# Patient Record
Sex: MALE | Race: BLACK OR AFRICAN AMERICAN | Employment: FULL TIME | URBAN - NONMETROPOLITAN AREA
[De-identification: names, ages, dates, MRNs, and addresses within clinical notes are randomized per-mention and may not be internally consistent; named-entity substitution may affect disease eponyms.]

---

## 2017-10-12 ENCOUNTER — NURSE TRIAGE (OUTPATIENT)
Dept: ADMINISTRATIVE | Age: 37
End: 2017-10-12

## 2017-10-12 NOTE — TELEPHONE ENCOUNTER
Reason for Disposition   Alcohol use, abuse, and dependence: question about  (all triage questions negative)    Answer Assessment - Initial Assessment Questions  1. DO YOU DRINK: \"Do you drink alcohol, including beer, wine or hard liquor? \"      Hard liquir  2. HOW OFTEN: \"How many days per week do you typically drink alcohol? \"     Every day for years   3. HOW MUCH: \"How many drinks do you typically have on days when you drink? \" (one drink = 1.5 oz alcohol, 5 oz wine, 12 oz beer)      Drinks all day every day   4. MOST: \"What is the most that you have had to drink on any one occasion in the last   *No Answer* month? \"     5. LAST 24 HOURS: \"Have you had a drink within the last 24 hours? \"      yes6. DRINKING PROBLEM: \"Do you have or have you ever had a drinking problem? \"      Martha Carmichael been through alcohol anonymous- states that just makes him want to drink to hear other people talking about drinking, has been to [facility]- states that he is addicted and cannot stop. Had heard that there is a shot than they can give you. He is expecting me as the \"hospital\" to have all the answer. Was going to try to direct him to Outpatient addiction services to see if they could help-explaining they I do not have all the answers, but my job is to direct him to another source that might, if I do not have that information. Maribel Lo up at this point before I could even finish my questions or before I could get him the #. Was pleasant enough, but feeling that I am not understanding the additctive nature of alcohol. That is why I was trying to transfer to outpatient addiction services. But again, he hung up before I could do it. 7. DRUG PROBLEM: Valorie Ulloaipes you using any other drugs? \" (e.g., yes/no; cocaine, prescription medications, etc.)     Assessment not finished  8. SYMPTOMS: \"What symptoms are you currently experiencing? \" (e.g., none, tremors or shakiness, abdominal pain, vomiting, blackout spells)      na  9.  DETOX PROGRAM: Lucia Peng you ever gone through a detox program?\"      yes  10. THERAPIST: \"Do you have a counselor or therapist? Name? \"       No  11. SUPPORT: \"Who is with you now? \" \"Who do you live with?\" \"Do you have family or friends nearby who you can talk to?\" \"Are you a member of Alcoholics Anonymous? \"      no12. PREGNANCY: \"Is there any chance you are pregnant? \" \"When was your last menstrual period? \"      na    Protocols used: ALCOHOL ABUSE AND DEPENDENCE-ADULT-

## 2018-06-27 ENCOUNTER — HOSPITAL ENCOUNTER (OUTPATIENT)
Age: 38
Setting detail: OBSERVATION
Discharge: HOME | End: 2018-06-28
Attending: HOSPITALIST | Admitting: HOSPITALIST
Payer: MEDICARE

## 2018-06-27 ENCOUNTER — APPOINTMENT (OUTPATIENT)
Dept: CT IMAGING | Age: 38
End: 2018-06-27
Payer: MEDICARE

## 2018-06-27 DIAGNOSIS — F10.10 ALCOHOL ABUSE: ICD-10-CM

## 2018-06-27 DIAGNOSIS — D69.6 THROMBOCYTOPENIA (HCC): ICD-10-CM

## 2018-06-27 DIAGNOSIS — R56.9 ALCOHOL WITHDRAWAL SEIZURE WITHOUT COMPLICATION (HCC): Primary | ICD-10-CM

## 2018-06-27 DIAGNOSIS — E87.6 HYPOKALEMIA: ICD-10-CM

## 2018-06-27 DIAGNOSIS — F10.930 ALCOHOL WITHDRAWAL SEIZURE WITHOUT COMPLICATION (HCC): Primary | ICD-10-CM

## 2018-06-27 PROBLEM — K70.10 ALCOHOLIC HEPATITIS WITHOUT ASCITES: Status: ACTIVE | Noted: 2018-06-27

## 2018-06-27 LAB
ACETAMINOPHEN LEVEL: < 5 UG/ML (ref 0–20)
ALBUMIN SERPL-MCNC: 3.8 G/DL (ref 3.5–5.1)
ALP BLD-CCNC: 188 U/L (ref 38–126)
ALT SERPL-CCNC: 77 U/L (ref 11–66)
AMMONIA: 39 UMOL/L (ref 11–60)
AMPHETAMINE+METHAMPHETAMINE URINE SCREEN: NEGATIVE
ANION GAP SERPL CALCULATED.3IONS-SCNC: 26 MEQ/L (ref 8–16)
ANISOCYTOSIS: PRESENT
AST SERPL-CCNC: 274 U/L (ref 5–40)
BACTERIA: ABNORMAL /HPF
BARBITURATE QUANTITATIVE URINE: NEGATIVE
BASOPHILIA: ABNORMAL
BASOPHILIC STIPPLING: ABNORMAL
BASOPHILS # BLD: 0.4 %
BASOPHILS ABSOLUTE: 0 THOU/MM3 (ref 0–0.1)
BENZODIAZEPINE QUANTITATIVE URINE: NEGATIVE
BILIRUB SERPL-MCNC: 1.1 MG/DL (ref 0.3–1.2)
BILIRUBIN DIRECT: 0.4 MG/DL (ref 0–0.3)
BILIRUBIN URINE: ABNORMAL
BLOOD, URINE: NEGATIVE
BUN BLDV-MCNC: 3 MG/DL (ref 7–22)
CALCIUM SERPL-MCNC: 9.3 MG/DL (ref 8.5–10.5)
CANNABINOID QUANTITATIVE URINE: NEGATIVE
CASTS 2: ABNORMAL /LPF
CASTS UA: ABNORMAL /LPF
CHARACTER, URINE: CLEAR
CHLORIDE BLD-SCNC: 92 MEQ/L (ref 98–111)
CO2: 22 MEQ/L (ref 23–33)
COCAINE METABOLITE QUANTITATIVE URINE: NEGATIVE
COLOR: YELLOW
CREAT SERPL-MCNC: 0.7 MG/DL (ref 0.4–1.2)
CRYSTALS, UA: ABNORMAL
EOSINOPHIL # BLD: 0.8 %
EOSINOPHILS ABSOLUTE: 0.1 THOU/MM3 (ref 0–0.4)
EPITHELIAL CELLS, UA: ABNORMAL /HPF
ERYTHROCYTE [DISTWIDTH] IN BLOOD BY AUTOMATED COUNT: 14.1 % (ref 11.5–14.5)
ERYTHROCYTE [DISTWIDTH] IN BLOOD BY AUTOMATED COUNT: 47.9 FL (ref 35–45)
ETHYL ALCOHOL, SERUM: < 0.01 %
GFR SERPL CREATININE-BSD FRML MDRD: > 90 ML/MIN/1.73M2
GLUCOSE BLD-MCNC: 119 MG/DL
GLUCOSE BLD-MCNC: 119 MG/DL (ref 70–108)
GLUCOSE BLD-MCNC: 125 MG/DL (ref 70–108)
GLUCOSE URINE: NEGATIVE MG/DL
HCT VFR BLD CALC: 31.4 % (ref 42–52)
HEMOGLOBIN: 11.2 GM/DL (ref 14–18)
ICTOTEST: NEGATIVE
IMMATURE GRANS (ABS): 0.05 THOU/MM3 (ref 0–0.07)
IMMATURE GRANULOCYTES: 0.6 %
KETONES, URINE: 40
LEUKOCYTE ESTERASE, URINE: NEGATIVE
LIPASE: 66.7 U/L (ref 5.6–51.3)
LYMPHOCYTES # BLD: 16.9 %
LYMPHOCYTES ABSOLUTE: 1.3 THOU/MM3 (ref 1–4.8)
MAGNESIUM: 1.1 MG/DL (ref 1.6–2.4)
MCH RBC QN AUTO: 33.7 PG (ref 26–33)
MCHC RBC AUTO-ENTMCNC: 35.7 GM/DL (ref 32.2–35.5)
MCV RBC AUTO: 94.6 FL (ref 80–94)
MISCELLANEOUS 2: ABNORMAL
MONOCYTES # BLD: 5.4 %
MONOCYTES ABSOLUTE: 0.4 THOU/MM3 (ref 0.4–1.3)
NITRITE, URINE: NEGATIVE
NUCLEATED RED BLOOD CELLS: 0 /100 WBC
OPIATES, URINE: NEGATIVE
OSMOLALITY CALCULATION: 277.4 MOSMOL/KG (ref 275–300)
OXYCODONE: NEGATIVE
PH UA: 8
PHENCYCLIDINE QUANTITATIVE URINE: NEGATIVE
PLATELET # BLD: 68 THOU/MM3 (ref 130–400)
PLATELET ESTIMATE: ABNORMAL
PMV BLD AUTO: 10.1 FL (ref 9.4–12.4)
POTASSIUM SERPL-SCNC: 2.8 MEQ/L (ref 3.5–5.2)
PROLACTIN: 28.2 NG/ML
PROTEIN UA: 30
RBC # BLD: 3.32 MILL/MM3 (ref 4.7–6.1)
RBC URINE: ABNORMAL /HPF
RENAL EPITHELIAL, UA: ABNORMAL
SALICYLATE, SERUM: < 0.3 MG/DL (ref 2–10)
SCAN OF BLOOD SMEAR: NORMAL
SEG NEUTROPHILS: 75.9 %
SEGMENTED NEUTROPHILS ABSOLUTE COUNT: 6 THOU/MM3 (ref 1.8–7.7)
SODIUM BLD-SCNC: 140 MEQ/L (ref 135–145)
SPECIFIC GRAVITY, URINE: 1.02 (ref 1–1.03)
TARGET CELLS: ABNORMAL
TOTAL CK: 462 U/L (ref 55–170)
TOTAL PROTEIN: 6.8 G/DL (ref 6.1–8)
TROPONIN T: < 0.01 NG/ML
UROBILINOGEN, URINE: 1 EU/DL
WBC # BLD: 7.9 THOU/MM3 (ref 4.8–10.8)
WBC UA: ABNORMAL /HPF
YEAST: ABNORMAL

## 2018-06-27 PROCEDURE — 36415 COLL VENOUS BLD VENIPUNCTURE: CPT

## 2018-06-27 PROCEDURE — 72125 CT NECK SPINE W/O DYE: CPT

## 2018-06-27 PROCEDURE — 84484 ASSAY OF TROPONIN QUANT: CPT

## 2018-06-27 PROCEDURE — 82948 REAGENT STRIP/BLOOD GLUCOSE: CPT

## 2018-06-27 PROCEDURE — 83735 ASSAY OF MAGNESIUM: CPT

## 2018-06-27 PROCEDURE — 70450 CT HEAD/BRAIN W/O DYE: CPT

## 2018-06-27 PROCEDURE — 84146 ASSAY OF PROLACTIN: CPT

## 2018-06-27 PROCEDURE — 2580000003 HC RX 258: Performed by: PHYSICIAN ASSISTANT

## 2018-06-27 PROCEDURE — 83690 ASSAY OF LIPASE: CPT

## 2018-06-27 PROCEDURE — 82140 ASSAY OF AMMONIA: CPT

## 2018-06-27 PROCEDURE — 85025 COMPLETE CBC W/AUTO DIFF WBC: CPT

## 2018-06-27 PROCEDURE — 93005 ELECTROCARDIOGRAM TRACING: CPT | Performed by: PHYSICIAN ASSISTANT

## 2018-06-27 PROCEDURE — 96375 TX/PRO/DX INJ NEW DRUG ADDON: CPT

## 2018-06-27 PROCEDURE — 82550 ASSAY OF CK (CPK): CPT

## 2018-06-27 PROCEDURE — 80307 DRUG TEST PRSMV CHEM ANLYZR: CPT

## 2018-06-27 PROCEDURE — 2500000003 HC RX 250 WO HCPCS: Performed by: PHYSICIAN ASSISTANT

## 2018-06-27 PROCEDURE — 80053 COMPREHEN METABOLIC PANEL: CPT

## 2018-06-27 PROCEDURE — 6370000000 HC RX 637 (ALT 250 FOR IP): Performed by: PHYSICIAN ASSISTANT

## 2018-06-27 PROCEDURE — 81001 URINALYSIS AUTO W/SCOPE: CPT

## 2018-06-27 PROCEDURE — G0480 DRUG TEST DEF 1-7 CLASSES: HCPCS

## 2018-06-27 PROCEDURE — 96365 THER/PROPH/DIAG IV INF INIT: CPT

## 2018-06-27 PROCEDURE — 99220 PR INITIAL OBSERVATION CARE/DAY 70 MINUTES: CPT | Performed by: HOSPITALIST

## 2018-06-27 PROCEDURE — 82248 BILIRUBIN DIRECT: CPT

## 2018-06-27 PROCEDURE — 99285 EMERGENCY DEPT VISIT HI MDM: CPT

## 2018-06-27 PROCEDURE — 6360000002 HC RX W HCPCS: Performed by: PHYSICIAN ASSISTANT

## 2018-06-27 RX ORDER — POTASSIUM CHLORIDE 7.45 MG/ML
10 INJECTION INTRAVENOUS
Status: COMPLETED | OUTPATIENT
Start: 2018-06-28 | End: 2018-06-28

## 2018-06-27 RX ORDER — THIAMINE HYDROCHLORIDE 100 MG/ML
100 INJECTION, SOLUTION INTRAMUSCULAR; INTRAVENOUS DAILY
Status: DISCONTINUED | OUTPATIENT
Start: 2018-06-28 | End: 2018-06-27

## 2018-06-27 RX ORDER — THIAMINE HYDROCHLORIDE 100 MG/ML
100 INJECTION, SOLUTION INTRAMUSCULAR; INTRAVENOUS ONCE
Status: COMPLETED | OUTPATIENT
Start: 2018-06-27 | End: 2018-06-27

## 2018-06-27 RX ORDER — MAGNESIUM SULFATE IN WATER 40 MG/ML
2 INJECTION, SOLUTION INTRAVENOUS ONCE
Status: COMPLETED | OUTPATIENT
Start: 2018-06-28 | End: 2018-06-28

## 2018-06-27 RX ORDER — 0.9 % SODIUM CHLORIDE 0.9 %
1000 INTRAVENOUS SOLUTION INTRAVENOUS ONCE
Status: COMPLETED | OUTPATIENT
Start: 2018-06-27 | End: 2018-06-28

## 2018-06-27 RX ORDER — POTASSIUM CHLORIDE 20 MEQ/1
40 TABLET, EXTENDED RELEASE ORAL ONCE
Status: COMPLETED | OUTPATIENT
Start: 2018-06-27 | End: 2018-06-27

## 2018-06-27 RX ADMIN — SODIUM CHLORIDE 1000 ML: 9 INJECTION, SOLUTION INTRAVENOUS at 21:37

## 2018-06-27 RX ADMIN — FOLIC ACID 1 MG: 5 INJECTION, SOLUTION INTRAMUSCULAR; INTRAVENOUS; SUBCUTANEOUS at 23:14

## 2018-06-27 RX ADMIN — POTASSIUM CHLORIDE 40 MEQ: 20 TABLET, EXTENDED RELEASE ORAL at 23:00

## 2018-06-27 RX ADMIN — THIAMINE HYDROCHLORIDE 100 MG: 100 INJECTION, SOLUTION INTRAMUSCULAR; INTRAVENOUS at 22:12

## 2018-06-27 ASSESSMENT — ENCOUNTER SYMPTOMS
RHINORRHEA: 0
NAUSEA: 0
ABDOMINAL PAIN: 0
SHORTNESS OF BREATH: 0
BACK PAIN: 0
DIARRHEA: 0
VOMITING: 0
EYE REDNESS: 0
WHEEZING: 0
SORE THROAT: 0
EYE DISCHARGE: 0
COUGH: 0

## 2018-06-28 ENCOUNTER — APPOINTMENT (OUTPATIENT)
Dept: ULTRASOUND IMAGING | Age: 38
End: 2018-06-28
Payer: MEDICARE

## 2018-06-28 VITALS
BODY MASS INDEX: 16.91 KG/M2 | HEART RATE: 94 BPM | RESPIRATION RATE: 16 BRPM | OXYGEN SATURATION: 98 % | SYSTOLIC BLOOD PRESSURE: 141 MMHG | DIASTOLIC BLOOD PRESSURE: 100 MMHG | WEIGHT: 120.8 LBS | HEIGHT: 71 IN | TEMPERATURE: 99.2 F

## 2018-06-28 PROBLEM — F10.10 ALCOHOL ABUSE: Status: ACTIVE | Noted: 2018-06-28

## 2018-06-28 PROBLEM — D69.59 THROMBOCYTOPENIA CONCURRENT WITH AND DUE TO ALCOHOLISM (HCC): Status: ACTIVE | Noted: 2018-06-28

## 2018-06-28 PROBLEM — F10.20 THROMBOCYTOPENIA CONCURRENT WITH AND DUE TO ALCOHOLISM (HCC): Status: ACTIVE | Noted: 2018-06-28

## 2018-06-28 LAB
ALBUMIN SERPL-MCNC: 3.5 G/DL (ref 3.5–5.1)
ALP BLD-CCNC: 188 U/L (ref 38–126)
ALT SERPL-CCNC: 62 U/L (ref 11–66)
ANION GAP SERPL CALCULATED.3IONS-SCNC: 16 MEQ/L (ref 8–16)
AST SERPL-CCNC: 165 U/L (ref 5–40)
BASOPHILS # BLD: 0.3 %
BASOPHILS ABSOLUTE: 0 THOU/MM3 (ref 0–0.1)
BILIRUB SERPL-MCNC: 1.2 MG/DL (ref 0.3–1.2)
BUN BLDV-MCNC: < 2 MG/DL (ref 7–22)
CALCIUM SERPL-MCNC: 8.8 MG/DL (ref 8.5–10.5)
CHLORIDE BLD-SCNC: 96 MEQ/L (ref 98–111)
CO2: 24 MEQ/L (ref 23–33)
CREAT SERPL-MCNC: 0.5 MG/DL (ref 0.4–1.2)
EKG ATRIAL RATE: 92 BPM
EKG ATRIAL RATE: 99 BPM
EKG ATRIAL RATE: 99 BPM
EKG P AXIS: 81 DEGREES
EKG P AXIS: 82 DEGREES
EKG P AXIS: 84 DEGREES
EKG P-R INTERVAL: 122 MS
EKG P-R INTERVAL: 122 MS
EKG P-R INTERVAL: 124 MS
EKG Q-T INTERVAL: 378 MS
EKG Q-T INTERVAL: 386 MS
EKG Q-T INTERVAL: 394 MS
EKG QRS DURATION: 74 MS
EKG QRS DURATION: 76 MS
EKG QRS DURATION: 78 MS
EKG QTC CALCULATION (BAZETT): 485 MS
EKG QTC CALCULATION (BAZETT): 487 MS
EKG QTC CALCULATION (BAZETT): 495 MS
EKG R AXIS: 80 DEGREES
EKG R AXIS: 80 DEGREES
EKG R AXIS: 82 DEGREES
EKG T AXIS: 80 DEGREES
EKG T AXIS: 80 DEGREES
EKG T AXIS: 82 DEGREES
EKG VENTRICULAR RATE: 92 BPM
EKG VENTRICULAR RATE: 99 BPM
EKG VENTRICULAR RATE: 99 BPM
EOSINOPHIL # BLD: 2.1 %
EOSINOPHILS ABSOLUTE: 0.2 THOU/MM3 (ref 0–0.4)
ERYTHROCYTE [DISTWIDTH] IN BLOOD BY AUTOMATED COUNT: 14.4 % (ref 11.5–14.5)
ERYTHROCYTE [DISTWIDTH] IN BLOOD BY AUTOMATED COUNT: 49.6 FL (ref 35–45)
GFR SERPL CREATININE-BSD FRML MDRD: > 90 ML/MIN/1.73M2
GLUCOSE BLD-MCNC: 107 MG/DL (ref 70–108)
HAV IGM SER IA-ACNC: NEGATIVE
HCT VFR BLD CALC: 33.1 % (ref 42–52)
HEMOGLOBIN: 11.5 GM/DL (ref 14–18)
HEPATITIS B CORE IGM ANTIBODY: NEGATIVE
HEPATITIS B SURFACE ANTIGEN: NEGATIVE
HEPATITIS C ANTIBODY: NEGATIVE
IMMATURE GRANS (ABS): 0.05 THOU/MM3 (ref 0–0.07)
IMMATURE GRANULOCYTES: 0.6 %
LYMPHOCYTES # BLD: 19.7 %
LYMPHOCYTES ABSOLUTE: 1.5 THOU/MM3 (ref 1–4.8)
MAGNESIUM: 2.1 MG/DL (ref 1.6–2.4)
MCH RBC QN AUTO: 33.8 PG (ref 26–33)
MCHC RBC AUTO-ENTMCNC: 34.7 GM/DL (ref 32.2–35.5)
MCV RBC AUTO: 97.4 FL (ref 80–94)
MONOCYTES # BLD: 5 %
MONOCYTES ABSOLUTE: 0.4 THOU/MM3 (ref 0.4–1.3)
NUCLEATED RED BLOOD CELLS: 0 /100 WBC
PLATELET # BLD: 64 THOU/MM3 (ref 130–400)
PMV BLD AUTO: 10.2 FL (ref 9.4–12.4)
POTASSIUM REFLEX MAGNESIUM: 3 MEQ/L (ref 3.5–5.2)
POTASSIUM SERPL-SCNC: 3.2 MEQ/L (ref 3.5–5.2)
RBC # BLD: 3.4 MILL/MM3 (ref 4.7–6.1)
SEG NEUTROPHILS: 72.3 %
SEGMENTED NEUTROPHILS ABSOLUTE COUNT: 5.6 THOU/MM3 (ref 1.8–7.7)
SODIUM BLD-SCNC: 136 MEQ/L (ref 135–145)
TOTAL PROTEIN: 6.6 G/DL (ref 6.1–8)
WBC # BLD: 7.8 THOU/MM3 (ref 4.8–10.8)

## 2018-06-28 PROCEDURE — G0378 HOSPITAL OBSERVATION PER HR: HCPCS

## 2018-06-28 PROCEDURE — 76705 ECHO EXAM OF ABDOMEN: CPT

## 2018-06-28 PROCEDURE — 36415 COLL VENOUS BLD VENIPUNCTURE: CPT

## 2018-06-28 PROCEDURE — 85025 COMPLETE CBC W/AUTO DIFF WBC: CPT

## 2018-06-28 PROCEDURE — 96366 THER/PROPH/DIAG IV INF ADDON: CPT

## 2018-06-28 PROCEDURE — 6370000000 HC RX 637 (ALT 250 FOR IP): Performed by: HOSPITALIST

## 2018-06-28 PROCEDURE — 93010 ELECTROCARDIOGRAM REPORT: CPT | Performed by: NUCLEAR MEDICINE

## 2018-06-28 PROCEDURE — 6360000002 HC RX W HCPCS: Performed by: PHYSICIAN ASSISTANT

## 2018-06-28 PROCEDURE — 6360000002 HC RX W HCPCS: Performed by: HOSPITALIST

## 2018-06-28 PROCEDURE — 96367 TX/PROPH/DG ADDL SEQ IV INF: CPT

## 2018-06-28 PROCEDURE — 2500000003 HC RX 250 WO HCPCS: Performed by: HOSPITALIST

## 2018-06-28 PROCEDURE — 99217 PR OBSERVATION CARE DISCHARGE MANAGEMENT: CPT | Performed by: INTERNAL MEDICINE

## 2018-06-28 PROCEDURE — 93005 ELECTROCARDIOGRAM TRACING: CPT | Performed by: HOSPITALIST

## 2018-06-28 PROCEDURE — 84132 ASSAY OF SERUM POTASSIUM: CPT

## 2018-06-28 PROCEDURE — 83735 ASSAY OF MAGNESIUM: CPT

## 2018-06-28 PROCEDURE — 80053 COMPREHEN METABOLIC PANEL: CPT

## 2018-06-28 PROCEDURE — 80074 ACUTE HEPATITIS PANEL: CPT

## 2018-06-28 RX ORDER — MULTIVITAMIN WITH FOLIC ACID 400 MCG
1 TABLET ORAL DAILY
Status: DISCONTINUED | OUTPATIENT
Start: 2018-06-28 | End: 2018-06-28 | Stop reason: HOSPADM

## 2018-06-28 RX ORDER — POTASSIUM CHLORIDE 20 MEQ/1
40 TABLET, EXTENDED RELEASE ORAL ONCE
Status: COMPLETED | OUTPATIENT
Start: 2018-06-28 | End: 2018-06-28

## 2018-06-28 RX ORDER — FOLIC ACID 1 MG/1
1 TABLET ORAL DAILY
Status: DISCONTINUED | OUTPATIENT
Start: 2018-06-28 | End: 2018-06-28 | Stop reason: HOSPADM

## 2018-06-28 RX ORDER — LORAZEPAM 2 MG/ML
3 INJECTION INTRAMUSCULAR
Status: DISCONTINUED | OUTPATIENT
Start: 2018-06-28 | End: 2018-06-28 | Stop reason: HOSPADM

## 2018-06-28 RX ORDER — LORAZEPAM 2 MG/ML
4 INJECTION INTRAMUSCULAR
Status: DISCONTINUED | OUTPATIENT
Start: 2018-06-28 | End: 2018-06-28 | Stop reason: HOSPADM

## 2018-06-28 RX ORDER — LORAZEPAM 2 MG/1
4 TABLET ORAL
Status: DISCONTINUED | OUTPATIENT
Start: 2018-06-28 | End: 2018-06-28 | Stop reason: HOSPADM

## 2018-06-28 RX ORDER — POTASSIUM CHLORIDE 20MEQ/15ML
40 LIQUID (ML) ORAL PRN
Status: DISCONTINUED | OUTPATIENT
Start: 2018-06-28 | End: 2018-06-28 | Stop reason: HOSPADM

## 2018-06-28 RX ORDER — SODIUM CHLORIDE 0.9 % (FLUSH) 0.9 %
10 SYRINGE (ML) INJECTION PRN
Status: DISCONTINUED | OUTPATIENT
Start: 2018-06-28 | End: 2018-06-28 | Stop reason: HOSPADM

## 2018-06-28 RX ORDER — DEXTROSE, SODIUM CHLORIDE, AND POTASSIUM CHLORIDE 5; .45; .15 G/100ML; G/100ML; G/100ML
INJECTION INTRAVENOUS CONTINUOUS
Status: DISCONTINUED | OUTPATIENT
Start: 2018-06-28 | End: 2018-06-28 | Stop reason: HOSPADM

## 2018-06-28 RX ORDER — POTASSIUM CHLORIDE 20 MEQ/1
20 TABLET, EXTENDED RELEASE ORAL DAILY
Qty: 3 TABLET | Refills: 0 | Status: SHIPPED | OUTPATIENT
Start: 2018-06-28 | End: 2019-08-01

## 2018-06-28 RX ORDER — POTASSIUM CHLORIDE 7.45 MG/ML
10 INJECTION INTRAVENOUS PRN
Status: DISCONTINUED | OUTPATIENT
Start: 2018-06-28 | End: 2018-06-28 | Stop reason: HOSPADM

## 2018-06-28 RX ORDER — CHLORDIAZEPOXIDE HYDROCHLORIDE 10 MG/1
10 CAPSULE, GELATIN COATED ORAL 3 TIMES DAILY PRN
Qty: 30 CAPSULE | Refills: 0 | Status: SHIPPED | OUTPATIENT
Start: 2018-06-28 | End: 2018-07-08

## 2018-06-28 RX ORDER — LORAZEPAM 2 MG/ML
2 INJECTION INTRAMUSCULAR
Status: DISCONTINUED | OUTPATIENT
Start: 2018-06-28 | End: 2018-06-28 | Stop reason: HOSPADM

## 2018-06-28 RX ORDER — THIAMINE MONONITRATE (VIT B1) 100 MG
100 TABLET ORAL DAILY
Status: DISCONTINUED | OUTPATIENT
Start: 2018-06-28 | End: 2018-06-28 | Stop reason: HOSPADM

## 2018-06-28 RX ORDER — POTASSIUM CHLORIDE 20 MEQ/1
40 TABLET, EXTENDED RELEASE ORAL PRN
Status: DISCONTINUED | OUTPATIENT
Start: 2018-06-28 | End: 2018-06-28 | Stop reason: HOSPADM

## 2018-06-28 RX ORDER — SODIUM CHLORIDE 0.9 % (FLUSH) 0.9 %
10 SYRINGE (ML) INJECTION EVERY 12 HOURS SCHEDULED
Status: DISCONTINUED | OUTPATIENT
Start: 2018-06-28 | End: 2018-06-28 | Stop reason: HOSPADM

## 2018-06-28 RX ORDER — ONDANSETRON 2 MG/ML
4 INJECTION INTRAMUSCULAR; INTRAVENOUS EVERY 6 HOURS PRN
Status: DISCONTINUED | OUTPATIENT
Start: 2018-06-28 | End: 2018-06-28 | Stop reason: HOSPADM

## 2018-06-28 RX ORDER — LORAZEPAM 2 MG/ML
1 INJECTION INTRAMUSCULAR
Status: DISCONTINUED | OUTPATIENT
Start: 2018-06-28 | End: 2018-06-28 | Stop reason: HOSPADM

## 2018-06-28 RX ORDER — LORAZEPAM 1 MG/1
1 TABLET ORAL
Status: DISCONTINUED | OUTPATIENT
Start: 2018-06-28 | End: 2018-06-28 | Stop reason: HOSPADM

## 2018-06-28 RX ORDER — LORAZEPAM 2 MG/1
2 TABLET ORAL
Status: DISCONTINUED | OUTPATIENT
Start: 2018-06-28 | End: 2018-06-28 | Stop reason: HOSPADM

## 2018-06-28 RX ADMIN — POTASSIUM CHLORIDE 10 MEQ: 7.46 INJECTION, SOLUTION INTRAVENOUS at 06:13

## 2018-06-28 RX ADMIN — MAGNESIUM SULFATE HEPTAHYDRATE 2 G: 40 INJECTION, SOLUTION INTRAVENOUS at 01:26

## 2018-06-28 RX ADMIN — Medication 100 MG: at 07:47

## 2018-06-28 RX ADMIN — THERA TABS 1 TABLET: TAB at 07:47

## 2018-06-28 RX ADMIN — FOLIC ACID 1 MG: 1 TABLET ORAL at 07:47

## 2018-06-28 RX ADMIN — POTASSIUM CHLORIDE 10 MEQ: 7.46 INJECTION, SOLUTION INTRAVENOUS at 03:26

## 2018-06-28 RX ADMIN — POTASSIUM CHLORIDE 10 MEQ: 7.46 INJECTION, SOLUTION INTRAVENOUS at 02:19

## 2018-06-28 RX ADMIN — POTASSIUM CHLORIDE, DEXTROSE MONOHYDRATE AND SODIUM CHLORIDE: 150; 5; 450 INJECTION, SOLUTION INTRAVENOUS at 01:21

## 2018-06-28 RX ADMIN — POTASSIUM CHLORIDE 40 MEQ: 20 TABLET, EXTENDED RELEASE ORAL at 12:08

## 2018-06-28 RX ADMIN — POTASSIUM CHLORIDE 10 MEQ: 7.46 INJECTION, SOLUTION INTRAVENOUS at 01:22

## 2018-06-28 ASSESSMENT — PAIN DESCRIPTION - PROGRESSION: CLINICAL_PROGRESSION: NOT CHANGED

## 2018-06-28 ASSESSMENT — PAIN SCALES - GENERAL
PAINLEVEL_OUTOF10: 6
PAINLEVEL_OUTOF10: 0

## 2018-06-28 ASSESSMENT — PAIN DESCRIPTION - FREQUENCY: FREQUENCY: INTERMITTENT

## 2018-06-28 ASSESSMENT — PAIN DESCRIPTION - ORIENTATION: ORIENTATION: MID;ANTERIOR

## 2018-06-28 ASSESSMENT — PAIN DESCRIPTION - ONSET: ONSET: ON-GOING

## 2018-06-28 ASSESSMENT — PAIN DESCRIPTION - DESCRIPTORS: DESCRIPTORS: HEADACHE

## 2018-06-28 ASSESSMENT — PAIN DESCRIPTION - PAIN TYPE: TYPE: ACUTE PAIN

## 2018-06-28 ASSESSMENT — PAIN DESCRIPTION - LOCATION: LOCATION: HEAD

## 2018-07-24 ENCOUNTER — TELEPHONE (OUTPATIENT)
Dept: FAMILY MEDICINE CLINIC | Age: 38
End: 2018-07-24

## 2018-07-24 NOTE — TELEPHONE ENCOUNTER
Due to office policy, patient has been dismissed from [address] due to no show of new patient appointment. Standard and certified letters have been mailed to patient.

## 2019-08-01 ENCOUNTER — APPOINTMENT (OUTPATIENT)
Dept: GENERAL RADIOLOGY | Age: 39
End: 2019-08-01
Payer: MEDICARE

## 2019-08-01 ENCOUNTER — APPOINTMENT (OUTPATIENT)
Dept: CT IMAGING | Age: 39
End: 2019-08-01
Payer: MEDICARE

## 2019-08-01 ENCOUNTER — HOSPITAL ENCOUNTER (EMERGENCY)
Age: 39
Discharge: HOME | End: 2019-08-01
Attending: FAMILY MEDICINE
Payer: MEDICARE

## 2019-08-01 VITALS
HEIGHT: 71 IN | SYSTOLIC BLOOD PRESSURE: 128 MMHG | BODY MASS INDEX: 18.2 KG/M2 | TEMPERATURE: 97.9 F | HEART RATE: 87 BPM | WEIGHT: 130 LBS | RESPIRATION RATE: 18 BRPM | DIASTOLIC BLOOD PRESSURE: 96 MMHG | OXYGEN SATURATION: 99 %

## 2019-08-01 DIAGNOSIS — R74.8 ELEVATED LIPASE: ICD-10-CM

## 2019-08-01 DIAGNOSIS — R11.2 NON-INTRACTABLE VOMITING WITH NAUSEA, UNSPECIFIED VOMITING TYPE: Primary | ICD-10-CM

## 2019-08-01 DIAGNOSIS — K86.2 PANCREATIC CYST: ICD-10-CM

## 2019-08-01 DIAGNOSIS — Z78.9 ALCOHOL USE: ICD-10-CM

## 2019-08-01 DIAGNOSIS — E87.6 HYPOKALEMIA: ICD-10-CM

## 2019-08-01 LAB
ALBUMIN SERPL-MCNC: 4.9 G/DL (ref 3.5–5.1)
ALP BLD-CCNC: 127 U/L (ref 38–126)
ALT SERPL-CCNC: 32 U/L (ref 11–66)
AMPHETAMINE+METHAMPHETAMINE URINE SCREEN: NEGATIVE
ANION GAP SERPL CALCULATED.3IONS-SCNC: 17 MEQ/L (ref 8–16)
ANION GAP SERPL CALCULATED.3IONS-SCNC: 23 MEQ/L (ref 8–16)
AST SERPL-CCNC: 82 U/L (ref 5–40)
BACTERIA: ABNORMAL /HPF
BARBITURATE QUANTITATIVE URINE: NEGATIVE
BASOPHILS # BLD: 0.7 %
BASOPHILS ABSOLUTE: 0 THOU/MM3 (ref 0–0.1)
BENZODIAZEPINE QUANTITATIVE URINE: NEGATIVE
BILIRUB SERPL-MCNC: 0.7 MG/DL (ref 0.3–1.2)
BILIRUBIN DIRECT: < 0.2 MG/DL (ref 0–0.3)
BILIRUBIN URINE: ABNORMAL
BLOOD, URINE: NEGATIVE
BUN BLDV-MCNC: 21 MG/DL (ref 7–22)
BUN BLDV-MCNC: 24 MG/DL (ref 7–22)
CALCIUM SERPL-MCNC: 10 MG/DL (ref 8.5–10.5)
CALCIUM SERPL-MCNC: 10.9 MG/DL (ref 8.5–10.5)
CANNABINOID QUANTITATIVE URINE: NEGATIVE
CASTS 2: ABNORMAL /LPF
CASTS UA: ABNORMAL /LPF
CHARACTER, URINE: CLEAR
CHLORIDE BLD-SCNC: 85 MEQ/L (ref 98–111)
CHLORIDE BLD-SCNC: 89 MEQ/L (ref 98–111)
CO2: 25 MEQ/L (ref 23–33)
CO2: 25 MEQ/L (ref 23–33)
COCAINE METABOLITE QUANTITATIVE URINE: NEGATIVE
COLOR: YELLOW
CREAT SERPL-MCNC: 0.9 MG/DL (ref 0.4–1.2)
CREAT SERPL-MCNC: 0.9 MG/DL (ref 0.4–1.2)
CRYSTALS, UA: ABNORMAL
EKG ATRIAL RATE: 100 BPM
EKG P AXIS: 84 DEGREES
EKG P-R INTERVAL: 124 MS
EKG Q-T INTERVAL: 358 MS
EKG QRS DURATION: 84 MS
EKG QTC CALCULATION (BAZETT): 461 MS
EKG R AXIS: 85 DEGREES
EKG T AXIS: 85 DEGREES
EKG VENTRICULAR RATE: 100 BPM
EOSINOPHIL # BLD: 0.7 %
EOSINOPHILS ABSOLUTE: 0 THOU/MM3 (ref 0–0.4)
EPITHELIAL CELLS, UA: ABNORMAL /HPF
ERYTHROCYTE [DISTWIDTH] IN BLOOD BY AUTOMATED COUNT: 14.1 % (ref 11.5–14.5)
ERYTHROCYTE [DISTWIDTH] IN BLOOD BY AUTOMATED COUNT: 48.8 FL (ref 35–45)
ETHYL ALCOHOL, SERUM: < 0.01 %
GFR SERPL CREATININE-BSD FRML MDRD: > 90 ML/MIN/1.73M2
GFR SERPL CREATININE-BSD FRML MDRD: > 90 ML/MIN/1.73M2
GLUCOSE BLD-MCNC: 109 MG/DL (ref 70–108)
GLUCOSE BLD-MCNC: 115 MG/DL (ref 70–108)
GLUCOSE URINE: NEGATIVE MG/DL
HCT VFR BLD CALC: 43.2 % (ref 42–52)
HEMOGLOBIN: 15.4 GM/DL (ref 14–18)
ICTOTEST: NEGATIVE
IMMATURE GRANS (ABS): 0.02 THOU/MM3 (ref 0–0.07)
IMMATURE GRANULOCYTES: 0.4 %
KETONES, URINE: 15
LEUKOCYTE ESTERASE, URINE: NEGATIVE
LIPASE: 134.4 U/L (ref 5.6–51.3)
LYMPHOCYTES # BLD: 31.7 %
LYMPHOCYTES ABSOLUTE: 1.8 THOU/MM3 (ref 1–4.8)
MAGNESIUM: 1.8 MG/DL (ref 1.6–2.4)
MCH RBC QN AUTO: 33.6 PG (ref 26–33)
MCHC RBC AUTO-ENTMCNC: 35.6 GM/DL (ref 32.2–35.5)
MCV RBC AUTO: 94.3 FL (ref 80–94)
MISCELLANEOUS 2: ABNORMAL
MONOCYTES # BLD: 16.5 %
MONOCYTES ABSOLUTE: 0.9 THOU/MM3 (ref 0.4–1.3)
NITRITE, URINE: NEGATIVE
NUCLEATED RED BLOOD CELLS: 0 /100 WBC
OPIATES, URINE: NEGATIVE
OSMOLALITY CALCULATION: 266.2 MOSMOL/KG (ref 275–300)
OSMOLALITY CALCULATION: 271.3 MOSMOL/KG (ref 275–300)
OXYCODONE: NEGATIVE
PH UA: 7 (ref 5–9)
PHENCYCLIDINE QUANTITATIVE URINE: NEGATIVE
PLATELET # BLD: 209 THOU/MM3 (ref 130–400)
PMV BLD AUTO: 10.5 FL (ref 9.4–12.4)
POTASSIUM SERPL-SCNC: 3.2 MEQ/L (ref 3.5–5.2)
POTASSIUM SERPL-SCNC: 3.3 MEQ/L (ref 3.5–5.2)
PRO-BNP: 18.6 PG/ML (ref 0–450)
PROTEIN UA: ABNORMAL
RBC # BLD: 4.58 MILL/MM3 (ref 4.7–6.1)
RBC URINE: ABNORMAL /HPF
RENAL EPITHELIAL, UA: ABNORMAL
SEG NEUTROPHILS: 50 %
SEGMENTED NEUTROPHILS ABSOLUTE COUNT: 2.8 THOU/MM3 (ref 1.8–7.7)
SODIUM BLD-SCNC: 131 MEQ/L (ref 135–145)
SODIUM BLD-SCNC: 133 MEQ/L (ref 135–145)
SPECIFIC GRAVITY, URINE: 1.03 (ref 1–1.03)
TOTAL PROTEIN: 8.4 G/DL (ref 6.1–8)
TROPONIN T: < 0.01 NG/ML
UROBILINOGEN, URINE: 1 EU/DL (ref 0–1)
WBC # BLD: 5.6 THOU/MM3 (ref 4.8–10.8)
WBC UA: ABNORMAL /HPF
YEAST: ABNORMAL

## 2019-08-01 PROCEDURE — 6360000002 HC RX W HCPCS: Performed by: FAMILY MEDICINE

## 2019-08-01 PROCEDURE — 2580000003 HC RX 258: Performed by: FAMILY MEDICINE

## 2019-08-01 PROCEDURE — 93010 ELECTROCARDIOGRAM REPORT: CPT | Performed by: INTERNAL MEDICINE

## 2019-08-01 PROCEDURE — 83690 ASSAY OF LIPASE: CPT

## 2019-08-01 PROCEDURE — 36415 COLL VENOUS BLD VENIPUNCTURE: CPT

## 2019-08-01 PROCEDURE — 80048 BASIC METABOLIC PNL TOTAL CA: CPT

## 2019-08-01 PROCEDURE — 6360000004 HC RX CONTRAST MEDICATION: Performed by: FAMILY MEDICINE

## 2019-08-01 PROCEDURE — 80307 DRUG TEST PRSMV CHEM ANLYZR: CPT

## 2019-08-01 PROCEDURE — 93005 ELECTROCARDIOGRAM TRACING: CPT | Performed by: FAMILY MEDICINE

## 2019-08-01 PROCEDURE — 84484 ASSAY OF TROPONIN QUANT: CPT

## 2019-08-01 PROCEDURE — 81001 URINALYSIS AUTO W/SCOPE: CPT

## 2019-08-01 PROCEDURE — 2500000003 HC RX 250 WO HCPCS: Performed by: FAMILY MEDICINE

## 2019-08-01 PROCEDURE — 71046 X-RAY EXAM CHEST 2 VIEWS: CPT

## 2019-08-01 PROCEDURE — 83735 ASSAY OF MAGNESIUM: CPT

## 2019-08-01 PROCEDURE — 99285 EMERGENCY DEPT VISIT HI MDM: CPT

## 2019-08-01 PROCEDURE — 96375 TX/PRO/DX INJ NEW DRUG ADDON: CPT

## 2019-08-01 PROCEDURE — 80053 COMPREHEN METABOLIC PANEL: CPT

## 2019-08-01 PROCEDURE — 74177 CT ABD & PELVIS W/CONTRAST: CPT

## 2019-08-01 PROCEDURE — 83880 ASSAY OF NATRIURETIC PEPTIDE: CPT

## 2019-08-01 PROCEDURE — 85025 COMPLETE CBC W/AUTO DIFF WBC: CPT

## 2019-08-01 PROCEDURE — 6370000000 HC RX 637 (ALT 250 FOR IP): Performed by: FAMILY MEDICINE

## 2019-08-01 PROCEDURE — G0480 DRUG TEST DEF 1-7 CLASSES: HCPCS

## 2019-08-01 PROCEDURE — 82248 BILIRUBIN DIRECT: CPT

## 2019-08-01 PROCEDURE — 96361 HYDRATE IV INFUSION ADD-ON: CPT

## 2019-08-01 PROCEDURE — 96374 THER/PROPH/DIAG INJ IV PUSH: CPT

## 2019-08-01 RX ORDER — POTASSIUM CHLORIDE 750 MG/1
40 TABLET, FILM COATED, EXTENDED RELEASE ORAL ONCE
Status: COMPLETED | OUTPATIENT
Start: 2019-08-01 | End: 2019-08-01

## 2019-08-01 RX ORDER — ONDANSETRON 2 MG/ML
4 INJECTION INTRAMUSCULAR; INTRAVENOUS ONCE
Status: COMPLETED | OUTPATIENT
Start: 2019-08-01 | End: 2019-08-01

## 2019-08-01 RX ORDER — ONDANSETRON 4 MG/1
4 TABLET, ORALLY DISINTEGRATING ORAL EVERY 8 HOURS PRN
Qty: 6 TABLET | Refills: 0 | Status: SHIPPED | OUTPATIENT
Start: 2019-08-01

## 2019-08-01 RX ORDER — CHLORDIAZEPOXIDE HYDROCHLORIDE 25 MG/1
25 CAPSULE, GELATIN COATED ORAL 3 TIMES DAILY PRN
Qty: 9 CAPSULE | Refills: 0 | Status: SHIPPED | OUTPATIENT
Start: 2019-08-01 | End: 2019-08-04

## 2019-08-01 RX ORDER — POTASSIUM CHLORIDE 7.45 MG/ML
10 INJECTION INTRAVENOUS ONCE
Status: COMPLETED | OUTPATIENT
Start: 2019-08-01 | End: 2019-08-01

## 2019-08-01 RX ORDER — FAMOTIDINE 20 MG/1
20 TABLET, FILM COATED ORAL 2 TIMES DAILY
Qty: 60 TABLET | Refills: 3 | Status: ON HOLD | OUTPATIENT
Start: 2019-08-01 | End: 2019-12-17

## 2019-08-01 RX ORDER — 0.9 % SODIUM CHLORIDE 0.9 %
1000 INTRAVENOUS SOLUTION INTRAVENOUS ONCE
Status: DISCONTINUED | OUTPATIENT
Start: 2019-08-01 | End: 2019-08-01 | Stop reason: HOSPADM

## 2019-08-01 RX ORDER — 0.9 % SODIUM CHLORIDE 0.9 %
1000 INTRAVENOUS SOLUTION INTRAVENOUS ONCE
Status: COMPLETED | OUTPATIENT
Start: 2019-08-01 | End: 2019-08-01

## 2019-08-01 RX ORDER — POTASSIUM CHLORIDE 20 MEQ/1
20 TABLET, EXTENDED RELEASE ORAL DAILY
Qty: 3 TABLET | Refills: 0 | Status: ON HOLD | OUTPATIENT
Start: 2019-08-01 | End: 2019-12-17

## 2019-08-01 RX ADMIN — ONDANSETRON 4 MG: 2 INJECTION INTRAMUSCULAR; INTRAVENOUS at 12:59

## 2019-08-01 RX ADMIN — POTASSIUM CHLORIDE 10 MEQ: 7.46 INJECTION, SOLUTION INTRAVENOUS at 16:59

## 2019-08-01 RX ADMIN — IOPAMIDOL 80 ML: 755 INJECTION, SOLUTION INTRAVENOUS at 14:01

## 2019-08-01 RX ADMIN — SODIUM CHLORIDE 1000 ML: 9 INJECTION, SOLUTION INTRAVENOUS at 13:03

## 2019-08-01 RX ADMIN — FAMOTIDINE 20 MG: 10 INJECTION, SOLUTION INTRAVENOUS at 12:59

## 2019-08-01 RX ADMIN — POTASSIUM CHLORIDE 40 MEQ: 750 TABLET, FILM COATED, EXTENDED RELEASE ORAL at 14:32

## 2019-08-01 ASSESSMENT — ENCOUNTER SYMPTOMS
SORE THROAT: 0
DIARRHEA: 0
ABDOMINAL PAIN: 0
NAUSEA: 1
EYE REDNESS: 0
SHORTNESS OF BREATH: 1
COUGH: 0
RHINORRHEA: 0
BLOOD IN STOOL: 0
EYE DISCHARGE: 0
WHEEZING: 0
VOMITING: 1
BACK PAIN: 0
CONSTIPATION: 0

## 2019-08-01 ASSESSMENT — PAIN SCALES - GENERAL: PAINLEVEL_OUTOF10: 1

## 2019-08-01 ASSESSMENT — PAIN DESCRIPTION - PAIN TYPE: TYPE: ACUTE PAIN

## 2019-08-01 ASSESSMENT — PAIN DESCRIPTION - LOCATION: LOCATION: ABDOMEN

## 2019-08-01 NOTE — ED PROVIDER NOTES
[facility]  eMERGENCY dEPARTMENT eNCOUnter          CHIEF COMPLAINT       Chief Complaint   Patient presents with    Emesis       Nurses Notes reviewed and I agree except as noted in the HPI. HISTORY OF PRESENT ILLNESS    Raymon Nieto is a 37 y.o. male who presents to the Emergency Department for the evaluation of nausea and vomiting onset 4 days ago. He reports weight loss and exertional shortness of breath. He states he can only keep food down when he \"nibbles on food\". He denies fever, chills, diarrhea, constipation, blood in stool or abdominal pain. patient states he has had these episodes in the past stating it occurrs every other month. Patient smokes cigarettes and drinks alcohol daily. He states he drinks vodka daily and his last drink was yesterday. He states he drank almost a whole bottle yesterday. He denies illicit drug use. The patient has a past medical history of liver disease or seizures. No further complaints at the time of the initial encounter. The HPI was provided by the patient. REVIEW OF SYSTEMS     Review of Systems   Constitutional: Positive for unexpected weight change (loss). Negative for appetite change, chills, fatigue and fever. HENT: Negative for congestion, ear pain, rhinorrhea and sore throat. Eyes: Negative for discharge, redness and visual disturbance. Respiratory: Positive for shortness of breath (exertional ). Negative for cough and wheezing. Cardiovascular: Negative for chest pain, palpitations and leg swelling. Gastrointestinal: Positive for nausea and vomiting. Negative for abdominal pain, blood in stool, constipation and diarrhea. Genitourinary: Negative for decreased urine volume, difficulty urinating, dysuria and hematuria. Musculoskeletal: Negative for arthralgias, back pain, joint swelling and neck pain. Skin: Negative for pallor and rash. Allergic/Immunologic: Negative for environmental allergies.    Neurological: Negative GLOMERULAR FILTRATION RATE, ESTIMATED   BILE ACIDS, TOTAL   GLOMERULAR FILTRATION RATE, ESTIMATED       EMERGENCY DEPARTMENT COURSE:   Vitals:    Vitals:    08/01/19 1225 08/01/19 1316 08/01/19 1419 08/01/19 1621   BP: (!) 139/111 (!) 116/100 (!) 127/94 (!) 128/96   Pulse: 109 104 88 87   Resp: 18 17 18 18   Temp:  97.9 °F (36.6 °C)     TempSrc:  Oral     SpO2: 98% 99% 100% 99%   Weight: 130 lb (59 kg)      Height: 5' 11\" (1.803 m)          12:25 PM: The patient was seen and evaluated. MDM:  The patient was seen and evaluated within the ED today following nausea and vomiting. Within the department, I observed the patient's vital signs to be within acceptable range, with the exception of tachycardia. On exam, I appreciated tachycardia. Radiological studies revealed Thickening of the ascending, descending and sigmoid colon concerning for colitis. Correlation with symptoms is advised. 1.2 cm pancreatic head cyst. Follow-up advised. Laboratory work was completed and discussed with the patient. Potassium was low at 3.2. Within the department, the patient was treated with IV fluids, Pepcid, Zofran and potassium chloride. I observed the patient's condition to improve during the duration of their stay. I explained my proposed course of treatment to the patient, and they were amenable to my decision. They were discharged home, and will return to the ED if their symptoms become more severe in nature, or otherwise change. The patient is to follow up with Josiah Butt MD and [address] [place] [address]. Patient felt better. Patient was able to tolerate oral intake. Patient was counseled regarding alcohol. He was also advised to follow-up and establish care with primary care physician and have his lipase and kidney function rechecked. Patient was also provided with gastroenterology information to follow-up as an outpatient. Return precautions discussed with the patient.  Patient was

## 2019-10-04 ENCOUNTER — TELEPHONE (OUTPATIENT)
Dept: INTERNAL MEDICINE CLINIC | Age: 39
End: 2019-10-04

## 2019-10-04 NOTE — TELEPHONE ENCOUNTER
[facility]- called requesting lab orders for patient to be fax to [phone]. No open ordrers.  Patient is not our patient

## 2019-12-17 ENCOUNTER — HOSPITAL ENCOUNTER (OUTPATIENT)
Age: 39
Setting detail: OUTPATIENT SURGERY
Discharge: HOME | End: 2019-12-17
Attending: INTERNAL MEDICINE | Admitting: INTERNAL MEDICINE
Payer: MEDICARE

## 2019-12-17 ENCOUNTER — ANESTHESIA EVENT (OUTPATIENT)
Dept: ENDOSCOPY | Age: 39
End: 2019-12-17
Payer: MEDICARE

## 2019-12-17 ENCOUNTER — ANESTHESIA (OUTPATIENT)
Dept: ENDOSCOPY | Age: 39
End: 2019-12-17
Payer: MEDICARE

## 2019-12-17 VITALS
TEMPERATURE: 97.1 F | BODY MASS INDEX: 17.39 KG/M2 | WEIGHT: 124.2 LBS | HEART RATE: 81 BPM | OXYGEN SATURATION: 99 % | SYSTOLIC BLOOD PRESSURE: 106 MMHG | RESPIRATION RATE: 18 BRPM | DIASTOLIC BLOOD PRESSURE: 73 MMHG | HEIGHT: 71 IN

## 2019-12-17 VITALS
DIASTOLIC BLOOD PRESSURE: 66 MMHG | OXYGEN SATURATION: 100 % | SYSTOLIC BLOOD PRESSURE: 105 MMHG | RESPIRATION RATE: 5 BRPM

## 2019-12-17 PROCEDURE — 6360000002 HC RX W HCPCS: Performed by: NURSE ANESTHETIST, CERTIFIED REGISTERED

## 2019-12-17 PROCEDURE — 7100000000 HC PACU RECOVERY - FIRST 15 MIN: Performed by: INTERNAL MEDICINE

## 2019-12-17 PROCEDURE — 3700000000 HC ANESTHESIA ATTENDED CARE: Performed by: INTERNAL MEDICINE

## 2019-12-17 PROCEDURE — 7100000001 HC PACU RECOVERY - ADDTL 15 MIN: Performed by: INTERNAL MEDICINE

## 2019-12-17 PROCEDURE — 88305 TISSUE EXAM BY PATHOLOGIST: CPT

## 2019-12-17 PROCEDURE — 2709999900 HC NON-CHARGEABLE SUPPLY: Performed by: INTERNAL MEDICINE

## 2019-12-17 PROCEDURE — 3700000001 HC ADD 15 MINUTES (ANESTHESIA): Performed by: INTERNAL MEDICINE

## 2019-12-17 PROCEDURE — 2580000003 HC RX 258: Performed by: INTERNAL MEDICINE

## 2019-12-17 PROCEDURE — 3609010400 HC COLONOSCOPY POLYPECTOMY HOT BIOPSY: Performed by: INTERNAL MEDICINE

## 2019-12-17 RX ORDER — FAMOTIDINE 20 MG/1
20 TABLET, FILM COATED ORAL 2 TIMES DAILY
COMMUNITY

## 2019-12-17 RX ORDER — PROPOFOL 10 MG/ML
INJECTION, EMULSION INTRAVENOUS PRN
Status: DISCONTINUED | OUTPATIENT
Start: 2019-12-17 | End: 2019-12-17 | Stop reason: SDUPTHER

## 2019-12-17 RX ORDER — SODIUM CHLORIDE 450 MG/100ML
INJECTION, SOLUTION INTRAVENOUS CONTINUOUS
Status: DISCONTINUED | OUTPATIENT
Start: 2019-12-17 | End: 2019-12-17 | Stop reason: HOSPADM

## 2019-12-17 RX ADMIN — PROPOFOL 250 MG: 10 INJECTION, EMULSION INTRAVENOUS at 11:38

## 2019-12-17 RX ADMIN — SODIUM CHLORIDE: 4.5 INJECTION, SOLUTION INTRAVENOUS at 09:41

## 2019-12-17 ASSESSMENT — PAIN SCALES - GENERAL
PAINLEVEL_OUTOF10: 0
PAINLEVEL_OUTOF10: 0

## 2019-12-17 ASSESSMENT — PAIN - FUNCTIONAL ASSESSMENT: PAIN_FUNCTIONAL_ASSESSMENT: 0-10

## 2020-02-21 ENCOUNTER — HOSPITAL ENCOUNTER (OUTPATIENT)
Age: 40
Discharge: HOME | End: 2020-02-21
Payer: MEDICARE

## 2020-02-21 ENCOUNTER — HOSPITAL ENCOUNTER (OUTPATIENT)
Dept: GENERAL RADIOLOGY | Age: 40
Discharge: HOME | End: 2020-02-21
Payer: MEDICARE

## 2020-02-21 LAB
ALBUMIN SERPL-MCNC: 4.6 G/DL (ref 3.5–5.1)
ALP BLD-CCNC: 210 U/L (ref 38–126)
ALT SERPL-CCNC: 91 U/L (ref 11–66)
ANION GAP SERPL CALCULATED.3IONS-SCNC: 19 MEQ/L (ref 8–16)
AST SERPL-CCNC: 231 U/L (ref 5–40)
BASOPHILS # BLD: 0.3 %
BASOPHILS ABSOLUTE: 0 THOU/MM3 (ref 0–0.1)
BILIRUB SERPL-MCNC: 0.9 MG/DL (ref 0.3–1.2)
BILIRUBIN DIRECT: 0.3 MG/DL (ref 0–0.3)
BUN BLDV-MCNC: 4 MG/DL (ref 7–22)
CALCIUM SERPL-MCNC: 10.3 MG/DL (ref 8.5–10.5)
CHLORIDE BLD-SCNC: 99 MEQ/L (ref 98–111)
CHOLESTEROL, TOTAL: 273 MG/DL (ref 100–199)
CO2: 24 MEQ/L (ref 23–33)
CREAT SERPL-MCNC: 0.4 MG/DL (ref 0.4–1.2)
EKG ATRIAL RATE: 87 BPM
EKG P AXIS: 87 DEGREES
EKG P-R INTERVAL: 128 MS
EKG Q-T INTERVAL: 388 MS
EKG QRS DURATION: 82 MS
EKG QTC CALCULATION (BAZETT): 466 MS
EKG R AXIS: 85 DEGREES
EKG T AXIS: 86 DEGREES
EKG VENTRICULAR RATE: 87 BPM
EOSINOPHIL # BLD: 0.9 %
EOSINOPHILS ABSOLUTE: 0 THOU/MM3 (ref 0–0.4)
ERYTHROCYTE [DISTWIDTH] IN BLOOD BY AUTOMATED COUNT: 16.4 % (ref 11.5–14.5)
ERYTHROCYTE [DISTWIDTH] IN BLOOD BY AUTOMATED COUNT: 61.1 FL (ref 35–45)
FERRITIN: 537 NG/ML (ref 22–322)
FOLATE: 12.7 NG/ML (ref 4.8–24.2)
GFR SERPL CREATININE-BSD FRML MDRD: > 90 ML/MIN/1.73M2
GLUCOSE BLD-MCNC: 96 MG/DL (ref 70–108)
HCT VFR BLD CALC: 42.7 % (ref 42–52)
HDLC SERPL-MCNC: 138 MG/DL
HEMOGLOBIN: 14 GM/DL (ref 14–18)
HEPATITIS C ANTIBODY: NEGATIVE
IMMATURE GRANS (ABS): 0.01 THOU/MM3 (ref 0–0.07)
IMMATURE GRANULOCYTES: 0.3 %
IRON SATURATION: 33 % (ref 20–50)
IRON: 97 UG/DL (ref 65–195)
LDL CHOLESTEROL CALCULATED: 119 MG/DL
LYMPHOCYTES # BLD: 32.8 %
LYMPHOCYTES ABSOLUTE: 1.1 THOU/MM3 (ref 1–4.8)
MAGNESIUM: 1.8 MG/DL (ref 1.6–2.4)
MCH RBC QN AUTO: 33.3 PG (ref 26–33)
MCHC RBC AUTO-ENTMCNC: 32.8 GM/DL (ref 32.2–35.5)
MCV RBC AUTO: 101.4 FL (ref 80–94)
MONOCYTES # BLD: 11.4 %
MONOCYTES ABSOLUTE: 0.4 THOU/MM3 (ref 0.4–1.3)
NUCLEATED RED BLOOD CELLS: 1 /100 WBC
PLATELET # BLD: 170 THOU/MM3 (ref 130–400)
PMV BLD AUTO: 9.6 FL (ref 9.4–12.4)
POTASSIUM SERPL-SCNC: 3.1 MEQ/L (ref 3.5–5.2)
PROSTATE SPECIFIC ANTIGEN: 0.69 NG/ML (ref 0–1)
RBC # BLD: 4.21 MILL/MM3 (ref 4.7–6.1)
SEG NEUTROPHILS: 54.3 %
SEGMENTED NEUTROPHILS ABSOLUTE COUNT: 1.8 THOU/MM3 (ref 1.8–7.7)
SODIUM BLD-SCNC: 142 MEQ/L (ref 135–145)
TOTAL IRON BINDING CAPACITY: 293 UG/DL (ref 171–450)
TOTAL PROTEIN: 7.9 G/DL (ref 6.1–8)
TRIGL SERPL-MCNC: 81 MG/DL (ref 0–199)
TSH SERPL DL<=0.05 MIU/L-ACNC: 2.95 UIU/ML (ref 0.4–4.2)
VITAMIN B-12: 679 PG/ML (ref 211–911)
WBC # BLD: 3.4 THOU/MM3 (ref 4.8–10.8)

## 2020-02-21 PROCEDURE — 82607 VITAMIN B-12: CPT

## 2020-02-21 PROCEDURE — 83735 ASSAY OF MAGNESIUM: CPT

## 2020-02-21 PROCEDURE — 83540 ASSAY OF IRON: CPT

## 2020-02-21 PROCEDURE — 93005 ELECTROCARDIOGRAM TRACING: CPT | Performed by: NURSE PRACTITIONER

## 2020-02-21 PROCEDURE — 84591 ASSAY OF NOS VITAMIN: CPT

## 2020-02-21 PROCEDURE — 84153 ASSAY OF PSA TOTAL: CPT

## 2020-02-21 PROCEDURE — 71046 X-RAY EXAM CHEST 2 VIEWS: CPT

## 2020-02-21 PROCEDURE — 84207 ASSAY OF VITAMIN B-6: CPT

## 2020-02-21 PROCEDURE — 84590 ASSAY OF VITAMIN A: CPT

## 2020-02-21 PROCEDURE — 86803 HEPATITIS C AB TEST: CPT

## 2020-02-21 PROCEDURE — 84443 ASSAY THYROID STIM HORMONE: CPT

## 2020-02-21 PROCEDURE — 36415 COLL VENOUS BLD VENIPUNCTURE: CPT

## 2020-02-21 PROCEDURE — 82248 BILIRUBIN DIRECT: CPT

## 2020-02-21 PROCEDURE — 82306 VITAMIN D 25 HYDROXY: CPT

## 2020-02-21 PROCEDURE — 83550 IRON BINDING TEST: CPT

## 2020-02-21 PROCEDURE — 80061 LIPID PANEL: CPT

## 2020-02-21 PROCEDURE — 82728 ASSAY OF FERRITIN: CPT

## 2020-02-21 PROCEDURE — 87389 HIV-1 AG W/HIV-1&-2 AB AG IA: CPT

## 2020-02-21 PROCEDURE — 82746 ASSAY OF FOLIC ACID SERUM: CPT

## 2020-02-21 PROCEDURE — 85025 COMPLETE CBC W/AUTO DIFF WBC: CPT

## 2020-02-21 PROCEDURE — 80053 COMPREHEN METABOLIC PANEL: CPT

## 2020-02-21 PROCEDURE — 84252 ASSAY OF VITAMIN B-2: CPT

## 2020-02-23 LAB — HIV-2 AB: NEGATIVE

## 2020-02-26 LAB — VITAMIN D2 AND D3, TOTAL: NORMAL

## 2020-02-27 LAB
RETINOL (VITAMIN A): NORMAL
VITAMIN B6: 25.7 NMOL/L (ref 20–125)

## 2020-02-28 LAB — VITAMIN B2: 5 NMOL/L (ref 5–50)

## 2021-05-04 ENCOUNTER — APPOINTMENT (OUTPATIENT)
Dept: GENERAL RADIOLOGY | Age: 41
End: 2021-05-04
Payer: MEDICARE

## 2021-05-04 ENCOUNTER — HOSPITAL ENCOUNTER (EMERGENCY)
Age: 41
Discharge: HOME | End: 2021-05-04
Attending: EMERGENCY MEDICINE
Payer: MEDICARE

## 2021-05-04 VITALS
SYSTOLIC BLOOD PRESSURE: 149 MMHG | RESPIRATION RATE: 16 BRPM | WEIGHT: 145 LBS | HEART RATE: 96 BPM | TEMPERATURE: 98.4 F | OXYGEN SATURATION: 98 % | BODY MASS INDEX: 20.3 KG/M2 | DIASTOLIC BLOOD PRESSURE: 99 MMHG | HEIGHT: 71 IN

## 2021-05-04 DIAGNOSIS — R11.2 NON-INTRACTABLE VOMITING WITH NAUSEA, UNSPECIFIED VOMITING TYPE: Primary | ICD-10-CM

## 2021-05-04 PROCEDURE — 6370000000 HC RX 637 (ALT 250 FOR IP): Performed by: EMERGENCY MEDICINE

## 2021-05-04 PROCEDURE — 99283 EMERGENCY DEPT VISIT LOW MDM: CPT

## 2021-05-04 PROCEDURE — 71046 X-RAY EXAM CHEST 2 VIEWS: CPT

## 2021-05-04 RX ORDER — ONDANSETRON 4 MG/1
4 TABLET, ORALLY DISINTEGRATING ORAL EVERY 8 HOURS PRN
Qty: 12 TABLET | Refills: 0 | Status: SHIPPED | OUTPATIENT
Start: 2021-05-04

## 2021-05-04 RX ORDER — ONDANSETRON 4 MG/1
8 TABLET, ORALLY DISINTEGRATING ORAL ONCE
Status: COMPLETED | OUTPATIENT
Start: 2021-05-04 | End: 2021-05-04

## 2021-05-04 RX ADMIN — ONDANSETRON 8 MG: 4 TABLET, ORALLY DISINTEGRATING ORAL at 12:06

## 2021-05-04 ASSESSMENT — ENCOUNTER SYMPTOMS
BACK PAIN: 0
NAUSEA: 1
DIARRHEA: 0
EYE REDNESS: 0
VOMITING: 1
CONSTIPATION: 0
COUGH: 1
ABDOMINAL PAIN: 0
SHORTNESS OF BREATH: 0
TROUBLE SWALLOWING: 0

## 2021-05-04 NOTE — ED PROVIDER NOTES
 Number of children: 4    Years of education: None    Highest education level: None   Occupational History    None   Social Needs    Financial resource strain: None    Food insecurity     Worry: None     Inability: None    Transportation needs     Medical: None     Non-medical: None   Tobacco Use    Smoking status: Current Every Day Smoker     Packs/day: 1.00     Types: Cigarettes, Cigars    Smokeless tobacco: Never Used    Tobacco comment: daily   Substance and Sexual Activity    Alcohol use: Yes     Alcohol/week: 70.0 standard drinks     Types: 70 Shots of liquor per week     Comment: approximately 10 shots of vodka a day    Drug use: No    Sexual activity: Yes   Lifestyle    Physical activity     Days per week: None     Minutes per session: None    Stress: None   Relationships    Social connections     Talks on phone: None     Gets together: None     Attends Holiness service: None     Active member of club or organization: None     Attends meetings of clubs or organizations: None     Relationship status: None    Intimate partner violence     Fear of current or ex partner: None     Emotionally abused: None     Physically abused: None     Forced sexual activity: None   Other Topics Concern    None   Social History Narrative    None       REVIEW OF SYSTEMS     Review of Systems   Constitutional: Negative for fatigue and fever. HENT: Negative for congestion and trouble swallowing. Eyes: Negative for redness. Respiratory: Positive for cough. Negative for shortness of breath. Cardiovascular: Negative for chest pain. Gastrointestinal: Positive for nausea and vomiting. Negative for abdominal pain, constipation and diarrhea. Genitourinary: Negative for difficulty urinating. Musculoskeletal: Negative for back pain. Skin: Negative for rash. Allergic/Immunologic: Negative for immunocompromised state. Neurological: Negative for light-headedness and headaches.    Hematological: Does not bruise/bleed easily. Except as noted above the remainder of the review of systems was reviewed and is. PHYSICAL EXAM    (up to 7 for level 4, 8 or more for level 5)     ED Triage Vitals [05/04/21 1149]   BP Temp Temp Source Pulse Resp SpO2 Height Weight   (!) 156/99 98.4 °F (36.9 °C) Oral 110 18 98 % 5' 11\" (1.803 m) 145 lb (65.8 kg)       Physical Exam  Vitals signs and nursing note reviewed. Constitutional:       General: He is not in acute distress. Appearance: He is normal weight. He is not ill-appearing. HENT:      Head: Normocephalic and atraumatic. Nose: Nose normal.      Mouth/Throat:      Mouth: Mucous membranes are moist.      Pharynx: Oropharynx is clear. No oropharyngeal exudate or posterior oropharyngeal erythema. Eyes:      General: No scleral icterus. Extraocular Movements: Extraocular movements intact. Conjunctiva/sclera: Conjunctivae normal.      Pupils: Pupils are equal, round, and reactive to light. Neck:      Musculoskeletal: Neck supple. Cardiovascular:      Rate and Rhythm: Regular rhythm. Tachycardia present. Pulses: Normal pulses. Heart sounds: Normal heart sounds. No murmur. Pulmonary:      Effort: Pulmonary effort is normal. No respiratory distress. Breath sounds: Normal breath sounds. No decreased breath sounds, wheezing or rales. Abdominal:      General: Bowel sounds are normal.      Palpations: Abdomen is soft. Tenderness: There is no abdominal tenderness. There is no guarding or rebound. Musculoskeletal:      Right lower leg: No edema. Left lower leg: No edema. Skin:     General: Skin is warm and dry. Capillary Refill: Capillary refill takes less than 2 seconds. Neurological:      General: No focal deficit present. Mental Status: He is alert.          DIAGNOSTIC RESULTS     EKG:(none if blank)  All E[facility]'s are [facility] by [facility] who either signs or Co-signs this chart in the absence of a cardiologist.        RADIOLOGY: (none if blank)   Interpretation per the Radiologistbelow, if available at the time of this note:    XR CHEST (2 VW)   Final Result   Normal chest. No acute findings. **This report has been created using voice recognition software. It may contain minor errors which are inherent in voice recognition technology. **      Final report electronically signed by Dr. Stewart Mercado on 5/4/2021 12:37 PM          LABS:  Labs Reviewed - No data to display    All other labs were within normal range or not returned as of this dictation. Please note, any cultures that may have been sent were not resulted at the time of this patient visit. EMERGENCY DEPARTMENT COURSE andMedical Decision Making:     MDM  Number of Diagnoses or Management Options  Diagnosis management comments: 39-year-old well-appearing male presents emergency room for vomiting and cough. Patient does have a history of alcohol abuse however he denies any abdominal pain associated with the vomiting. Will give p.o. Zofran and to a chest x-ray to rule out any pneumonia. He had some mild tachycardia but otherwise normal vitals. Will defer on doing labs at this time. We will ensure that he can tolerate p.o. liquids prior to discharge. /  ED Course as of May 05 1556   Tue May 04, 2021   1300 Patient feeling better after Zofran. Spoke with him about how it was a little early for us to discern the exact cause of his vomiting since he is only had 3 episodes today and no other symptoms. Chest x-ray did not reveal any pneumonia pneumothorax or free air under the diaphragm. We will give him a small prescription of Zofran for symptomatic relief at home.    [DD]      ED Course User Index  [DD] Day Deleon MD         The patient was evaluated during the global COVID-19 pandemic, and that diagnosis was considered upon their initial presentation.  Their evaluation, treatment and testing was consistent with current guidelines for patients who present with complaints or symptoms that may be related to COVID-19. Strict returnprecautions and follow up instructions were discussed with the patient with which the patient agrees        ED Medications administered this visit:    Medications   ondansetron (ZOFRAN-ODT) disintegrating tablet 8 mg (8 mg Oral Given 5/4/21 1206)         Procedures: (None if blank)       CLINICAL       1. Non-intractable vomiting with nausea, unspecified vomiting type          DISPOSITION/PLAN   DISPOSITION Decision To Discharge 05/04/2021 01:01:37 PM      PATIENT REFERRED TO:  David Agudelo APRN - CNP  [address] [facility]  [facility] [address]  [address] [facility]  [phone]    Schedule an appointment as soon as possible for a visit   If symptoms worsen      DISCHARGE MEDICATIONS:  Discharge Medication List as of 5/4/2021  1:04 PM      START taking these medications    Details   !! ondansetron (ZOFRAN ODT) 4 MG disintegrating tablet Take 1 tablet by mouth every 8 hours as needed for Nausea or Vomiting, Disp-12 tablet, R-0Normal       !! - Potential duplicate medications found. Please discuss with provider.                  (Please note that portions of this note were completed with a voice recognition program.  Efforts were made to edit the dictations but occasionallywords are mis-transcribed.)      Electronically signed by Clare Sunshine MD on 5/4/21 at 12:03 PM EDT    Attending Physician, Emergency Department       Jorge Ortiz MD  05/05/21 2138

## 2021-05-04 NOTE — ED NOTES
Pt presents to ED via triage for c/o \"random vomiting. \" Pt states he was at work and [employer] of nowhere, I started vomiting. \" Upon initial assessment, pt is A&ox4, resps easy and unlabored. Pt denies chest pain, shortness of breath, abdominal pain, diarrhea, cough or fever. Pt denies being sick recently. VSS. Will monitor.      Malorie Amado RN  05/04/21 5343

## 2021-12-03 ENCOUNTER — HOSPITAL ENCOUNTER (EMERGENCY)
Age: 41
Discharge: HOME | End: 2021-12-03
Payer: COMMERCIAL

## 2021-12-03 VITALS
TEMPERATURE: 97.9 F | OXYGEN SATURATION: 99 % | RESPIRATION RATE: 18 BRPM | SYSTOLIC BLOOD PRESSURE: 127 MMHG | HEART RATE: 78 BPM | DIASTOLIC BLOOD PRESSURE: 72 MMHG

## 2021-12-03 DIAGNOSIS — S61.213A LACERATION OF LEFT MIDDLE FINGER WITHOUT FOREIGN BODY WITHOUT DAMAGE TO NAIL, INITIAL ENCOUNTER: Primary | ICD-10-CM

## 2021-12-03 PROCEDURE — 90715 TDAP VACCINE 7 YRS/> IM: CPT | Performed by: NURSE PRACTITIONER

## 2021-12-03 PROCEDURE — 12001 RPR S/N/AX/GEN/TRNK 2.5CM/<: CPT

## 2021-12-03 PROCEDURE — 6360000002 HC RX W HCPCS: Performed by: NURSE PRACTITIONER

## 2021-12-03 PROCEDURE — 99282 EMERGENCY DEPT VISIT SF MDM: CPT

## 2021-12-03 PROCEDURE — 90471 IMMUNIZATION ADMIN: CPT | Performed by: NURSE PRACTITIONER

## 2021-12-03 PROCEDURE — 2500000003 HC RX 250 WO HCPCS

## 2021-12-03 RX ORDER — LIDOCAINE HYDROCHLORIDE 10 MG/ML
INJECTION, SOLUTION INFILTRATION; PERINEURAL
Status: COMPLETED
Start: 2021-12-03 | End: 2021-12-03

## 2021-12-03 RX ORDER — LIDOCAINE HYDROCHLORIDE 20 MG/ML
INJECTION, SOLUTION INFILTRATION; PERINEURAL
Status: DISCONTINUED
Start: 2021-12-03 | End: 2021-12-03 | Stop reason: WASHOUT

## 2021-12-03 RX ORDER — CEPHALEXIN 500 MG/1
500 CAPSULE ORAL 3 TIMES DAILY
Qty: 21 CAPSULE | Refills: 0 | Status: SHIPPED | OUTPATIENT
Start: 2021-12-03 | End: 2021-12-10

## 2021-12-03 RX ADMIN — LIDOCAINE HYDROCHLORIDE: 10 INJECTION, SOLUTION INFILTRATION; PERINEURAL at 19:54

## 2021-12-03 RX ADMIN — TETANUS TOXOID, REDUCED DIPHTHERIA TOXOID AND ACELLULAR PERTUSSIS VACCINE, ADSORBED 0.5 ML: 5; 2.5; 8; 8; 2.5 SUSPENSION INTRAMUSCULAR at 19:53

## 2021-12-03 ASSESSMENT — PAIN SCALES - GENERAL: PAINLEVEL_OUTOF10: 0

## 2021-12-03 NOTE — ED TRIAGE NOTES
Pt presents to the ED for a laceration to left middle finger. Pt states while at work he cut his finger on a piece of machinery. Pt states he works at [employer] in [facility] [employer].

## 2021-12-04 NOTE — ED NOTES
Aluminum finger splint applied to LT index finger. Coban applied.       Lazarus Grizzle, RN  12/03/21 2035

## 2021-12-08 ENCOUNTER — HOSPITAL ENCOUNTER (EMERGENCY)
Age: 41
Discharge: HOME | End: 2021-12-08
Payer: COMMERCIAL

## 2021-12-08 VITALS
SYSTOLIC BLOOD PRESSURE: 140 MMHG | DIASTOLIC BLOOD PRESSURE: 85 MMHG | OXYGEN SATURATION: 100 % | HEART RATE: 82 BPM | RESPIRATION RATE: 16 BRPM | TEMPERATURE: 98.2 F

## 2021-12-08 DIAGNOSIS — Z51.89 ENCOUNTER FOR WOUND RE-CHECK: Primary | ICD-10-CM

## 2021-12-08 PROCEDURE — 99282 EMERGENCY DEPT VISIT SF MDM: CPT

## 2021-12-08 ASSESSMENT — ENCOUNTER SYMPTOMS
VOMITING: 0
SHORTNESS OF BREATH: 0
NAUSEA: 0

## 2021-12-08 NOTE — ED PROVIDER NOTES
[facility] [address] [address] EMERGENCY DEPT      CHIEF COMPLAINT       Chief Complaint   Patient presents with    Suture / Staple Removal       Nurses Notes reviewed and I agree except as noted in the HPI. HISTORY OF PRESENT ILLNESS    Rose Dexter is a 35 y.o. male who presents for suture removal of a laceration of the left middle finger. Patient had the sutures placed on 12/03 and thought he was supposed to return after 12/06 for removal. Patient did not go to [address] [place] [address] on 12/06. REVIEW OF SYSTEMS     Review of Systems   Constitutional: Negative for diaphoresis and fever. Respiratory: Negative for shortness of breath. Cardiovascular: Negative for chest pain. Gastrointestinal: Negative for nausea and vomiting. Musculoskeletal: Negative for gait problem and myalgias. Skin: Positive for wound. Negative for rash. Neurological: Negative for weakness and numbness. PAST MEDICAL HISTORY    has a past medical history of Liver disease and Seizures ([facility] [address] SURGICAL HISTORY      has a past surgical history that includes Colonoscopy (N/A, 12/17/2019). CURRENT MEDICATIONS       Discharge Medication List as of 12/8/2021 12:32 PM      CONTINUE these medications which have NOT CHANGED    Details   cephALEXin (KEFLEX) 500 MG capsule Take 1 capsule by mouth 3 times daily for 7 days, Disp-21 capsule, R-0Normal      !! ondansetron (ZOFRAN ODT) 4 MG disintegrating tablet Take 1 tablet by mouth every 8 hours as needed for Nausea or Vomiting, Disp-12 tablet, R-0Normal      famotidine (PEPCID) 20 MG tablet Take 20 mg by mouth 2 times dailyHistorical Med      !! ondansetron (ZOFRAN ODT) 4 MG disintegrating tablet Take 1 tablet by mouth every 8 hours as needed for Nausea, Disp-6 tablet, R-0Print       !! - Potential duplicate medications found. Please discuss with provider. ALLERGIES     has No Known Allergies. FAMILY HISTORY     He indicated that his mother is alive.  He indicated that his GCS verbal subscore is 5. GCS motor subscore is 6. Sensory: No sensory deficit. Gait: Gait normal.   Psychiatric:         Speech: Speech normal.         Behavior: Behavior normal. Behavior is cooperative. Thought Content: Thought content normal.         DIFFERENTIAL DIAGNOSIS:   Including but not limited to: Suture Removal, wound check    DIAGNOSTIC RESULTS     EKG: All EKG's are [facility] by [facility] who either signs or Co-signs this chart in the absence of a cardiologist.  None    RADIOLOGY: non-plain film images(s) such as CT,Ultrasound and MRI are read by the radiologist.  Plain radiographic images are visualized and preliminarily interpreted by the emergency physician unless otherwise stated below. No orders to display       LABS:   Labs Reviewed - No data to display    EMERGENCY DEPARTMENT COURSE:   Vitals:    Vitals:    12/08/21 1114   BP: (!) 140/85   Pulse: 82   Resp: 16   Temp: 98.2 °F (36.8 °C)   TempSrc: Oral   SpO2: 100%       Patient was seen in the emergency department during the global pandemic, when there was surge capacity and regional healthcare crisis. MDM:  The patient was seen and evaluated within the ED today for possible Suture Removal. On exam, I appreciated no acute findings. Old records were reviewed. Within the department, I observed the patient's vital signs to be within acceptable range. The patient was comfortable with the plan of discharge home and to follow up with this facility for Suture Removal after the 12/12 because he lives within walking distance. Anticipatory guidance was given. The patient was instructed to return to the emergency department for any worsening of their symptoms. Patient was discharged from the emergency department in good condition with all questions answered. See disposition below.  I have given the patient strict written and verbal instructions about care at home, follow-up, and signs and symptoms of worsening of condition and they did verbalize understanding. CRITICAL CARE:   None    CONSULTS:  None    PROCEDURES:  None    FINAL IMPRESSION      1. Encounter for wound re-check          DISPOSITION/PLAN     1.  Encounter for wound re-check        PATIENT REFERRED TO:  [facility] EMERGENCY DEPT  [address]    For suture removal on the 12th or 13th      DISCHARGE MEDICATIONS:  Discharge Medication List as of 12/8/2021 12:32 PM          (Please note that portions of this note were completed with a voice recognition program.  Efforts were made to edit the dictations but occasionally words are mis-transcribed.)    Jayson Mckeon PA-C 12/08/21 1:54 PM    SUJATA Potter PA-C  12/08/21 7961

## 2021-12-12 ENCOUNTER — HOSPITAL ENCOUNTER (EMERGENCY)
Age: 41
Discharge: HOME | End: 2021-12-12
Payer: COMMERCIAL

## 2021-12-12 VITALS
TEMPERATURE: 98.3 F | OXYGEN SATURATION: 99 % | DIASTOLIC BLOOD PRESSURE: 100 MMHG | HEART RATE: 78 BPM | RESPIRATION RATE: 16 BRPM | SYSTOLIC BLOOD PRESSURE: 146 MMHG

## 2021-12-12 DIAGNOSIS — Z48.02 VISIT FOR SUTURE REMOVAL: Primary | ICD-10-CM

## 2021-12-12 PROCEDURE — 99282 EMERGENCY DEPT VISIT SF MDM: CPT

## 2021-12-12 PROCEDURE — 99281 EMR DPT VST MAYX REQ PHY/QHP: CPT

## 2021-12-12 NOTE — ED PROVIDER NOTES
oral temperature is 98.3 °F (36.8 °C). His blood pressure is 146/100 (abnormal) and his pulse is 78. His respiration is 16 and oxygen saturation is 99%. Physical Exam  Vitals and nursing note reviewed. Constitutional:       Appearance: Normal appearance. HENT:      Head: Normocephalic and atraumatic. Eyes:      Conjunctiva/sclera: Conjunctivae normal.   Cardiovascular:      Rate and Rhythm: Normal rate. Pulmonary:      Effort: Pulmonary effort is normal. No respiratory distress. Musculoskeletal:         General: No swelling, tenderness or deformity. Left hand: Normal range of motion. Cervical back: Normal range of motion. Skin:     Comments: Laceration to the palmar surface overlying the left distal phalanx that has 4 intact sutures and is well-healing. No dehiscence. No erythema, swelling, crusting or evidence of secondary infection. Neurological:      General: No focal deficit present. Mental Status: He is alert. Psychiatric:         Mood and Affect: Mood normal.           DIFFERENTIAL DIAGNOSIS:   Differential diagnoses are discussed    DIAGNOSTIC RESULTS     EKG: All EKG's are interpreted by the Emergency Department Physician who either signs or Co-signsthis chart in the absence of a cardiologist.        RADIOLOGY: non-plain film images(s) such as CT, Ultrasound and MRI are read by the radiologist.    No orders to display       LABS:    Labs Reviewed - No data to display    EMERGENCY DEPARTMENT COURSE:   Vitals:    Vitals:    12/12/21 1039   BP: (!) 146/100   Pulse: 78   Resp: 16   Temp: 98.3 °F (36.8 °C)   TempSrc: Oral   SpO2: 99%      12:07 PM EST: The patient was seen and evaluated. Patient presents for request of suture removal for sutures placed 10-11 days ago. Area appears to be well-healing without secondary infection. Sutures removed and patient tolerated this well. Return precautions discussed and he denied further needs upon discharge.      CRITICAL CARE:   None CONSULTS:  None    PROCEDURES:  Suture Removal    Date/Time: 12/12/2021 12:10 PM  Performed by: Gonzalo Suazo PA-C  Authorized by: Gonzalo Suazo PA-C     Consent:     Consent obtained:  Verbal    Consent given by:  Patient    Risks discussed:  Pain and wound separation    Alternatives discussed:  No treatment and delayed treatment  Location:     Location:  Upper extremity    Upper extremity location:  Hand    Hand location:  L long finger  Procedure details:     Wound appearance:  No signs of infection, good wound healing and clean    Number of sutures removed:  4  Post-procedure details:     Post-removal:  No dressing applied    Patient tolerance of procedure: Tolerated well, no immediate complications        FINAL IMPRESSION      1.  Visit for suture removal          DISPOSITION/PLAN   Discharge    PATIENT REFERRED TO:  MARIA ALEJANDRA Calhoun CNP  [address] [address]  [phone]      As needed      DISCHARGEMEDICATIONS:  New Prescriptions    No medications on file       (Please note that portions of this note were completedwith a voice recognition program.  Efforts were made to edit the dictations but occasionally words are mis-transcribed.)        Gonzalo Suazo PA-C  12/12/21 1219

## 2021-12-13 ASSESSMENT — ENCOUNTER SYMPTOMS
VOMITING: 0
RHINORRHEA: 0
EYE REDNESS: 0
BACK PAIN: 0
ABDOMINAL PAIN: 0
COUGH: 0
CHEST TIGHTNESS: 0
NAUSEA: 0

## 2021-12-13 NOTE — ED PROVIDER NOTES
[facility] Emergency [address] [place] [address]       Chief Complaint   Patient presents with    Laceration     left middle finger       Nurses Notes reviewed and I agree except as noted in the HPI. HISTORY OF PRESENT ILLNESS    Brad Valdivia alvaro 35 y.o. male who presents to the ED for evaluation of a laceration to the left middle finger while at work. Unsure when last tetanus was. FROM. Sensation intact. HPI was provided by the patient    REVIEW OF SYSTEMS     Review of Systems   Constitutional: Negative for chills, fatigue and fever. HENT: Negative for congestion, ear discharge, ear pain, postnasal drip and rhinorrhea. Eyes: Negative for redness. Respiratory: Negative for cough and chest tightness. Cardiovascular: Negative for chest pain and leg swelling. Gastrointestinal: Negative for abdominal pain, nausea and vomiting. Genitourinary: Negative for difficulty urinating, dysuria, enuresis, flank pain and hematuria. Musculoskeletal: Negative for back pain and joint swelling. Skin: Positive for wound. Negative for rash. Neurological: Negative for dizziness, light-headedness, numbness and headaches. Psychiatric/Behavioral: Negative for agitation, behavioral problems and confusion. All other systems negative except as noted. PAST MEDICAL HISTORY     Past Medical History:   Diagnosis Date    Liver disease     Seizures ([facility] [address]        SURGICALHISTORY      has a past surgical history that includes Colonoscopy (N/A, 12/17/2019).     CURRENT MEDICATIONS       Discharge Medication List as of 12/3/2021  9:09 PM      CONTINUE these medications which have NOT CHANGED    Details   !! ondansetron (ZOFRAN ODT) 4 MG disintegrating tablet Take 1 tablet by mouth every 8 hours as needed for Nausea or Vomiting, Disp-12 tablet, R-0Normal      famotidine (PEPCID) 20 MG tablet Take 20 mg by mouth 2 times dailyHistorical Med      !! ondansetron (ZOFRAN ODT) 4 MG disintegrating tablet Take 1 tablet by mouth every 8 hours as needed for Nausea, Disp-6 tablet, R-0Print       !! - Potential duplicate medications found. Please discuss with provider. ALLERGIES     has No Known Allergies. FAMILY HISTORY     He indicated that his mother is alive. He indicated that his father is alive. family history is not on file. SOCIAL HISTORY       Social History     Socioeconomic History    Marital status: Single     Spouse name: Not on file    Number of children: 3    Years of education: Not on file    Highest education level: Not on file   Occupational History    Not on file   Tobacco Use    Smoking status: Current Every Day Smoker     Packs/day: 1.00     Types: Cigarettes, Cigars    Smokeless tobacco: Never Used    Tobacco comment: daily   Vaping Use    Vaping Use: Never used   Substance and Sexual Activity    Alcohol use: Yes     Alcohol/week: 70.0 standard drinks     Types: 70 Shots of liquor per week     Comment: approximately 10 shots of vodka a day    Drug use: No    Sexual activity: Yes   Other Topics Concern    Not on file   Social History Narrative    Not on file     Social Determinants of Health     Financial Resource Strain:     Difficulty of Paying Living Expenses: Not on file   Food Insecurity:     Worried About Running Out of Food in the Last Year: Not on file    Nuris of Food in the Last Year: Not on file   Transportation Needs:     Lack of Transportation (Medical): Not on file    Lack of Transportation (Non-Medical):  Not on file   Physical Activity:     Days of Exercise per Week: Not on file    Minutes of Exercise per Session: Not on file   Stress:     Feeling of Stress : Not on file   Social Connections:     Frequency of Communication with Friends and Family: Not on file    Frequency of Social Gatherings with Friends and Family: Not on file    Attends Yazidi Services: Not on file    Active Member of Clubs or Organizations: Not on file    Attends cardiologist.        RADIOLOGY: non-plainfilm images(s) such as CT, Ultrasound and MRI are read by the radiologist.  Plain radiographic images are visualized and preliminarily interpreted by the emergency physician unless otherwise stated below. No orders to display         LABS:   Labs Reviewed - No data to display    EMERGENCY DEPARTMENT COURSE:   Vitals:    Vitals:    12/03/21 1822 12/03/21 1823   BP:  127/72   Pulse: 78    Resp: 18    Temp: 97.9 °F (36.6 °C)    TempSrc: Oral    SpO2: 99%                                     MDM    Patient was seen and evaluated in the emergency department for a laceration to the left middle finger. Wound is closed per the procedure note. Patient tolerated well. He is placed in a finger splint and given close follow up with [facility]. Patient is agreeable. Medications   lidocaine 1 % injection (  Given 12/3/21 1954)   Tetanus-Diphth-Acell Pertussis (BOOSTRIX) injection 0.5 mL (0.5 mLs IntraMUSCular Given 12/3/21 1953)       Please note that the patient was evaluated during a pandemic. All efforts were made for HIPPA compliance as well as provision of appropriate care. Patient was seen independently by myself. The patient's final impression and disposition and plan was determined by myself. Strict return precautions and follow up instructions were discussed with the patient prior to discharge, with which the patient agrees. Physical assessment findings, diagnostic testing(s) if applicable, and vital signs reviewed with patient/patient representative. Questions answered. Medications asdirected, including OTC medications for supportive care. Education provided on medications. Differential diagnosis(s) discussed with patient/patient representative. Home care/self care instructions reviewed withpatient/patient representative. Patient is to follow-up with family care provider in 2-3 days if no improvement.   Patient is to go to the emergency department if symptoms worsen. Patient/patient representative isaware of care plan, questions answered, verbalizes understanding and is in agreement. CRITICAL CARE:   None    CONSULTS:  None    PROCEDURES:  Lac Repair    Date/Time: 12/13/2021 6:03 AM  Performed by: MARIA ALEJANDRA Orta CNP  Authorized by: MARIA ALEJANDRA Orta CNP     Consent:     Consent obtained:  Verbal    Consent given by:  Patient    Risks discussed:  Infection, pain, tendon damage, vascular damage, retained foreign body, poor cosmetic result, poor wound healing, nerve damage and need for additional repair    Alternatives discussed:  No treatment, delayed treatment and observation  Anesthesia (see MAR for exact dosages): Anesthesia method:  Local infiltration    Local anesthetic:  Lidocaine 1% w/o epi  Laceration details:     Location:  Finger    Finger location:  L long finger    Length (cm):  1.5  Pre-procedure details:     Preparation:  Patient was prepped and draped in usual sterile fashion  Exploration:     Hemostasis achieved with:  Direct pressure    Wound exploration: wound explored through full range of motion and entire depth of wound probed and visualized      Wound extent: no fascia violation noted, no foreign bodies/material noted, no muscle damage noted, no nerve damage noted, no tendon damage noted, no underlying fracture noted and no vascular damage noted      Contaminated: no    Treatment:     Area cleansed with:  Shfabian-Clens    Amount of cleaning:  Standard  Skin repair:     Repair method:  Sutures    Suture size:  4-0    Suture material:  Nylon    Suture technique:  Simple interrupted    Number of sutures:  4  Approximation:     Approximation:  Close  Post-procedure details:     Dressing:  Antibiotic ointment and splint for protection    Patient tolerance of procedure: Tolerated well, no immediate complications        FINAL IMPRESSION     1.  Laceration of left middle finger without foreign body without damage to nail, initial encounter          DISPOSITION/PLAN   DISPOSITION Decision To Discharge 12/03/2021 07:52:09 PM      PATIENT REFERREDTO:  [facility]  [address] [address]  [phone]  Go on 12/6/2021  @ 0830 am, For follow up, For wound re-check      DISCHARGE MEDICATIONS:  Discharge Medication List as of 12/3/2021  9:09 PM      START taking these medications    Details   cephALEXin (KEFLEX) 500 MG capsule Take 1 capsule by mouth 3 times daily for 7 days, Disp-21 capsule, R-0Normal             (Please note that portions of this note were completed with a voice recognition program.  Efforts were made to edit the dictations but occasionally words are mis-transcribed.)         MARIA ALEJANDRA Pollock CNP, APRN - CNP  12/13/21 [id number]

## 2022-06-16 ENCOUNTER — HOSPITAL ENCOUNTER (EMERGENCY)
Age: 42
Discharge: HOME | End: 2022-06-16
Payer: MEDICARE

## 2022-06-16 VITALS
DIASTOLIC BLOOD PRESSURE: 68 MMHG | TEMPERATURE: 98.5 F | BODY MASS INDEX: 19.18 KG/M2 | OXYGEN SATURATION: 98 % | SYSTOLIC BLOOD PRESSURE: 120 MMHG | WEIGHT: 137 LBS | HEART RATE: 87 BPM | RESPIRATION RATE: 16 BRPM | HEIGHT: 71 IN

## 2022-06-16 DIAGNOSIS — G56.21 CUBITAL TUNNEL SYNDROME ON RIGHT: Primary | ICD-10-CM

## 2022-06-16 PROCEDURE — 99283 EMERGENCY DEPT VISIT LOW MDM: CPT

## 2022-06-16 RX ORDER — METHYLPREDNISOLONE 4 MG/1
TABLET ORAL
Qty: 1 KIT | Refills: 0 | Status: SHIPPED | OUTPATIENT
Start: 2022-06-16

## 2022-06-16 RX ORDER — LIDOCAINE 40 MG/G
CREAM TOPICAL
Qty: 30 G | Refills: 0 | Status: SHIPPED | OUTPATIENT
Start: 2022-06-16

## 2022-06-16 NOTE — ED NOTES
Pt presents to the ER for R arm numbness. Pt states that it is numb below his elbow. Pt had a laceration to his R arm and since his arm from the elbow is goes numb every once in a while.      Brown Burt  06/16/22 [id number]

## 2022-06-16 NOTE — Clinical Note
Jazmin Brian was seen and treated in our emergency department on 6/16/2022. He may return to work on 06/20/2022. If you have any questions or concerns, please don't hesitate to call.       Magda Paniagua PA-C

## 2022-06-16 NOTE — Clinical Note
Sheryl Hale was seen and treated in our emergency department on 6/16/2022. He may return to work on 06/20/2022. If you have any questions or concerns, please don't hesitate to call.       Andrew Nuñez PA-C

## 2022-06-16 NOTE — ED PROVIDER NOTES
[facility]  eMERGENCY dEPARTMENT eNCOUnter          [address] [facility]       Chief Complaint   Patient presents with    Numbness     R below elbow       Nurses Notes reviewed and I agree except as noted in the HPI. HISTORY OF PRESENT ILLNESS    Grant Encarnacion is a 38 y.o. male who presents to the Emergency Department for the evaluation of right hand numbness. Patient states that approximately 5 years ago after he punched a window and sustained elbow laceration which resulted in surgical intervention. He had altered sensation and range of motion initially after the surgery which has recovered and for the past 2 days, he has noticed numbness and tingling in the right hand, primarily the fourth and fifth digits associated with some weakness. He is right-hand dominant. States that today he dropped some milk as a result of the weakness. He complains of pain that radiates from the elbow distally, none proximal.  He tried ibuprofen without relief. Denies recent injury but he does work as a  and has been working increased hours recently. The HPI was provided by the patient. REVIEW OF SYSTEMS     Review of Systems   Musculoskeletal: Positive for arthralgias. Negative for neck pain. Neurological: Positive for weakness and numbness. All other systems reviewed and are negative. PAST MEDICAL HISTORY    has a past medical history of Liver disease and Seizures ([facility] [address] SURGICAL HISTORY      has a past surgical history that includes Colonoscopy (N/A, 12/17/2019).     CURRENT MEDICATIONS       Discharge Medication List as of 6/22/2022  1:36 PM      CONTINUE these medications which have NOT CHANGED    Details   !! ondansetron (ZOFRAN ODT) 4 MG disintegrating tablet Take 1 tablet by mouth every 8 hours as needed for Nausea or Vomiting, Disp-12 tablet, R-0Normal      famotidine (PEPCID) 20 MG tablet Take 20 mg by mouth 2 times dailyHistorical Med      !! ondansetron (ZOFRAN ODT) 4 MG disintegrating tablet Take 1 tablet by mouth every 8 hours as needed for Nausea, Disp-6 tablet, R-0Print       !! - Potential duplicate medications found. Please discuss with provider. ALLERGIES     has No Known Allergies. FAMILY HISTORY     He indicated that his mother is alive. He indicated that his father is alive. family history is not on file. SOCIAL HISTORY      reports that he has been smoking cigarettes and cigars. He has been smoking about 1.00 pack per day. He has never used smokeless tobacco. He reports current alcohol use of about 70.0 standard drinks of alcohol per week. He reports that he does not use drugs. PHYSICAL EXAM     INITIAL VITALS:  height is 5' 11\" (1.803 m) and weight is 137 lb (62.1 kg). His oral temperature is 98.5 °F (36.9 °C). His blood pressure is 120/68 and his pulse is 87. His respiration is 16 and oxygen saturation is 98%. Physical Exam  Vitals and nursing note reviewed. Constitutional:       Appearance: Normal appearance. HENT:      Head: Normocephalic and atraumatic. Eyes:      Conjunctiva/sclera: Conjunctivae normal.   Cardiovascular:      Rate and Rhythm: Normal rate. Pulses: Normal pulses. Pulmonary:      Effort: Pulmonary effort is normal. No respiratory distress. Musculoskeletal:      Right shoulder: Normal.      Right upper arm: Normal.      Right elbow: No swelling, deformity or effusion. Normal range of motion. Tenderness present. Right forearm: Normal.      Right hand: Normal strength. Normal sensation. Normal capillary refill. Normal pulse. Cervical back: Normal range of motion. Comments: Positive Tinel and Phalen signs on the right as well as positive cubital Tinel sign.  strength intact bilaterally, though decreased in the fourth and fifth digits on the right in comparison to the left. Skin:     General: Skin is warm and dry. Neurological:      General: No focal deficit present.       Mental Status: He is alert.   Psychiatric:         Mood and Affect: Mood normal.         DIFFERENTIAL DIAGNOSIS:   Differential diagnoses are discussed    DIAGNOSTIC RESULTS     EKG: All EKG's are interpreted by the Emergency Department Physician who either signs or Co-signsthis chart in the absence of a cardiologist.        RADIOLOGY: non-plain film images(s) such as CT, Ultrasound and MRI are read by the radiologist.    No orders to display       LABS:    Labs Reviewed - No data to display    EMERGENCY DEPARTMENT COURSE:   Vitals:    Vitals:    06/16/22 1429   BP: 120/68   Pulse: 87   Resp: 16   Temp: 98.5 °F (36.9 °C)   TempSrc: Oral   SpO2: 98%   Weight: 137 lb (62.1 kg)   Height: 5' 11\" (1.803 m)      2:45 PM EDT: The patient was seen and evaluated. Patient presents today with reassuring vital signs for complaints of atraumatic paresthesias and weakness associated with radiating pain from the right elbow to the ulnar surface of the hand/wrist.  He does report working increased hours recently. He does have some associated decreased  strength in the affected digits, otherwise neurovascularly intact. Symptoms consistent with cubital tunnel syndrome. Work note provided and he is advised of RICE and provided with wrist brace. We will trial Medrol Dosepak as ibuprofen has not provided relief and provide lidocaine cream as well. Close orthopedic follow-up advised if not improving given the dominant hand involvement and decreased strength in the fourth and fifth digits. Return precautions were discussed as well. Patient was agreeable with the above plan and denied further needs upon discharge. CRITICAL CARE:   None    CONSULTS:  None    PROCEDURES:  None    FINAL IMPRESSION      1.  Cubital tunnel syndrome on right          DISPOSITION/PLAN   Discharge    PATIENT REFERRED TO:  MARIA ALEJANDRA Amaro - CNP  [address]  [address]      As needed    [address]  [address] [address]  [facility], hand provider should be available tuesday/thursday mornings    [facility] EMERGENCY DEPT  [address]    As needed      DISCHARGEMEDICATIONS:  Discharge Medication List as of 6/22/2022  1:36 PM      START taking these medications    Details   methylPREDNISolone (MEDROL, ADAN,) 4 MG tablet Take by mouth., Disp-1 kit, R-0Normal      lidocaine (LMX) 4 % cream Apply topically as needed for pain., Disp-30 g, R-0, Normal             (Please note that portions of this note were completedwith a voice recognition program.  Efforts were made to edit the dictations but occasionally words are mis-transcribed.)     Joshua Alvarez PA-C  06/22/22 [id number]

## 2022-06-28 ENCOUNTER — APPOINTMENT (OUTPATIENT)
Dept: GENERAL RADIOLOGY | Age: 42
End: 2022-06-28
Payer: MEDICARE

## 2022-06-28 ENCOUNTER — APPOINTMENT (OUTPATIENT)
Dept: CT IMAGING | Age: 42
End: 2022-06-28
Payer: MEDICARE

## 2022-06-28 ENCOUNTER — HOSPITAL ENCOUNTER (EMERGENCY)
Age: 42
Discharge: HOME | End: 2022-06-28
Attending: STUDENT IN AN ORGANIZED HEALTH CARE EDUCATION/TRAINING PROGRAM
Payer: MEDICARE

## 2022-06-28 VITALS
OXYGEN SATURATION: 99 % | DIASTOLIC BLOOD PRESSURE: 100 MMHG | SYSTOLIC BLOOD PRESSURE: 168 MMHG | RESPIRATION RATE: 18 BRPM | HEIGHT: 71 IN | HEART RATE: 78 BPM | WEIGHT: 137 LBS | BODY MASS INDEX: 19.18 KG/M2 | TEMPERATURE: 98.5 F

## 2022-06-28 DIAGNOSIS — R55 POSTURAL DIZZINESS WITH PRESYNCOPE: ICD-10-CM

## 2022-06-28 DIAGNOSIS — J18.9 COMMUNITY ACQUIRED PNEUMONIA, UNSPECIFIED LATERALITY: Primary | ICD-10-CM

## 2022-06-28 DIAGNOSIS — R42 POSTURAL DIZZINESS WITH PRESYNCOPE: ICD-10-CM

## 2022-06-28 LAB
ALBUMIN SERPL-MCNC: 4.7 G/DL (ref 3.5–5.1)
ALP BLD-CCNC: 73 U/L (ref 38–126)
ALT SERPL-CCNC: 22 U/L (ref 11–66)
ANION GAP SERPL CALCULATED.3IONS-SCNC: 23 MEQ/L (ref 8–16)
AST SERPL-CCNC: 41 U/L (ref 5–40)
BASOPHILS # BLD: 1.1 %
BASOPHILS ABSOLUTE: 0 THOU/MM3 (ref 0–0.1)
BILIRUB SERPL-MCNC: 0.5 MG/DL (ref 0.3–1.2)
BILIRUBIN DIRECT: < 0.2 MG/DL (ref 0–0.3)
BUN BLDV-MCNC: 10 MG/DL (ref 7–22)
CALCIUM SERPL-MCNC: 9.4 MG/DL (ref 8.5–10.5)
CHLORIDE BLD-SCNC: 93 MEQ/L (ref 98–111)
CO2: 20 MEQ/L (ref 23–33)
CREAT SERPL-MCNC: 0.6 MG/DL (ref 0.4–1.2)
EKG ATRIAL RATE: 88 BPM
EKG P AXIS: 83 DEGREES
EKG P-R INTERVAL: 130 MS
EKG Q-T INTERVAL: 380 MS
EKG QRS DURATION: 84 MS
EKG QTC CALCULATION (BAZETT): 459 MS
EKG R AXIS: 85 DEGREES
EKG T AXIS: 77 DEGREES
EKG VENTRICULAR RATE: 88 BPM
EOSINOPHIL # BLD: 0.5 %
EOSINOPHILS ABSOLUTE: 0 THOU/MM3 (ref 0–0.4)
ERYTHROCYTE [DISTWIDTH] IN BLOOD BY AUTOMATED COUNT: 15 % (ref 11.5–14.5)
ERYTHROCYTE [DISTWIDTH] IN BLOOD BY AUTOMATED COUNT: 48.8 FL (ref 35–45)
ETHYL ALCOHOL, SERUM: 0.03 %
GFR SERPL CREATININE-BSD FRML MDRD: > 90 ML/MIN/1.73M2
GLUCOSE BLD-MCNC: 102 MG/DL (ref 70–108)
HCT VFR BLD CALC: 43.5 % (ref 42–52)
HEMOGLOBIN: 14.6 GM/DL (ref 14–18)
IMMATURE GRANS (ABS): 0.01 THOU/MM3 (ref 0–0.07)
IMMATURE GRANULOCYTES: 0.3 %
LIPASE: 10.4 U/L (ref 5.6–51.3)
LYMPHOCYTES # BLD: 24.4 %
LYMPHOCYTES ABSOLUTE: 0.9 THOU/MM3 (ref 1–4.8)
MAGNESIUM: 1.6 MG/DL (ref 1.6–2.4)
MCH RBC QN AUTO: 30.2 PG (ref 26–33)
MCHC RBC AUTO-ENTMCNC: 33.6 GM/DL (ref 32.2–35.5)
MCV RBC AUTO: 90.1 FL (ref 80–94)
MONOCYTES # BLD: 3.3 %
MONOCYTES ABSOLUTE: 0.1 THOU/MM3 (ref 0.4–1.3)
NUCLEATED RED BLOOD CELLS: 0 /100 WBC
OSMOLALITY CALCULATION: 271.2 MOSMOL/KG (ref 275–300)
PLATELET # BLD: 191 THOU/MM3 (ref 130–400)
PMV BLD AUTO: 9.5 FL (ref 9.4–12.4)
POTASSIUM SERPL-SCNC: 3.7 MEQ/L (ref 3.5–5.2)
PRO-BNP: 201.4 PG/ML (ref 0–450)
RBC # BLD: 4.83 MILL/MM3 (ref 4.7–6.1)
SEG NEUTROPHILS: 70.4 %
SEGMENTED NEUTROPHILS ABSOLUTE COUNT: 2.6 THOU/MM3 (ref 1.8–7.7)
SODIUM BLD-SCNC: 136 MEQ/L (ref 135–145)
TOTAL PROTEIN: 7.3 G/DL (ref 6.1–8)
TROPONIN T: < 0.01 NG/ML
WBC # BLD: 3.7 THOU/MM3 (ref 4.8–10.8)

## 2022-06-28 PROCEDURE — 83690 ASSAY OF LIPASE: CPT

## 2022-06-28 PROCEDURE — 84484 ASSAY OF TROPONIN QUANT: CPT

## 2022-06-28 PROCEDURE — 71045 X-RAY EXAM CHEST 1 VIEW: CPT

## 2022-06-28 PROCEDURE — 2580000003 HC RX 258: Performed by: STUDENT IN AN ORGANIZED HEALTH CARE EDUCATION/TRAINING PROGRAM

## 2022-06-28 PROCEDURE — 36415 COLL VENOUS BLD VENIPUNCTURE: CPT

## 2022-06-28 PROCEDURE — 82077 ASSAY SPEC XCP UR&BREATH IA: CPT

## 2022-06-28 PROCEDURE — 99285 EMERGENCY DEPT VISIT HI MDM: CPT

## 2022-06-28 PROCEDURE — 70450 CT HEAD/BRAIN W/O DYE: CPT

## 2022-06-28 PROCEDURE — 80053 COMPREHEN METABOLIC PANEL: CPT

## 2022-06-28 PROCEDURE — 83735 ASSAY OF MAGNESIUM: CPT

## 2022-06-28 PROCEDURE — 85025 COMPLETE CBC W/AUTO DIFF WBC: CPT

## 2022-06-28 PROCEDURE — 93010 ELECTROCARDIOGRAM REPORT: CPT | Performed by: INTERNAL MEDICINE

## 2022-06-28 PROCEDURE — 93005 ELECTROCARDIOGRAM TRACING: CPT | Performed by: STUDENT IN AN ORGANIZED HEALTH CARE EDUCATION/TRAINING PROGRAM

## 2022-06-28 PROCEDURE — 6370000000 HC RX 637 (ALT 250 FOR IP): Performed by: STUDENT IN AN ORGANIZED HEALTH CARE EDUCATION/TRAINING PROGRAM

## 2022-06-28 PROCEDURE — 82248 BILIRUBIN DIRECT: CPT

## 2022-06-28 PROCEDURE — 83880 ASSAY OF NATRIURETIC PEPTIDE: CPT

## 2022-06-28 RX ORDER — AZITHROMYCIN 250 MG/1
250 TABLET, FILM COATED ORAL SEE ADMIN INSTRUCTIONS
Qty: 6 TABLET | Refills: 0 | Status: SHIPPED | OUTPATIENT
Start: 2022-06-28 | End: 2022-07-03

## 2022-06-28 RX ORDER — AMOXICILLIN AND CLAVULANATE POTASSIUM 875; 125 MG/1; MG/1
1 TABLET, FILM COATED ORAL 2 TIMES DAILY
Qty: 10 TABLET | Refills: 0 | Status: SHIPPED | OUTPATIENT
Start: 2022-06-28 | End: 2022-07-03

## 2022-06-28 RX ORDER — ACETAMINOPHEN 325 MG/1
650 TABLET ORAL ONCE
Status: COMPLETED | OUTPATIENT
Start: 2022-06-28 | End: 2022-06-28

## 2022-06-28 RX ORDER — 0.9 % SODIUM CHLORIDE 0.9 %
1000 INTRAVENOUS SOLUTION INTRAVENOUS ONCE
Status: COMPLETED | OUTPATIENT
Start: 2022-06-28 | End: 2022-06-28

## 2022-06-28 RX ADMIN — SODIUM CHLORIDE 1000 ML: 9 INJECTION, SOLUTION INTRAVENOUS at 14:42

## 2022-06-28 RX ADMIN — ACETAMINOPHEN 650 MG: 325 TABLET ORAL at 15:36

## 2022-06-28 ASSESSMENT — ENCOUNTER SYMPTOMS
SORE THROAT: 0
COUGH: 0
VOMITING: 0
EYE REDNESS: 0
ABDOMINAL PAIN: 0
SHORTNESS OF BREATH: 1
CONSTIPATION: 0
RHINORRHEA: 0
NAUSEA: 0
DIARRHEA: 0

## 2022-06-28 ASSESSMENT — PAIN - FUNCTIONAL ASSESSMENT
PAIN_FUNCTIONAL_ASSESSMENT: NONE - DENIES PAIN
PAIN_FUNCTIONAL_ASSESSMENT: NONE - DENIES PAIN

## 2022-06-28 NOTE — ED TRIAGE NOTES
Patient presents via EMS to ER with reports of syncope, generalized body tingling, and body tremors that started today. 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 Patient reports he thinks he is having a panic attack. EKG obtained. Patient reports drinking alcohol daily. Blood glucose obtained by EMS with result 116. Patient A/O x4.

## 2022-06-28 NOTE — ED PROVIDER NOTES
[facility] ENCOUN[facility]          Pt Name: Kirti Navarro  MRN: [id number]  [place] 1980  Date of evaluation: 6/28/2022  Physician: Bipin Wilson MD    CHIEF COMPLAINT       Chief Complaint   Patient presents with    Loss of Consciousness    Tingling    Shaking     History obtained from the patient. HISTORY OF PRESENT ILLNESS    HPI  Kirti Navarro is a 38 y.o. male with a history of alcohol abuse who presents to the emergency department for evaluation of lightheadedness. Patient states that he was going to an employment agency when he suddenly started feeling lightheaded and felt like he was going to pass out. Patient laid down on the ground. He did not lose consciousness or fall or hit his head. Patient states that he drank water today but did not drink alcohol or eat anything. He states this has not happened before. Patient states that after this lightheaded episode he has tingling over his entire body. He states that he had 1 episode of emesis earlier today. He reports left-sided headache that started earlier today with gradual onset but got worse. He states headache is currently 4 out of 10 in intensity. He denies history of headaches or migraines. Headache is associated with photophobia. The patient has no other acute complaints at this time. REVIEW OF SYSTEMS   Review of Systems   Constitutional: Negative for chills and fever. HENT: Negative for rhinorrhea and sore throat. Eyes: Negative for redness and visual disturbance. Respiratory: Positive for shortness of breath. Negative for cough. Cardiovascular: Negative for chest pain. Gastrointestinal: Negative for abdominal pain, constipation, diarrhea, nausea and vomiting. Endocrine: Negative for polyuria. Genitourinary: Negative for dysuria. Musculoskeletal: Negative for arthralgias and myalgias. Skin: Negative for rash.    Neurological: Positive for light-headedness, numbness and headaches. Negative for syncope.        +tingling   Psychiatric/Behavioral: Negative for confusion. PAST MEDICAL AND SURGICAL HISTORY     Past Medical History:   Diagnosis Date    Liver disease     Seizures ([address]      Past Surgical History:   Procedure Laterality Date    COLONOSCOPY N/A 12/17/2019    COLONOSCOPY POLYPECTOMY HOT BIOPSY performed by Justus Mcduffie MD at [address] [place] [address]   No current facility-administered medications for this encounter. Current Outpatient Medications:     amoxicillin-clavulanate (AUGMENTIN) 875-125 MG per tablet, Take 1 tablet by mouth 2 times daily for 5 days, Disp: 10 tablet, Rfl: 0    azithromycin (ZITHROMAX) 250 MG tablet, Take 1 tablet by mouth See Admin Instructions for 5 days 500mg on day 1 followed by 250mg on days 2 - 5, Disp: 6 tablet, Rfl: 0    methylPREDNISolone (MEDROL, ADAN,) 4 MG tablet, Take by mouth., Disp: 1 kit, Rfl: 0    lidocaine (LMX) 4 % cream, Apply topically as needed for pain., Disp: 30 g, Rfl: 0    ondansetron (ZOFRAN ODT) 4 MG disintegrating tablet, Take 1 tablet by mouth every 8 hours as needed for Nausea or Vomiting, Disp: 12 tablet, Rfl: 0    famotidine (PEPCID) 20 MG tablet, Take 20 mg by mouth 2 times daily, Disp: , Rfl:     ondansetron (ZOFRAN ODT) 4 MG disintegrating tablet, Take 1 tablet by mouth every 8 hours as needed for Nausea, Disp: 6 tablet, Rfl: 0      SOCIAL HISTORY     Social History     Social History Narrative    Not on file     Social History     Tobacco Use    Smoking status: Current Every Day Smoker     Packs/day: 1.00     Types: Cigarettes, Cigars    Smokeless tobacco: Never Used    Tobacco comment: daily   Vaping Use    Vaping Use: Never used   Substance Use Topics    Alcohol use:  Yes     Alcohol/week: 70.0 standard drinks     Types: 70 Shots of liquor per week     Comment: approximately 10 shots of vodka a day    Drug use: No         ALLERGIES   No Known Allergies      FAMILY HISTORY   History reviewed. No pertinent family history. PREVIOUS RECORDS   Previous records reviewed:   ED visit on 6/16 for right arm numbness and weakness. PHYSICAL EXAM     ED Triage Vitals [06/28/22 1411]   BP Temp Temp Source Heart Rate Resp SpO2 Height Weight   (!) 155/100 98.5 °F (36.9 °C) Oral 86 18 100 % 5' 11\" (1.803 m) 137 lb (62.1 kg)     Initial vital signs and nursing assessment reviewed and normal. Body mass index is 19.11 kg/m². Pulsoximetry is normal per my interpretation. Additional Vital Signs:  Vitals:    06/28/22 1639   BP: (!) 168/100   Pulse: 78   Resp: 18   Temp:    SpO2: 99%       Physical Exam  Vitals and nursing note reviewed. Constitutional:       General: He is not in acute distress. Appearance: Normal appearance. HENT:      Head: Normocephalic and atraumatic. Mouth/Throat:      Mouth: Mucous membranes are moist.   Eyes:      Extraocular Movements: Extraocular movements intact. Conjunctiva/sclera: Conjunctivae normal.      Pupils: Pupils are equal, round, and reactive to light. Cardiovascular:      Rate and Rhythm: Normal rate and regular rhythm. Pulses: Normal pulses. Heart sounds: Normal heart sounds. Pulmonary:      Effort: Pulmonary effort is normal.      Breath sounds: Normal breath sounds. Abdominal:      General: Abdomen is flat. Palpations: Abdomen is soft. Tenderness: There is no abdominal tenderness. Musculoskeletal:         General: Normal range of motion. Cervical back: Normal range of motion and neck supple. Skin:     General: Skin is warm and dry. Capillary Refill: Capillary refill takes less than 2 seconds. Neurological:      Mental Status: He is alert and oriented to person, place, and time. Cranial Nerves: No cranial nerve deficit. Sensory: Sensory deficit present. Motor: Weakness present.       Coordination: Coordination normal.      Comments: Mild sensory deficit to right upper extremity, weakness in  strength/right upper extremity strength 4/5 versus 5/5 in all other extremities though it appears to be effort related as he appears to have full strength/equal movements when he is distracted. Patient tremulous. Psychiatric:         Mood and Affect: Mood normal.         Behavior: Behavior normal.             MEDICAL DECISION MAKING   Initial Assessment:   Jorge Brunson is a 38 y.o. male with a history of alcohol abuse who presents to the emergency department for evaluation of lightheadedness. Patient is hemodynamically stable. Differential but is not limited to dehydration, electrolyte abnormality, anemia, hyponatremia, intracranial hemorrhage, ACS. Doubt PE as patient is PERC negative.     Plan:    CBC, BMP, LFTs, lipase, troponin, BNP, ethanol, magnesium   CT head to evaluate for intracranial hemorrhage, chest x-ray        ED RESULTS   Laboratory results:  Labs Reviewed   CBC WITH AUTO DIFFERENTIAL - Abnormal; Notable for the following components:       Result Value    WBC 3.7 (*)     RDW-CV 15.0 (*)     RDW-SD 48.8 (*)     Lymphocytes Absolute 0.9 (*)     Monocytes Absolute 0.1 (*)     All other components within normal limits   BASIC METABOLIC PANEL - Abnormal; Notable for the following components:    Chloride 93 (*)     CO2 20 (*)     All other components within normal limits   HEPATIC FUNCTION PANEL - Abnormal; Notable for the following components:    AST 41 (*)     All other components within normal limits   ANION GAP - Abnormal; Notable for the following components:    Anion Gap 23.0 (*)     All other components within normal limits   OSMOLALITY - Abnormal; Notable for the following components:    Osmolality Calc 271.2 (*)     All other components within normal limits   LIPASE   TROPONIN   BRAIN NATRIURETIC PEPTIDE   ETHANOL   MAGNESIUM   GLOMERULAR FILTRATION RATE, ESTIMATED       Radiologic studies results:  CT Head WO Contrast   Final Result   No acute intracranial hemorrhage, mass effect or midline shift. **This report has been created using voice recognition software. It may contain minor errors which are inherent in voice recognition technology. **      Final report electronically signed by Dr Johanna Andrews on 6/28/2022 3:31 PM      XR CHEST PORTABLE   Final Result   Right hilar opacities which could represent atelectasis or a developing infiltrate. **This report has been created using voice recognition software. It may contain minor errors which are inherent in voice recognition technology. **      Final report electronically signed by Dr Johanna Andrews on 6/28/2022 2:56 PM                ED COURSE   ED Medications administered this visit:   Medications   0.9 % sodium chloride bolus (0 mLs IntraVENous Stopped 6/28/22 1542)   acetaminophen (TYLENOL) tablet 650 mg (650 mg Oral Given 6/28/22 1536)     Laboratory work-up significant for ethanol level 0.03, normal electrolytes and renal function, negative troponin and BNP, no leukocytosis, normal hemoglobin. Chest x-ray concerning for possible infiltrates. CT head negative for acute process. EKG shows normal sinus rhythm with a rate of 88, , QTc 459, QRS 84, no ischemic changes. On reevaluation, patient reports significant improvement in his symptoms and states that he feels ready to go. Upon review of previous ED visit, patient appeared to have some right-sided upper extremity weakness and numbness at an ED visit a few days ago similar to today and patient states overall he is feeling improved. We will treat patient for community-acquired pneumonia. Antibiotics prescribed. Return precautions were discussed with the patient and he was instructed to follow-up with his primary doctor. Patient verbalized agreement and understanding with this plan and was discharged in stable condition.          MEDICATION CHANGES     Discharge Medication List as of 6/28/2022  5:51 PM      START taking these medications    Details   amoxicillin-clavulanate (AUGMENTIN) 875-125 MG per tablet Take 1 tablet by mouth 2 times daily for 5 days, Disp-10 tablet, R-0Normal      azithromycin (ZITHROMAX) 250 MG tablet Take 1 tablet by mouth See Admin Instructions for 5 days 500mg on day 1 followed by 250mg on days 2 - 5, Disp-6 tablet, R-0Normal               FINAL DISPOSITION     Final diagnoses:   Community acquired pneumonia, unspecified laterality   Postural dizziness with presyncope     Condition: condition: good  Dispo: Discharge to home      This transcription was electronically signed. It was dictated by use of voice recognition software and electronically transcribed. The transcription may contain errors not detected in proofreading.         Pantera Cahu MD  06/29/22 2973

## 2023-03-07 ENCOUNTER — HOSPITAL ENCOUNTER (EMERGENCY)
Age: 43
Discharge: HOME | End: 2023-03-07
Payer: MEDICAID

## 2023-03-07 ENCOUNTER — APPOINTMENT (OUTPATIENT)
Dept: GENERAL RADIOLOGY | Age: 43
End: 2023-03-07
Payer: MEDICAID

## 2023-03-07 ENCOUNTER — APPOINTMENT (OUTPATIENT)
Dept: CT IMAGING | Age: 43
End: 2023-03-07
Payer: MEDICAID

## 2023-03-07 VITALS
TEMPERATURE: 98.8 F | RESPIRATION RATE: 16 BRPM | DIASTOLIC BLOOD PRESSURE: 99 MMHG | SYSTOLIC BLOOD PRESSURE: 135 MMHG | HEART RATE: 102 BPM | OXYGEN SATURATION: 100 %

## 2023-03-07 DIAGNOSIS — R11.14 BILIOUS VOMITING WITH NAUSEA: Primary | ICD-10-CM

## 2023-03-07 DIAGNOSIS — R06.02 SHORTNESS OF BREATH: ICD-10-CM

## 2023-03-07 LAB
ALBUMIN SERPL BCG-MCNC: 4.5 G/DL (ref 3.5–5.1)
ALP SERPL-CCNC: 94 U/L (ref 38–126)
ALT SERPL W/O P-5'-P-CCNC: 110 U/L (ref 11–66)
AMPHETAMINES UR QL SCN: NEGATIVE
ANION GAP SERPL CALC-SCNC: 14 MEQ/L (ref 8–16)
AST SERPL-CCNC: 290 U/L (ref 5–40)
BACTERIA URNS QL MICRO: ABNORMAL /HPF
BARBITURATES UR QL SCN: NEGATIVE
BASOPHILS ABSOLUTE: 0 THOU/MM3 (ref 0–0.1)
BASOPHILS NFR BLD AUTO: 0.3 %
BENZODIAZ UR QL SCN: NEGATIVE
BILIRUB SERPL-MCNC: 1 MG/DL (ref 0.3–1.2)
BILIRUB UR QL STRIP.AUTO: NEGATIVE
BUN SERPL-MCNC: 10 MG/DL (ref 7–22)
BZE UR QL SCN: NEGATIVE
CALCIUM SERPL-MCNC: 10.2 MG/DL (ref 8.5–10.5)
CANNABINOIDS UR QL SCN: NEGATIVE
CASTS #/AREA URNS LPF: ABNORMAL /LPF
CASTS 2: ABNORMAL /LPF
CHARACTER UR: CLEAR
CHLORIDE SERPL-SCNC: 91 MEQ/L (ref 98–111)
CO2 SERPL-SCNC: 25 MEQ/L (ref 23–33)
COLOR: YELLOW
CREAT SERPL-MCNC: 0.7 MG/DL (ref 0.4–1.2)
CRYSTALS URNS MICRO: ABNORMAL
D DIMER PPP IA.FEU-MCNC: 556 NG/ML FEU (ref 0–500)
DEPRECATED RDW RBC AUTO: 47.4 FL (ref 35–45)
EOSINOPHIL NFR BLD AUTO: 0.9 %
EOSINOPHILS ABSOLUTE: 0.1 THOU/MM3 (ref 0–0.4)
EPITHELIAL CELLS, UA: ABNORMAL /HPF
ERYTHROCYTE [DISTWIDTH] IN BLOOD BY AUTOMATED COUNT: 14.6 % (ref 11.5–14.5)
FENTANYL: NEGATIVE
GFR SERPL CREATININE-BSD FRML MDRD: > 60 ML/MIN/1.73M2
GLUCOSE SERPL-MCNC: 163 MG/DL (ref 70–108)
GLUCOSE UR QL STRIP.AUTO: NEGATIVE MG/DL
HCT VFR BLD AUTO: 46.2 % (ref 42–52)
HGB BLD-MCNC: 16 GM/DL (ref 14–18)
HGB UR QL STRIP.AUTO: NEGATIVE
IMM GRANULOCYTES # BLD AUTO: 0.03 THOU/MM3 (ref 0–0.07)
IMM GRANULOCYTES NFR BLD AUTO: 0.4 %
INR PPP: 0.89 (ref 0.85–1.13)
KETONES UR QL STRIP.AUTO: 15
LIPASE SERPL-CCNC: 45.4 U/L (ref 5.6–51.3)
LYMPHOCYTES ABSOLUTE: 1 THOU/MM3 (ref 1–4.8)
LYMPHOCYTES NFR BLD AUTO: 14.5 %
MCH RBC QN AUTO: 30.7 PG (ref 26–33)
MCHC RBC AUTO-ENTMCNC: 34.6 GM/DL (ref 32.2–35.5)
MCV RBC AUTO: 88.7 FL (ref 80–94)
MISCELLANEOUS 2: ABNORMAL
MONOCYTES ABSOLUTE: 0.4 THOU/MM3 (ref 0.4–1.3)
MONOCYTES NFR BLD AUTO: 5.7 %
NEUTROPHILS NFR BLD AUTO: 78.2 %
NITRITE UR QL STRIP: NEGATIVE
NRBC BLD AUTO-RTO: 0 /100 WBC
NT-PROBNP SERPL IA-MCNC: < 36 PG/ML (ref 0–124)
OPIATES UR QL SCN: NEGATIVE
OXYCODONE: NEGATIVE
PCP UR QL SCN: NEGATIVE
PH UR STRIP.AUTO: 6.5 [PH] (ref 5–9)
PLATELET # BLD AUTO: 101 THOU/MM3 (ref 130–400)
PMV BLD AUTO: 10.3 FL (ref 9.4–12.4)
POTASSIUM SERPL-SCNC: 3.9 MEQ/L (ref 3.5–5.2)
PROT SERPL-MCNC: 7.8 G/DL (ref 6.1–8)
PROT UR STRIP.AUTO-MCNC: ABNORMAL MG/DL
RBC # BLD AUTO: 5.21 MILL/MM3 (ref 4.7–6.1)
RBC URINE: ABNORMAL /HPF
RENAL EPI CELLS #/AREA URNS HPF: ABNORMAL /[HPF]
SEGMENTED NEUTROPHILS ABSOLUTE COUNT: 5.2 THOU/MM3 (ref 1.8–7.7)
SODIUM SERPL-SCNC: 130 MEQ/L (ref 135–145)
SP GR UR REFRACT.AUTO: > 1.03 (ref 1–1.03)
TROPONIN T: < 0.01 NG/ML
UROBILINOGEN, URINE: 1 EU/DL (ref 0–1)
WBC # BLD AUTO: 6.7 THOU/MM3 (ref 4.8–10.8)
WBC #/AREA URNS HPF: ABNORMAL /HPF
WBC #/AREA URNS HPF: NEGATIVE /[HPF]
YEAST LIKE FUNGI URNS QL MICRO: ABNORMAL

## 2023-03-07 PROCEDURE — 96374 THER/PROPH/DIAG INJ IV PUSH: CPT

## 2023-03-07 PROCEDURE — 83690 ASSAY OF LIPASE: CPT

## 2023-03-07 PROCEDURE — 80307 DRUG TEST PRSMV CHEM ANLYZR: CPT

## 2023-03-07 PROCEDURE — 6360000002 HC RX W HCPCS: Performed by: NURSE PRACTITIONER

## 2023-03-07 PROCEDURE — 93005 ELECTROCARDIOGRAM TRACING: CPT | Performed by: NURSE PRACTITIONER

## 2023-03-07 PROCEDURE — 74177 CT ABD & PELVIS W/CONTRAST: CPT

## 2023-03-07 PROCEDURE — 80053 COMPREHEN METABOLIC PANEL: CPT

## 2023-03-07 PROCEDURE — 96361 HYDRATE IV INFUSION ADD-ON: CPT

## 2023-03-07 PROCEDURE — 71046 X-RAY EXAM CHEST 2 VIEWS: CPT

## 2023-03-07 PROCEDURE — 85610 PROTHROMBIN TIME: CPT

## 2023-03-07 PROCEDURE — 36415 COLL VENOUS BLD VENIPUNCTURE: CPT

## 2023-03-07 PROCEDURE — 2580000003 HC RX 258: Performed by: NURSE PRACTITIONER

## 2023-03-07 PROCEDURE — 85379 FIBRIN DEGRADATION QUANT: CPT

## 2023-03-07 PROCEDURE — 71275 CT ANGIOGRAPHY CHEST: CPT

## 2023-03-07 PROCEDURE — 83880 ASSAY OF NATRIURETIC PEPTIDE: CPT

## 2023-03-07 PROCEDURE — 81001 URINALYSIS AUTO W/SCOPE: CPT

## 2023-03-07 PROCEDURE — 85025 COMPLETE CBC W/AUTO DIFF WBC: CPT

## 2023-03-07 PROCEDURE — 6360000004 HC RX CONTRAST MEDICATION: Performed by: NURSE PRACTITIONER

## 2023-03-07 PROCEDURE — 84484 ASSAY OF TROPONIN QUANT: CPT

## 2023-03-07 PROCEDURE — 99285 EMERGENCY DEPT VISIT HI MDM: CPT

## 2023-03-07 RX ORDER — ONDANSETRON 2 MG/ML
4 INJECTION INTRAMUSCULAR; INTRAVENOUS ONCE
Status: COMPLETED | OUTPATIENT
Start: 2023-03-07 | End: 2023-03-07

## 2023-03-07 RX ORDER — ONDANSETRON 4 MG/1
4 TABLET, ORALLY DISINTEGRATING ORAL EVERY 8 HOURS PRN
Qty: 20 TABLET | Refills: 0 | Status: SHIPPED | OUTPATIENT
Start: 2023-03-07 | End: 2023-03-14

## 2023-03-07 RX ORDER — SODIUM CHLORIDE 9 MG/ML
INJECTION, SOLUTION INTRAVENOUS ONCE
Status: DISCONTINUED | OUTPATIENT
Start: 2023-03-07 | End: 2023-03-07 | Stop reason: HOSPADM

## 2023-03-07 RX ORDER — OMEPRAZOLE 20 MG/1
40 CAPSULE, DELAYED RELEASE ORAL DAILY
Qty: 60 CAPSULE | Refills: 0 | Status: SHIPPED | OUTPATIENT
Start: 2023-03-07 | End: 2023-04-06

## 2023-03-07 RX ORDER — SODIUM CHLORIDE 9 MG/ML
INJECTION, SOLUTION INTRAVENOUS CONTINUOUS
Status: DISCONTINUED | OUTPATIENT
Start: 2023-03-07 | End: 2023-03-07 | Stop reason: HOSPADM

## 2023-03-07 RX ADMIN — ONDANSETRON 4 MG: 2 INJECTION INTRAMUSCULAR; INTRAVENOUS at 14:12

## 2023-03-07 RX ADMIN — SODIUM CHLORIDE: 9 INJECTION, SOLUTION INTRAVENOUS at 14:13

## 2023-03-07 RX ADMIN — IOPAMIDOL 80 ML: 755 INJECTION, SOLUTION INTRAVENOUS at 15:12

## 2023-03-07 NOTE — ED NOTES
Pt in hallway requesting update. Pt made aware blood testing not back yet.  Will monitor      Mya Washington RN  03/07/23 0532

## 2023-03-07 NOTE — ED NOTES
Resting in bed. No concerns voiced. Call light nimeshin reach.  Will monitor      Mitul Tomas RN  03/07/23 3006

## 2023-03-07 NOTE — ED PROVIDER NOTES
[facility] EMERGENCY DEPT      EMERGENCY MEDICINE     Pt Name: Dashawn Rivera  MRN: [id number]  Birthdate 1980  Date of evaluation: 3/7/2023  Provider: MARIA ALEJANDRA Pan CNP    CHIEF COMPLAINT       Chief Complaint   Patient presents with    Emesis    Abdominal Pain    Shortness of Breath     HISTORY OF PRESENT ILLNESS   Dashawn Rivera is a pleasant 43 y.o. male who presents to the emergency department from from home, by private vehicle for evaluation of SOB, emesis, and abdominal pain x 2 days. Pt states that for the past 2-3 days he has been unable to keep any food down but has been able to keep fluid down. He endorses dizziness, weakness, and SOB, and a productive cough. Pt states that he \"just can't catch his breath.\" Denies fevers, diarrhea, blood in the stool, LOC, CP, palpitations, and hemoptysis. Pt drinks a small bottle of vodka and smokes 5 cigars per day.    PASTMEDICAL HISTORY     Past Medical History:   Diagnosis Date    Liver disease     Seizures (HCC)        Patient Active Problem List   Diagnosis Code    Hypokalemia E87.6    Alcohol withdrawal seizure without complication (HCC) F10.930, R56.9    Alcoholic hepatitis K70.10    Alcohol abuse F10.10    Thrombocytopenia concurrent with and due to alcoholism (HCC) D69.59, F10.20     SURGICAL HISTORY       Past Surgical History:   Procedure Laterality Date    COLONOSCOPY N/A 12/17/2019    COLONOSCOPY POLYPECTOMY HOT BIOPSY performed by Jovanni Perry MD at [facility]       CURRENT MEDICATIONS       Discharge Medication List as of 3/7/2023  4:35 PM          ALLERGIES     has No Known Allergies.    FAMILY HISTORY     He indicated that his mother is alive. He indicated that his father is alive.       SOCIAL HISTORY       Social History     Tobacco Use    Smoking status: Every Day     Packs/day: 1.00     Types: Cigarettes, Cigars    Smokeless tobacco: Never    Tobacco comments:     daily   Vaping Use    Vaping Use: Never used   Substance Use Topics  The patient is a 25y Male complaining of medical evaluation. Alcohol use: Yes     Alcohol/week: 70.0 standard drinks     Types: 70 Shots of liquor per week     Comment: approximately 10 shots of vodka a day    Drug use: No       PHYSICAL EXAM       ED Triage Vitals [03/07/23 1259]   BP Temp Temp Source Heart Rate Resp SpO2 Height Weight   (!) 144/99 98.8 °F (37.1 °C) Oral (!) 130 20 99 % -- --       Additional Vital Signs:  Vitals:    03/07/23 1628   BP: (!) 135/99   Pulse: (!) 102   Resp: 16   Temp:    SpO2: 100%     Physical Exam  Vitals and nursing note reviewed. Constitutional:       General: He is not in acute distress. Appearance: He is underweight. He is not ill-appearing or diaphoretic. HENT:      Head: Normocephalic and atraumatic. Mouth/Throat:      Pharynx: Oropharynx is clear. No oropharyngeal exudate or posterior oropharyngeal erythema. Eyes:      Pupils: Pupils are equal, round, and reactive to light. Cardiovascular:      Rate and Rhythm: Regular rhythm. Tachycardia present. Pulses: Normal pulses. Heart sounds: No murmur heard. No friction rub. No gallop. Pulmonary:      Effort: Pulmonary effort is normal. No accessory muscle usage or respiratory distress. Breath sounds: No wheezing, rhonchi or rales. Comments: Rhonchi cleared with coughing. Abdominal:      General: Abdomen is flat. Bowel sounds are normal. There is no distension or abdominal bruit. There are no signs of injury. Palpations: Abdomen is soft. Tenderness: There is no abdominal tenderness. There is no guarding or rebound. Negative signs include Ferris's sign and McBurney's sign. Skin:     General: Skin is warm and dry. Capillary Refill: Capillary refill takes less than 2 seconds. Coloration: Skin is not cyanotic, jaundiced, mottled or pale. Neurological:      Mental Status: He is alert and oriented to person, place, and time.    Psychiatric:         Mood and Affect: Mood normal.       FORMAL DIAGNOSTIC RESULTS     RADIOLOGY: Interpretation per the Radiologist below, if available at the time of this note (none if blank):    CTA CHEST W WO CONTRAST   Final Result    There is no PE. There is right hilar adenopathy which may be reactive. There is soft tissue density in the retrosternal space which may represent residual thymic tissue versus prevascular space lymphadenopathy. **This report has been created using voice recognition software. It may contain minor errors which are inherent in voice recognition technology. **      Final report electronically signed by Dr. Marcella Chahal on 3/7/2023 3:35 PM      CT ABDOMEN PELVIS W IV CONTRAST Additional Contrast? None   Final Result   1. Hepatomegaly. Distended gallbladder likely from prolonged fasting. No acute abdominal or pelvic abnormality identified. **This report has been created using voice recognition software. It may contain minor errors which are inherent in voice recognition technology. **      Final report electronically signed by Dr. Marcella Chahal on 3/7/2023 3:44 PM      XR CHEST (2 VW)   Final Result   Stable radiographic appearance of the chest. No evidence of an acute process. **This report has been created using voice recognition software. It may contain minor errors which are inherent in voice recognition technology. **      Final report electronically signed by Dr. Marcella Chahal on 3/7/2023 2:06 PM          LABS: (none if blank)  Labs Reviewed   CBC WITH AUTO DIFFERENTIAL - Abnormal; Notable for the following components:       Result Value    RDW-CV 14.6 (*)     RDW-SD 47.4 (*)     Platelets 797 (*)     All other components within normal limits   COMPREHENSIVE METABOLIC PANEL - Abnormal; Notable for the following components:    Glucose 163 (*)     Sodium 130 (*)     Chloride 91 (*)      (*)      (*)     All other components within normal limits   D-DIMER, QUANTITATIVE - Abnormal; Notable for the following components:    D-Dimer, Quant 556.00 (*)     All other components within normal limits   URINE WITH REFLEXED MICRO - Abnormal; Notable for the following components:    Ketones, Urine 15 (*)     Specific Gravity, Urine > 1.030 (*)     Protein, UA TRACE (*)     All other components within normal limits   LIPASE   URINE DRUG SCREEN   PROTIME-INR   ANION GAP   GLOMERULAR FILTRATION RATE, ESTIMATED   BRAIN NATRIURETIC PEPTIDE   TROPONIN       (Any cultures that may have been sent were not resulted at the time of this patient visit)    [address] / ED COURSE:     1) Number and Complexity of Problems            Problem List This Visit:         Chief Complaint   Patient presents with    Emesis    Abdominal Pain    Shortness of Breath            Differential Diagnosis includes (but not limited to):  Gastroenteritis, Dehydration, ANGELITO        Diagnoses Considered but I have low suspicion of:   PE, MI, Pancreatitis              Pertinent Comorbid Conditions:    ETOH abuse    2)  Data Reviewed (none if left blank)          My Independent interpretations:     EKG:        Sinus tachycardia at a rate of 120 as read by physician    Imaging:   Chest x-ray shows stable radiographic appearance of the chest.  No evidence of acute process as read by radiologist.    Labs:      See ED course          External Documentation Reviewed:         Previous patient encounter documents & history available on EMR was reviewed              See Formal Diagnostic Results above for the lab and radiology tests and orders.     3)  Treatment and Disposition         ED Reassessment:  stable         Case discussed with consulting clinician:  Dr. Genene Cushing         Shared Decision-Making was performed and disposition discussed with the Patient/Family and questions answered       Summary of Patient Presentation:      MDM  Number of Diagnoses or Management Options  Bilious vomiting with nausea  Shortness of breath  Diagnosis management comments: Patient is a 39-year-old male that presents to the ER today for shortness of breath, nausea, vomiting and abdominal pain x2 days. Patient reports dizziness and weakness. Patient has a history of seizures, liver disease and alcohol abuse. Appropriate testing ordered. EKG shows sinus tachycardia as read by physician. Chest x-ray as read by radiologist shows the heart size is normal.  Mediastinum is not widened. There is no pulmonary infiltrates or effusions. The pulmonary vasculature is normal.  No suspicious osseous lesions are present. CTA of the chest with contrast there is adequate ossification of the pulmonary artery system. No pulmonary emboli are present. The aorta is within acceptable limits. The heart is normal size. There is no pericardial or pleural effusions. There are right hilar adenopathy which is 1 cm right hilar lymph node. No other mediastinal adenopathy identified. No axillary lymphadenopathy. Superior mediastinal prevascular space lymph nodes versus residual thymic tissue measuring 1.4 x 1.5 cm. The lungs are clear. There are no infiltrates. No suspicious osseous lesions are present. See the same day dedicated exam.  CT of abdomen pelvis with IV contrast as read by radiologist shows hepatomegaly. Distended gallbladder likely from prolonged fasting. No acute abdominal or pelvic abnormalities identified. CBC unremarkable. CMP glucose 163, sodium 130, chloride 91, AST of 290, . D-dimer 556. BNP less than 36.0. Troponin less than 0.010. Lipase 45.4. UDS negative for all substances. UA ketones 15, specific gravity greater than 1.030. Protein trace. Discussed patient with attending Dr. Mary Henriquez. Patient requesting to leave. Patient reports improvement of symptoms after IV fluids and Zofran. Patient will be discharged home to care of self. Plan of care discussed with patient who is in agreements. Patient to follow-up with primary care provider within the next week for reevaluation.   If any worsening or concerns return to the ER immediately. /  ED Course as of 03/07/23 2224   Tue Mar 07, 2023   2221 CO2: 25 [SC]      ED Course User Index  [SC] MARIA ALEJANDRA Ibarra CNP     Vitals Reviewed:    Vitals:    03/07/23 1259 03/07/23 1413 03/07/23 1527 03/07/23 1628   BP: (!) 144/99 118/86 105/66 (!) 135/99   Pulse: (!) 130 (!) 110 (!) 104 (!) 102   Resp: 20 14 16 16   Temp: 98.8 °F (37.1 °C)      TempSrc: Oral      SpO2: 99% 96% 97% 100%       The patient was seen and examined. Appropriate diagnostic testing was performed and results reviewed with the patient. The results of pertinent diagnostic studies and exam findings were discussed. The patients provisional diagnosis and plan of care were discussed with the patient and present family who expressed understanding. Any medications were reviewed and indications and risks of medications were discussed with the patient /family present. Strict verbal and written return precautions, instructions and appropriate follow-up provided to  the patient. ED Medications administered this visit:  (None if blank)  Medications   ondansetron (ZOFRAN) injection 4 mg (4 mg IntraVENous Given 3/7/23 1412)   iopamidol (ISOVUE-370) 76 % injection 80 mL (80 mLs IntraVENous Given 3/7/23 1512)         PROCEDURES: (None if blank)  Procedures:     CRITICAL CARE: (None if blank)      DISCHARGE PRESCRIPTIONS: (None if blank)  Discharge Medication List as of 3/7/2023  4:35 PM        START taking these medications    Details   omeprazole (PRILOSEC) 20 MG delayed release capsule Take 2 capsules by mouth Daily, Disp-60 capsule, R-0Normal             FINAL IMPRESSION      1. Bilious vomiting with nausea    2. Shortness of breath          DISPOSITION/PLAN   DISPOSITION Decision To Discharge 03/07/2023 06:16:54 PM      OUTPATIENT FOLLOW UP THE PATIENT:  [address] [address],[address]  [address]  Schedule an appointment as soon as possible for a visit in 1 week  For reevaluation    MARIA ALEJANDRA Yang CNP, APRN - CNP  03/07/23 7776

## 2023-03-07 NOTE — ED NOTES
Pt refusing more fluids. Pt expressed frustration with length of stay. Refused second bag of fluids. Pt requesting discharge.  Will monitor      Kevin Pardo RN  03/07/23 9682

## 2023-03-07 NOTE — ED TRIAGE NOTES
Presents to ER with concern of SOB, abd pain, and emesis. He reports emesis x2 days. Reports he can be a daily drinker. Reports last drink Saturday. Pt reports he has been unable to eat the last couple days. He reports SOB started yesterday that's worse with exertion. Pt tachycardic. He reports coughing up  \"a lot of phlegm\".   Will monitor

## 2023-03-07 NOTE — DISCHARGE INSTRUCTIONS
Rest, stay well-hydrated. Over-the-counter Tylenol and/or Motrin as needed for pain. Zofran as needed for nausea and vomiting. Omeprazole daily. Follow-up with [facility] medicine within the next week for reevaluation. If any worsening or concerns seek medical attention promptly.

## 2023-03-08 LAB
EKG ATRIAL RATE: 120 BPM
EKG P AXIS: 87 DEGREES
EKG P-R INTERVAL: 122 MS
EKG Q-T INTERVAL: 300 MS
EKG QRS DURATION: 76 MS
EKG QTC CALCULATION (BAZETT): 424 MS
EKG R AXIS: 90 DEGREES
EKG T AXIS: 82 DEGREES
EKG VENTRICULAR RATE: 120 BPM

## 2023-03-08 PROCEDURE — 93010 ELECTROCARDIOGRAM REPORT: CPT | Performed by: INTERNAL MEDICINE

## 2023-04-14 ENCOUNTER — APPOINTMENT (OUTPATIENT)
Dept: CT IMAGING | Age: 43
End: 2023-04-14
Payer: MEDICAID

## 2023-04-14 ENCOUNTER — HOSPITAL ENCOUNTER (EMERGENCY)
Age: 43
Discharge: HOME | End: 2023-04-14
Attending: EMERGENCY MEDICINE
Payer: MEDICAID

## 2023-04-14 VITALS
OXYGEN SATURATION: 97 % | RESPIRATION RATE: 19 BRPM | SYSTOLIC BLOOD PRESSURE: 134 MMHG | DIASTOLIC BLOOD PRESSURE: 83 MMHG | TEMPERATURE: 98.3 F | HEART RATE: 88 BPM

## 2023-04-14 DIAGNOSIS — F10.20 ALCOHOLISM (HCC): Primary | ICD-10-CM

## 2023-04-14 DIAGNOSIS — G40.909 SEIZURE DISORDER (HCC): ICD-10-CM

## 2023-04-14 LAB
ALBUMIN SERPL BCG-MCNC: 4.3 G/DL (ref 3.5–5.1)
ALP SERPL-CCNC: 108 U/L (ref 38–126)
ALT SERPL W/O P-5'-P-CCNC: 43 U/L (ref 11–66)
ANION GAP SERPL CALC-SCNC: 19 MEQ/L (ref 8–16)
AST SERPL-CCNC: 63 U/L (ref 5–40)
BASOPHILS ABSOLUTE: 0 THOU/MM3 (ref 0–0.1)
BASOPHILS NFR BLD AUTO: 0.3 %
BILIRUB CONJ SERPL-MCNC: < 0.2 MG/DL (ref 0–0.3)
BILIRUB SERPL-MCNC: 0.5 MG/DL (ref 0.3–1.2)
BUN SERPL-MCNC: 13 MG/DL (ref 7–22)
CALCIUM SERPL-MCNC: 9.3 MG/DL (ref 8.5–10.5)
CHLORIDE SERPL-SCNC: 98 MEQ/L (ref 98–111)
CO2 SERPL-SCNC: 21 MEQ/L (ref 23–33)
CREAT SERPL-MCNC: 0.9 MG/DL (ref 0.4–1.2)
D DIMER PPP IA.FEU-MCNC: 2737 NG/ML FEU (ref 0–500)
DEPRECATED RDW RBC AUTO: 61.6 FL (ref 35–45)
EOSINOPHIL NFR BLD AUTO: 0.1 %
EOSINOPHILS ABSOLUTE: 0 THOU/MM3 (ref 0–0.4)
ERYTHROCYTE [DISTWIDTH] IN BLOOD BY AUTOMATED COUNT: 17.7 % (ref 11.5–14.5)
ETHANOL SERPL-MCNC: < 0.01 %
GFR SERPL CREATININE-BSD FRML MDRD: > 60 ML/MIN/1.73M2
GGT SERPL-CCNC: 706 U/L (ref 8–69)
GLUCOSE SERPL-MCNC: 92 MG/DL (ref 70–108)
HCT VFR BLD AUTO: 38.5 % (ref 42–52)
HGB BLD-MCNC: 12.3 GM/DL (ref 14–18)
IMM GRANULOCYTES # BLD AUTO: 0.04 THOU/MM3 (ref 0–0.07)
IMM GRANULOCYTES NFR BLD AUTO: 0.6 %
LACTATE SERPL-SCNC: 0.7 MMOL/L (ref 0.5–2)
LACTATE SERPL-SCNC: 7 MMOL/L (ref 0.5–2)
LIPASE SERPL-CCNC: 31.2 U/L (ref 5.6–51.3)
LYMPHOCYTES ABSOLUTE: 1.8 THOU/MM3 (ref 1–4.8)
LYMPHOCYTES NFR BLD AUTO: 26.2 %
MAGNESIUM SERPL-MCNC: 1.3 MG/DL (ref 1.6–2.4)
MCH RBC QN AUTO: 30.6 PG (ref 26–33)
MCHC RBC AUTO-ENTMCNC: 31.9 GM/DL (ref 32.2–35.5)
MCV RBC AUTO: 95.8 FL (ref 80–94)
MONOCYTES ABSOLUTE: 0.8 THOU/MM3 (ref 0.4–1.3)
MONOCYTES NFR BLD AUTO: 11.4 %
NEUTROPHILS NFR BLD AUTO: 61.4 %
NRBC BLD AUTO-RTO: 0 /100 WBC
OSMOLALITY SERPL CALC.SUM OF ELEC: 275.4 MOSMOL/KG (ref 275–300)
PLATELET # BLD AUTO: 117 THOU/MM3 (ref 130–400)
PMV BLD AUTO: 10.2 FL (ref 9.4–12.4)
POTASSIUM SERPL-SCNC: 3.5 MEQ/L (ref 3.5–5.2)
PROT SERPL-MCNC: 6.9 G/DL (ref 6.1–8)
RBC # BLD AUTO: 4.02 MILL/MM3 (ref 4.7–6.1)
SEGMENTED NEUTROPHILS ABSOLUTE COUNT: 4.2 THOU/MM3 (ref 1.8–7.7)
SODIUM SERPL-SCNC: 138 MEQ/L (ref 135–145)
TROPONIN T: < 0.01 NG/ML
TSH SERPL DL<=0.005 MIU/L-ACNC: 3.67 UIU/ML (ref 0.4–4.2)
WBC # BLD AUTO: 6.9 THOU/MM3 (ref 4.8–10.8)

## 2023-04-14 PROCEDURE — 4500000002 HC ER NO CHARGE

## 2023-04-14 PROCEDURE — 80053 COMPREHEN METABOLIC PANEL: CPT

## 2023-04-14 PROCEDURE — 85025 COMPLETE CBC W/AUTO DIFF WBC: CPT

## 2023-04-14 PROCEDURE — 99285 EMERGENCY DEPT VISIT HI MDM: CPT

## 2023-04-14 PROCEDURE — 70450 CT HEAD/BRAIN W/O DYE: CPT

## 2023-04-14 PROCEDURE — 83690 ASSAY OF LIPASE: CPT

## 2023-04-14 PROCEDURE — 83605 ASSAY OF LACTIC ACID: CPT

## 2023-04-14 PROCEDURE — 71275 CT ANGIOGRAPHY CHEST: CPT

## 2023-04-14 PROCEDURE — 82977 ASSAY OF GGT: CPT

## 2023-04-14 PROCEDURE — 2580000003 HC RX 258: Performed by: EMERGENCY MEDICINE

## 2023-04-14 PROCEDURE — 87040 BLOOD CULTURE FOR BACTERIA: CPT

## 2023-04-14 PROCEDURE — 6360000004 HC RX CONTRAST MEDICATION: Performed by: EMERGENCY MEDICINE

## 2023-04-14 PROCEDURE — 36415 COLL VENOUS BLD VENIPUNCTURE: CPT

## 2023-04-14 PROCEDURE — 84484 ASSAY OF TROPONIN QUANT: CPT

## 2023-04-14 PROCEDURE — 83735 ASSAY OF MAGNESIUM: CPT

## 2023-04-14 PROCEDURE — 84443 ASSAY THYROID STIM HORMONE: CPT

## 2023-04-14 PROCEDURE — 82077 ASSAY SPEC XCP UR&BREATH IA: CPT

## 2023-04-14 PROCEDURE — 82248 BILIRUBIN DIRECT: CPT

## 2023-04-14 PROCEDURE — 85379 FIBRIN DEGRADATION QUANT: CPT

## 2023-04-14 PROCEDURE — 93005 ELECTROCARDIOGRAM TRACING: CPT | Performed by: EMERGENCY MEDICINE

## 2023-04-14 RX ORDER — FOLIC ACID 1 MG/1
1 TABLET ORAL DAILY
Qty: 30 TABLET | Refills: 0 | Status: SHIPPED | OUTPATIENT
Start: 2023-04-14

## 2023-04-14 RX ORDER — THIAMINE MONONITRATE (VIT B1) 100 MG
100 TABLET ORAL DAILY
Qty: 30 TABLET | Refills: 0 | Status: SHIPPED | OUTPATIENT
Start: 2023-04-14

## 2023-04-14 RX ORDER — 0.9 % SODIUM CHLORIDE 0.9 %
500 INTRAVENOUS SOLUTION INTRAVENOUS ONCE
Status: DISCONTINUED | OUTPATIENT
Start: 2023-04-14 | End: 2023-04-14 | Stop reason: HOSPADM

## 2023-04-14 RX ORDER — SODIUM CHLORIDE 9 MG/ML
INJECTION, SOLUTION INTRAVENOUS CONTINUOUS
Status: DISCONTINUED | OUTPATIENT
Start: 2023-04-14 | End: 2023-04-14 | Stop reason: HOSPADM

## 2023-04-14 RX ORDER — LEVETIRACETAM 500 MG/1
500 TABLET ORAL 2 TIMES DAILY
Qty: 60 TABLET | Refills: 0 | Status: SHIPPED | OUTPATIENT
Start: 2023-04-14

## 2023-04-14 RX ADMIN — SODIUM CHLORIDE: 9 INJECTION, SOLUTION INTRAVENOUS at 16:58

## 2023-04-14 RX ADMIN — IOPAMIDOL 80 ML: 755 INJECTION, SOLUTION INTRAVENOUS at 18:04

## 2023-04-14 ASSESSMENT — PAIN - FUNCTIONAL ASSESSMENT
PAIN_FUNCTIONAL_ASSESSMENT: NONE - DENIES PAIN
PAIN_FUNCTIONAL_ASSESSMENT: NONE - DENIES PAIN

## 2023-04-14 NOTE — ED PROVIDER NOTES
was reviewed ***             See Formal Diagnostic Results above for the lab and radiology tests and orders. 3)  Treatment and Disposition:         ED Medications administered this visit:  (None if blank)         Medications   0.9 % sodium chloride infusion (0 mL/hr IntraVENous Stopped 4/14/23 1714)   0.9 % sodium chloride bolus ( IntraVENous Rate/Dose Change 4/14/23 1714)   iopamidol (ISOVUE-370) 76 % injection 80 mL (80 mLs IntraVENous Given 4/14/23 1804)            ED Reassessment:  ***         Case discussed with consulting clinician:  ***         Shared Decision-Making was performed and disposition discussed with the        Patient/Family and questions answered          Social determinants of health impacting treatment or disposition:  NA         Code Status:  Full Code      Summary of Patient Presentation:      RAINA  /   Chasidy Mckeon Reviewed:    Vitals:    04/14/23 1730 04/14/23 1745 04/14/23 1912 04/14/23 2013   BP: (!) 137/93 (!) 147/94 123/84 134/83   Pulse: 97 93 87 88   Resp: 17 18 17 19   Temp:  98.3 °F (36.8 °C)     TempSrc:  Oral     SpO2: 98% 98% 98% 97%       The patient was seen and examined. Appropriate diagnostic testing was performed and results reviewed with the patient***      The results of pertinent diagnostic studies and exam findings were discussed. The patients provisional diagnosis and plan of care were discussed with the patient and present family who expressed understanding. Any medications were reviewed and indications and risks of medications were discussed with the patient /family present. Strict verbal and written return precautions, instructions and appropriate follow-up provided to  the patient ***. PROCEDURES: (None if blank)  Procedures:     CRITICAL CARE:  None      FINAL IMPRESSION      1. Alcoholism ([facility] [address]    2.  Possible Seizure disorder VS widrawal syndrome(HCC)          DISPOSITION/PLAN   DISPOSITION [place] 04/14/2023 08:54:23 PM      PATIENT REFERRED TO:  Skylar Mujica

## 2023-04-14 NOTE — ED NOTES
Pt to ED w/family and rprts of possible seizure activity lasting approximately 2 minutes. Pt's daughter rprts finding him on the driveway, convulsing, foaming at the mouth and slow to answer questions. Daughter rprts by the time she got him in the car, he was answering questions appropriately. Daughter rpts this is his third seizure, but not currently on medications. Rprts a hx of alcohol abuse. Pt rprts no alcohol today. Daughter rprs pt has been drinking today. Pt is lethargic, slow to answer questions. Is poor historian, but is A&O x4. Breathing easy and unlabored on RA. Placed on cardiac monitor. Seizure pads in place.       Demetria Watkins RN  04/14/23 2578

## 2023-04-14 NOTE — ED NOTES
ED nurse-to-nurse bedside report    Chief Complaint   Patient presents with    Seizures      LOC: alert and orientated to name, place, date  Vital signs   Vitals:    04/14/23 1715 04/14/23 1730 04/14/23 1745 04/14/23 1912   BP: (!) 130/91 (!) 137/93 (!) 147/94 123/84   Pulse: 98 97 93 87   Resp: 16 17 18 17   Temp:   98.3 °F (36.8 °C)    TempSrc:   Oral    SpO2: 97% 98% 98% 98%      Pain:    Pain Interventions: none  Pain Goal: none  Oxygen: No    Current needs required none   Telemetry: Yes  LDAs:   Peripheral IV 04/14/23 Right Antecubital (Active)   Site Assessment Clean, dry & intact 04/14/23 1654   Line Status Blood return noted; Flushed; Infusing 04/14/23 1654   Phlebitis Assessment No symptoms 04/14/23 1654   Infiltration Assessment 0 04/14/23 1654   Dressing Status New dressing applied;Clean, dry & intact 04/14/23 1654   Dressing Type Transparent 04/14/23 1654   Dressing Intervention New 04/14/23 1654     Continuous Infusions:    sodium chloride Stopped (04/14/23 1714)     Mobility: Requires assistance * 1  Neal Fall Risk Score: No flowsheet data found.   Fall Interventions: call light, rails x2  Report given to: Tiffany Cassidy RN  04/14/23 1914

## 2023-04-15 PROCEDURE — 93010 ELECTROCARDIOGRAM REPORT: CPT | Performed by: INTERNAL MEDICINE

## 2023-04-19 LAB
BACTERIA BLD AEROBE CULT: NORMAL
BACTERIA BLD AEROBE CULT: NORMAL

## 2023-04-23 LAB
EKG ATRIAL RATE: 95 BPM
EKG P AXIS: 83 DEGREES
EKG P-R INTERVAL: 134 MS
EKG Q-T INTERVAL: 344 MS
EKG QRS DURATION: 82 MS
EKG QTC CALCULATION (BAZETT): 432 MS
EKG R AXIS: 84 DEGREES
EKG T AXIS: 75 DEGREES
EKG VENTRICULAR RATE: 95 BPM

## 2023-04-30 ENCOUNTER — APPOINTMENT (OUTPATIENT)
Dept: CT IMAGING | Age: 43
End: 2023-04-30
Payer: MEDICAID

## 2023-04-30 ENCOUNTER — HOSPITAL ENCOUNTER (EMERGENCY)
Age: 43
Discharge: HOME | End: 2023-04-30
Payer: MEDICAID

## 2023-04-30 VITALS
RESPIRATION RATE: 18 BRPM | OXYGEN SATURATION: 95 % | SYSTOLIC BLOOD PRESSURE: 148 MMHG | TEMPERATURE: 99.1 F | BODY MASS INDEX: 19.18 KG/M2 | HEART RATE: 115 BPM | DIASTOLIC BLOOD PRESSURE: 98 MMHG | WEIGHT: 137 LBS | HEIGHT: 71 IN

## 2023-04-30 DIAGNOSIS — S01.81XA FACIAL LACERATION, INITIAL ENCOUNTER: Primary | ICD-10-CM

## 2023-04-30 DIAGNOSIS — R00.0 TACHYCARDIA: ICD-10-CM

## 2023-04-30 DIAGNOSIS — R94.31 ABNORMAL EKG: ICD-10-CM

## 2023-04-30 LAB
ANION GAP SERPL CALC-SCNC: 27 MEQ/L (ref 8–16)
BASOPHILS ABSOLUTE: 0 THOU/MM3 (ref 0–0.1)
BASOPHILS NFR BLD AUTO: 0.4 %
BUN SERPL-MCNC: 7 MG/DL (ref 7–22)
CALCIUM SERPL-MCNC: 8.7 MG/DL (ref 8.5–10.5)
CHLORIDE SERPL-SCNC: 97 MEQ/L (ref 98–111)
CO2 SERPL-SCNC: 18 MEQ/L (ref 23–33)
CREAT SERPL-MCNC: 0.6 MG/DL (ref 0.4–1.2)
DEPRECATED RDW RBC AUTO: 59.9 FL (ref 35–45)
EOSINOPHIL NFR BLD AUTO: 0.2 %
EOSINOPHILS ABSOLUTE: 0 THOU/MM3 (ref 0–0.4)
ERYTHROCYTE [DISTWIDTH] IN BLOOD BY AUTOMATED COUNT: 17.4 % (ref 11.5–14.5)
GFR SERPL CREATININE-BSD FRML MDRD: > 60 ML/MIN/1.73M2
GLUCOSE SERPL-MCNC: 110 MG/DL (ref 70–108)
HCT VFR BLD AUTO: 40.8 % (ref 42–52)
HGB BLD-MCNC: 13.8 GM/DL (ref 14–18)
IMM GRANULOCYTES # BLD AUTO: 0.02 THOU/MM3 (ref 0–0.07)
IMM GRANULOCYTES NFR BLD AUTO: 0.2 %
LYMPHOCYTES ABSOLUTE: 1.5 THOU/MM3 (ref 1–4.8)
LYMPHOCYTES NFR BLD AUTO: 17 %
MAGNESIUM SERPL-MCNC: 1.7 MG/DL (ref 1.6–2.4)
MCH RBC QN AUTO: 31.7 PG (ref 26–33)
MCHC RBC AUTO-ENTMCNC: 33.8 GM/DL (ref 32.2–35.5)
MCV RBC AUTO: 93.8 FL (ref 80–94)
MONOCYTES ABSOLUTE: 0.7 THOU/MM3 (ref 0.4–1.3)
MONOCYTES NFR BLD AUTO: 7.3 %
NEUTROPHILS NFR BLD AUTO: 74.9 %
NRBC BLD AUTO-RTO: 0 /100 WBC
OSMOLALITY SERPL CALC.SUM OF ELEC: 281.7 MOSMOL/KG (ref 275–300)
PLATELET # BLD AUTO: 242 THOU/MM3 (ref 130–400)
PMV BLD AUTO: 9.6 FL (ref 9.4–12.4)
POTASSIUM SERPL-SCNC: 3.2 MEQ/L (ref 3.5–5.2)
RBC # BLD AUTO: 4.35 MILL/MM3 (ref 4.7–6.1)
SEGMENTED NEUTROPHILS ABSOLUTE COUNT: 6.7 THOU/MM3 (ref 1.8–7.7)
SODIUM SERPL-SCNC: 142 MEQ/L (ref 135–145)
TROPONIN T: < 0.01 NG/ML
WBC # BLD AUTO: 9 THOU/MM3 (ref 4.8–10.8)

## 2023-04-30 PROCEDURE — 80048 BASIC METABOLIC PNL TOTAL CA: CPT

## 2023-04-30 PROCEDURE — 36415 COLL VENOUS BLD VENIPUNCTURE: CPT

## 2023-04-30 PROCEDURE — 84484 ASSAY OF TROPONIN QUANT: CPT

## 2023-04-30 PROCEDURE — 99284 EMERGENCY DEPT VISIT MOD MDM: CPT

## 2023-04-30 PROCEDURE — 85025 COMPLETE CBC W/AUTO DIFF WBC: CPT

## 2023-04-30 PROCEDURE — 12013 RPR F/E/E/N/L/M 2.6-5.0 CM: CPT

## 2023-04-30 PROCEDURE — 2500000003 HC RX 250 WO HCPCS

## 2023-04-30 PROCEDURE — 93005 ELECTROCARDIOGRAM TRACING: CPT | Performed by: PHYSICIAN ASSISTANT

## 2023-04-30 PROCEDURE — 6370000000 HC RX 637 (ALT 250 FOR IP): Performed by: PHYSICIAN ASSISTANT

## 2023-04-30 PROCEDURE — 83735 ASSAY OF MAGNESIUM: CPT

## 2023-04-30 PROCEDURE — 70450 CT HEAD/BRAIN W/O DYE: CPT

## 2023-04-30 RX ORDER — LIDOCAINE HYDROCHLORIDE 10 MG/ML
INJECTION, SOLUTION EPIDURAL; INFILTRATION; INTRACAUDAL; PERINEURAL
Status: COMPLETED
Start: 2023-04-30 | End: 2023-04-30

## 2023-04-30 RX ORDER — 0.9 % SODIUM CHLORIDE 0.9 %
1000 INTRAVENOUS SOLUTION INTRAVENOUS ONCE
Status: DISCONTINUED | OUTPATIENT
Start: 2023-04-30 | End: 2023-04-30 | Stop reason: HOSPADM

## 2023-04-30 RX ORDER — LORAZEPAM 1 MG/1
1 TABLET ORAL ONCE
Status: COMPLETED | OUTPATIENT
Start: 2023-04-30 | End: 2023-04-30

## 2023-04-30 RX ORDER — LIDOCAINE HYDROCHLORIDE 10 MG/ML
5 INJECTION, SOLUTION INFILTRATION; PERINEURAL ONCE
Status: COMPLETED | OUTPATIENT
Start: 2023-04-30 | End: 2023-04-30

## 2023-04-30 RX ADMIN — LIDOCAINE HYDROCHLORIDE 5 ML: 10 INJECTION, SOLUTION INFILTRATION; PERINEURAL at 14:03

## 2023-04-30 RX ADMIN — LIDOCAINE HYDROCHLORIDE 5 ML: 10 INJECTION, SOLUTION EPIDURAL; INFILTRATION; INTRACAUDAL; PERINEURAL at 14:03

## 2023-04-30 RX ADMIN — LORAZEPAM 1 MG: 1 TABLET ORAL at 14:02

## 2023-04-30 ASSESSMENT — PAIN - FUNCTIONAL ASSESSMENT: PAIN_FUNCTIONAL_ASSESSMENT: 0-10

## 2023-04-30 ASSESSMENT — PAIN SCALES - GENERAL: PAINLEVEL_OUTOF10: 7

## 2023-04-30 ASSESSMENT — ENCOUNTER SYMPTOMS
SORE THROAT: 0
NAUSEA: 0
VOMITING: 0
DIARRHEA: 0
COUGH: 0
ROS SKIN COMMENTS: LACERATION

## 2023-04-30 ASSESSMENT — PAIN DESCRIPTION - ORIENTATION: ORIENTATION: LEFT

## 2023-04-30 ASSESSMENT — PAIN DESCRIPTION - LOCATION: LOCATION: HEAD

## 2023-05-01 LAB
EKG ATRIAL RATE: 114 BPM
EKG P AXIS: 80 DEGREES
EKG P-R INTERVAL: 130 MS
EKG Q-T INTERVAL: 314 MS
EKG QRS DURATION: 88 MS
EKG QTC CALCULATION (BAZETT): 432 MS
EKG R AXIS: 96 DEGREES
EKG T AXIS: 21 DEGREES
EKG VENTRICULAR RATE: 114 BPM

## 2023-05-16 ENCOUNTER — HOSPITAL ENCOUNTER (EMERGENCY)
Age: 43
Discharge: HOME | End: 2023-05-16
Attending: EMERGENCY MEDICINE
Payer: MEDICAID

## 2023-05-16 VITALS
HEART RATE: 92 BPM | OXYGEN SATURATION: 98 % | WEIGHT: 137 LBS | TEMPERATURE: 98.8 F | RESPIRATION RATE: 15 BRPM | BODY MASS INDEX: 19.11 KG/M2 | DIASTOLIC BLOOD PRESSURE: 98 MMHG | SYSTOLIC BLOOD PRESSURE: 144 MMHG

## 2023-05-16 DIAGNOSIS — Z48.02 VISIT FOR SUTURE REMOVAL: Primary | ICD-10-CM

## 2023-05-16 PROCEDURE — 99282 EMERGENCY DEPT VISIT SF MDM: CPT

## 2023-05-16 ASSESSMENT — PAIN - FUNCTIONAL ASSESSMENT: PAIN_FUNCTIONAL_ASSESSMENT: NONE - DENIES PAIN

## 2023-05-16 NOTE — ED PROVIDER NOTES
[facility] [address] [address] EMERGENCY DEPT    Pt Name: Clemente Amado  MRN: [id number]  [place] 1980  Date of evaluation: 5/16/2023  Resident Physician: Zachery Finch MD  Supervising Physician: Dr. Whitney Jimenez MD    [address] [place] [address]       Chief Complaint   Patient presents with    Suture / Staple Removal     HISTORY OF PRESENT ILLNESS   Clemente Amado is a 37 y.o. male  fall with sutures on 4/30/2023  who presents to the emergency department for evaluation of suture removal.     Pt was last seen in the ED on 4/30/23 where he tripped and landed on some rocks. Had two small lacs to his left face. It was repaired in the ED and 7 sutures were placed. Pt is here for suture removal. States that he waited 16 days bc he wanted to make sure it was healed all the way. No concerns of acute infection, no fever no chills. The patient has no other acute complaints at this time. Review of Systems    Negative Except as Documented Above. PASTMEDICAL HISTORY     Past Medical History:   Diagnosis Date    Liver disease     Seizures ([facility] [address]        Patient Active Problem List   Diagnosis Code    Hypokalemia E87.6    Alcohol withdrawal seizure without complication (HCC) T25.879, A56.5    Alcoholic hepatitis O30.85    Alcohol abuse F10.10    Thrombocytopenia concurrent with and due to alcoholism ([facility] [address] D69.59, F10.20     SURGICAL HISTORY       Past Surgical History:   Procedure Laterality Date    COLONOSCOPY N/A 12/17/2019    COLONOSCOPY POLYPECTOMY HOT BIOPSY performed by Anoop Dunbar MD at [facility]       Previous Medications    FOLIC ACID (FOLVITE) 1 MG TABLET    Take 1 tablet by mouth daily    LEVETIRACETAM (KEPPRA) 500 MG TABLET    Take 1 tablet by mouth 2 times daily    OMEPRAZOLE (PRILOSEC) 20 MG DELAYED RELEASE CAPSULE    Take 2 capsules by mouth Daily    VITAMIN B-1 (THIAMINE) 100 MG TABLET    Take 1 tablet by mouth daily       ALLERGIES     has No Known Allergies.     FAMILY HISTORY     He

## 2023-05-16 NOTE — DISCHARGE INSTRUCTIONS
You were seen today in the ED to get your sutures removed    You can put [facility] on the wound for better healing   Stay out of the sun or put sunscreen on the area for better healing

## 2023-08-09 ENCOUNTER — HOSPITAL ENCOUNTER (EMERGENCY)
Age: 43
Discharge: HOME | End: 2023-08-09
Payer: MEDICAID

## 2023-08-09 VITALS
DIASTOLIC BLOOD PRESSURE: 80 MMHG | WEIGHT: 140 LBS | TEMPERATURE: 97.9 F | RESPIRATION RATE: 18 BRPM | HEIGHT: 71 IN | HEART RATE: 97 BPM | SYSTOLIC BLOOD PRESSURE: 127 MMHG | BODY MASS INDEX: 19.6 KG/M2 | OXYGEN SATURATION: 97 %

## 2023-08-09 DIAGNOSIS — G56.21 CUBITAL TUNNEL SYNDROME ON RIGHT: Primary | ICD-10-CM

## 2023-08-09 LAB
ALBUMIN SERPL BCG-MCNC: 4.3 G/DL (ref 3.5–5.1)
ALP SERPL-CCNC: 61 U/L (ref 38–126)
ALT SERPL W/O P-5'-P-CCNC: 24 U/L (ref 11–66)
ANION GAP SERPL CALC-SCNC: 14 MEQ/L (ref 8–16)
AST SERPL-CCNC: 50 U/L (ref 5–40)
BASOPHILS ABSOLUTE: 0 THOU/MM3 (ref 0–0.1)
BASOPHILS NFR BLD AUTO: 0.5 %
BILIRUB SERPL-MCNC: 0.8 MG/DL (ref 0.3–1.2)
BUN SERPL-MCNC: 17 MG/DL (ref 7–22)
BURR CELLS BLD QL SMEAR: ABNORMAL
CALCIUM SERPL-MCNC: 9.1 MG/DL (ref 8.5–10.5)
CHLORIDE SERPL-SCNC: 100 MEQ/L (ref 98–111)
CO2 SERPL-SCNC: 23 MEQ/L (ref 23–33)
CREAT SERPL-MCNC: 1 MG/DL (ref 0.4–1.2)
DACROCYTES: ABNORMAL
DEPRECATED RDW RBC AUTO: 47.3 FL (ref 35–45)
EOSINOPHIL NFR BLD AUTO: 0.8 %
EOSINOPHILS ABSOLUTE: 0.1 THOU/MM3 (ref 0–0.4)
ERYTHROCYTE [DISTWIDTH] IN BLOOD BY AUTOMATED COUNT: 14.2 % (ref 11.5–14.5)
ETHANOL SERPL-MCNC: < 0.01 %
GFR SERPL CREATININE-BSD FRML MDRD: > 60 ML/MIN/1.73M2
GLUCOSE SERPL-MCNC: 82 MG/DL (ref 70–108)
HCT VFR BLD AUTO: 44.5 % (ref 42–52)
HGB BLD-MCNC: 14.6 GM/DL (ref 14–18)
IMM GRANULOCYTES # BLD AUTO: 0.01 THOU/MM3 (ref 0–0.07)
IMM GRANULOCYTES NFR BLD AUTO: 0.1 %
LYMPHOCYTES ABSOLUTE: 1.7 THOU/MM3 (ref 1–4.8)
LYMPHOCYTES NFR BLD AUTO: 23.1 %
MCH RBC QN AUTO: 30 PG (ref 26–33)
MCHC RBC AUTO-ENTMCNC: 32.8 GM/DL (ref 32.2–35.5)
MCV RBC AUTO: 91.6 FL (ref 80–94)
MONOCYTES ABSOLUTE: 0.6 THOU/MM3 (ref 0.4–1.3)
MONOCYTES NFR BLD AUTO: 8.3 %
NEUTROPHILS NFR BLD AUTO: 67.2 %
NRBC BLD AUTO-RTO: 0 /100 WBC
PATHOLOGIST REVIEW: ABNORMAL
PLATELET # BLD AUTO: 259 THOU/MM3 (ref 130–400)
PMV BLD AUTO: 9.3 FL (ref 9.4–12.4)
POTASSIUM SERPL-SCNC: 4.8 MEQ/L (ref 3.5–5.2)
PROT SERPL-MCNC: 6.8 G/DL (ref 6.1–8)
RBC # BLD AUTO: 4.86 MILL/MM3 (ref 4.7–6.1)
SCAN OF BLOOD SMEAR: NORMAL
SEGMENTED NEUTROPHILS ABSOLUTE COUNT: 5 THOU/MM3 (ref 1.8–7.7)
SODIUM SERPL-SCNC: 137 MEQ/L (ref 135–145)
TARGETS BLD QL SMEAR: ABNORMAL
VARIANT LYMPHS BLD QL SMEAR: ABNORMAL %
WBC # BLD AUTO: 7.4 THOU/MM3 (ref 4.8–10.8)

## 2023-08-09 PROCEDURE — 36415 COLL VENOUS BLD VENIPUNCTURE: CPT

## 2023-08-09 PROCEDURE — 82077 ASSAY SPEC XCP UR&BREATH IA: CPT

## 2023-08-09 PROCEDURE — 80053 COMPREHEN METABOLIC PANEL: CPT

## 2023-08-09 PROCEDURE — 85025 COMPLETE CBC W/AUTO DIFF WBC: CPT

## 2023-08-09 PROCEDURE — 6370000000 HC RX 637 (ALT 250 FOR IP): Performed by: NURSE PRACTITIONER

## 2023-08-09 PROCEDURE — 99283 EMERGENCY DEPT VISIT LOW MDM: CPT

## 2023-08-09 RX ORDER — METHYLPREDNISOLONE 4 MG/1
TABLET ORAL
Qty: 21 TABLET | Refills: 0 | Status: SHIPPED | OUTPATIENT
Start: 2023-08-09 | End: 2023-08-15

## 2023-08-09 RX ORDER — LIDOCAINE 4 G/G
1 PATCH TOPICAL DAILY
Status: DISCONTINUED | OUTPATIENT
Start: 2023-08-09 | End: 2023-08-09 | Stop reason: HOSPADM

## 2023-08-09 RX ORDER — KETOROLAC TROMETHAMINE 30 MG/ML
30 INJECTION, SOLUTION INTRAMUSCULAR; INTRAVENOUS ONCE
Status: DISCONTINUED | OUTPATIENT
Start: 2023-08-09 | End: 2023-08-09 | Stop reason: HOSPADM

## 2023-08-09 ASSESSMENT — PAIN - FUNCTIONAL ASSESSMENT: PAIN_FUNCTIONAL_ASSESSMENT: NONE - DENIES PAIN

## 2023-08-09 NOTE — DISCHARGE INSTRUCTIONS
Rest, stay well-hydrated. Continue home medications as previously prescribed. Over-the-counter Tylenol and/or Motrin as directed for the next 5 to 7 days. Medrol Dosepak as prescribed. Please call [facility] for further evaluation and care, referral has been provided. If any worsening or concerns return to the ER immediately.

## 2023-08-09 NOTE — ED NOTES
Pt presents to ED with c/o right arm numbness that began this morning. Pt states he went to grab a gallon of milk and couldn't pick it up. Pt states having same symptoms when cutting arm previously and damaging nerves. Pt denies pain at this time.       Shantanu Morales  08/09/23 1054

## 2023-09-11 ENCOUNTER — HOSPITAL ENCOUNTER (EMERGENCY)
Age: 43
Discharge: HOME | End: 2023-09-11

## 2023-12-11 ENCOUNTER — HOSPITAL ENCOUNTER (EMERGENCY)
Age: 43
Discharge: HOME | End: 2023-12-11
Attending: EMERGENCY MEDICINE
Payer: MEDICAID

## 2023-12-11 ENCOUNTER — APPOINTMENT (OUTPATIENT)
Dept: CT IMAGING | Age: 43
End: 2023-12-11
Payer: MEDICAID

## 2023-12-11 VITALS
OXYGEN SATURATION: 99 % | DIASTOLIC BLOOD PRESSURE: 87 MMHG | TEMPERATURE: 98.3 F | RESPIRATION RATE: 20 BRPM | HEART RATE: 90 BPM | SYSTOLIC BLOOD PRESSURE: 144 MMHG

## 2023-12-11 DIAGNOSIS — K02.9 DENTAL CARIES: Primary | ICD-10-CM

## 2023-12-11 LAB
ANION GAP SERPL CALC-SCNC: 23 MEQ/L (ref 8–16)
BASOPHILS ABSOLUTE: 0 THOU/MM3 (ref 0–0.1)
BASOPHILS NFR BLD AUTO: 0.4 %
BUN SERPL-MCNC: 8 MG/DL (ref 7–22)
CALCIUM SERPL-MCNC: 9.3 MG/DL (ref 8.5–10.5)
CHLORIDE SERPL-SCNC: 89 MEQ/L (ref 98–111)
CO2 SERPL-SCNC: 23 MEQ/L (ref 23–33)
CREAT SERPL-MCNC: 0.7 MG/DL (ref 0.4–1.2)
DEPRECATED RDW RBC AUTO: 49.9 FL (ref 35–45)
EOSINOPHIL NFR BLD AUTO: 0.2 %
EOSINOPHILS ABSOLUTE: 0 THOU/MM3 (ref 0–0.4)
ERYTHROCYTE [DISTWIDTH] IN BLOOD BY AUTOMATED COUNT: 15.5 % (ref 11.5–14.5)
GFR SERPL CREATININE-BSD FRML MDRD: > 60 ML/MIN/1.73M2
GLUCOSE SERPL-MCNC: 74 MG/DL (ref 70–108)
HCT VFR BLD AUTO: 45.2 % (ref 42–52)
HGB BLD-MCNC: 15.2 GM/DL (ref 14–18)
IMM GRANULOCYTES # BLD AUTO: 0.03 THOU/MM3 (ref 0–0.07)
IMM GRANULOCYTES NFR BLD AUTO: 0.3 %
LYMPHOCYTES ABSOLUTE: 1.5 THOU/MM3 (ref 1–4.8)
LYMPHOCYTES NFR BLD AUTO: 14 %
MCH RBC QN AUTO: 29.7 PG (ref 26–33)
MCHC RBC AUTO-ENTMCNC: 33.6 GM/DL (ref 32.2–35.5)
MCV RBC AUTO: 88.5 FL (ref 80–94)
MONOCYTES ABSOLUTE: 1 THOU/MM3 (ref 0.4–1.3)
MONOCYTES NFR BLD AUTO: 9.6 %
NEUTROPHILS NFR BLD AUTO: 75.5 %
NRBC BLD AUTO-RTO: 0 /100 WBC
OSMOLALITY SERPL CALC.SUM OF ELEC: 267.1 MOSMOL/KG (ref 275–300)
PLATELET # BLD AUTO: 236 THOU/MM3 (ref 130–400)
PMV BLD AUTO: 9.2 FL (ref 9.4–12.4)
POTASSIUM SERPL-SCNC: 4.9 MEQ/L (ref 3.5–5.2)
RBC # BLD AUTO: 5.11 MILL/MM3 (ref 4.7–6.1)
SEGMENTED NEUTROPHILS ABSOLUTE COUNT: 7.9 THOU/MM3 (ref 1.8–7.7)
SODIUM SERPL-SCNC: 135 MEQ/L (ref 135–145)
WBC # BLD AUTO: 10.5 THOU/MM3 (ref 4.8–10.8)

## 2023-12-11 PROCEDURE — 6360000002 HC RX W HCPCS: Performed by: EMERGENCY MEDICINE

## 2023-12-11 PROCEDURE — 85025 COMPLETE CBC W/AUTO DIFF WBC: CPT

## 2023-12-11 PROCEDURE — 96374 THER/PROPH/DIAG INJ IV PUSH: CPT

## 2023-12-11 PROCEDURE — 36415 COLL VENOUS BLD VENIPUNCTURE: CPT

## 2023-12-11 PROCEDURE — 80048 BASIC METABOLIC PNL TOTAL CA: CPT

## 2023-12-11 PROCEDURE — 99285 EMERGENCY DEPT VISIT HI MDM: CPT

## 2023-12-11 PROCEDURE — 70487 CT MAXILLOFACIAL W/DYE: CPT

## 2023-12-11 PROCEDURE — 6360000004 HC RX CONTRAST MEDICATION: Performed by: EMERGENCY MEDICINE

## 2023-12-11 RX ORDER — AMOXICILLIN AND CLAVULANATE POTASSIUM 875; 125 MG/1; MG/1
1 TABLET, FILM COATED ORAL 2 TIMES DAILY
Qty: 20 TABLET | Refills: 0 | Status: SHIPPED | OUTPATIENT
Start: 2023-12-11 | End: 2023-12-21

## 2023-12-11 RX ORDER — DEXAMETHASONE SODIUM PHOSPHATE 4 MG/ML
10 INJECTION, SOLUTION INTRA-ARTICULAR; INTRALESIONAL; INTRAMUSCULAR; INTRAVENOUS; SOFT TISSUE ONCE
Status: COMPLETED | OUTPATIENT
Start: 2023-12-11 | End: 2023-12-11

## 2023-12-11 RX ADMIN — IOPAMIDOL 80 ML: 755 INJECTION, SOLUTION INTRAVENOUS at 13:39

## 2023-12-11 RX ADMIN — DEXAMETHASONE SODIUM PHOSPHATE 10 MG: 4 INJECTION, SOLUTION INTRAMUSCULAR; INTRAVENOUS at 14:33

## 2023-12-11 NOTE — ED PROVIDER NOTES
[facility] EMERGENCY DEPT      CHIEF COMPLAINT       Chief Complaint   Patient presents with    Dental Pain    Facial Swelling       Nurses Notes reviewed and I agree except as noted in the HPI. HISTORY OF PRESENT ILLNESS    Ely Cutler is a 37 y.o. male who presents with complaint of facial swelling status post dental extraction/root canal some time middle of last week. Patient stated that he feels like his face is still numb from the anesthetic. He also has swelling involving right face. He has no trouble swallowing or breathing. Onset: Acute  Duration: Approximately 5 days  Timing: Persistent  Location of Pain: Right facial pain  Intesity/severity: Mild edema  Modifying Factors: Recent dental procedure  Relieved by;  Previous Episodes; Tx Before arrival: None    PAST MEDICAL HISTORY    has a past medical history of Liver disease and Seizures ([address] SURGICAL HISTORY      has a past surgical history that includes Colonoscopy (N/A, 12/17/2019). CURRENT MEDICATIONS       Discharge Medication List as of 12/11/2023  2:33 PM        CONTINUE these medications which have NOT CHANGED    Details   vitamin B-1 (THIAMINE) 100 MG tablet Take 1 tablet by mouth daily, Disp-30 tablet, [id number]      folic acid (FOLVITE) 1 MG tablet Take 1 tablet by mouth daily, Disp-30 tablet, R-0Normal      levETIRAcetam (KEPPRA) 500 MG tablet Take 1 tablet by mouth 2 times daily, Disp-60 tablet, R-0Normal      omeprazole (PRILOSEC) 20 MG delayed release capsule Take 2 capsules by mouth Daily, Disp-60 capsule, R-0Normal             ALLERGIES     has No Known Allergies. FAMILY HISTORY     He indicated that his mother is alive. He indicated that his father is alive. family history is not on file. SOCIAL HISTORY      reports that he has been smoking cigarettes and cigars. He has been smoking an average of 1 pack per day.  He has never used smokeless tobacco. He reports current alcohol use of about 70.0 standard drinks of

## 2023-12-11 NOTE — ED NOTES
Patient to ED for right sided facial swelling that started after having a root canal Wednesday along with fillings.      Sari Macdonald RN  12/11/23 4860

## 2024-01-08 ENCOUNTER — HOSPITAL ENCOUNTER (EMERGENCY)
Age: 44
Discharge: HOME | End: 2024-01-08
Payer: MEDICAID

## 2024-01-08 VITALS
DIASTOLIC BLOOD PRESSURE: 65 MMHG | RESPIRATION RATE: 18 BRPM | TEMPERATURE: 98.6 F | OXYGEN SATURATION: 99 % | SYSTOLIC BLOOD PRESSURE: 116 MMHG | HEART RATE: 82 BPM

## 2024-01-08 DIAGNOSIS — R20.0 LIP NUMBNESS: Primary | ICD-10-CM

## 2024-01-08 DIAGNOSIS — Z92.89 HISTORY OF DENTAL SURGERY: ICD-10-CM

## 2024-01-08 PROCEDURE — 99213 OFFICE O/P EST LOW 20 MIN: CPT

## 2024-01-08 RX ORDER — PREDNISONE 20 MG/1
TABLET ORAL
Qty: 11 TABLET | Refills: 0 | Status: SHIPPED | OUTPATIENT
Start: 2024-01-08 | End: 2024-01-16

## 2024-01-08 ASSESSMENT — PAIN - FUNCTIONAL ASSESSMENT: PAIN_FUNCTIONAL_ASSESSMENT: NONE - DENIES PAIN

## 2024-01-08 NOTE — ED PROVIDER NOTES
[facility] URGENT CARE  Urgent Care Encounter       CHIEF COMPLAINT       Chief Complaint   Patient presents with    Facial Swelling       Nurses Notes reviewed and I agree except as noted in the HPI.  HISTORY OF PRESENT ILLNESS   Dashawn Rivera is a 43 y.o. male who presents with dental pain and lip swelling. Reports post-op dental surgery at a facility in [place] over one month ago. Reports since, facial swelling and lip numbness. Reports was seen at ER for those concerns and was treated with a steroid 30 day ago which reduced swelling significantly. Remains concerned with upper lip numbness.     The history is provided by the patient.       REVIEW OF SYSTEMS     Review of Systems   HENT:  Positive for dental problem.    Neurological:  Positive for numbness (upper lip).   All other systems reviewed and are negative.      PAST MEDICAL HISTORY         Diagnosis Date    Liver disease     Seizures (HCC)        SURGICALHISTORY     Patient  has a past surgical history that includes Colonoscopy (N/A, 12/17/2019).    CURRENT MEDICATIONS       Previous Medications    FOLIC ACID (FOLVITE) 1 MG TABLET    Take 1 tablet by mouth daily    LEVETIRACETAM (KEPPRA) 500 MG TABLET    Take 1 tablet by mouth 2 times daily    OMEPRAZOLE (PRILOSEC) 20 MG DELAYED RELEASE CAPSULE    Take 2 capsules by mouth Daily    VITAMIN B-1 (THIAMINE) 100 MG TABLET    Take 1 tablet by mouth daily       ALLERGIES     Patient is has No Known Allergies.    Patients   Immunization History   Administered Date(s) Administered    TDaP, ADACEL (age 10y-64y), BOOSTRIX (age 10y+), IM, 0.5mL 07/26/2015, 12/03/2021       FAMILY HISTORY     Patient's family history is not on file.    SOCIAL HISTORY     Patient  reports that he has been smoking cigarettes and cigars. He has never used smokeless tobacco. He reports current alcohol use of about 70.0 standard drinks of alcohol per week. He reports that he does not use drugs.    PHYSICAL EXAM     ED TRIAGE

## 2024-03-16 ENCOUNTER — HOSPITAL ENCOUNTER (EMERGENCY)
Age: 44
Discharge: HOME | End: 2024-03-16
Payer: MEDICAID

## 2024-03-16 VITALS
SYSTOLIC BLOOD PRESSURE: 147 MMHG | HEART RATE: 94 BPM | OXYGEN SATURATION: 98 % | TEMPERATURE: 98.6 F | RESPIRATION RATE: 16 BRPM | DIASTOLIC BLOOD PRESSURE: 89 MMHG

## 2024-03-16 DIAGNOSIS — R42 DIZZINESS: Primary | ICD-10-CM

## 2024-03-16 DIAGNOSIS — R55 NEAR SYNCOPE: ICD-10-CM

## 2024-03-16 LAB
EKG ATRIAL RATE: 82 BPM
EKG P AXIS: 87 DEGREES
EKG P-R INTERVAL: 134 MS
EKG Q-T INTERVAL: 380 MS
EKG QRS DURATION: 80 MS
EKG QTC CALCULATION (BAZETT): 443 MS
EKG R AXIS: 86 DEGREES
EKG T AXIS: 78 DEGREES
EKG VENTRICULAR RATE: 82 BPM
FLUAV AG SPEC QL: NEGATIVE
FLUBV AG SPEC QL: NEGATIVE
GLUCOSE BLD STRIP.AUTO-MCNC: 91 MG/DL (ref 70–108)
SARS-COV-2 RDRP RESP QL NAA+PROBE: NOT  DETECTED

## 2024-03-16 PROCEDURE — 93005 ELECTROCARDIOGRAM TRACING: CPT

## 2024-03-16 PROCEDURE — 99214 OFFICE O/P EST MOD 30 MIN: CPT

## 2024-03-16 PROCEDURE — 87635 SARS-COV-2 COVID-19 AMP PRB: CPT

## 2024-03-16 PROCEDURE — 99213 OFFICE O/P EST LOW 20 MIN: CPT

## 2024-03-16 PROCEDURE — 87804 INFLUENZA ASSAY W/OPTIC: CPT

## 2024-03-16 PROCEDURE — 82948 REAGENT STRIP/BLOOD GLUCOSE: CPT

## 2024-03-16 ASSESSMENT — ENCOUNTER SYMPTOMS
COUGH: 1
SHORTNESS OF BREATH: 0
DIARRHEA: 0
TROUBLE SWALLOWING: 0
EYE DISCHARGE: 0
EYE REDNESS: 0
NAUSEA: 1
RHINORRHEA: 0
VOMITING: 0
SORE THROAT: 0

## 2024-03-16 ASSESSMENT — PAIN - FUNCTIONAL ASSESSMENT: PAIN_FUNCTIONAL_ASSESSMENT: NONE - DENIES PAIN

## 2024-03-16 NOTE — ED NOTES
Patient had agreed for transport to [facility] e[facility] by EMS, refused to stay in room 4 and insisted on waiting outside, when outside walked off of property prior to EMS arrival, refused to further evaluated     Gerardo Ho, OSIEL  03/16/24 5106

## 2024-03-16 NOTE — ED PROVIDER NOTES
[facility] URGENT CARE  Urgent Care Encounter      CHIEF COMPLAINT       Chief Complaint   Patient presents with    Dizziness       Nurses Notes reviewed and I agree except as noted in the HPI.  HISTORY OF PRESENT ILLNESS   Dashawn Rivera is a 44 y.o. male who presents urgent care for evaluation of dizziness.  Patient reports onset of symptoms about 20 minutes prior to arrival.  Patient states he has a history of seizures and his dizziness is making him feel like he just wants to pass out.  He does report he is taking his Keppra as prescribed.  He describes the dizziness as his head is full air.  Patient is nauseated due to dizziness.  Patient denies any recent contact with anybody ill, fever.  Denies any alcohol consumption.    REVIEW OF SYSTEMS     Review of Systems   Constitutional:  Positive for fatigue. Negative for chills, diaphoresis and fever.   HENT:  Negative for congestion, ear pain, rhinorrhea, sore throat and trouble swallowing.    Eyes:  Negative for discharge and redness.   Respiratory:  Positive for cough. Negative for shortness of breath.    Cardiovascular:  Negative for chest pain.   Gastrointestinal:  Positive for nausea. Negative for diarrhea and vomiting.   Genitourinary:  Negative for decreased urine volume.   Musculoskeletal:  Negative for neck pain and neck stiffness.   Skin:  Negative for rash.   Neurological:  Positive for dizziness and light-headedness. Negative for headaches.   Hematological:  Negative for adenopathy.   Psychiatric/Behavioral:  Negative for sleep disturbance.        PAST MEDICAL HISTORY         Diagnosis Date    Liver disease     Seizures (HCC)        SURGICAL HISTORY     Patient  has a past surgical history that includes Colonoscopy (N/A, 12/17/2019).    CURRENT MEDICATIONS       Discharge Medication List as of 3/16/2024  2:15 PM        CONTINUE these medications which have NOT CHANGED    Details   vitamin B-1 (THIAMINE) 100 MG tablet Take 1 tablet by mouth  70 - 108 mg/dl   EKG 12 Lead   Result Value Ref Range    Ventricular Rate 82 BPM    Atrial Rate 82 BPM    P-R Interval 134 ms    QRS Duration 80 ms    Q-T Interval 380 ms    QTc Calculation (Bazett) 443 ms    P Axis 87 degrees    R Axis 86 degrees    T Axis 78 degrees       IMAGING:  No orders to display      URGENT CARE COURSE:     Vitals:    03/16/24 1338   BP: (!) 147/89   Pulse: 94   Resp: 16   Temp: 98.6 °F (37 °C)   TempSrc: Oral   SpO2: 98%       Medications - No data to display  PROCEDURES:  None  FINAL IMPRESSION      1. Dizziness    2. Near syncope        DISPOSITION/PLAN   DISPOSITION [place] - Left Before Treatment Complete 03/16/2024 02:15:11 PM    Patient was brought back to a room immediately.  EKG obtained.  40 care blood sugar obtained.  Blood sugar 91.  EKG interpreted by me was normal sinus rhythm.  Discussed with patient with recent symptoms cough noted.  Obtain COVID and influenza swab.  On assessment patient reeks of alcohol.  Discussed with patient patient be further evaluated in the emergency department.  Patient agreed to go by ambulance as he had no other way.  Report was called to [facility] emergency department and given to Summer ramsey RN.  EMS called. Patient refused to wait in room for EMS, he stated that he was going outside.  Patient urged to wait in room for visualization and safety.  Patient refused.  Patient walked out of the facility prior to EMS arrival.  Upon EMS arrival patient unable to locate.    PATIENT REFERRED TO:  No follow-up provider specified.  DISCHARGE MEDICATIONS:  Discharge Medication List as of 3/16/2024  2:15 PM        Discharge Medication List as of 3/16/2024  2:15 PM          MARIA ALEJANDRA Mir CNP, Olivia, APRN - CNP  03/16/24 3518

## 2024-03-16 NOTE — ED TRIAGE NOTES
Presents to ER with concern of feeling dizzy and lightheaded. Reports started 20 min ago. Upon arrival pt at window reporting he feels like he is going to have a seizure. Pt denies any pain or recent illness. EKG complete.

## 2024-03-18 LAB
EKG ATRIAL RATE: 82 BPM
EKG P AXIS: 87 DEGREES
EKG P-R INTERVAL: 134 MS
EKG Q-T INTERVAL: 380 MS
EKG QRS DURATION: 80 MS
EKG QTC CALCULATION (BAZETT): 443 MS
EKG R AXIS: 86 DEGREES
EKG T AXIS: 78 DEGREES
EKG VENTRICULAR RATE: 82 BPM

## 2024-03-18 PROCEDURE — 93010 ELECTROCARDIOGRAM REPORT: CPT | Performed by: INTERNAL MEDICINE

## 2024-09-24 ENCOUNTER — HOSPITAL ENCOUNTER (EMERGENCY)
Age: 44
Discharge: HOME | End: 2024-09-24
Payer: MEDICAID

## 2024-09-24 ENCOUNTER — APPOINTMENT (OUTPATIENT)
Dept: CT IMAGING | Age: 44
End: 2024-09-24
Payer: MEDICAID

## 2024-09-24 ENCOUNTER — APPOINTMENT (OUTPATIENT)
Dept: INTERVENTIONAL RADIOLOGY/VASCULAR | Age: 44
End: 2024-09-24
Payer: MEDICAID

## 2024-09-24 VITALS
RESPIRATION RATE: 16 BRPM | HEIGHT: 70 IN | OXYGEN SATURATION: 98 % | WEIGHT: 130 LBS | DIASTOLIC BLOOD PRESSURE: 94 MMHG | HEART RATE: 78 BPM | BODY MASS INDEX: 18.61 KG/M2 | TEMPERATURE: 98.2 F | SYSTOLIC BLOOD PRESSURE: 143 MMHG

## 2024-09-24 DIAGNOSIS — E83.51 HYPOCALCEMIA: ICD-10-CM

## 2024-09-24 DIAGNOSIS — E83.42 HYPOMAGNESEMIA: ICD-10-CM

## 2024-09-24 DIAGNOSIS — I82.401 ACUTE DEEP VEIN THROMBOSIS (DVT) OF RIGHT LOWER EXTREMITY, UNSPECIFIED VEIN (HCC): Primary | ICD-10-CM

## 2024-09-24 DIAGNOSIS — Z78.9 ALCOHOL USE: ICD-10-CM

## 2024-09-24 DIAGNOSIS — R74.8 ELEVATED LIVER ENZYMES: ICD-10-CM

## 2024-09-24 LAB
ALBUMIN SERPL BCG-MCNC: 2.6 G/DL (ref 3.5–5.1)
ALP SERPL-CCNC: 491 U/L (ref 38–126)
ALT SERPL W/O P-5'-P-CCNC: 80 U/L (ref 11–66)
ANION GAP SERPL CALC-SCNC: 11 MEQ/L (ref 8–16)
ANISOCYTOSIS BLD QL SMEAR: PRESENT
AST SERPL-CCNC: 242 U/L (ref 5–40)
BASO STIPL BLD QL SMEAR: ABNORMAL
BASOPHILS ABSOLUTE: 0 THOU/MM3 (ref 0–0.1)
BASOPHILS NFR BLD AUTO: 0.5 %
BILIRUB SERPL-MCNC: 1.3 MG/DL (ref 0.3–1.2)
BUN SERPL-MCNC: 8 MG/DL (ref 7–22)
CALCIUM SERPL-MCNC: 7.6 MG/DL (ref 8.5–10.5)
CHLORIDE SERPL-SCNC: 97 MEQ/L (ref 98–111)
CO2 SERPL-SCNC: 25 MEQ/L (ref 23–33)
CREAT SERPL-MCNC: 0.5 MG/DL (ref 0.4–1.2)
D DIMER PPP IA.FEU-MCNC: 7530 NG/ML FEU (ref 0–500)
D DIMER PPP IA.FEU-MCNC: 7601 NG/ML FEU (ref 0–500)
DEPRECATED RDW RBC AUTO: 65.7 FL (ref 35–45)
ECHO BSA: 1.71 M2
EOSINOPHIL NFR BLD AUTO: 0.2 %
EOSINOPHILS ABSOLUTE: 0 THOU/MM3 (ref 0–0.4)
ERYTHROCYTE [DISTWIDTH] IN BLOOD BY AUTOMATED COUNT: 18.8 % (ref 11.5–14.5)
GFR SERPL CREATININE-BSD FRML MDRD: > 90 ML/MIN/1.73M2
GLUCOSE SERPL-MCNC: 113 MG/DL (ref 70–108)
HCT VFR BLD AUTO: 30.8 % (ref 42–52)
HGB BLD-MCNC: 10.8 GM/DL (ref 14–18)
IMM GRANULOCYTES # BLD AUTO: 0.05 THOU/MM3 (ref 0–0.07)
IMM GRANULOCYTES NFR BLD AUTO: 0.8 %
LYMPHOCYTES ABSOLUTE: 1.8 THOU/MM3 (ref 1–4.8)
LYMPHOCYTES NFR BLD AUTO: 27.8 %
MAGNESIUM SERPL-MCNC: 1.4 MG/DL (ref 1.6–2.4)
MCH RBC QN AUTO: 34.2 PG (ref 26–33)
MCHC RBC AUTO-ENTMCNC: 35.1 GM/DL (ref 32.2–35.5)
MCV RBC AUTO: 97.5 FL (ref 80–94)
MONOCYTES ABSOLUTE: 0.6 THOU/MM3 (ref 0.4–1.3)
MONOCYTES NFR BLD AUTO: 8.8 %
NEUTROPHILS ABSOLUTE: 4 THOU/MM3 (ref 1.8–7.7)
NEUTROPHILS NFR BLD AUTO: 61.9 %
NRBC BLD AUTO-RTO: 5 /100 WBC
PLATELET # BLD AUTO: 118 THOU/MM3 (ref 130–400)
PLATELET BLD QL SMEAR: ABNORMAL
PMV BLD AUTO: 9.6 FL (ref 9.4–12.4)
POIKILOCYTES: ABNORMAL
POLYCHROMASIA BLD QL SMEAR: ABNORMAL
POTASSIUM SERPL-SCNC: 3.8 MEQ/L (ref 3.5–5.2)
PROT SERPL-MCNC: < 0.2 G/DL (ref 6.1–8)
RBC # BLD AUTO: 3.16 MILL/MM3 (ref 4.7–6.1)
REASON FOR REJECTION: NORMAL
REASON FOR REJECTION: NORMAL
REJECTED TEST: NORMAL
REJECTED TEST: NORMAL
SCAN OF BLOOD SMEAR: NORMAL
SODIUM SERPL-SCNC: 133 MEQ/L (ref 135–145)
STOMATOCYTES: ABNORMAL
TARGETS BLD QL SMEAR: ABNORMAL
WBC # BLD AUTO: 6.5 THOU/MM3 (ref 4.8–10.8)

## 2024-09-24 PROCEDURE — 71275 CT ANGIOGRAPHY CHEST: CPT

## 2024-09-24 PROCEDURE — 6370000000 HC RX 637 (ALT 250 FOR IP): Performed by: PHYSICIAN ASSISTANT

## 2024-09-24 PROCEDURE — 85025 COMPLETE CBC W/AUTO DIFF WBC: CPT

## 2024-09-24 PROCEDURE — 99285 EMERGENCY DEPT VISIT HI MDM: CPT

## 2024-09-24 PROCEDURE — 85379 FIBRIN DEGRADATION QUANT: CPT

## 2024-09-24 PROCEDURE — 93971 EXTREMITY STUDY: CPT

## 2024-09-24 PROCEDURE — 96366 THER/PROPH/DIAG IV INF ADDON: CPT

## 2024-09-24 PROCEDURE — 2580000003 HC RX 258: Performed by: PHYSICIAN ASSISTANT

## 2024-09-24 PROCEDURE — 6360000002 HC RX W HCPCS: Performed by: PHYSICIAN ASSISTANT

## 2024-09-24 PROCEDURE — 6360000004 HC RX CONTRAST MEDICATION: Performed by: PHYSICIAN ASSISTANT

## 2024-09-24 PROCEDURE — 96365 THER/PROPH/DIAG IV INF INIT: CPT

## 2024-09-24 PROCEDURE — 36415 COLL VENOUS BLD VENIPUNCTURE: CPT

## 2024-09-24 PROCEDURE — 80053 COMPREHEN METABOLIC PANEL: CPT

## 2024-09-24 PROCEDURE — 83735 ASSAY OF MAGNESIUM: CPT

## 2024-09-24 RX ORDER — PHENOL 1.4 %
1 AEROSOL, SPRAY (ML) MUCOUS MEMBRANE DAILY
Qty: 30 TABLET | Refills: 0 | Status: SHIPPED | OUTPATIENT
Start: 2024-09-24

## 2024-09-24 RX ORDER — 0.9 % SODIUM CHLORIDE 0.9 %
1000 INTRAVENOUS SOLUTION INTRAVENOUS ONCE
Status: COMPLETED | OUTPATIENT
Start: 2024-09-24 | End: 2024-09-24

## 2024-09-24 RX ORDER — MAGNESIUM SULFATE IN WATER 40 MG/ML
2000 INJECTION, SOLUTION INTRAVENOUS ONCE
Status: COMPLETED | OUTPATIENT
Start: 2024-09-24 | End: 2024-09-24

## 2024-09-24 RX ORDER — IOPAMIDOL 755 MG/ML
80 INJECTION, SOLUTION INTRAVASCULAR
Status: COMPLETED | OUTPATIENT
Start: 2024-09-24 | End: 2024-09-24

## 2024-09-24 RX ADMIN — MAGNESIUM SULFATE HEPTAHYDRATE 2000 MG: 40 INJECTION, SOLUTION INTRAVENOUS at 16:44

## 2024-09-24 RX ADMIN — IOPAMIDOL 80 ML: 755 INJECTION, SOLUTION INTRAVENOUS at 16:24

## 2024-09-24 RX ADMIN — SODIUM CHLORIDE 1000 ML: 9 INJECTION, SOLUTION INTRAVENOUS at 16:42

## 2024-09-24 RX ADMIN — RIVAROXABAN 15 MG: 15 TABLET, FILM COATED ORAL at 17:50

## 2024-09-24 ASSESSMENT — PAIN DESCRIPTION - LOCATION: LOCATION: LEG

## 2024-09-24 ASSESSMENT — PAIN SCALES - GENERAL: PAINLEVEL_OUTOF10: 6

## 2024-09-24 ASSESSMENT — PAIN - FUNCTIONAL ASSESSMENT: PAIN_FUNCTIONAL_ASSESSMENT: 0-10

## 2024-09-30 PROCEDURE — 99285 EMERGENCY DEPT VISIT HI MDM: CPT

## 2024-10-01 ENCOUNTER — APPOINTMENT (OUTPATIENT)
Dept: INTERVENTIONAL RADIOLOGY/VASCULAR | Age: 44
DRG: 182 | End: 2024-10-01
Payer: MEDICAID

## 2024-10-01 ENCOUNTER — APPOINTMENT (OUTPATIENT)
Dept: ULTRASOUND IMAGING | Age: 44
DRG: 182 | End: 2024-10-01
Payer: MEDICAID

## 2024-10-01 ENCOUNTER — HOSPITAL ENCOUNTER (INPATIENT)
Age: 44
LOS: 2 days | Discharge: HOME | DRG: 182 | End: 2024-10-03
Attending: EMERGENCY MEDICINE | Admitting: RADIOLOGY
Payer: MEDICAID

## 2024-10-01 ENCOUNTER — APPOINTMENT (OUTPATIENT)
Dept: CT IMAGING | Age: 44
DRG: 182 | End: 2024-10-01
Payer: MEDICAID

## 2024-10-01 ENCOUNTER — APPOINTMENT (OUTPATIENT)
Dept: MRI IMAGING | Age: 44
DRG: 182 | End: 2024-10-01
Payer: MEDICAID

## 2024-10-01 DIAGNOSIS — I82.401 ACUTE DEEP VEIN THROMBOSIS (DVT) OF RIGHT LOWER EXTREMITY, UNSPECIFIED VEIN (HCC): ICD-10-CM

## 2024-10-01 DIAGNOSIS — F10.20 THROMBOCYTOPENIA CONCURRENT WITH AND DUE TO ALCOHOLISM (HCC): ICD-10-CM

## 2024-10-01 DIAGNOSIS — R74.8 ELEVATED LIVER ENZYMES: Primary | ICD-10-CM

## 2024-10-01 DIAGNOSIS — D69.59 THROMBOCYTOPENIA CONCURRENT WITH AND DUE TO ALCOHOLISM (HCC): ICD-10-CM

## 2024-10-01 DIAGNOSIS — R60.0 BILATERAL LOWER EXTREMITY EDEMA: ICD-10-CM

## 2024-10-01 PROBLEM — R74.01 ELEVATED LIVER TRANSAMINASE LEVEL: Status: ACTIVE | Noted: 2024-10-01

## 2024-10-01 LAB
ALBUMIN SERPL BCG-MCNC: 2.4 G/DL (ref 3.5–5.1)
ALP SERPL-CCNC: 442 U/L (ref 38–126)
ALT SERPL W/O P-5'-P-CCNC: 95 U/L (ref 11–66)
ANION GAP SERPL CALC-SCNC: 17 MEQ/L (ref 8–16)
ANISOCYTOSIS BLD QL SMEAR: PRESENT
APAP SERPL-MCNC: < 5 UG/ML (ref 0–20)
APTT PPP: 56.4 SECONDS (ref 22–38)
AST SERPL-CCNC: 322 U/L (ref 5–40)
B-OH-BUTYR SERPL-MSCNC: 2.89 MG/DL (ref 0.2–2.81)
BACTERIA URNS QL MICRO: ABNORMAL /HPF
BASOPHILS ABSOLUTE: 0 THOU/MM3 (ref 0–0.1)
BASOPHILS NFR BLD AUTO: 0.5 %
BILIRUB CONJ SERPL-MCNC: 1.3 MG/DL (ref 0.1–13.8)
BILIRUB SERPL-MCNC: 1.9 MG/DL (ref 0.3–1.2)
BILIRUB UR QL STRIP.AUTO: ABNORMAL
BUN SERPL-MCNC: 11 MG/DL (ref 7–22)
CALCIUM SERPL-MCNC: 8.2 MG/DL (ref 8.5–10.5)
CASTS #/AREA URNS LPF: ABNORMAL /LPF
CASTS 2: ABNORMAL /LPF
CHARACTER UR: CLEAR
CHLORIDE SERPL-SCNC: 95 MEQ/L (ref 98–111)
CO2 SERPL-SCNC: 21 MEQ/L (ref 23–33)
COLOR, UA: ABNORMAL
CREAT SERPL-MCNC: 0.5 MG/DL (ref 0.4–1.2)
CRYSTALS URNS MICRO: ABNORMAL
DEPRECATED RDW RBC AUTO: 69 FL (ref 35–45)
ECHO BSA: 1.71 M2
ECHO BSA: 1.71 M2
EKG ATRIAL RATE: 109 BPM
EKG P AXIS: 83 DEGREES
EKG P-R INTERVAL: 126 MS
EKG Q-T INTERVAL: 326 MS
EKG QRS DURATION: 74 MS
EKG QTC CALCULATION (BAZETT): 439 MS
EKG R AXIS: 84 DEGREES
EKG T AXIS: 80 DEGREES
EKG VENTRICULAR RATE: 109 BPM
EOSINOPHIL NFR BLD AUTO: 0.2 %
EOSINOPHILS ABSOLUTE: 0 THOU/MM3 (ref 0–0.4)
EPITHELIAL CELLS, UA: ABNORMAL /HPF
ERYTHROCYTE [DISTWIDTH] IN BLOOD BY AUTOMATED COUNT: 18.7 % (ref 11.5–14.5)
ETHANOL SERPL-MCNC: < 0.01 % (ref 0–0.08)
FERRITIN SERPL IA-MCNC: 1399 NG/ML (ref 22–322)
FIBRINOGEN PPP-MCNC: 125 MG/100ML (ref 155–475)
FOLATE SERPL-MCNC: 18 NG/ML (ref 4.8–24.2)
GFR SERPL CREATININE-BSD FRML MDRD: > 90 ML/MIN/1.73M2
GGT SERPL-CCNC: 4143 U/L (ref 8–69)
GLUCOSE SERPL-MCNC: 97 MG/DL (ref 70–108)
GLUCOSE UR QL STRIP.AUTO: NEGATIVE MG/DL
HAV IGM SER QL: NEGATIVE
HBV CORE IGM SERPL QL IA: NEGATIVE
HBV SURFACE AG SERPL QL IA: NEGATIVE
HCT VFR BLD AUTO: 33.5 % (ref 42–52)
HCV IGG SERPL QL IA: NEGATIVE
HGB BLD-MCNC: 11.4 GM/DL (ref 14–18)
HGB RETIC QN AUTO: 33.7 PG (ref 28.2–35.7)
HGB UR QL STRIP.AUTO: NEGATIVE
HOWELL-JOLLY BOD BLD QL SMEAR: PRESENT
HYPOCHROMIA BLD QL SMEAR: PRESENT
IMM GRANULOCYTES # BLD AUTO: 0.07 THOU/MM3 (ref 0–0.07)
IMM GRANULOCYTES NFR BLD AUTO: 1.1 %
IMM RETICS NFR: 23.5 % (ref 2.3–13.4)
INR PPP: 4.68 (ref 0.85–1.13)
INR PPP: 5.59 (ref 0.85–1.13)
IRON SATN MFR SERPL: 89 % (ref 20–50)
IRON SERPL-MCNC: 141 UG/DL (ref 65–195)
KETONES UR QL STRIP.AUTO: 15
LACTIC ACID, SEPSIS: 1.1 MMOL/L (ref 0.5–1.9)
LDH SERPL L TO P-CCNC: 396 U/L (ref 100–190)
LYMPHOCYTES ABSOLUTE: 2.2 THOU/MM3 (ref 1–4.8)
LYMPHOCYTES NFR BLD AUTO: 33 %
MCH RBC QN AUTO: 34.7 PG (ref 26–33)
MCHC RBC AUTO-ENTMCNC: 34 GM/DL (ref 32.2–35.5)
MCV RBC AUTO: 101.8 FL (ref 80–94)
MISCELLANEOUS 2: ABNORMAL
MONOCYTES ABSOLUTE: 0.4 THOU/MM3 (ref 0.4–1.3)
MONOCYTES NFR BLD AUTO: 6.7 %
NEUTROPHILS ABSOLUTE: 3.9 THOU/MM3 (ref 1.8–7.7)
NEUTROPHILS NFR BLD AUTO: 58.5 %
NITRITE UR QL STRIP: NEGATIVE
NRBC BLD AUTO-RTO: 2 /100 WBC
NT-PROBNP SERPL IA-MCNC: < 36 PG/ML (ref 0–124)
OSMOLALITY SERPL CALC.SUM OF ELEC: 265.7 MOSMOL/KG (ref 275–300)
PH UR STRIP.AUTO: 6 [PH] (ref 5–9)
PLATELET # BLD AUTO: 168 THOU/MM3 (ref 130–400)
PLATELET BLD QL SMEAR: ADEQUATE
PMV BLD AUTO: 10.2 FL (ref 9.4–12.4)
POLYCHROMASIA BLD QL SMEAR: ABNORMAL
POTASSIUM SERPL-SCNC: 4.1 MEQ/L (ref 3.5–5.2)
PROT SERPL-MCNC: 4.7 G/DL (ref 6.1–8)
PROT UR STRIP.AUTO-MCNC: ABNORMAL MG/DL
RBC # BLD AUTO: 3.29 MILL/MM3 (ref 4.7–6.1)
RBC URINE: ABNORMAL /HPF
RENAL EPI CELLS #/AREA URNS HPF: ABNORMAL /[HPF]
RETICS # AUTO: 685 THOU/MM3 (ref 20–115)
RETICS/RBC NFR AUTO: 2.3 % (ref 0.5–2)
SALICYLATES SERPL-MCNC: < 0.3 MG/DL (ref 2–10)
SCAN OF BLOOD SMEAR: NORMAL
SODIUM SERPL-SCNC: 133 MEQ/L (ref 135–145)
SP GR UR REFRACT.AUTO: > 1.03 (ref 1–1.03)
TIBC SERPL-MCNC: < 158 UG/DL (ref 171–450)
TROPONIN, HIGH SENSITIVITY: < 6 NG/L (ref 0–12)
UROBILINOGEN, URINE: 1 EU/DL (ref 0–1)
VIT B12 SERPL-MCNC: 1063 PG/ML (ref 211–911)
WBC # BLD AUTO: 6.6 THOU/MM3 (ref 4.8–10.8)
WBC #/AREA URNS HPF: ABNORMAL /HPF
WBC #/AREA URNS HPF: ABNORMAL /[HPF]
YEAST LIKE FUNGI URNS QL MICRO: ABNORMAL

## 2024-10-01 PROCEDURE — 93005 ELECTROCARDIOGRAM TRACING: CPT

## 2024-10-01 PROCEDURE — 83540 ASSAY OF IRON: CPT

## 2024-10-01 PROCEDURE — 83880 ASSAY OF NATRIURETIC PEPTIDE: CPT

## 2024-10-01 PROCEDURE — 6360000004 HC RX CONTRAST MEDICATION: Performed by: NURSE PRACTITIONER

## 2024-10-01 PROCEDURE — 74183 MRI ABD W/O CNTR FLWD CNTR: CPT

## 2024-10-01 PROCEDURE — 83605 ASSAY OF LACTIC ACID: CPT

## 2024-10-01 PROCEDURE — 80074 ACUTE HEPATITIS PANEL: CPT

## 2024-10-01 PROCEDURE — 82077 ASSAY SPEC XCP UR&BREATH IA: CPT

## 2024-10-01 PROCEDURE — 93010 ELECTROCARDIOGRAM REPORT: CPT | Performed by: INTERNAL MEDICINE

## 2024-10-01 PROCEDURE — 36415 COLL VENOUS BLD VENIPUNCTURE: CPT

## 2024-10-01 PROCEDURE — 6360000002 HC RX W HCPCS: Performed by: RADIOLOGY

## 2024-10-01 PROCEDURE — 82390 ASSAY OF CERULOPLASMIN: CPT

## 2024-10-01 PROCEDURE — B51B1ZZ FLUOROSCOPY OF RIGHT LOWER EXTREMITY VEINS USING LOW OSMOLAR CONTRAST: ICD-10-PCS | Performed by: RADIOLOGY

## 2024-10-01 PROCEDURE — 6360000004 HC RX CONTRAST MEDICATION: Performed by: RADIOLOGY

## 2024-10-01 PROCEDURE — 85046 RETICYTE/HGB CONCENTRATE: CPT

## 2024-10-01 PROCEDURE — C1769 GUIDE WIRE: HCPCS

## 2024-10-01 PROCEDURE — 6370000000 HC RX 637 (ALT 250 FOR IP)

## 2024-10-01 PROCEDURE — 82103 ALPHA-1-ANTITRYPSIN TOTAL: CPT

## 2024-10-01 PROCEDURE — 93971 EXTREMITY STUDY: CPT

## 2024-10-01 PROCEDURE — 82977 ASSAY OF GGT: CPT

## 2024-10-01 PROCEDURE — 85610 PROTHROMBIN TIME: CPT

## 2024-10-01 PROCEDURE — 82728 ASSAY OF FERRITIN: CPT

## 2024-10-01 PROCEDURE — 86663 EPSTEIN-BARR ANTIBODY: CPT

## 2024-10-01 PROCEDURE — 99223 1ST HOSP IP/OBS HIGH 75: CPT

## 2024-10-01 PROCEDURE — 2580000003 HC RX 258

## 2024-10-01 PROCEDURE — 85025 COMPLETE CBC W/AUTO DIFF WBC: CPT

## 2024-10-01 PROCEDURE — 80179 DRUG ASSAY SALICYLATE: CPT

## 2024-10-01 PROCEDURE — 80076 HEPATIC FUNCTION PANEL: CPT

## 2024-10-01 PROCEDURE — 86664 EPSTEIN-BARR NUCLEAR ANTIGEN: CPT

## 2024-10-01 PROCEDURE — 85384 FIBRINOGEN ACTIVITY: CPT

## 2024-10-01 PROCEDURE — 80143 DRUG ASSAY ACETAMINOPHEN: CPT

## 2024-10-01 PROCEDURE — 81256 HFE GENE: CPT

## 2024-10-01 PROCEDURE — 37248 TRLUML BALO ANGIOP 1ST VEIN: CPT

## 2024-10-01 PROCEDURE — 86038 ANTINUCLEAR ANTIBODIES: CPT

## 2024-10-01 PROCEDURE — 6360000002 HC RX W HCPCS: Performed by: EMERGENCY MEDICINE

## 2024-10-01 PROCEDURE — 1200000003 HC TELEMETRY R&B

## 2024-10-01 PROCEDURE — 96374 THER/PROPH/DIAG INJ IV PUSH: CPT

## 2024-10-01 PROCEDURE — 85730 THROMBOPLASTIN TIME PARTIAL: CPT

## 2024-10-01 PROCEDURE — A9579 GAD-BASE MR CONTRAST NOS,1ML: HCPCS | Performed by: NURSE PRACTITIONER

## 2024-10-01 PROCEDURE — 76705 ECHO EXAM OF ABDOMEN: CPT

## 2024-10-01 PROCEDURE — 86665 EPSTEIN-BARR CAPSID VCA: CPT

## 2024-10-01 PROCEDURE — 74177 CT ABD & PELVIS W/CONTRAST: CPT

## 2024-10-01 PROCEDURE — 84484 ASSAY OF TROPONIN QUANT: CPT

## 2024-10-01 PROCEDURE — 83516 IMMUNOASSAY NONANTIBODY: CPT

## 2024-10-01 PROCEDURE — 93975 VASCULAR STUDY: CPT

## 2024-10-01 PROCEDURE — 80048 BASIC METABOLIC PNL TOTAL CA: CPT

## 2024-10-01 PROCEDURE — 82010 KETONE BODYS QUAN: CPT

## 2024-10-01 PROCEDURE — 82525 ASSAY OF COPPER: CPT

## 2024-10-01 PROCEDURE — 37187 VENOUS MECH THROMBECTOMY: CPT

## 2024-10-01 PROCEDURE — 83615 LACTATE (LD) (LDH) ENZYME: CPT

## 2024-10-01 PROCEDURE — 6360000004 HC RX CONTRAST MEDICATION: Performed by: EMERGENCY MEDICINE

## 2024-10-01 PROCEDURE — 36010 PLACE CATHETER IN VEIN: CPT

## 2024-10-01 PROCEDURE — 83550 IRON BINDING TEST: CPT

## 2024-10-01 PROCEDURE — 83010 ASSAY OF HAPTOGLOBIN QUANT: CPT

## 2024-10-01 PROCEDURE — 82746 ASSAY OF FOLIC ACID SERUM: CPT

## 2024-10-01 PROCEDURE — 82607 VITAMIN B-12: CPT

## 2024-10-01 PROCEDURE — 2500000003 HC RX 250 WO HCPCS: Performed by: RADIOLOGY

## 2024-10-01 PROCEDURE — 04CK3ZZ EXTIRPATION OF MATTER FROM RIGHT FEMORAL ARTERY, PERCUTANEOUS APPROACH: ICD-10-PCS | Performed by: RADIOLOGY

## 2024-10-01 PROCEDURE — 81001 URINALYSIS AUTO W/SCOPE: CPT

## 2024-10-01 RX ORDER — FENTANYL CITRATE 50 UG/ML
50 INJECTION, SOLUTION INTRAMUSCULAR; INTRAVENOUS ONCE
Status: COMPLETED | OUTPATIENT
Start: 2024-10-01 | End: 2024-10-01

## 2024-10-01 RX ORDER — KETOROLAC TROMETHAMINE 30 MG/ML
15 INJECTION, SOLUTION INTRAMUSCULAR; INTRAVENOUS ONCE
Status: COMPLETED | OUTPATIENT
Start: 2024-10-01 | End: 2024-10-01

## 2024-10-01 RX ORDER — IOPAMIDOL 755 MG/ML
80 INJECTION, SOLUTION INTRAVASCULAR
Status: COMPLETED | OUTPATIENT
Start: 2024-10-01 | End: 2024-10-01

## 2024-10-01 RX ORDER — POLYETHYLENE GLYCOL 3350 17 G/17G
17 POWDER, FOR SOLUTION ORAL DAILY PRN
Status: DISCONTINUED | OUTPATIENT
Start: 2024-10-01 | End: 2024-10-03 | Stop reason: HOSPADM

## 2024-10-01 RX ORDER — POTASSIUM CHLORIDE 1500 MG/1
40 TABLET, EXTENDED RELEASE ORAL PRN
Status: DISCONTINUED | OUTPATIENT
Start: 2024-10-01 | End: 2024-10-03 | Stop reason: HOSPADM

## 2024-10-01 RX ORDER — MIDAZOLAM HYDROCHLORIDE 1 MG/ML
1 INJECTION INTRAMUSCULAR; INTRAVENOUS ONCE
Status: COMPLETED | OUTPATIENT
Start: 2024-10-01 | End: 2024-10-01

## 2024-10-01 RX ORDER — LIDOCAINE HYDROCHLORIDE 20 MG/ML
5 INJECTION, SOLUTION INFILTRATION; PERINEURAL ONCE
Status: COMPLETED | OUTPATIENT
Start: 2024-10-01 | End: 2024-10-01

## 2024-10-01 RX ORDER — POTASSIUM CHLORIDE 7.45 MG/ML
10 INJECTION INTRAVENOUS PRN
Status: DISCONTINUED | OUTPATIENT
Start: 2024-10-01 | End: 2024-10-03 | Stop reason: HOSPADM

## 2024-10-01 RX ORDER — ACETAMINOPHEN 650 MG/1
650 SUPPOSITORY RECTAL EVERY 6 HOURS PRN
Status: DISCONTINUED | OUTPATIENT
Start: 2024-10-01 | End: 2024-10-03 | Stop reason: HOSPADM

## 2024-10-01 RX ORDER — SODIUM CHLORIDE 9 MG/ML
INJECTION, SOLUTION INTRAVENOUS PRN
Status: DISCONTINUED | OUTPATIENT
Start: 2024-10-01 | End: 2024-10-03 | Stop reason: HOSPADM

## 2024-10-01 RX ORDER — FOLIC ACID 1 MG/1
1 TABLET ORAL DAILY
Status: DISCONTINUED | OUTPATIENT
Start: 2024-10-01 | End: 2024-10-03 | Stop reason: HOSPADM

## 2024-10-01 RX ORDER — LANOLIN ALCOHOL/MO/W.PET/CERES
100 CREAM (GRAM) TOPICAL DAILY
Status: DISCONTINUED | OUTPATIENT
Start: 2024-10-01 | End: 2024-10-03 | Stop reason: HOSPADM

## 2024-10-01 RX ORDER — LANOLIN ALCOHOL/MO/W.PET/CERES
400 CREAM (GRAM) TOPICAL DAILY
Status: DISCONTINUED | OUTPATIENT
Start: 2024-10-01 | End: 2024-10-03 | Stop reason: HOSPADM

## 2024-10-01 RX ORDER — IOPAMIDOL 612 MG/ML
100 INJECTION, SOLUTION INTRAVASCULAR
Status: COMPLETED | OUTPATIENT
Start: 2024-10-01 | End: 2024-10-01

## 2024-10-01 RX ORDER — LEVETIRACETAM 500 MG/1
500 TABLET ORAL 2 TIMES DAILY
Status: DISCONTINUED | OUTPATIENT
Start: 2024-10-01 | End: 2024-10-03 | Stop reason: HOSPADM

## 2024-10-01 RX ORDER — MAGNESIUM SULFATE IN WATER 40 MG/ML
2000 INJECTION, SOLUTION INTRAVENOUS PRN
Status: DISCONTINUED | OUTPATIENT
Start: 2024-10-01 | End: 2024-10-03 | Stop reason: HOSPADM

## 2024-10-01 RX ORDER — MULTIVITAMIN WITH IRON
1 TABLET ORAL DAILY
Status: DISCONTINUED | OUTPATIENT
Start: 2024-10-01 | End: 2024-10-03 | Stop reason: HOSPADM

## 2024-10-01 RX ORDER — ACETAMINOPHEN 325 MG/1
650 TABLET ORAL EVERY 6 HOURS PRN
Status: DISCONTINUED | OUTPATIENT
Start: 2024-10-01 | End: 2024-10-03 | Stop reason: HOSPADM

## 2024-10-01 RX ORDER — SODIUM CHLORIDE 0.9 % (FLUSH) 0.9 %
5-40 SYRINGE (ML) INJECTION PRN
Status: DISCONTINUED | OUTPATIENT
Start: 2024-10-01 | End: 2024-10-03 | Stop reason: HOSPADM

## 2024-10-01 RX ORDER — ONDANSETRON 2 MG/ML
4 INJECTION INTRAMUSCULAR; INTRAVENOUS EVERY 6 HOURS PRN
Status: DISCONTINUED | OUTPATIENT
Start: 2024-10-01 | End: 2024-10-03 | Stop reason: HOSPADM

## 2024-10-01 RX ORDER — PANTOPRAZOLE SODIUM 40 MG/1
40 TABLET, DELAYED RELEASE ORAL
Status: DISCONTINUED | OUTPATIENT
Start: 2024-10-01 | End: 2024-10-03 | Stop reason: HOSPADM

## 2024-10-01 RX ORDER — ONDANSETRON 4 MG/1
4 TABLET, ORALLY DISINTEGRATING ORAL EVERY 8 HOURS PRN
Status: DISCONTINUED | OUTPATIENT
Start: 2024-10-01 | End: 2024-10-03 | Stop reason: HOSPADM

## 2024-10-01 RX ORDER — SODIUM CHLORIDE 0.9 % (FLUSH) 0.9 %
5-40 SYRINGE (ML) INJECTION EVERY 12 HOURS SCHEDULED
Status: DISCONTINUED | OUTPATIENT
Start: 2024-10-01 | End: 2024-10-03 | Stop reason: HOSPADM

## 2024-10-01 RX ADMIN — IOPAMIDOL 80 ML: 755 INJECTION, SOLUTION INTRAVENOUS at 03:26

## 2024-10-01 RX ADMIN — GADOTERIDOL 10 ML: 279.3 INJECTION, SOLUTION INTRAVENOUS at 18:48

## 2024-10-01 RX ADMIN — FENTANYL CITRATE 50 MCG: 50 INJECTION INTRAMUSCULAR; INTRAVENOUS at 14:14

## 2024-10-01 RX ADMIN — Medication 400 MG: at 11:55

## 2024-10-01 RX ADMIN — LEVETIRACETAM 500 MG: 500 TABLET, FILM COATED ORAL at 21:46

## 2024-10-01 RX ADMIN — LIDOCAINE HYDROCHLORIDE 5 ML: 20 INJECTION, SOLUTION INFILTRATION; PERINEURAL at 14:37

## 2024-10-01 RX ADMIN — MIDAZOLAM 1 MG: 1 INJECTION INTRAMUSCULAR; INTRAVENOUS at 13:48

## 2024-10-01 RX ADMIN — IOPAMIDOL 25 ML: 612 INJECTION, SOLUTION INTRAVENOUS at 14:37

## 2024-10-01 RX ADMIN — FENTANYL CITRATE 50 MCG: 50 INJECTION INTRAMUSCULAR; INTRAVENOUS at 13:48

## 2024-10-01 RX ADMIN — Medication 1 TABLET: at 11:54

## 2024-10-01 RX ADMIN — MIDAZOLAM 1 MG: 1 INJECTION INTRAMUSCULAR; INTRAVENOUS at 14:14

## 2024-10-01 RX ADMIN — KETOROLAC TROMETHAMINE 15 MG: 30 INJECTION, SOLUTION INTRAMUSCULAR at 01:51

## 2024-10-01 RX ADMIN — SODIUM CHLORIDE, PRESERVATIVE FREE 10 ML: 5 INJECTION INTRAVENOUS at 21:47

## 2024-10-01 RX ADMIN — ACETAMINOPHEN 650 MG: 325 TABLET ORAL at 21:47

## 2024-10-01 RX ADMIN — LEVETIRACETAM 500 MG: 500 TABLET, FILM COATED ORAL at 11:54

## 2024-10-01 ASSESSMENT — PAIN DESCRIPTION - LOCATION
LOCATION: LEG
LOCATION: FOOT

## 2024-10-01 ASSESSMENT — PAIN SCALES - GENERAL
PAINLEVEL_OUTOF10: 6
PAINLEVEL_OUTOF10: 6
PAINLEVEL_OUTOF10: 4
PAINLEVEL_OUTOF10: 4
PAINLEVEL_OUTOF10: 5
PAINLEVEL_OUTOF10: 6
PAINLEVEL_OUTOF10: 0
PAINLEVEL_OUTOF10: 6

## 2024-10-01 ASSESSMENT — PAIN - FUNCTIONAL ASSESSMENT
PAIN_FUNCTIONAL_ASSESSMENT: 0-10
PAIN_FUNCTIONAL_ASSESSMENT: NONE - DENIES PAIN
PAIN_FUNCTIONAL_ASSESSMENT: PREVENTS OR INTERFERES SOME ACTIVE ACTIVITIES AND ADLS
PAIN_FUNCTIONAL_ASSESSMENT: 0-10

## 2024-10-01 ASSESSMENT — PAIN DESCRIPTION - DESCRIPTORS
DESCRIPTORS: PRESSURE
DESCRIPTORS: CRAMPING
DESCRIPTORS: TIGHTNESS

## 2024-10-01 ASSESSMENT — PAIN DESCRIPTION - PAIN TYPE: TYPE: ACUTE PAIN

## 2024-10-01 ASSESSMENT — PAIN DESCRIPTION - ORIENTATION
ORIENTATION: RIGHT
ORIENTATION: LEFT;RIGHT
ORIENTATION: RIGHT;LEFT
ORIENTATION: RIGHT

## 2024-10-01 ASSESSMENT — PAIN DESCRIPTION - DIRECTION: RADIATING_TOWARDS: RIGHT FOOT

## 2024-10-01 ASSESSMENT — PAIN DESCRIPTION - FREQUENCY: FREQUENCY: CONTINUOUS

## 2024-10-01 NOTE — ED NOTES
Pt. Resting in bed with even and unlabored respirations. Pt. Denies any pain. Pt. Updated about plan for liver ultrasound and lower extremity doppler. Lab at bedside. Pt. Has no further concerns, questions or needs at this time. Call light within reach.

## 2024-10-01 NOTE — PROGRESS NOTES
1326 Pt arrived to special procedure room 2 for thrombectomy under conscious sedation. No family present per patient. Patient placed call to mother and writer spoke with mother per patient request.  1327 Procedure explained using teach back method. Pt states understanding.  1333 Dr Brady into assess patient and explain procedure. This procedure has been fully reviewed with the patient and written informed consent has been obtained.   1343 Patient assisted to table in prone position. Monitor attached to patient. Comfort ensured.  1346 Pre-procedure images obtained.  1353 Procedure begins. Access to right popliteal vein.  1424 Angioplasty performed per Dr Brady in the right mid femoral vein using a [id number] [place].  1427 Angioplasty performed per Dr Brady in the right mid femoral vein using a [id number] [place].  1436 Procedure complete. Post-procedure images obtained.  1443 Call placed to ED for report and was notified that patient would be going to [facility]. Call placed to [facility] and was notified that room had not been cleaned yet for patient.  1446 Slipknot placed per Dr Brady to right popliteal vein.  1449 Monitor removed. Patient assisted to stretcher in semi fowlers position. Comfort ensured.  1500 Notified by ED that patient will be going to [facility] room 3.  1508 Patient transported to [facility] room 3 in stable condition. Writer at bedside.

## 2024-10-01 NOTE — ED TRIAGE NOTES
Pt to ED c/o left leg swelling. Pt states he was here 3 days ago and they diagnosed him with a blood clot in his right leg. Pt sautes now his left leg is swelling and rating his pain a 6/10. Pt states he is taking his medication as prescribed. On arrival pt foot cool to the touch and has pitting edema. Pt A&O.

## 2024-10-01 NOTE — ED NOTES
Patient resting in bed playing on phone. Updated on plan of care. Denies any needs. Call light in reach.

## 2024-10-01 NOTE — CONSULTS
CONSULTATION NOTE :GI       Patient - Dashawn Rivera,  Age - 44 y.o.    - 1980      Room Number - 6K-27/027-A   MRN -  [id number]   formerly [facility] # - [id number]  Date of Admission -  10/1/2024 12:12 AM  Patient's PCP: No primary care provider on file.     Requesting Physician: Behl, Ashita, MD    REASON FOR CONSULTATION     Elevated liver enzymes, elevated INR  HISTORY OF PRESENT ILLNESS       This is a very pleasant 44 y.o. male who was admitted to the hospital with a chief complaints of  leg swelling. Patient reports being seen recently () in ED for concerns of right lower extremity swelling. States he was sent home on a blood thinner. Reports that he noted that his left foot was becoming swollen and was fearful he may have a blood clot in that leg as well. States that he has pain only with standing. Otherwise denies symptoms. Denies headache, dizziness, or lightheadedness. Denies fever or chills. Denies chest pain or SOB. Denies abdominal pain or nausea. Denies dysuria or hematuria.  Patient reports a history of alcohol use but when he was diagnosed with the blood clot he quit drinking.     At time of examination pt status post of successful thromboaspiration right leg veins.   Status post successful venous angioplasty mid thigh segment femoral vein.     Chart review patient drinks 70 standard liquor drinks per week, he states it depends on the occasion of how much he drinks. Denies any drug use. States he works part time and was sitting in house watching movie for last 3 days. Weight is stable denies any decrease appetite. Denies any SOB or chest pain, some tenderness RUQ. Denies nausea or vomiting, bloody or black stools.      US-   DOPPLER EVALUATION: The splenic artery and splenic vein are patent. The main  portal vein shows hepatopetal flow. Hepatofugal flow is seen in the hepatic  veins. Loss of phasicity in the hepatic veins can reflect underlying liver  disease.     Impression:

## 2024-10-01 NOTE — OP NOTE
Department of Radiology  Post Procedure Progress Note      Pre-Procedure Diagnosis:  Deep venous thrombosis     Procedure Performed:  thromboaspiration  right leg veins , and venous angioplasty     Anesthesia: local / versed and fentanyl    Findings: successful    Immediate Complications:  None    Estimated Blood Loss: minimal    SEE DICTATED PROCEDURE NOTE FOR COMPLETE DETAILS.    Scar Brady MD   10/1/2024 2:40 PM

## 2024-10-01 NOTE — PROGRESS NOTES
Attempted to see pt for full consult, off the floor for testing.  Will attempt to see patient again for consult.    Electronically signed by MARIA ALEJANDRA Miguel CNP on 10/1/2024 at 1:24 PM

## 2024-10-01 NOTE — H&P
Hospitalist History & Physical    Patient:  Dashawn Rivera    Unit/Bed:10/010A  YOB: 1980  MRN: [id number]   Acct: [id number]   PCP: No primary care provider on file.  Code Status: Prior    Date of Service: Pt seen/examined on 10/01/24 and admitted to Inpatient with expected LOS greater than two midnights due to medical therapy.     Chief Complaint: leg swelling    Assessment/Plan:    Acutely elevated transaminases: Albumin 2.4, Alk Phos 442, ALT 95, , Total Bili 1.9, Direct Bili 1.3, previously unremarkable in Aug 2023. Unclear etiology. Spoke with Dr. Perry with GI and he suspects acute hepatitis on chronic fibrosis/cirrhosis and believes malnutrition may be contributing to elevated INR. There is also concern for extensive DVT in the portal or hepatic vein.   GI consult  Liver US pending  Hepatitis panel pending  Acetaminophen level <5.0, Salicyclic acid level <0.3  Neuro checks Q4H    Prolonged prothrombin time: INR 5.59. Likely multifactorial due to malnutrition and underlying liver disease.   Repeat INR to assess for accuracy: 4.68  Treatment as noted above and below    Acute DVT to right lower extremity (POA): Was evaluated in ED on 9/24 with concerns of right lower leg swelling. Doppler to RLE on 9/24/24 extensive acute DVT throughout the right lower extremity. Patient with concerns of left lower leg swelling and pain.   Continue Xarelto  Venous Doppler LLE pending  NPO  IR request for possible thrombectomy    Protein calorie malnutrition: Total protein 4.7. Likely from alcoholism.   Dietician consult    Alcohol use disorder: Reports that he has not had a drink since 9/25/24. Denies any withdrawal symptoms. EtOH level negative on arrival.   Continue multivitamin, folic acid, thiamine    Seizure disorder:   Seizure precautions  Continue Keppra      DVT prophylaxis: Already on anticoagulation  Diet: No diet orders on file  PT/OT: No  Tele: Yes  Code Status: Prior      History

## 2024-10-01 NOTE — H&P
Formulation and discussion of sedation / procedure plans, risks, benefits, side effects and alternatives with patient and/or responsible adult completed.    History and Physical reviewed and unchanged.    Electronically signed by Scar Brady MD on 10/1/24 at 1:41 PM EDT

## 2024-10-01 NOTE — PROGRESS NOTES
Patient was in US at time of consult. Will see patient later today. Please call for any GI immediate concerns.

## 2024-10-01 NOTE — H&P
[facility]  Sedation/Analgesia History & Physical    Pt Name: Dashawn Rivera  MRN: [id number]  YOB: 1980  Provider Performing Procedure: Scar Brady MD, MD  Primary Care Physician: No primary care provider on file.    Formulation and discussion of sedation / procedure plans, risks, benefits, side effects and alternatives with patient and/or responsible adult completed.    PRE-PROCEDURE   DNR-CCA/DNR-CC []Yes [x]No  Brief History/Pre-Procedure Diagnosis: Deep venous thrombosis right leg           MEDICAL HISTORY  []CAD/Valve  []Liver Disease  []Lung Disease []Diabetes  []Hypertension []Renal Disease  []Additional information:       has a past medical history of Liver disease and Seizures (HCC).    SURGICAL HISTORY   has a past surgical history that includes Colonoscopy (N/A, 12/17/2019).  Additional information:       ALLERGIES   Allergies as of 09/30/2024    (No Known Allergies)     Additional information:       MEDICATIONS   Coumadin Use Last 5 Days [x]No []Yes  Antiplatelet drug therapy use last 5 days  [x]No []Yes  Other anticoagulant use last 5 days  []No [x]Yes    Current Facility-Administered Medications:     sodium chloride flush 0.9 % injection 5-40 mL, 5-40 mL, IntraVENous, 2 times per day, Milena Aguilera APRN - CNP    sodium chloride flush 0.9 % injection 5-40 mL, 5-40 mL, IntraVENous, PRN, Milena Aguilera S, APRN - CNP    0.9 % sodium chloride infusion, , IntraVENous, PRN, Milena Aguilera, APRN - CNP    potassium chloride (KLOR-CON M) extended release tablet 40 mEq, 40 mEq, Oral, PRN **OR** potassium bicarb-citric acid (EFFER-K) effervescent tablet 40 mEq, 40 mEq, Oral, PRN **OR** potassium chloride 10 mEq/100 mL IVPB (Peripheral Line), 10 mEq, IntraVENous, PRN, Milena Aguilera, APRN - CNP    magnesium sulfate 2000 mg in 50 mL IVPB premix, 2,000 mg, IntraVENous, PRN, Milena Aguilera, APRN - CNP    ondansetron (ZOFRAN-ODT) disintegrating tablet 4 mg, 4 mg,

## 2024-10-01 NOTE — ED PROVIDER NOTES
Patient is agreeable with this plan of care. [DD]      ED Course User Index  [DD] Delmis Arguello MD     Vitals Reviewed:    Vitals:    10/01/24 0310 10/01/24 0338 10/01/24 0438 10/01/24 0553   BP: 137/87 131/85 111/80 117/80   Pulse: 98 99 96 97   Resp: 15 15 17 14   Temp:       SpO2: 98% 99% 98% 97%   Weight:       Height:           The patient was seen and examined. Appropriate diagnostic testing was performed and results reviewed with the patient.      The results of pertinent diagnostic studies and exam findings were discussed. The patient’s provisional diagnosis and plan of care were discussed with the patient and present family who expressed understanding. Any medications were reviewed and indications and risks of medications were discussed with the patient /family present. Strict verbal and written return precautions, instructions and appropriate follow-up provided to  the patient .     ED Medications administered this visit:  (None if blank)  Medications   sodium chloride flush 0.9 % injection 5-40 mL (has no administration in time range)   sodium chloride flush 0.9 % injection 5-40 mL (has no administration in time range)   0.9 % sodium chloride infusion (has no administration in time range)   potassium chloride (KLOR-CON M) extended release tablet 40 mEq (has no administration in time range)     Or   potassium bicarb-citric acid (EFFER-K) effervescent tablet 40 mEq (has no administration in time range)     Or   potassium chloride 10 mEq/100 mL IVPB (Peripheral Line) (has no administration in time range)   magnesium sulfate 2000 mg in 50 mL IVPB premix (has no administration in time range)   ondansetron (ZOFRAN-ODT) disintegrating tablet 4 mg (has no administration in time range)     Or   ondansetron (ZOFRAN) injection 4 mg (has no administration in time range)   polyethylene glycol (GLYCOLAX) packet 17 g (has no administration in time range)   acetaminophen (TYLENOL) tablet 650 mg (has no

## 2024-10-01 NOTE — ED NOTES
Pt. Resting in bed with even and unlabored respirations. Pt. Denies any pain. Ultrasound at bedside. Pt. Updated about plan of care and treatment.. Pt. Has no further concerns, questions or needs at this time. Call light within reach.

## 2024-10-01 NOTE — PLAN OF CARE
Problem: Discharge Planning  Goal: Discharge to home or other facility with appropriate resources  Outcome: Progressing  Flowsheets (Taken 10/1/2024 1645)  Discharge to home or other facility with appropriate resources: Identify barriers to discharge with patient and caregiver     Problem: Pain  Goal: Verbalizes/displays adequate comfort level or baseline comfort level  Outcome: Progressing     Problem: ABCDS Injury Assessment  Goal: Absence of physical injury  Outcome: Progressing     Problem: Safety - Adult  Goal: Free from fall injury  Outcome: Progressing     Problem: Neurosensory - Adult  Goal: Absence of seizures  Outcome: Progressing  Flowsheets (Taken 10/1/2024 1645)  Absence of seizures: Monitor for seizure activity.  If seizure occurs, document type and location of movements and any associated apnea     Problem: Respiratory - Adult  Goal: Achieves optimal ventilation and oxygenation  Outcome: Progressing  Flowsheets (Taken 10/1/2024 1645)  Achieves optimal ventilation and oxygenation:   Assess for changes in respiratory status   Assess for changes in mentation and behavior     Problem: Cardiovascular - Adult  Goal: Absence of cardiac dysrhythmias or at baseline  Outcome: Progressing  Flowsheets (Taken 10/1/2024 1645)  Absence of cardiac dysrhythmias or at baseline:   Monitor cardiac rate and rhythm   Assess for signs of decreased cardiac output     Problem: Skin/Tissue Integrity - Adult  Goal: Skin integrity remains intact  Outcome: Progressing  Flowsheets (Taken 10/1/2024 1645)  Skin Integrity Remains Intact: Monitor for areas of redness and/or skin breakdown  Goal: Incisions, wounds, or drain sites healing without S/S of infection  Outcome: Progressing  Flowsheets (Taken 10/1/2024 1645)  Incisions, Wounds, or Drain Sites Healing Without Sign and Symptoms of Infection: ADMISSION and DAILY: Assess and document risk factors for pressure ulcer development  Goal: Oral mucous membranes remain

## 2024-10-01 NOTE — ED NOTES
Pt. Resting in bed with even and unlabored respirations. Pt. Denies any pain. Pt. Updated about plan of care and treatment. Pt. Has no further concerns, questions or needs at this time. Call light within reach.      Waiting on a bed @ [facility].       Rosie Goes  09/21/21 5155

## 2024-10-01 NOTE — PROCEDURES
PROCEDURE NOTE  Date: 10/1/2024   Name: Dashawn Rivera  YOB: 1980    Procedures    EKG completed, given to Debra CARTAGENA

## 2024-10-01 NOTE — PROGRESS NOTES
2nd attempt to see pt but off floor for procedure.  Will attempt to see again tomorrow for full consult.      Per chart review patient presented to ED on 9/24/2024 for evaluation of RLE edema with pain.  Doppler (+) extensive acute DVT throughout the RLE.  He was discharged on Xarelto.  History of alcohol abuse.  He presents again today with LLE edema and pain.  Doppler (+) acute DVT present in one of the left posterior tibial veins.  INR elevated at 5.59, APTT elevated at 56.4.  Repeat INR 4.68.  Patient reports he has not had any alcohol since starting Xarelto.  High risk to continue anticoagulation with elevated INR.  He is currently at IR for thrombectomy.  H/H 11.4/33.5.  Platelets 168.  Retic count elevated slightly at 2.3.  Absolute reticulocyte elevated at 685.  Bili elevated at 1.9.  Rule out hemolysis.  Ferritin elevated at 1399, iron sat elevated at 89%.  Check HFE gene d/t elevated iron levels.      Current recommendations:       HOLD anticoagulation   Recommend IVC filter.   Check fibrinogen    Check HFE gene with elevated iron, elevated ferritin.     Obtain LDH, haptoglobin.  Bili elevated at 1.9.   Obtain MRI Abdomen   Will need hypercoagulable workup as outpatient.   Await GI recommendations.    Discussed case with Dr. Garcia.      Please call with specific questions/concerns.    Electronically signed by MARIA ALEJANDRA Miguel CNP on 10/1/2024 at 2:19 PM

## 2024-10-01 NOTE — ED NOTES
Report provided by OSIEL rosado. Pt. Resting in bed with even and unlabored respirations. Pt. States pain is a 6/10 at this time. Pt. Updated about plan of care and treatment.  Pt. Has no further concerns, questions or needs at this time. Call light within reach.

## 2024-10-02 ENCOUNTER — APPOINTMENT (OUTPATIENT)
Dept: INTERVENTIONAL RADIOLOGY/VASCULAR | Age: 44
DRG: 182 | End: 2024-10-02
Payer: MEDICAID

## 2024-10-02 PROBLEM — E44.0 MODERATE MALNUTRITION (HCC): Status: ACTIVE | Noted: 2024-10-02

## 2024-10-02 LAB
A1AT SERPL-MCNC: 142 MG/DL (ref 90–200)
ALBUMIN SERPL BCG-MCNC: 2.2 G/DL (ref 3.5–5.1)
ALP SERPL-CCNC: 412 U/L (ref 38–126)
ALT SERPL W/O P-5'-P-CCNC: 67 U/L (ref 11–66)
ANION GAP SERPL CALC-SCNC: 10 MEQ/L (ref 8–16)
ANISOCYTOSIS BLD QL SMEAR: PRESENT
AST SERPL-CCNC: 188 U/L (ref 5–40)
BASOPHILS ABSOLUTE: 0 THOU/MM3 (ref 0–0.1)
BASOPHILS NFR BLD AUTO: 0.2 %
BILIRUB CONJ SERPL-MCNC: 1.2 MG/DL (ref 0.1–13.8)
BILIRUB SERPL-MCNC: 1.6 MG/DL (ref 0.3–1.2)
BUN SERPL-MCNC: 6 MG/DL (ref 7–22)
CALCIUM SERPL-MCNC: 7.4 MG/DL (ref 8.5–10.5)
CHLORIDE SERPL-SCNC: 103 MEQ/L (ref 98–111)
CO2 SERPL-SCNC: 24 MEQ/L (ref 23–33)
CREAT SERPL-MCNC: 0.5 MG/DL (ref 0.4–1.2)
DEPRECATED RDW RBC AUTO: 74.9 FL (ref 35–45)
EOSINOPHIL NFR BLD AUTO: 1 %
EOSINOPHILS ABSOLUTE: 0.1 THOU/MM3 (ref 0–0.4)
ERYTHROCYTE [DISTWIDTH] IN BLOOD BY AUTOMATED COUNT: 19.7 % (ref 11.5–14.5)
GFR SERPL CREATININE-BSD FRML MDRD: > 90 ML/MIN/1.73M2
GLUCOSE SERPL-MCNC: 97 MG/DL (ref 70–108)
HCT VFR BLD AUTO: 24.9 % (ref 42–52)
HGB BLD-MCNC: 8.2 GM/DL (ref 14–18)
IMM GRANULOCYTES # BLD AUTO: 0.05 THOU/MM3 (ref 0–0.07)
IMM GRANULOCYTES NFR BLD AUTO: 0.8 %
LYMPHOCYTES ABSOLUTE: 2.1 THOU/MM3 (ref 1–4.8)
LYMPHOCYTES NFR BLD AUTO: 35.8 %
MCH RBC QN AUTO: 35 PG (ref 26–33)
MCHC RBC AUTO-ENTMCNC: 32.9 GM/DL (ref 32.2–35.5)
MCV RBC AUTO: 106.4 FL (ref 80–94)
MONOCYTES ABSOLUTE: 0.4 THOU/MM3 (ref 0.4–1.3)
MONOCYTES NFR BLD AUTO: 7.4 %
NEUTROPHILS ABSOLUTE: 3.2 THOU/MM3 (ref 1.8–7.7)
NEUTROPHILS NFR BLD AUTO: 54.8 %
NRBC BLD AUTO-RTO: 2 /100 WBC
PLATELET # BLD AUTO: 117 THOU/MM3 (ref 130–400)
PMV BLD AUTO: 10.3 FL (ref 9.4–12.4)
POTASSIUM SERPL-SCNC: 3.7 MEQ/L (ref 3.5–5.2)
PROT SERPL-MCNC: 4.6 G/DL (ref 6.1–8)
RBC # BLD AUTO: 2.34 MILL/MM3 (ref 4.7–6.1)
SODIUM SERPL-SCNC: 137 MEQ/L (ref 135–145)
SPECIMEN SOURCE: NORMAL
WBC # BLD AUTO: 5.9 THOU/MM3 (ref 4.8–10.8)

## 2024-10-02 PROCEDURE — 6370000000 HC RX 637 (ALT 250 FOR IP): Performed by: PHYSICIAN ASSISTANT

## 2024-10-02 PROCEDURE — 2580000003 HC RX 258

## 2024-10-02 PROCEDURE — C1880 VENA CAVA FILTER: HCPCS | Performed by: RADIOLOGY

## 2024-10-02 PROCEDURE — 1200000003 HC TELEMETRY R&B

## 2024-10-02 PROCEDURE — 85025 COMPLETE CBC W/AUTO DIFF WBC: CPT

## 2024-10-02 PROCEDURE — 06H03DZ INSERTION OF INTRALUMINAL DEVICE INTO INFERIOR VENA CAVA, PERCUTANEOUS APPROACH: ICD-10-PCS | Performed by: RADIOLOGY

## 2024-10-02 PROCEDURE — C1880 VENA CAVA FILTER: HCPCS

## 2024-10-02 PROCEDURE — 80076 HEPATIC FUNCTION PANEL: CPT

## 2024-10-02 PROCEDURE — 6360000002 HC RX W HCPCS: Performed by: PHYSICIAN ASSISTANT

## 2024-10-02 PROCEDURE — 37191 INS ENDOVAS VENA CAVA FILTR: CPT

## 2024-10-02 PROCEDURE — 6370000000 HC RX 637 (ALT 250 FOR IP)

## 2024-10-02 PROCEDURE — 2500000003 HC RX 250 WO HCPCS: Performed by: RADIOLOGY

## 2024-10-02 PROCEDURE — 99254 IP/OBS CNSLTJ NEW/EST MOD 60: CPT | Performed by: NURSE PRACTITIONER

## 2024-10-02 PROCEDURE — 80048 BASIC METABOLIC PNL TOTAL CA: CPT

## 2024-10-02 PROCEDURE — 6360000004 HC RX CONTRAST MEDICATION: Performed by: RADIOLOGY

## 2024-10-02 PROCEDURE — 36415 COLL VENOUS BLD VENIPUNCTURE: CPT

## 2024-10-02 RX ORDER — LIDOCAINE HYDROCHLORIDE 20 MG/ML
INJECTION, SOLUTION EPIDURAL; INFILTRATION; INTRACAUDAL; PERINEURAL PRN
Status: COMPLETED | OUTPATIENT
Start: 2024-10-02 | End: 2024-10-02

## 2024-10-02 RX ORDER — MAGNESIUM SULFATE IN WATER 40 MG/ML
2000 INJECTION, SOLUTION INTRAVENOUS ONCE
Status: COMPLETED | OUTPATIENT
Start: 2024-10-02 | End: 2024-10-02

## 2024-10-02 RX ORDER — IOPAMIDOL 612 MG/ML
INJECTION, SOLUTION INTRAVASCULAR PRN
Status: COMPLETED | OUTPATIENT
Start: 2024-10-02 | End: 2024-10-02

## 2024-10-02 RX ORDER — PREGABALIN 75 MG/1
75 CAPSULE ORAL ONCE
Status: COMPLETED | OUTPATIENT
Start: 2024-10-02 | End: 2024-10-02

## 2024-10-02 RX ORDER — DIPHENHYDRAMINE HCL 25 MG
25 TABLET ORAL EVERY 6 HOURS PRN
Status: DISCONTINUED | OUTPATIENT
Start: 2024-10-02 | End: 2024-10-03 | Stop reason: HOSPADM

## 2024-10-02 RX ADMIN — LEVETIRACETAM 500 MG: 500 TABLET, FILM COATED ORAL at 09:09

## 2024-10-02 RX ADMIN — LEVETIRACETAM 500 MG: 500 TABLET, FILM COATED ORAL at 19:32

## 2024-10-02 RX ADMIN — SODIUM CHLORIDE, PRESERVATIVE FREE 10 ML: 5 INJECTION INTRAVENOUS at 19:32

## 2024-10-02 RX ADMIN — FOLIC ACID 1 MG: 1 TABLET ORAL at 09:10

## 2024-10-02 RX ADMIN — Medication 1 TABLET: at 09:10

## 2024-10-02 RX ADMIN — IOPAMIDOL 20 ML: 612 INJECTION, SOLUTION INTRAVENOUS at 10:31

## 2024-10-02 RX ADMIN — Medication 400 MG: at 09:09

## 2024-10-02 RX ADMIN — Medication 100 MG: at 09:10

## 2024-10-02 RX ADMIN — PREGABALIN 75 MG: 75 CAPSULE ORAL at 01:09

## 2024-10-02 RX ADMIN — MAGNESIUM SULFATE HEPTAHYDRATE 2000 MG: 40 INJECTION, SOLUTION INTRAVENOUS at 01:18

## 2024-10-02 RX ADMIN — LIDOCAINE HYDROCHLORIDE 10 ML: 20 INJECTION, SOLUTION EPIDURAL; INFILTRATION; INTRACAUDAL; PERINEURAL at 10:31

## 2024-10-02 ASSESSMENT — PAIN SCALES - GENERAL
PAINLEVEL_OUTOF10: 4
PAINLEVEL_OUTOF10: 2

## 2024-10-02 ASSESSMENT — PAIN DESCRIPTION - LOCATION: LOCATION: LEG

## 2024-10-02 ASSESSMENT — PAIN DESCRIPTION - ORIENTATION: ORIENTATION: RIGHT

## 2024-10-02 NOTE — PROGRESS NOTES
Hospitalist Progress Note      Patient:  Dashawn Rivera 44 y.o. male       : 1980  Unit/Bed:[facility]27/027-A    Date of Admission: 10/1/2024      ASSESSMENT AND PLAN    Active Problems  Acute liver dysfunction  Acute transaminitis with mild hyperbilirubinemia, elevated INR.  MRCP reported hepatomegaly with patchy geographic areas of steatosis  GI consulted -questionable cirrhosis / alcoholic hepatitis.   Unprovoked DVT, bilateral (POA)  Doppler 24 reported extensive acute DVT through RLE.  Patient was discharged on Xarelto.  Presented again 10/1 with LLE pain and edema. B/l venous dopplers 10/1 showed persistent extensive acute appearing DVT throughout RLE, acute DVT in 1 of left posterior tibial veins.  Hematology consulted  Given patient's liver dysfunction causing elevated INR and APTT, recommends against any anticoagulation.  S/p right lower extremity thrombectomy 10/1  IVC filter placed 10/2.  Hematology will plan hypercoag workup as outpatient  Elevated INR, prolonged APTT  Likely secondary to underlying liver disease.  Hematology and GI following as above.  Protein calorie malnutrition  Dietitian consulted  Alcohol use disorder  Thiamine, folic acid, multivitamin.  Reports last drink being , monitor for signs of withdrawal.  Addiction services consult  Hypocalcemia  Check iCal and replace per protocol   Acute on chronic microcytic anemia  Anemia workup overall unimpressive. Elevated iron sat. Hematology following. No signs of acute bleeding.     Chronic Conditions (reviewed, stable, and home medications resumed, unless otherwise stated)  Seizure disorder        LDA: []CVC / []PICC / []Midline / []Raygoza / []Drains / []Mediport / [x]None  Antibiotics: no  Steroids: no  Labs (still needed?): [x]Yes / []No  IVF (still needed?): []Yes / [x]No    Level of care: []Step Down / [x]Med-Surg  Bed Status: [x]Inpatient / []Observation  Telemetry: [x]Yes / []No  PT/OT: []Yes / [x]No    DVT Prophylaxis: []

## 2024-10-02 NOTE — CARE COORDINATION
Case Management Assessment Initial Evaluation    Date/Time of Evaluation: 10/2/2024 9:16 AM  Assessment Completed by: Griselda Zavala RN    If patient is discharged prior to next notation, then this note serves as note for discharge by case management.    Patient Name: Dashawn Rivera                   YOB: 1980  Diagnosis: Elevated liver enzymes [R74.8]  Bilateral lower extremity edema [R60.0]  Acute deep vein thrombosis (DVT) of right lower extremity, unspecified vein (HCC) [I82.401]  Elevated liver transaminase level [R74.01]                   Date / Time: 10/1/2024 12:12 AM  Location: [address] [place] [address]     Patient Admission Status: Inpatient   Readmission Risk Low 0-14, Mod 15-19), High > 20: Readmission Risk Score: 14.2    Current PCP: No primary care provider on file.  Health Care Decision Makers:     Additional Case Management Notes: Presented to ER with reports of leg swelling, recent DVT diagnosis being treated with Xarelto. Trending liver panel. GI consulted. Hematology consulted, recommending IVC filter. INR 5.59 on admission.     Procedures: 10/1 thromboaspiration right leg veins , and venous angioplasty   IVC filter planned    Imaging: 10/1 CT Abd/Plv W: 1. Distended gallbladder.   2. Severe hepatic steatosis with mild-to-moderate hepatomegaly.   3. Probably benign cystic changes in the right kidney and pancreas, outpatient ultrasound follow-up recommended to document benignity. Six-month follow up CT may be needed if ultrasound is equivocal.   4. Mild chronic fatty infiltrate in the left colon wall of uncertain   significance.   10/1 US Abd: 1. Marked hepatomegaly. Fatty infiltration of the liver.   2. Subcentimeter pancreatic cystic lesions are seen. MRI of the abdomen without and with contrast on an outpatient basis can be obtained.   3. The gallbladder is distended. No gallstones   10/1 MRI Abd W WO:   1. Hepatomegaly with patchy geographic areas of steatosis. No suspicious liver lesions.  2.  Benign right renal cyst.    Patient Goals/Plan/Treatment Preferences: Planning to return to his step-mother's home. Denies any needs at this time States his family can transport him home and to follow up appointments.   He would like scheduled with a new PCP, after reviewing offices in the area he would like scheduled with [facility]. Updated  for scheduling.          10/02/24 1870   Service Assessment   Patient Orientation Alert and Oriented   Cognition Alert   History Provided By Patient   Primary Caregiver Self   Accompanied By/Relationship alone   Support Systems Spouse/Significant Other;Parent;Children   Patient's Healthcare Decision Maker is: Legal Next of Kin  (parents)   PCP Verified by CM No  (would like set up with)   Prior Functional Level Independent in ADLs/IADLs   Current Functional Level Independent in ADLs/IADLs   Can patient return to prior living arrangement Yes   Ability to make needs known: Good   Family able to assist with home care needs: Yes   Would you like for me to discuss the discharge plan with any other family members/significant others, and if so, who? Yes   Financial Resources Medicaid   Community Resources None   Discharge Planning   Type of Residence House   Living Arrangements Other (Comment)  (step-mom)   Current Services Prior To Admission None   Potential Assistance Needed N/A   DME Ordered? No   Potential Assistance Purchasing Medications No   Type of Home Care Services None   Patient expects to be discharged to: House   Services At/After Discharge   Transition of Care Consult (CM Consult) Discharge Planning   Services At/After Discharge None   Mode of Transport at Discharge Other (see comment)  (family)   Confirm Follow Up Transport Family   Condition of Participation: Discharge Planning   The Plan for Transition of Care is related to the following treatment goals: return home hopefully tomorrow

## 2024-10-02 NOTE — PLAN OF CARE
Problem: Discharge Planning  Goal: Discharge to home or other facility with appropriate resources  10/2/2024 0533 by Christ Reid RN  Outcome: Progressing  10/1/2024 1732 by Debra Panchal RN  Outcome: Progressing  Flowsheets (Taken 10/1/2024 1645)  Discharge to home or other facility with appropriate resources: Identify barriers to discharge with patient and caregiver     Problem: Pain  Goal: Verbalizes/displays adequate comfort level or baseline comfort level  10/2/2024 0533 by Christ Reid RN  Outcome: Progressing  10/1/2024 1732 by Debra Panchal RN  Outcome: Progressing     Problem: ABCDS Injury Assessment  Goal: Absence of physical injury  10/2/2024 0533 by Christ Reid RN  Outcome: Progressing  10/1/2024 1732 by Debra Panchal RN  Outcome: Progressing     Problem: Safety - Adult  Goal: Free from fall injury  10/2/2024 0533 by Christ Reid RN  Outcome: Progressing  10/1/2024 1732 by Debra Panchal RN  Outcome: Progressing     Problem: Neurosensory - Adult  Goal: Absence of seizures  10/2/2024 0533 by Christ Reid RN  Outcome: Progressing  10/1/2024 1732 by Debra Panchal RN  Outcome: Progressing  Flowsheets (Taken 10/1/2024 1645)  Absence of seizures: Monitor for seizure activity.  If seizure occurs, document type and location of movements and any associated apnea     Problem: Respiratory - Adult  Goal: Achieves optimal ventilation and oxygenation  10/2/2024 0533 by Christ Reid RN  Outcome: Progressing  Flowsheets (Taken 10/1/2024 1645 by Debra Panchal RN)  Achieves optimal ventilation and oxygenation:   Assess for changes in respiratory status   Assess for changes in mentation and behavior  10/1/2024 1732 by Debra Panchal RN  Outcome: Progressing  Flowsheets (Taken 10/1/2024 1645)  Achieves optimal ventilation and oxygenation:   Assess for changes in respiratory status   Assess for changes in mentation and behavior     Problem: Cardiovascular - Adult  Goal:  without assistive devices   Assist with transfers and ambulation using safe patient handling equipment as needed  10/1/2024 1732 by Debra Panchal RN  Outcome: Progressing  Flowsheets (Taken 10/1/2024 1645)  Return Mobility to Safest Level of Function:   Assess patient stability and activity tolerance for standing, transferring and ambulating with or without assistive devices   Assist with transfers and ambulation using safe patient handling equipment as needed  Goal: Maintain proper alignment of affected body part  10/2/2024 0533 by Christ Reid RN  Outcome: Progressing  Flowsheets (Taken 10/1/2024 1645 by Debra Panchal RN)  Maintain proper alignment of affected body part: Support and protect limb and body alignment per provider's orders  10/1/2024 1732 by Debra Panchal RN  Outcome: Progressing  Flowsheets (Taken 10/1/2024 1645)  Maintain proper alignment of affected body part: Support and protect limb and body alignment per provider's orders     Problem: Gastrointestinal - Adult  Goal: Maintains or returns to baseline bowel function  10/2/2024 0533 by Christ Reid RN  Outcome: Progressing  Flowsheets (Taken 10/1/2024 1645 by Debra Panchal RN)  Maintains or returns to baseline bowel function:   Assess bowel function   Encourage oral fluids to ensure adequate hydration  10/1/2024 1732 by Debra Panchal RN  Outcome: Progressing  Flowsheets (Taken 10/1/2024 1645)  Maintains or returns to baseline bowel function:   Assess bowel function   Encourage oral fluids to ensure adequate hydration  Goal: Maintains adequate nutritional intake  10/2/2024 0533 by Christ Reid RN  Outcome: Progressing  Flowsheets (Taken 10/1/2024 1645 by Debra Panchal RN)  Maintains adequate nutritional intake: Monitor percentage of each meal consumed  10/1/2024 1732 by Debra Panchal RN  Outcome: Progressing  Flowsheets (Taken 10/1/2024 1645)  Maintains adequate nutritional intake: Monitor percentage of each meal

## 2024-10-02 NOTE — H&P
Formulation and discussion of sedation / procedure plans, risks, benefits, side effects and alternatives with patient and/or responsible adult completed.    History and Physical reviewed and unchanged.    Electronically signed by Scar Brady MD on 10/2/24 at 10:28 AM EDT

## 2024-10-02 NOTE — OP NOTE
Department of Radiology  Post Procedure Progress Note      Pre-Procedure Diagnosis:  Deep venous thrombosis     Procedure Performed:  inferior vena cava filter insertion     Anesthesia: local    Findings: successful    Immediate Complications:  None    Estimated Blood Loss: minimal    SEE DICTATED PROCEDURE NOTE FOR COMPLETE DETAILS.    Scar Brady MD   10/2/2024 10:28 AM

## 2024-10-02 NOTE — PROGRESS NOTES
TRANSFER - OUT REPORT:    Verbal report given to Diana RN(name) on Dashawn Rivera being transferred to (unit) for routine progression of patient care       Report consisted of patient's Situation, Background, Assessment and   Recommendations(SBAR).     Information from the following report(s) Nurse Handoff Report, Surgery Report, and MAR was reviewed with the receiving nurse.    Opportunity for questions and clarification was provided.      Patient transported with:   Monitor

## 2024-10-02 NOTE — PROGRESS NOTES
Comprehensive Nutrition Assessment    Type and Reason for Visit:  Initial, Consult (ONS start)    Nutrition Recommendations/Plan:   Recommend continue MVI, thiamin, and folic acid supplementation.  ONS initiated: Ensure Plus TID (prefers chocolate or vanilla).  Continue current diet. Encouraged oral intake.      Malnutrition Assessment:  Malnutrition Status:  Moderate malnutrition (10/02/24 1522)    Context:  Social/Environmental Circumstances (alcohol abuse/food availability)     Findings of the 6 clinical characteristics of malnutrition:  Energy Intake:  Less than 75% estimated energy requirements for 3 months or longer  Weight Loss:  Unable to assess (limited EMR weights, reported 7.1% loss in the last 3-4 months per patient)     Body Fat Loss:   (Moderate Body Fat Loss) Orbital, Triceps, Fat Overlying Ribs   Muscle Mass Loss:   (Moderate Muscle Mass Loss) Temples (temporalis), Clavicles (pectoralis & deltoids), Thigh (quadraceps), Calf (gastrocnemius)  Fluid Accumulation:  No significant fluid accumulation     Strength:  Not Performed    Nutrition Assessment:     Pt. moderately malnourished AEB criteria as listed above.  At risk for further nutrition compromise r/t admit with liver enzymes, DVT s/p IVC filter placement 10/2/24, alcohol abuse history,  elevated liver enzymes, and underlying medical condition (hx: liver disease, seizures, smoking, ETOH abuse-10 shots of vodka daily).        Nutrition Related Findings:    Pt. Report/Treatments/Miscellaneous: Patient seen, reports he has always been thin but has lost 10# or so in the last 3-4 months.  Denied any nausea.  States he no longer has food stamps, working on getting them back.  Alcohol abuse hx.  Eating maybe 2 meals/day.  Agreeable to ONS.    GI Status: no BM  Pertinent Labs: 10/2/24: Albumin 2.2, Alk Phos 412, ALT 67, , total bilirubin 1.6  Pertinent Meds: folvite, keppra, mag-ox, MVI, protonix, thiamin     Wound Type: None       Current  Nutrition Intake & Therapies:    Average Meal Intake:  (not  recorded, was NPO earlier today)     ADULT DIET; Regular  ADULT ORAL NUTRITION SUPPLEMENT; Breakfast, Lunch, Dinner; Standard High Calorie/High Protein Oral Supplement    Anthropometric Measures:  Height: 177.8 cm (5' 10\")  Ideal Body Weight (IBW): 166 lbs (75 kg)    Admission Body Weight: 59 kg (130 lb) (10/1/24, no edema)  Current Body Weight: 58.6 kg (129 lb 3.7 oz) (10/2/24 bedscale, no edema),    Current BMI (kg/m2): 18.5  Usual Body Weight:  (~135-140# per RN.  Limited EMR weights: 12/17/19: 124#3oz)                       BMI Categories: Normal Weight (BMI 18.5-24.9)    Estimated Daily Nutrient Needs:  Energy Requirements Based On: Kcal/kg  Weight Used for Energy Requirements:  (59 kg)  Energy (kcal/day): ~ 9899-6296 kcals (30-35 kcals/kg/day)  Weight Used for Protein Requirements:  (59 kg)  Protein (g/day): ~71-83 grams (1.2-1.4 grams/kg/day)         Nutrition Diagnosis:   Moderate malnutrition, In context of social or environmental circumstances related to inadequate protein-energy intake as evidenced by Criteria as identified in malnutrition assessment    Nutrition Interventions:   Food and/or Nutrient Delivery: Continue Current Diet, Start Oral Nutrition Supplement  Nutrition Education/Counseling: Education initiated  Coordination of Nutrition Care: Continue to monitor while inpatient       Goals:     Goals: PO intake 75% or greater, by next RD assessment       Nutrition Monitoring and Evaluation:      Food/Nutrient Intake Outcomes: Food and Nutrient Intake, Supplement Intake  Physical Signs/Symptoms Outcomes: Biochemical Data, GI Status, Fluid Status or Edema, Nutrition Focused Physical Findings, Weight    Discharge Planning:    Too soon to determine     Allison C Schwab, KYMBERLY, LD  Contact: [phone]

## 2024-10-02 NOTE — CONSULTS
non-focal.  Psychiatric: Alert and oriented x 3, thought content appropriate, normal insight  Capillary Refill: < 3 seconds   Peripheral Pulses: +2 palpable     Labs   CBC  Recent Labs     10/01/24  0058 10/02/24  0554   WBC 6.6 5.9   RBC 3.29* 2.34*   HGB 11.4* 8.2*   HCT 33.5* 24.9*   .8* 106.4*   MCH 34.7* 35.0*   MCHC 34.0 32.9    117*   MPV 10.2 10.3      BMP  Recent Labs     10/01/24  0058 10/02/24  0554   * 137   K 4.1 3.7   CL 95* 103   CO2 21* 24   BUN 11 6*   CREATININE 0.5 0.5   GLUCOSE 97 97   CALCIUM 8.2* 7.4*     LFT  Recent Labs     10/01/24  0212 10/02/24  0554   * 188*   ALT 95* 67*   BILITOT 1.9* 1.6*   ALKPHOS 442* 412*     INR  Recent Labs     10/01/24  0058 10/01/24  0651   INR 5.59* 4.68*     PTT  Recent Labs     10/01/24  0058   APTT 56.4*       Radiology        MRI ABDOMEN W WO CONTRAST    Result Date: 10/1/2024  MR abdomen with and without gadolinium Comparison: CT/SR - CT ABDOMEN PELVIS W IV CONTRAST - 10/01/2024 03:27 AM EDT Findings: Liver is enlarged with patchy geographic areas of signal dropout on opposed phase imaging suggesting steatosis. No arterial enhancing lesions. Gallbladder is unremarkable. No intrahepatic biliary dilatation. Normal caliber common bile duct and pancreatic duct. Spleen, pancreas, and adrenal glands are unremarkable. Both kidneys enhance symmetrically without hydronephrosis. Benign 1 x 0.8 cm T2 hyperintense cyst in the lower pole of the right kidney. Visualized bowel loops are unremarkable. No ascites. No enlarged abdominal lymph nodes. No abnormal bone marrow edema.     1. Hepatomegaly with patchy geographic areas of steatosis. No suspicious liver lesions. 2. Benign right renal cyst. This document has been electronically signed by: Wade Platt MD on 10/01/2024 08:11 PM    IR GUIDED THROMB MECH VEIN    Result Date: 10/1/2024  PROCEDURE: IR GUIDED THROMB MECH VEIN/venous angioplasty CLINICAL INFORMATION: extensive DVT RLE PERFORMED  Leg swelling COMPARISON: CT 4/14/2023 TECHNIQUE: 3 mm axial images were obtained through the chest after the administration of IV contrast.  A non-contrast localizer was obtained.  3D reconstructions were performed on the scanner to include MIP images through the right and left pulmonary arteries and sagittal images through the chest. Isovue was the intravenous contrast utilized. All CT scans at this facility use dose modulation, iterative reconstruction, and/or weight-based dosing when appropriate to reduce radiation dose to as low as reasonably achievable. FINDINGS: The thyroid gland is normal. The central airways are patent. The heart is normal in size. There is no pericardial effusion. The pulmonary arteries are adequately opacified. There are no pulmonary emboli. No pathologically enlarged lymph nodes in the mediastinum, hilar or axillary regions. The lungs are free of infiltrates. No pleural effusion or pneumothorax. The liver is diffusely hypoattenuated which is likely related to fatty infiltration. The bones are intact.      No pulmonary emboli or pulmonary infiltrates. **This report has been created using voice recognition software. It may contain minor errors which are inherent in voice recognition technology.** Electronically signed by Dr Andrew Vick    Vascular duplex lower extremity venous right    Result Date: 9/24/2024  PROCEDURE: VAS DUP LOWER EXTREMITY VENOUS RIGHT CLINICAL INFORMATION: 44-year-old male with right calf edema. COMPARISON: No prior study. TECHNIQUE: Venous doppler ultrasound was performed of the right lower extremity using gray scale, color flow and spectral doppler imaging. FINDINGS: There is lack of compressibility and absence of flow in the right superficial femoral vein, distal common femoral vein, popliteal vein, gastrocnemius, posterior tibial vein, peroneal vein and anterior tibial veins. There is normal color flow, spectral analysis and compressibility in the

## 2024-10-02 NOTE — PROGRESS NOTES
0955 Patient received in IR for IVC filter insertion.   1000 Slip knot removed from right calf, no bleeding noted, gauze and opsite applied to area.  1005 This procedure has been fully reviewed with the patient and written informed consent has been obtained.  1014 Procedure started with Dr. Brady.  1022 Access to right femoral vein with use of ultrasound.   1026 [place] IVC filter inserted. LOT: [id number].  1027 Procedure completed; patient tolerated well.   1028 Sheath removed and manual pressure held.   1042 Manual pressure discontinued. Dressing to right groin, quick clot, gauze and opsite; no bleeding noted.  1044 Patient on bed; comfort ensured.  1045 Patient taken to 6K via bed/nurse. Groin dressing remains dry and intact; no bleeding or hematoma noted. Area soft. Pt alert and oriented x3; follows commands. Skin pink, warm, and dry. Respirations easy, regular, and nonlabored.

## 2024-10-03 VITALS
SYSTOLIC BLOOD PRESSURE: 96 MMHG | WEIGHT: 116.4 LBS | HEIGHT: 70 IN | BODY MASS INDEX: 16.66 KG/M2 | HEART RATE: 91 BPM | OXYGEN SATURATION: 99 % | TEMPERATURE: 98.3 F | RESPIRATION RATE: 16 BRPM | DIASTOLIC BLOOD PRESSURE: 63 MMHG

## 2024-10-03 LAB
ALBUMIN SERPL BCG-MCNC: 2.5 G/DL (ref 3.5–5.1)
ALP SERPL-CCNC: 385 U/L (ref 38–126)
ALT SERPL W/O P-5'-P-CCNC: 57 U/L (ref 11–66)
ANION GAP SERPL CALC-SCNC: 8 MEQ/L (ref 8–16)
ANISOCYTOSIS BLD QL SMEAR: PRESENT
AST SERPL-CCNC: 118 U/L (ref 5–40)
BASOPHILS ABSOLUTE: 0 THOU/MM3 (ref 0–0.1)
BASOPHILS NFR BLD AUTO: 0.3 %
BILIRUB CONJ SERPL-MCNC: 0.9 MG/DL (ref 0.1–13.8)
BILIRUB SERPL-MCNC: 1.3 MG/DL (ref 0.3–1.2)
BUN SERPL-MCNC: 6 MG/DL (ref 7–22)
CALCIUM SERPL-MCNC: 7.7 MG/DL (ref 8.5–10.5)
CERULOPLASMIN SERPL-MCNC: 17 MG/DL (ref 15–30)
CHLORIDE SERPL-SCNC: 101 MEQ/L (ref 98–111)
CO2 SERPL-SCNC: 25 MEQ/L (ref 23–33)
CREAT SERPL-MCNC: 0.5 MG/DL (ref 0.4–1.2)
DEPRECATED RDW RBC AUTO: 77 FL (ref 35–45)
EOSINOPHIL NFR BLD AUTO: 0.5 %
EOSINOPHILS ABSOLUTE: 0 THOU/MM3 (ref 0–0.4)
ERYTHROCYTE [DISTWIDTH] IN BLOOD BY AUTOMATED COUNT: 19.7 % (ref 11.5–14.5)
GFR SERPL CREATININE-BSD FRML MDRD: > 90 ML/MIN/1.73M2
GLUCOSE SERPL-MCNC: 94 MG/DL (ref 70–108)
HAPTOGLOB SERPL-MCNC: < 10 MG/DL (ref 30–200)
HCT VFR BLD AUTO: 25.8 % (ref 42–52)
HGB BLD-MCNC: 8.1 GM/DL (ref 14–18)
IMM GRANULOCYTES # BLD AUTO: 0.14 THOU/MM3 (ref 0–0.07)
IMM GRANULOCYTES NFR BLD AUTO: 1.8 %
INR PPP: 0.96 (ref 0.85–1.13)
LYMPHOCYTES ABSOLUTE: 2.4 THOU/MM3 (ref 1–4.8)
LYMPHOCYTES NFR BLD AUTO: 31.4 %
MCH RBC QN AUTO: 34.3 PG (ref 26–33)
MCHC RBC AUTO-ENTMCNC: 31.4 GM/DL (ref 32.2–35.5)
MCV RBC AUTO: 109.3 FL (ref 80–94)
MITOCHONDRIAL M2 AB, IGG: 1.2 U/ML (ref 0–4)
MONOCYTES ABSOLUTE: 0.6 THOU/MM3 (ref 0.4–1.3)
MONOCYTES NFR BLD AUTO: 7.4 %
NEUTROPHILS ABSOLUTE: 4.5 THOU/MM3 (ref 1.8–7.7)
NEUTROPHILS NFR BLD AUTO: 58.6 %
NRBC BLD AUTO-RTO: 2 /100 WBC
PLATELET # BLD AUTO: 115 THOU/MM3 (ref 130–400)
PLATELET BLD QL SMEAR: ABNORMAL
PMV BLD AUTO: 10.5 FL (ref 9.4–12.4)
POIKILOCYTES: ABNORMAL
POLYCHROMASIA BLD QL SMEAR: ABNORMAL
POTASSIUM SERPL-SCNC: 4.3 MEQ/L (ref 3.5–5.2)
PROT SERPL-MCNC: 5 G/DL (ref 6.1–8)
RBC # BLD AUTO: 2.36 MILL/MM3 (ref 4.7–6.1)
SCAN OF BLOOD SMEAR: NORMAL
SODIUM SERPL-SCNC: 134 MEQ/L (ref 135–145)
STOMATOCYTES: ABNORMAL
TARGETS BLD QL SMEAR: ABNORMAL
WBC # BLD AUTO: 7.6 THOU/MM3 (ref 4.8–10.8)

## 2024-10-03 PROCEDURE — 99239 HOSP IP/OBS DSCHRG MGMT >30: CPT | Performed by: PHYSICIAN ASSISTANT

## 2024-10-03 PROCEDURE — 85610 PROTHROMBIN TIME: CPT

## 2024-10-03 PROCEDURE — 85025 COMPLETE CBC W/AUTO DIFF WBC: CPT

## 2024-10-03 PROCEDURE — 80048 BASIC METABOLIC PNL TOTAL CA: CPT

## 2024-10-03 PROCEDURE — 80076 HEPATIC FUNCTION PANEL: CPT

## 2024-10-03 PROCEDURE — 36415 COLL VENOUS BLD VENIPUNCTURE: CPT

## 2024-10-03 PROCEDURE — 2580000003 HC RX 258

## 2024-10-03 PROCEDURE — 6370000000 HC RX 637 (ALT 250 FOR IP)

## 2024-10-03 RX ORDER — MULTIVITAMIN WITH IRON
1 TABLET ORAL DAILY
COMMUNITY
Start: 2024-10-04

## 2024-10-03 RX ORDER — DIPHENHYDRAMINE HCL 25 MG
25 TABLET ORAL EVERY 6 HOURS PRN
COMMUNITY
Start: 2024-10-03 | End: 2024-11-02

## 2024-10-03 RX ADMIN — Medication 400 MG: at 09:24

## 2024-10-03 RX ADMIN — Medication 1 TABLET: at 09:24

## 2024-10-03 RX ADMIN — LEVETIRACETAM 500 MG: 500 TABLET, FILM COATED ORAL at 09:24

## 2024-10-03 RX ADMIN — SODIUM CHLORIDE, PRESERVATIVE FREE 10 ML: 5 INJECTION INTRAVENOUS at 09:24

## 2024-10-03 RX ADMIN — FOLIC ACID 1 MG: 1 TABLET ORAL at 09:24

## 2024-10-03 RX ADMIN — Medication 100 MG: at 09:24

## 2024-10-03 RX ADMIN — PANTOPRAZOLE SODIUM 40 MG: 40 TABLET, DELAYED RELEASE ORAL at 06:28

## 2024-10-03 NOTE — PROGRESS NOTES
Progress note: GI    Patient - Dashawn Rivera,  Age - 44 y.o.    - 1980      Room Number - 6K-27/027-A   MRN -  [id number]   [facility] # - [id number]  Date of Admission -  10/1/2024 12:12 AM    SUBJECTIVE:   Pt sitting up eating breakfast. Denies any abd pain, nausea or vomiting. Slight tenderness at filter placement and mild feet pain. States had bowel movement that was \"normal\" when asked color he stated brown.  Discussed cessation of alcohol safely, states \"this threw him for a loop and will stop drinking\" when asked about rehab stated he did it before does not feel he needs it now.   OBJECTIVE   VITALS    height is 1.778 m (5' 10\") and weight is 52.8 kg (116 lb 6.5 oz). His oral temperature is 98.2 °F (36.8 °C). His blood pressure is 102/61 and his pulse is 88. His respiration is 18 and oxygen saturation is 98%.       Wt Readings from Last 3 Encounters:   10/03/24 52.8 kg (116 lb 6.5 oz)   24 59 kg (130 lb)   23 63.5 kg (140 lb)       I/O (24 Hours)    Intake/Output Summary (Last 24 hours) at 10/3/2024 0816  Last data filed at 10/3/2024 0418  Gross per 24 hour   Intake 1000 ml   Output 1075 ml   Net -75 ml       General Appearance  Awake, alert, oriented,  not  In acute distress  HEENT - normocephalic, atraumatic, pink conjunctiva,  icteric sclera  Lungs -  Bilateral good air entry, no rhonchi, no wheeze  Cardiovascular - Heart sounds are normal.  Regular rate and rhythm without murmur, gallop or rub.  Abdomen - soft, not distended, nontender  Neurologic - Awake, alert, oriented to name, place and time.no deficit  Skin - No bruising or bleeding  Extremities - Full ROM    MEDICATIONS:      sodium chloride flush  5-40 mL IntraVENous 2 times per day    folic acid  1 mg Oral Daily    levETIRAcetam  500 mg Oral BID    pantoprazole  40 mg Oral QAM AC    [Held by provider] rivaroxaban  15 mg Oral Daily    thiamine  100 mg Oral Daily    magnesium oxide  400 mg Oral Daily    multivitamin  1 tablet Oral     BC  04/14/2023 05:20 PM     No growth 24 hours. No growth 48 hours. No growth at 5 days         IMAGING:         Problem list of patient:     Patient Active Problem List   Diagnosis    Hypokalemia    Alcohol withdrawal seizure without complication (HCC)    Alcoholic hepatitis    Alcohol abuse    Thrombocytopenia concurrent with and due to alcoholism (HCC)    Elevated liver transaminase level    Moderate malnutrition (HCC)         ASSESSMENT/PLAN     -Chart reviewed over last 24 hours.   -LFTs continue to decrease. Continue to wait for full results from liver work up. Can discuss at follow up appt if discharged.  -INR normal, acute anemia, no signs of bleeding at this time. Continue to monitor HH and transfuse per protocol.   -Hematology following.  -No stool color documented. Nursing to monitor and document all stools.   Monitor daily labs.  -Addiction services following.         MARIA ALEJANDRA Almeida - CNP, MD, FACP 10/3/2024 8:16 AM

## 2024-10-03 NOTE — CARE COORDINATION
10/3/24, 11:57 AM EDT    Patient goals/plan/ treatment preferences discussed by  and .  Patient goals/plan/ treatment preferences reviewed with patient/ family.  Patient/ family verbalize understanding of discharge plan and are in agreement with goal/plan/treatment preferences.  Understanding was demonstrated using the teach back method.  AVS provided by RN at time of discharge, which includes all necessary medical information pertaining to the patients current course of illness, treatment, post-discharge goals of care, and treatment preferences.     Services At/After Discharge: None

## 2024-10-03 NOTE — PLAN OF CARE
Problem: Discharge Planning  Goal: Discharge to home or other facility with appropriate resources  Outcome: Progressing  Flowsheets (Taken 10/1/2024 1645 by Debra Panchal, RN)  Discharge to home or other facility with appropriate resources: Identify barriers to discharge with patient and caregiver     Problem: Pain  Goal: Verbalizes/displays adequate comfort level or baseline comfort level  Outcome: Progressing  Flowsheets (Taken 10/2/2024 2148)  Verbalizes/displays adequate comfort level or baseline comfort level: Encourage patient to monitor pain and request assistance     Problem: ABCDS Injury Assessment  Goal: Absence of physical injury  Outcome: Progressing  Flowsheets (Taken 10/2/2024 2148)  Absence of Physical Injury: Implement safety measures based on patient assessment     Problem: Safety - Adult  Goal: Free from fall injury  Outcome: Progressing  Flowsheets (Taken 10/2/2024 2148)  Free From Fall Injury: Instruct family/caregiver on patient safety     Problem: Neurosensory - Adult  Goal: Absence of seizures  Outcome: Progressing  Flowsheets (Taken 10/1/2024 1645 by Debra Panchal, RN)  Absence of seizures: Monitor for seizure activity.  If seizure occurs, document type and location of movements and any associated apnea     Problem: Respiratory - Adult  Goal: Achieves optimal ventilation and oxygenation  Outcome: Progressing  Flowsheets (Taken 10/1/2024 1645 by Debra Panchal, RN)  Achieves optimal ventilation and oxygenation:   Assess for changes in respiratory status   Assess for changes in mentation and behavior     Problem: Cardiovascular - Adult  Goal: Absence of cardiac dysrhythmias or at baseline  Outcome: Progressing  Flowsheets (Taken 10/1/2024 1645 by Debra Panchal, RN)  Absence of cardiac dysrhythmias or at baseline:   Monitor cardiac rate and rhythm   Assess for signs of decreased cardiac output     Problem: Skin/Tissue Integrity - Adult  Goal: Skin integrity remains intact  Outcome:  Progressing  Flowsheets (Taken 10/1/2024 1645 by Debra Panchal, RN)  Absence of urinary retention:   Assess patient’s ability to void and empty bladder   Monitor intake/output and perform bladder scan as needed     Problem: Infection - Adult  Goal: Absence of infection at discharge  Outcome: Progressing  Flowsheets (Taken 10/1/2024 1645 by Debra Panchal, RN)  Absence of infection at discharge:   Assess and monitor for signs and symptoms of infection   Monitor lab/diagnostic results  Goal: Absence of infection during hospitalization  Outcome: Progressing  Flowsheets (Taken 10/1/2024 1645 by Debra Panchal, RN)  Absence of infection during hospitalization:   Assess and monitor for signs and symptoms of infection   Monitor lab/diagnostic results     Problem: Metabolic/Fluid and Electrolytes - Adult  Goal: Electrolytes maintained within normal limits  Outcome: Progressing  Flowsheets (Taken 10/1/2024 1645 by Debra Panchal, RN)  Electrolytes maintained within normal limits: Monitor labs and assess patient for signs and symptoms of electrolyte imbalances  Goal: Hemodynamic stability and optimal renal function maintained  Outcome: Progressing  Flowsheets (Taken 10/1/2024 1645 by Debra Panchal, RN)  Hemodynamic stability and optimal renal function maintained: Monitor labs and assess for signs and symptoms of volume excess or deficit     Problem: Hematologic - Adult  Goal: Maintains hematologic stability  Outcome: Progressing  Flowsheets (Taken 10/1/2024 1645 by Debra Panchal, RN)  Maintains hematologic stability:   Assess for signs and symptoms of bleeding or hemorrhage   Monitor labs for bleeding or clotting disorders     Problem: Nutrition Deficit:  Goal: Optimize nutritional status  Outcome: Progressing  Flowsheets (Taken 10/2/2024 2148)  Nutrient intake appropriate for improving, restoring, or maintaining nutritional needs: Assess nutritional status and recommend course of action

## 2024-10-03 NOTE — PLAN OF CARE
Problem: Discharge Planning  Goal: Discharge to home or other facility with appropriate resources  10/3/2024 1040 by Floyd Drummond RN  Outcome: Progressing  Flowsheets (Taken 10/1/2024 1645 by Debra Panchal, RN)  Discharge to home or other facility with appropriate resources: Identify barriers to discharge with patient and caregiver  10/2/2024 2148 by Douglas Maharaj RN  Outcome: Progressing  Flowsheets (Taken 10/1/2024 1645 by Debra Panchal, RN)  Discharge to home or other facility with appropriate resources: Identify barriers to discharge with patient and caregiver     Problem: Pain  Goal: Verbalizes/displays adequate comfort level or baseline comfort level  10/3/2024 1040 by Floyd Drummond RN  Outcome: Progressing  Flowsheets (Taken 10/2/2024 2148 by Douglas Maharaj RN)  Verbalizes/displays adequate comfort level or baseline comfort level: Encourage patient to monitor pain and request assistance  10/2/2024 2148 by Douglas Maharaj RN  Outcome: Progressing  Flowsheets (Taken 10/2/2024 2148)  Verbalizes/displays adequate comfort level or baseline comfort level: Encourage patient to monitor pain and request assistance     Problem: ABCDS Injury Assessment  Goal: Absence of physical injury  10/3/2024 1040 by Floyd Drummond RN  Outcome: Progressing  Flowsheets (Taken 10/2/2024 2148 by Douglas Maharaj RN)  Absence of Physical Injury: Implement safety measures based on patient assessment  10/2/2024 2148 by Douglas Maharaj RN  Outcome: Progressing  Flowsheets (Taken 10/2/2024 2148)  Absence of Physical Injury: Implement safety measures based on patient assessment     Problem: Safety - Adult  Goal: Free from fall injury  10/3/2024 1040 by Floyd Drummond RN  Outcome: Progressing  Flowsheets (Taken 10/2/2024 2148 by Douglas Maharaj, RN)  Free From Fall Injury: Instruct family/caregiver on patient safety  10/2/2024 2148 by Douglas Maharaj RN  Outcome: Progressing  Flowsheets (Taken

## 2024-10-04 LAB
COPPER SERPL-MCNC: 72.4 UG/DL (ref 70–140)
EPSTEIN-BARR VIRUS ANTIBODIES: NORMAL
NUCLEAR IGG SER QL IA: NORMAL
SMA IGG SER-ACNC: 14 UNITS (ref 0–19)

## 2024-10-05 NOTE — DISCHARGE SUMMARY
Hospitalist Discharge Summary    Patient: Dashawn Rivera  YOB: 1980  MRN: [id number]   Acct: [id number]    Primary Care Physician: Lyn Damico, MARIA ALEJANDRA - CNP    Admit date  10/1/2024    Discharge date: 10/3/2024     Chief Complaint on presentation: leg swelling     Initial H&P and Hospital course:  \"Dashawn Rivera is a 44 y.o. male with PMHx of Alcohol use, Seizure disorder who presented to [facility] with chief complaint of leg swelling. Patient reports being seen recently (9/24) in ED for concerns of right lower extremity swelling. States he was sent home on a blood thinner. Reports that he noted that his left foot was becoming swollen and was fearful he may have a blood clot in that leg as well. States that he has pain only with standing. Otherwise denies symptoms. Denies headache, dizziness, or lightheadedness. Denies fever or chills. Denies chest pain or SOB. Denies abdominal pain or nausea. Denies dysuria or hematuria.  Patient reports a history of alcohol use but when he was diagnosed with the blood clot he quit drinking.\"    Patient was admitted to hospitalist service for acute liver dysfunction as well as unprovoked bilateral DVTs.  GI and hematology consulted.  Patient went for right lower extremity thrombectomy on 10/1, IVC filter placement on 10/2.  Discussed with GI and hematology, okay for discharge from their standpoint.  Patient advised on alcohol cessation.  GI will follow-up in the office she will remainder of liver workup.  Follow-up with PCP and hematology. Repeat CMP  and CBC in 1 week    Patient responded well to medical management. Consultants had signed off or were contacted and agree with discharge plan. The patient was discharged in stable condition with appropriate outpatient follow up arranged.      Discharge Diagnoses / Assessment and Plan:    Acute liver dysfunction  Acute transaminitis with mild hyperbilirubinemia, elevated INR.  MRCP reported hepatomegaly with patchy  medications  diphenhydrAMINE 25 MG tablet  multivitamin Tabs tablet          Patient Instructions:    Discharge lab work: CMP, CBC, INR  Activity: activity as tolerated and no driving for today  Diet: No diet orders on file      Follow-up visits:   Tonie Stanford APRN - CNP  [address]  [address]  [place] [address]  [phone]    Go on 10/30/2024  Hospital follow up appointment, Appt time: 10:00am    Roxana Schmidt APRN - CNP  [address] [address]  [phone]    Follow up on 10/17/2024  10/17/24 @ 11:00         Disposition: home  Condition at Discharge: Stable    Time Spent: 45 minutes    Signed:    Thank you Lyn Damico APRN - CNP for the opportunity to be involved in this patient's care.    Electronically signed by Edna Mchugh PA-C on 10/5/2024 at 6:26 PM  Discharging Hospitalist

## 2024-10-09 LAB
HFE GENE MUT ANL BLD/T: NORMAL
HFE P.C282Y BLD/T QL: NEGATIVE
HFE P.H63D BLD/T QL: NEGATIVE
HFE P.S65C BLD/T QL: NEGATIVE
SPECIMEN SOURCE: NORMAL

## 2024-10-11 SDOH — HEALTH STABILITY: PHYSICAL HEALTH: ON AVERAGE, HOW MANY DAYS PER WEEK DO YOU ENGAGE IN MODERATE TO STRENUOUS EXERCISE (LIKE A BRISK WALK)?: 2 DAYS

## 2024-10-14 ENCOUNTER — HOSPITAL ENCOUNTER (OUTPATIENT)
Age: 44
Discharge: HOME | End: 2024-10-14
Payer: MEDICAID

## 2024-10-14 ENCOUNTER — HOSPITAL ENCOUNTER (INPATIENT)
Age: 44
LOS: 14 days | Discharge: HOME | DRG: 134 | End: 2024-10-28
Attending: STUDENT IN AN ORGANIZED HEALTH CARE EDUCATION/TRAINING PROGRAM
Payer: MEDICAID

## 2024-10-14 ENCOUNTER — HOSPITAL ENCOUNTER (OUTPATIENT)
Dept: CT IMAGING | Age: 44
Discharge: HOME | End: 2024-10-14
Payer: MEDICAID

## 2024-10-14 ENCOUNTER — OFFICE VISIT (OUTPATIENT)
Dept: FAMILY MEDICINE CLINIC | Age: 44
End: 2024-10-14
Payer: MEDICAID

## 2024-10-14 VITALS
DIASTOLIC BLOOD PRESSURE: 54 MMHG | RESPIRATION RATE: 16 BRPM | HEART RATE: 90 BPM | WEIGHT: 120.2 LBS | SYSTOLIC BLOOD PRESSURE: 96 MMHG | TEMPERATURE: 98.6 F | BODY MASS INDEX: 17.25 KG/M2

## 2024-10-14 DIAGNOSIS — R74.01 ELEVATED LIVER TRANSAMINASE LEVEL: ICD-10-CM

## 2024-10-14 DIAGNOSIS — R74.8 ELEVATED LIVER ENZYMES: ICD-10-CM

## 2024-10-14 DIAGNOSIS — D69.59 THROMBOCYTOPENIA CONCURRENT WITH AND DUE TO ALCOHOLISM (HCC): ICD-10-CM

## 2024-10-14 DIAGNOSIS — I26.99 OTHER ACUTE PULMONARY EMBOLISM WITHOUT ACUTE COR PULMONALE (HCC): Primary | ICD-10-CM

## 2024-10-14 DIAGNOSIS — Z09 HOSPITAL DISCHARGE FOLLOW-UP: ICD-10-CM

## 2024-10-14 DIAGNOSIS — R06.02 SOB (SHORTNESS OF BREATH): Primary | ICD-10-CM

## 2024-10-14 DIAGNOSIS — F10.11 HISTORY OF ALCOHOL ABUSE: ICD-10-CM

## 2024-10-14 DIAGNOSIS — Z95.828 PRESENCE OF IVC FILTER: ICD-10-CM

## 2024-10-14 DIAGNOSIS — I82.4Y3 ACUTE DEEP VEIN THROMBOSIS (DVT) OF PROXIMAL VEIN OF BOTH LOWER EXTREMITIES (HCC): ICD-10-CM

## 2024-10-14 DIAGNOSIS — F10.20 THROMBOCYTOPENIA CONCURRENT WITH AND DUE TO ALCOHOLISM (HCC): ICD-10-CM

## 2024-10-14 DIAGNOSIS — R06.02 SOB (SHORTNESS OF BREATH): ICD-10-CM

## 2024-10-14 DIAGNOSIS — D68.59 HYPERCOAGULABLE STATE (HCC): ICD-10-CM

## 2024-10-14 DIAGNOSIS — Z86.718 HISTORY OF DEEP VEIN THROMBOSIS (DVT) OF LOWER EXTREMITY: ICD-10-CM

## 2024-10-14 LAB
ALBUMIN SERPL BCG-MCNC: 3.4 G/DL (ref 3.5–5.1)
ALBUMIN SERPL BCG-MCNC: 3.5 G/DL (ref 3.5–5.1)
ALP SERPL-CCNC: 295 U/L (ref 38–126)
ALP SERPL-CCNC: 305 U/L (ref 38–126)
ALT SERPL W/O P-5'-P-CCNC: 46 U/L (ref 11–66)
ALT SERPL W/O P-5'-P-CCNC: 47 U/L (ref 11–66)
ANION GAP SERPL CALC-SCNC: 14 MEQ/L (ref 8–16)
ANION GAP SERPL CALC-SCNC: 15 MEQ/L (ref 8–16)
ANISOCYTOSIS BLD QL SMEAR: PRESENT
APTT PPP: 29.3 SECONDS (ref 22–38)
AST SERPL-CCNC: 108 U/L (ref 5–40)
AST SERPL-CCNC: 94 U/L (ref 5–40)
BASO STIPL BLD QL SMEAR: ABNORMAL
BASOPHILS ABSOLUTE: 0 THOU/MM3 (ref 0–0.1)
BASOPHILS NFR BLD AUTO: 0.2 %
BILIRUB SERPL-MCNC: 0.9 MG/DL (ref 0.3–1.2)
BILIRUB SERPL-MCNC: 0.9 MG/DL (ref 0.3–1.2)
BUN SERPL-MCNC: 10 MG/DL (ref 7–22)
BUN SERPL-MCNC: 10 MG/DL (ref 7–22)
CALCIUM SERPL-MCNC: 8.3 MG/DL (ref 8.5–10.5)
CALCIUM SERPL-MCNC: 8.6 MG/DL (ref 8.5–10.5)
CHLORIDE SERPL-SCNC: 96 MEQ/L (ref 98–111)
CHLORIDE SERPL-SCNC: 98 MEQ/L (ref 98–111)
CO2 SERPL-SCNC: 25 MEQ/L (ref 23–33)
CO2 SERPL-SCNC: 25 MEQ/L (ref 23–33)
CREAT SERPL-MCNC: 0.6 MG/DL (ref 0.4–1.2)
CREAT SERPL-MCNC: 0.6 MG/DL (ref 0.4–1.2)
DEPRECATED RDW RBC AUTO: 76.5 FL (ref 35–45)
DEPRECATED RDW RBC AUTO: 77.3 FL (ref 35–45)
EKG ATRIAL RATE: 101 BPM
EKG P AXIS: 86 DEGREES
EKG P-R INTERVAL: 122 MS
EKG Q-T INTERVAL: 338 MS
EKG QRS DURATION: 74 MS
EKG QTC CALCULATION (BAZETT): 438 MS
EKG R AXIS: 85 DEGREES
EKG T AXIS: 80 DEGREES
EKG VENTRICULAR RATE: 101 BPM
EOSINOPHIL NFR BLD AUTO: 0 %
EOSINOPHILS ABSOLUTE: 0 THOU/MM3 (ref 0–0.4)
ERYTHROCYTE [DISTWIDTH] IN BLOOD BY AUTOMATED COUNT: 19.1 % (ref 11.5–14.5)
ERYTHROCYTE [DISTWIDTH] IN BLOOD BY AUTOMATED COUNT: 19.1 % (ref 11.5–14.5)
GFR SERPL CREATININE-BSD FRML MDRD: > 90 ML/MIN/1.73M2
GFR SERPL CREATININE-BSD FRML MDRD: > 90 ML/MIN/1.73M2
GLUCOSE SERPL-MCNC: 136 MG/DL (ref 70–108)
GLUCOSE SERPL-MCNC: 96 MG/DL (ref 70–108)
HCT VFR BLD AUTO: 30.6 % (ref 42–52)
HCT VFR BLD AUTO: 31.5 % (ref 42–52)
HEPARIN UNFRACTIONATED: 0.1 U/ML (ref 0.3–0.7)
HEPARIN UNFRACTIONATED: 0.28 U/ML (ref 0.3–0.7)
HGB BLD-MCNC: 9.8 GM/DL (ref 14–18)
HGB BLD-MCNC: 9.9 GM/DL (ref 14–18)
HOWELL-JOLLY BOD BLD QL SMEAR: PRESENT
IMM GRANULOCYTES # BLD AUTO: 0.09 THOU/MM3 (ref 0–0.07)
IMM GRANULOCYTES NFR BLD AUTO: 1 %
INR PPP: 0.97 (ref 0.85–1.13)
INR PPP: 1.02 (ref 0.85–1.13)
LYMPHOCYTES ABSOLUTE: 1.1 THOU/MM3 (ref 1–4.8)
LYMPHOCYTES NFR BLD AUTO: 11.2 %
MACROCYTES BLD QL SMEAR: PRESENT
MCH RBC QN AUTO: 34.6 PG (ref 26–33)
MCH RBC QN AUTO: 35.3 PG (ref 26–33)
MCHC RBC AUTO-ENTMCNC: 31.4 GM/DL (ref 32.2–35.5)
MCHC RBC AUTO-ENTMCNC: 32 GM/DL (ref 32.2–35.5)
MCV RBC AUTO: 110.1 FL (ref 80–94)
MCV RBC AUTO: 110.1 FL (ref 80–94)
MONOCYTES ABSOLUTE: 0.5 THOU/MM3 (ref 0.4–1.3)
MONOCYTES NFR BLD AUTO: 4.9 %
NEUTROPHILS ABSOLUTE: 7.8 THOU/MM3 (ref 1.8–7.7)
NEUTROPHILS NFR BLD AUTO: 82.7 %
NRBC BLD AUTO-RTO: 2 /100 WBC
NT-PROBNP SERPL IA-MCNC: < 36 PG/ML (ref 0–124)
OSMOLALITY SERPL CALC.SUM OF ELEC: 273.1 MOSMOL/KG (ref 275–300)
PLATELET # BLD AUTO: 212 THOU/MM3 (ref 130–400)
PLATELET # BLD AUTO: 213 THOU/MM3 (ref 130–400)
PMV BLD AUTO: 9.3 FL (ref 9.4–12.4)
PMV BLD AUTO: 9.6 FL (ref 9.4–12.4)
POLYCHROMASIA BLD QL SMEAR: ABNORMAL
POTASSIUM SERPL-SCNC: 4.1 MEQ/L (ref 3.5–5.2)
POTASSIUM SERPL-SCNC: 4.8 MEQ/L (ref 3.5–5.2)
PROT SERPL-MCNC: 6.7 G/DL (ref 6.1–8)
PROT SERPL-MCNC: 6.8 G/DL (ref 6.1–8)
RBC # BLD AUTO: 2.78 MILL/MM3 (ref 4.7–6.1)
RBC # BLD AUTO: 2.86 MILL/MM3 (ref 4.7–6.1)
SCAN OF BLOOD SMEAR: NORMAL
SODIUM SERPL-SCNC: 136 MEQ/L (ref 135–145)
SODIUM SERPL-SCNC: 137 MEQ/L (ref 135–145)
STOMATOCYTES: ABNORMAL
TROPONIN, HIGH SENSITIVITY: < 6 NG/L (ref 0–12)
WBC # BLD AUTO: 9.4 THOU/MM3 (ref 4.8–10.8)
WBC # BLD AUTO: 9.6 THOU/MM3 (ref 4.8–10.8)

## 2024-10-14 PROCEDURE — 6360000002 HC RX W HCPCS

## 2024-10-14 PROCEDURE — 99222 1ST HOSP IP/OBS MODERATE 55: CPT | Performed by: INTERNAL MEDICINE

## 2024-10-14 PROCEDURE — 99285 EMERGENCY DEPT VISIT HI MDM: CPT

## 2024-10-14 PROCEDURE — 99204 OFFICE O/P NEW MOD 45 MIN: CPT | Performed by: NURSE PRACTITIONER

## 2024-10-14 PROCEDURE — 36415 COLL VENOUS BLD VENIPUNCTURE: CPT

## 2024-10-14 PROCEDURE — 84484 ASSAY OF TROPONIN QUANT: CPT

## 2024-10-14 PROCEDURE — 85730 THROMBOPLASTIN TIME PARTIAL: CPT

## 2024-10-14 PROCEDURE — 71275 CT ANGIOGRAPHY CHEST: CPT

## 2024-10-14 PROCEDURE — 85025 COMPLETE CBC W/AUTO DIFF WBC: CPT

## 2024-10-14 PROCEDURE — 96374 THER/PROPH/DIAG INJ IV PUSH: CPT

## 2024-10-14 PROCEDURE — 80053 COMPREHEN METABOLIC PANEL: CPT

## 2024-10-14 PROCEDURE — 93005 ELECTROCARDIOGRAM TRACING: CPT

## 2024-10-14 PROCEDURE — 85027 COMPLETE CBC AUTOMATED: CPT

## 2024-10-14 PROCEDURE — 85520 HEPARIN ASSAY: CPT

## 2024-10-14 PROCEDURE — 85610 PROTHROMBIN TIME: CPT

## 2024-10-14 PROCEDURE — 6370000000 HC RX 637 (ALT 250 FOR IP)

## 2024-10-14 PROCEDURE — 93010 ELECTROCARDIOGRAM REPORT: CPT | Performed by: INTERNAL MEDICINE

## 2024-10-14 PROCEDURE — 1200000003 HC TELEMETRY R&B

## 2024-10-14 PROCEDURE — 83880 ASSAY OF NATRIURETIC PEPTIDE: CPT

## 2024-10-14 PROCEDURE — 6360000004 HC RX CONTRAST MEDICATION: Performed by: NURSE PRACTITIONER

## 2024-10-14 RX ORDER — ACETAMINOPHEN 650 MG/1
650 SUPPOSITORY RECTAL EVERY 6 HOURS PRN
Status: DISCONTINUED | OUTPATIENT
Start: 2024-10-14 | End: 2024-10-28 | Stop reason: HOSPADM

## 2024-10-14 RX ORDER — ONDANSETRON 2 MG/ML
4 INJECTION INTRAMUSCULAR; INTRAVENOUS EVERY 6 HOURS PRN
Status: DISCONTINUED | OUTPATIENT
Start: 2024-10-14 | End: 2024-10-28 | Stop reason: HOSPADM

## 2024-10-14 RX ORDER — HEPARIN SODIUM 10000 [USP'U]/100ML
5-34 INJECTION, SOLUTION INTRAVENOUS CONTINUOUS
Status: DISCONTINUED | OUTPATIENT
Start: 2024-10-14 | End: 2024-10-28 | Stop reason: HOSPADM

## 2024-10-14 RX ORDER — IOPAMIDOL 755 MG/ML
80 INJECTION, SOLUTION INTRAVASCULAR
Status: COMPLETED | OUTPATIENT
Start: 2024-10-14 | End: 2024-10-14

## 2024-10-14 RX ORDER — POTASSIUM CHLORIDE 7.45 MG/ML
10 INJECTION INTRAVENOUS PRN
Status: DISCONTINUED | OUTPATIENT
Start: 2024-10-14 | End: 2024-10-25 | Stop reason: RX

## 2024-10-14 RX ORDER — HEPARIN SODIUM 1000 [USP'U]/ML
40 INJECTION, SOLUTION INTRAVENOUS; SUBCUTANEOUS PRN
Status: DISCONTINUED | OUTPATIENT
Start: 2024-10-14 | End: 2024-10-28 | Stop reason: HOSPADM

## 2024-10-14 RX ORDER — POTASSIUM CHLORIDE 1500 MG/1
40 TABLET, EXTENDED RELEASE ORAL PRN
Status: DISCONTINUED | OUTPATIENT
Start: 2024-10-14 | End: 2024-10-28 | Stop reason: HOSPADM

## 2024-10-14 RX ORDER — SODIUM CHLORIDE 9 MG/ML
INJECTION, SOLUTION INTRAVENOUS PRN
Status: DISCONTINUED | OUTPATIENT
Start: 2024-10-14 | End: 2024-10-28 | Stop reason: HOSPADM

## 2024-10-14 RX ORDER — HEPARIN SODIUM 1000 [USP'U]/ML
80 INJECTION, SOLUTION INTRAVENOUS; SUBCUTANEOUS PRN
Status: DISCONTINUED | OUTPATIENT
Start: 2024-10-14 | End: 2024-10-28 | Stop reason: HOSPADM

## 2024-10-14 RX ORDER — SODIUM CHLORIDE 0.9 % (FLUSH) 0.9 %
5-40 SYRINGE (ML) INJECTION EVERY 12 HOURS SCHEDULED
Status: DISCONTINUED | OUTPATIENT
Start: 2024-10-14 | End: 2024-10-28 | Stop reason: HOSPADM

## 2024-10-14 RX ORDER — SODIUM CHLORIDE 0.9 % (FLUSH) 0.9 %
5-40 SYRINGE (ML) INJECTION PRN
Status: DISCONTINUED | OUTPATIENT
Start: 2024-10-14 | End: 2024-10-28 | Stop reason: HOSPADM

## 2024-10-14 RX ORDER — MAGNESIUM SULFATE IN WATER 40 MG/ML
2000 INJECTION, SOLUTION INTRAVENOUS PRN
Status: DISCONTINUED | OUTPATIENT
Start: 2024-10-14 | End: 2024-10-28 | Stop reason: HOSPADM

## 2024-10-14 RX ORDER — POLYETHYLENE GLYCOL 3350 17 G/17G
17 POWDER, FOR SOLUTION ORAL DAILY PRN
Status: DISCONTINUED | OUTPATIENT
Start: 2024-10-14 | End: 2024-10-28 | Stop reason: HOSPADM

## 2024-10-14 RX ORDER — ACETAMINOPHEN 325 MG/1
650 TABLET ORAL EVERY 6 HOURS PRN
Status: DISCONTINUED | OUTPATIENT
Start: 2024-10-14 | End: 2024-10-28 | Stop reason: HOSPADM

## 2024-10-14 RX ORDER — HEPARIN SODIUM 1000 [USP'U]/ML
80 INJECTION, SOLUTION INTRAVENOUS; SUBCUTANEOUS ONCE
Status: COMPLETED | OUTPATIENT
Start: 2024-10-14 | End: 2024-10-14

## 2024-10-14 RX ORDER — ONDANSETRON 4 MG/1
4 TABLET, ORALLY DISINTEGRATING ORAL EVERY 8 HOURS PRN
Status: DISCONTINUED | OUTPATIENT
Start: 2024-10-14 | End: 2024-10-28 | Stop reason: HOSPADM

## 2024-10-14 RX ADMIN — IOPAMIDOL 80 ML: 755 INJECTION, SOLUTION INTRAVENOUS at 12:03

## 2024-10-14 RX ADMIN — HEPARIN SODIUM 4400 UNITS: 1000 INJECTION INTRAVENOUS; SUBCUTANEOUS at 16:34

## 2024-10-14 RX ADMIN — HEPARIN SODIUM 18 UNITS/KG/HR: 10000 INJECTION, SOLUTION INTRAVENOUS at 16:38

## 2024-10-14 RX ADMIN — HEPARIN SODIUM 2200 UNITS: 1000 INJECTION INTRAVENOUS; SUBCUTANEOUS at 23:14

## 2024-10-14 RX ADMIN — ACETAMINOPHEN 650 MG: 325 TABLET ORAL at 19:54

## 2024-10-14 SDOH — ECONOMIC STABILITY: INCOME INSECURITY: HOW HARD IS IT FOR YOU TO PAY FOR THE VERY BASICS LIKE FOOD, HOUSING, MEDICAL CARE, AND HEATING?: NOT HARD AT ALL

## 2024-10-14 SDOH — ECONOMIC STABILITY: FOOD INSECURITY: WITHIN THE PAST 12 MONTHS, THE FOOD YOU BOUGHT JUST DIDN'T LAST AND YOU DIDN'T HAVE MONEY TO GET MORE.: NEVER TRUE

## 2024-10-14 SDOH — ECONOMIC STABILITY: FOOD INSECURITY: WITHIN THE PAST 12 MONTHS, YOU WORRIED THAT YOUR FOOD WOULD RUN OUT BEFORE YOU GOT MONEY TO BUY MORE.: NEVER TRUE

## 2024-10-14 ASSESSMENT — ENCOUNTER SYMPTOMS
ABDOMINAL PAIN: 0
SHORTNESS OF BREATH: 1
BLOOD IN STOOL: 0
CHEST TIGHTNESS: 0
EYES NEGATIVE: 1

## 2024-10-14 ASSESSMENT — PATIENT HEALTH QUESTIONNAIRE - PHQ9
SUM OF ALL RESPONSES TO PHQ9 QUESTIONS 1 & 2: 0
SUM OF ALL RESPONSES TO PHQ QUESTIONS 1-9: 0
1. LITTLE INTEREST OR PLEASURE IN DOING THINGS: NOT AT ALL
SUM OF ALL RESPONSES TO PHQ QUESTIONS 1-9: 0
2. FEELING DOWN, DEPRESSED OR HOPELESS: NOT AT ALL

## 2024-10-14 ASSESSMENT — PAIN DESCRIPTION - DESCRIPTORS
DESCRIPTORS: ACHING;DISCOMFORT
DESCRIPTORS: ACHING;DISCOMFORT

## 2024-10-14 ASSESSMENT — PAIN DESCRIPTION - ORIENTATION
ORIENTATION: RIGHT;LEFT
ORIENTATION: RIGHT;LEFT

## 2024-10-14 ASSESSMENT — PAIN SCALES - WONG BAKER
WONGBAKER_NUMERICALRESPONSE: NO HURT

## 2024-10-14 ASSESSMENT — PAIN SCALES - GENERAL
PAINLEVEL_OUTOF10: 5
PAINLEVEL_OUTOF10: 5
PAINLEVEL_OUTOF10: 2

## 2024-10-14 ASSESSMENT — PAIN DESCRIPTION - LOCATION
LOCATION: LEG
LOCATION: LEG

## 2024-10-14 NOTE — ED NOTES
ED to inpatient nurses report      Chief Complaint:  Chief Complaint   Patient presents with    abnormal ct scan     Present to ED from: home    MOA:     LOC: alert and orientated to name, place, date  Mobility: Independent  Oxygen Baseline: 98%    Current needs required: RA     Code Status:   Prior    What abnormal results were found and what did you give/do to treat them? Abnormal CT scan, showing clots in lungs  Any procedures or intervention occur? heparin    Mental Status:  Level of Consciousness: Alert (0)    Psych Assessment:        Vitals:  Patient Vitals for the past 24 hrs:   BP Temp Pulse Resp SpO2   10/14/24 1554 107/69 -- (!) 106 16 97 %   10/14/24 1443 -- 98 °F (36.7 °C) -- -- --   10/14/24 1442 131/79 -- 99 18 99 %        LDAs:   Peripheral IV 10/14/24 Posterior;Right Forearm (Active)       Ambulatory Status:  Presents to emergency department  because of falls (Syncope, seizure, or loss of consciousness): No, Age > 70: No, Altered Mental Status, Intoxication with alcohol or substance confusion (Disorientation, impaired judgment, poor safety awaremess, or inability to follow instructions): No, Impaired Mobility: Ambulates or transfers with assistive devices or assistance; Unable to ambulate or transer.: No    Diagnosis:  DISPOSITION Admitted 10/14/2024 05:06:49 PM   Final diagnoses:   Other acute pulmonary embolism without acute cor pulmonale (HCC)        Consults:  None     Pain Score:       C-SSRS:   Risk of Suicide: No Risk    Sepsis Screening:       [place] Fall Risk:  [place] 1 Fall Risk  Presents to emergency department  because of falls (Syncope, seizure, or loss of consciousness): No  Age > 70: No  Altered Mental Status, Intoxication with alcohol or substance confusion (Disorientation, impaired judgment, poor safety awaremess, or inability to follow instructions): No  Impaired Mobility: Ambulates or transfers with assistive devices or assistance; Unable to ambulate or transer.: No  Standard Fall

## 2024-10-14 NOTE — ED TRIAGE NOTES
Pt to ED following a CT scan. States that he has been feeling short of breath for a few days. States that he has had leg pain for the past few days as well. States that he had a stent placed at the beginning of this month in the right leg for blood clots. Was not prescribed a blood thinner. States he was told he had a clot in the left leg but states that nothing was done for it. Pt is feeling short of breath at rest that worsens when he is moving. EKG done.

## 2024-10-14 NOTE — H&P
History & Physical  Internal Medicine Resident         Patient: Dashawn Rivera 44 y.o. male      : 1980  Date of Admission: 10/14/2024  Date of Service: Pt seen/examined on 10/14/24 and Admitted to Inpatient with expected LOS less than two midnights due to medical therapy.       ASSESSMENT AND PLAN    #Acute nonmassive unprovoked PE  History of recent bilateral lower extremity DVT. Right LE extensive DVT s/p  thrombectomy1 . 10/2/2024 IVC placement due to high risk of GI bleed secondary to liver disease.Left leg had DVT in the posterior tibial which was distal and has low risk for migration. CT chest 10/14/2024 showed PE extending from distal right main pulmonary artery to right lower lobar branches with no right heart strain and IVC filter was visualized. may be due to smoking or clotting dysfunction due to liver disease. However, hypercoagulable workup is needed and cannot be done in setting of heparin. Consult placed to hematology. Consult placed to GI for their opinion on A/C in setting of recurrent thrombosis. Will continue to treat with heparin.      Chronic Conditions (reviewed and stable unless otherwise stated)   Seizures continue Keppra  Liver disease hepatomegaly and hepatic steatosis noted on CT chest 10/14/2024 and on CT abdomen 3/2023. Unclear if he has cirrhosis. No liver biopsy in chart. Had transaminitis last admission but now improved.  Hepatitis panel - 10/1/2024. No history of varices found in chart. Chart states that patient drinks 70 shots of liquor per week. INR 1.02.  Normal platelets.  Chronic macrocytic anemia B12 and folate WNL.  Will continue to monitor.  Will transfuse if Hb less than 7.    ==================================================================    SUBJECTIVE    Chief Complaint: Dyspnea and bilateral lower leg pain    History Of Present Illness:  Dashawn Rivera is a 44 y.o. male with PMHx of alcohol use disorder, questionable cirrhosis, seizures who presents to

## 2024-10-14 NOTE — PROGRESS NOTES
[facility]  [address]  [place] [address]  Dept: [phone]  Dept Fax: [phone]      POST-DISCHARGE TRANSITIONAL CARE MANAGEMENT SERVICES     Chief Complaint   Patient presents with    New Patient     Pt here for ED f/u       Dashawn Rivera   YOB: 1980      No Known Allergies  Outpatient Medications Marked as Taking for the 10/14/24 encounter (Office Visit) with Lyn Damico APRN - CNP   Medication Sig Dispense Refill    Magnesium 400 MG CAPS Take 400 mg by mouth daily 30 capsule 0    folic acid (FOLVITE) 1 MG tablet Take 1 tablet by mouth daily 30 tablet 0    levETIRAcetam (KEPPRA) 500 MG tablet Take 1 tablet by mouth 2 times daily 60 tablet 0           Patient was admitted to [facility]  Admission date:10/1/24  Discharge date:10/3/24  Admitting Problem:leg swelling  Discharge Diagnosis:  Acute liver dysfunction  Acute transaminitis with mild hyperbilirubinemia, elevated INR.  MRCP reported hepatomegaly with patchy geographic areas of steatosis  GI consulted -questionable cirrhosis / alcoholic hepatitis.   Repeat CMP in 1 wk and follow up with GI.   Unprovoked DVT, bilateral (POA)  Doppler 9/24/24 reported extensive acute DVT through RLE.  Patient was discharged on Xarelto.  Presented again 10/1 with LLE pain and edema. B/l venous dopplers 10/1 showed persistent extensive acute appearing DVT throughout RLE, acute DVT in 1 of left posterior tibial veins.  Hematology consulted  Given patient's liver dysfunction causing elevated INR and APTT, recommends against any anticoagulation.  S/p right lower extremity thrombectomy 10/1  IVC filter placed 10/2.  Hematology will plan hypercoag workup as outpatient  Elevated INR, prolonged APTT  Likely secondary to underlying liver disease.  Hematology and GI following as above.  Protein calorie malnutrition  Dietitian consulted  Alcohol use disorder  Thiamine, folic acid, multivitamin.  Reports last drink being 9/25, monitor for signs of

## 2024-10-14 NOTE — ED NOTES
Pt transported to [facility] by cart in stable condition.   Called [facility] and informed Gayle that the patient was on their way to the unit.

## 2024-10-15 PROBLEM — D68.59 HYPERCOAGULABLE STATE (HCC): Status: ACTIVE | Noted: 2024-10-15

## 2024-10-15 PROBLEM — I82.403 DEEP VEIN THROMBOSIS (DVT) OF BOTH LOWER EXTREMITIES (HCC): Status: ACTIVE | Noted: 2024-10-15

## 2024-10-15 LAB
ALBUMIN SERPL BCG-MCNC: 3 G/DL (ref 3.5–5.1)
ALP SERPL-CCNC: 261 U/L (ref 38–126)
ALT SERPL W/O P-5'-P-CCNC: 41 U/L (ref 11–66)
ANION GAP SERPL CALC-SCNC: 10 MEQ/L (ref 8–16)
AST SERPL-CCNC: 114 U/L (ref 5–40)
BILIRUB SERPL-MCNC: 0.8 MG/DL (ref 0.3–1.2)
BUN SERPL-MCNC: 9 MG/DL (ref 7–22)
CALCIUM SERPL-MCNC: 8 MG/DL (ref 8.5–10.5)
CHLORIDE SERPL-SCNC: 101 MEQ/L (ref 98–111)
CO2 SERPL-SCNC: 28 MEQ/L (ref 23–33)
CREAT SERPL-MCNC: 0.6 MG/DL (ref 0.4–1.2)
DEPRECATED RDW RBC AUTO: 79.2 FL (ref 35–45)
ERYTHROCYTE [DISTWIDTH] IN BLOOD BY AUTOMATED COUNT: 19.2 % (ref 11.5–14.5)
GFR SERPL CREATININE-BSD FRML MDRD: > 90 ML/MIN/1.73M2
GLUCOSE SERPL-MCNC: 94 MG/DL (ref 70–108)
HCT VFR BLD AUTO: 27.8 % (ref 42–52)
HEPARIN UNFRACTIONATED: 0.27 U/ML (ref 0.3–0.7)
HEPARIN UNFRACTIONATED: 0.28 U/ML (ref 0.3–0.7)
HEPARIN UNFRACTIONATED: 0.37 U/ML (ref 0.3–0.7)
HEPARIN UNFRACTIONATED: 0.38 U/ML (ref 0.3–0.7)
HGB BLD-MCNC: 8.7 GM/DL (ref 14–18)
MCH RBC QN AUTO: 35.4 PG (ref 26–33)
MCHC RBC AUTO-ENTMCNC: 31.3 GM/DL (ref 32.2–35.5)
MCV RBC AUTO: 113 FL (ref 80–94)
PATHOLOGIST REVIEW: ABNORMAL
PLATELET # BLD AUTO: 215 THOU/MM3 (ref 130–400)
PMV BLD AUTO: 9.8 FL (ref 9.4–12.4)
POTASSIUM SERPL-SCNC: 4.1 MEQ/L (ref 3.5–5.2)
PROT SERPL-MCNC: 5.6 G/DL (ref 6.1–8)
RBC # BLD AUTO: 2.46 MILL/MM3 (ref 4.7–6.1)
SCAN OF BLOOD SMEAR: NORMAL
SODIUM SERPL-SCNC: 139 MEQ/L (ref 135–145)
WBC # BLD AUTO: 9.2 THOU/MM3 (ref 4.8–10.8)

## 2024-10-15 PROCEDURE — 6360000002 HC RX W HCPCS

## 2024-10-15 PROCEDURE — 1200000003 HC TELEMETRY R&B

## 2024-10-15 PROCEDURE — 6370000000 HC RX 637 (ALT 250 FOR IP): Performed by: PHYSICIAN ASSISTANT

## 2024-10-15 PROCEDURE — 85027 COMPLETE CBC AUTOMATED: CPT

## 2024-10-15 PROCEDURE — 81240 F2 GENE: CPT

## 2024-10-15 PROCEDURE — 1200000000 HC SEMI PRIVATE

## 2024-10-15 PROCEDURE — 81241 F5 GENE: CPT

## 2024-10-15 PROCEDURE — 99254 IP/OBS CNSLTJ NEW/EST MOD 60: CPT | Performed by: NURSE PRACTITIONER

## 2024-10-15 PROCEDURE — 36415 COLL VENOUS BLD VENIPUNCTURE: CPT

## 2024-10-15 PROCEDURE — 85520 HEPARIN ASSAY: CPT

## 2024-10-15 PROCEDURE — 80053 COMPREHEN METABOLIC PANEL: CPT

## 2024-10-15 PROCEDURE — 99232 SBSQ HOSP IP/OBS MODERATE 35: CPT | Performed by: PHYSICIAN ASSISTANT

## 2024-10-15 RX ORDER — LEVETIRACETAM 500 MG/1
500 TABLET ORAL 2 TIMES DAILY
Status: DISCONTINUED | OUTPATIENT
Start: 2024-10-15 | End: 2024-10-28 | Stop reason: HOSPADM

## 2024-10-15 RX ADMIN — HEPARIN SODIUM 22 UNITS/KG/HR: 10000 INJECTION, SOLUTION INTRAVENOUS at 17:44

## 2024-10-15 RX ADMIN — LEVETIRACETAM 500 MG: 500 TABLET, FILM COATED ORAL at 19:27

## 2024-10-15 RX ADMIN — HEPARIN SODIUM 2200 UNITS: 1000 INJECTION INTRAVENOUS; SUBCUTANEOUS at 23:38

## 2024-10-15 RX ADMIN — HEPARIN SODIUM 2200 UNITS: 1000 INJECTION INTRAVENOUS; SUBCUTANEOUS at 11:21

## 2024-10-15 RX ADMIN — HEPARIN SODIUM 22 UNITS/KG/HR: 10000 INJECTION, SOLUTION INTRAVENOUS at 11:24

## 2024-10-15 RX ADMIN — LEVETIRACETAM 500 MG: 500 TABLET, FILM COATED ORAL at 10:39

## 2024-10-15 ASSESSMENT — PAIN SCALES - WONG BAKER

## 2024-10-15 NOTE — CARE COORDINATION
10/15/24 0843   Readmission Assessment   Number of Days since last admission? 8-30 days   Previous Disposition Home with Family   Who is being Interviewed Patient   What was the patient's/caregiver's perception as to why they think they needed to return back to the hospital? Other (Comment)  (physician sent in after CT scan)   Did you visit your Primary Care Physician after you left the hospital, before you returned this time? Yes   Did you see a specialist, such as Cardiac, Pulmonary, Orthopedic Physician, etc. after you left the hospital? No   Who advised the patient to return to the hospital? Physician   Does the patient report anything that got in the way of taking their medications? No   In our efforts to provide the best possible care to you and others like you, can you think of anything that we could have done to help you after you left the hospital the first time, so that you might not have needed to return so soon? Other (Comment)  (look more into why the clots formed in the first place and treat the clot in the other leg.)

## 2024-10-15 NOTE — CARE COORDINATION
Case Management Assessment Initial Evaluation    Date/Time of Evaluation: 10/15/2024 7:57 AM  Assessment Completed by: Jazmin Carrera RN    If patient is discharged prior to next notation, then this note serves as note for discharge by case management.    Patient Name: Dashawn Rivera                   YOB: 1980  Diagnosis: Pulmonary embolism on right (HCC) [I26.99]  Other acute pulmonary embolism without acute cor pulmonale (HCC) [I26.99]                   Date / Time: 10/14/2024  2:33 PM  [facility]: [facility]     Patient Admission Status: Inpatient   Readmission Risk Low 0-14, Mod 15-19), High > 20: Readmission Risk Score: 18.1    Current PCP: Lyn Damico APRN - CNP  Health Care Decision Makers:   Primary Decision Maker: Porsha Rivera - Parent - [phone]    Additional Case Management Notes: Admitted through ED with dyspnea and BLE pain. Pt was recently here with bilateral LE DVT. IVC filter was placed due to high risk of GI bleeding (secondary to liver disease). Found to have PE. Consulting GI and Hematology. Hgb 9.8 and 8.7.  INR 1.02. Heparin gtt. Room air.     Procedures: None    Imaging:   10/14 CTA chest:   1. Positive for pulmonary emboli with filling defects noted in the distal right  main pulmonary artery extending to the right lower lobar branches. No right heart strain.  2. Chronic findings.    Patient Goals/Plan/Treatment Preferences: Spoke with pt, brother in room. Pt lives at home with his mother, plans to return there. Feels he may need a cane at discharge. Pt was seen by PCP yesterday (to establish). Pt does not drive, family or insurance provide transportation. Monitor for other needs.        10/15/24 0840   Service Assessment   Patient Orientation Alert and Oriented   Cognition Alert   History Provided By Patient   Primary Caregiver Self   Accompanied By/Relationship brother   Support Systems Parent;Family Members   Patient's Healthcare Decision Maker is: Patient Declined

## 2024-10-16 LAB
ALBUMIN SERPL BCG-MCNC: 2.8 G/DL (ref 3.5–5.1)
ALP SERPL-CCNC: 238 U/L (ref 38–126)
ALT SERPL W/O P-5'-P-CCNC: 40 U/L (ref 11–66)
ANION GAP SERPL CALC-SCNC: 9 MEQ/L (ref 8–16)
AST SERPL-CCNC: 91 U/L (ref 5–40)
BILIRUB SERPL-MCNC: 0.5 MG/DL (ref 0.3–1.2)
BUN SERPL-MCNC: 7 MG/DL (ref 7–22)
CALCIUM SERPL-MCNC: 8 MG/DL (ref 8.5–10.5)
CHLORIDE SERPL-SCNC: 101 MEQ/L (ref 98–111)
CO2 SERPL-SCNC: 27 MEQ/L (ref 23–33)
CREAT SERPL-MCNC: 0.5 MG/DL (ref 0.4–1.2)
DEPRECATED RDW RBC AUTO: 78.9 FL (ref 35–45)
ERYTHROCYTE [DISTWIDTH] IN BLOOD BY AUTOMATED COUNT: 18.6 % (ref 11.5–14.5)
GFR SERPL CREATININE-BSD FRML MDRD: > 90 ML/MIN/1.73M2
GLUCOSE SERPL-MCNC: 87 MG/DL (ref 70–108)
HCT VFR BLD AUTO: 27.9 % (ref 42–52)
HEPARIN UNFRACTIONATED: 0.41 U/ML (ref 0.3–0.7)
HEPARIN UNFRACTIONATED: 0.44 U/ML (ref 0.3–0.7)
HGB BLD-MCNC: 8.7 GM/DL (ref 14–18)
INR PPP: 0.91 (ref 0.85–1.13)
MCH RBC QN AUTO: 36 PG (ref 26–33)
MCHC RBC AUTO-ENTMCNC: 31.2 GM/DL (ref 32.2–35.5)
MCV RBC AUTO: 115.3 FL (ref 80–94)
PLATELET # BLD AUTO: 258 THOU/MM3 (ref 130–400)
PMV BLD AUTO: 9.9 FL (ref 9.4–12.4)
POTASSIUM SERPL-SCNC: 4.1 MEQ/L (ref 3.5–5.2)
PROT SERPL-MCNC: 5.5 G/DL (ref 6.1–8)
RBC # BLD AUTO: 2.42 MILL/MM3 (ref 4.7–6.1)
SODIUM SERPL-SCNC: 137 MEQ/L (ref 135–145)
WBC # BLD AUTO: 8.5 THOU/MM3 (ref 4.8–10.8)

## 2024-10-16 PROCEDURE — 99233 SBSQ HOSP IP/OBS HIGH 50: CPT | Performed by: PHYSICIAN ASSISTANT

## 2024-10-16 PROCEDURE — 85610 PROTHROMBIN TIME: CPT

## 2024-10-16 PROCEDURE — 6370000000 HC RX 637 (ALT 250 FOR IP): Performed by: PHYSICIAN ASSISTANT

## 2024-10-16 PROCEDURE — 6360000002 HC RX W HCPCS: Performed by: PHYSICIAN ASSISTANT

## 2024-10-16 PROCEDURE — 85520 HEPARIN ASSAY: CPT

## 2024-10-16 PROCEDURE — 36415 COLL VENOUS BLD VENIPUNCTURE: CPT

## 2024-10-16 PROCEDURE — 80053 COMPREHEN METABOLIC PANEL: CPT

## 2024-10-16 PROCEDURE — 85027 COMPLETE CBC AUTOMATED: CPT

## 2024-10-16 PROCEDURE — 1200000000 HC SEMI PRIVATE

## 2024-10-16 RX ORDER — LORAZEPAM 2 MG/ML
2 INJECTION INTRAMUSCULAR ONCE
Status: COMPLETED | OUTPATIENT
Start: 2024-10-16 | End: 2024-10-16

## 2024-10-16 RX ORDER — WARFARIN SODIUM 5 MG/1
5 TABLET ORAL
Status: COMPLETED | OUTPATIENT
Start: 2024-10-16 | End: 2024-10-16

## 2024-10-16 RX ADMIN — WARFARIN SODIUM 5 MG: 5 TABLET ORAL at 17:43

## 2024-10-16 RX ADMIN — LEVETIRACETAM 500 MG: 500 TABLET, FILM COATED ORAL at 20:43

## 2024-10-16 RX ADMIN — LORAZEPAM 2 MG: 2 INJECTION INTRAMUSCULAR; INTRAVENOUS at 06:51

## 2024-10-16 RX ADMIN — LEVETIRACETAM 500 MG: 500 TABLET, FILM COATED ORAL at 08:56

## 2024-10-16 NOTE — CARE COORDINATION
10/16/24, 1:47 PM EDT    DISCHARGE ON GOING EVALUATION    Dashawn L [facility] day: 2  Location: -24/024-A Reason for admit: Pulmonary embolism on right (HCC) [I26.99]  Other acute pulmonary embolism without acute cor pulmonale (HCC) [I26.99]     Procedures: None    Imaging since last note: None    Barriers to Discharge: Hospitalist and Hem/Onc following. Awaiting GI consult for clearance for anticoagulation. INR 0.91. Heparin gtt.     PCP: Lyn Damico APRN - CNP  Readmission Risk Score: 19.3    Patient Goals/Plan/Treatment Preferences: Plans home with mother. May need cane. Monitor.     Received call from pt's . She is available to assist at discharge if there are needs. Tarsha and her number is [phone].

## 2024-10-17 LAB
ALBUMIN SERPL BCG-MCNC: 3 G/DL (ref 3.5–5.1)
ALP SERPL-CCNC: 200 U/L (ref 38–126)
ALT SERPL W/O P-5'-P-CCNC: 45 U/L (ref 11–66)
ANION GAP SERPL CALC-SCNC: 11 MEQ/L (ref 8–16)
AST SERPL-CCNC: 74 U/L (ref 5–40)
BILIRUB SERPL-MCNC: 0.4 MG/DL (ref 0.3–1.2)
BUN SERPL-MCNC: 9 MG/DL (ref 7–22)
CALCIUM SERPL-MCNC: 8.6 MG/DL (ref 8.5–10.5)
CHLORIDE SERPL-SCNC: 101 MEQ/L (ref 98–111)
CO2 SERPL-SCNC: 26 MEQ/L (ref 23–33)
CREAT SERPL-MCNC: 0.6 MG/DL (ref 0.4–1.2)
DEPRECATED RDW RBC AUTO: 75.2 FL (ref 35–45)
ERYTHROCYTE [DISTWIDTH] IN BLOOD BY AUTOMATED COUNT: 18.1 % (ref 11.5–14.5)
GFR SERPL CREATININE-BSD FRML MDRD: > 90 ML/MIN/1.73M2
GLUCOSE SERPL-MCNC: 94 MG/DL (ref 70–108)
HCT VFR BLD AUTO: 29.5 % (ref 42–52)
HEPARIN UNFRACTIONATED: 0.36 U/ML (ref 0.3–0.7)
HGB BLD-MCNC: 9.1 GM/DL (ref 14–18)
INR PPP: 0.88 (ref 0.85–1.13)
MCH RBC QN AUTO: 34.9 PG (ref 26–33)
MCHC RBC AUTO-ENTMCNC: 30.8 GM/DL (ref 32.2–35.5)
MCV RBC AUTO: 113 FL (ref 80–94)
PLATELET # BLD AUTO: 324 THOU/MM3 (ref 130–400)
PMV BLD AUTO: 9.4 FL (ref 9.4–12.4)
POTASSIUM SERPL-SCNC: 4.4 MEQ/L (ref 3.5–5.2)
PROT SERPL-MCNC: 6.1 G/DL (ref 6.1–8)
RBC # BLD AUTO: 2.61 MILL/MM3 (ref 4.7–6.1)
SODIUM SERPL-SCNC: 138 MEQ/L (ref 135–145)
WBC # BLD AUTO: 11.1 THOU/MM3 (ref 4.8–10.8)

## 2024-10-17 PROCEDURE — 36415 COLL VENOUS BLD VENIPUNCTURE: CPT

## 2024-10-17 PROCEDURE — 85027 COMPLETE CBC AUTOMATED: CPT

## 2024-10-17 PROCEDURE — 85520 HEPARIN ASSAY: CPT

## 2024-10-17 PROCEDURE — 6370000000 HC RX 637 (ALT 250 FOR IP): Performed by: PHYSICIAN ASSISTANT

## 2024-10-17 PROCEDURE — 6370000000 HC RX 637 (ALT 250 FOR IP): Performed by: PHARMACIST

## 2024-10-17 PROCEDURE — 6370000000 HC RX 637 (ALT 250 FOR IP)

## 2024-10-17 PROCEDURE — 80053 COMPREHEN METABOLIC PANEL: CPT

## 2024-10-17 PROCEDURE — 1200000000 HC SEMI PRIVATE

## 2024-10-17 PROCEDURE — 2580000003 HC RX 258

## 2024-10-17 PROCEDURE — 99232 SBSQ HOSP IP/OBS MODERATE 35: CPT | Performed by: PHYSICIAN ASSISTANT

## 2024-10-17 PROCEDURE — 85610 PROTHROMBIN TIME: CPT

## 2024-10-17 RX ORDER — WARFARIN SODIUM 5 MG/1
5 TABLET ORAL
Status: COMPLETED | OUTPATIENT
Start: 2024-10-17 | End: 2024-10-17

## 2024-10-17 RX ADMIN — ACETAMINOPHEN 650 MG: 325 TABLET ORAL at 08:57

## 2024-10-17 RX ADMIN — LEVETIRACETAM 500 MG: 500 TABLET, FILM COATED ORAL at 20:47

## 2024-10-17 RX ADMIN — WARFARIN SODIUM 5 MG: 5 TABLET ORAL at 17:37

## 2024-10-17 RX ADMIN — SODIUM CHLORIDE, PRESERVATIVE FREE 10 ML: 5 INJECTION INTRAVENOUS at 20:47

## 2024-10-17 RX ADMIN — LEVETIRACETAM 500 MG: 500 TABLET, FILM COATED ORAL at 08:57

## 2024-10-17 ASSESSMENT — PAIN DESCRIPTION - ORIENTATION: ORIENTATION: RIGHT

## 2024-10-17 ASSESSMENT — PAIN DESCRIPTION - ONSET: ONSET: ON-GOING

## 2024-10-17 ASSESSMENT — PAIN DESCRIPTION - LOCATION: LOCATION: GROIN

## 2024-10-17 ASSESSMENT — PAIN SCALES - GENERAL: PAINLEVEL_OUTOF10: 4

## 2024-10-17 ASSESSMENT — PAIN DESCRIPTION - PAIN TYPE: TYPE: ACUTE PAIN

## 2024-10-17 ASSESSMENT — PAIN DESCRIPTION - DESCRIPTORS: DESCRIPTORS: SHARP

## 2024-10-17 ASSESSMENT — PAIN DESCRIPTION - FREQUENCY: FREQUENCY: INTERMITTENT

## 2024-10-17 ASSESSMENT — PAIN - FUNCTIONAL ASSESSMENT: PAIN_FUNCTIONAL_ASSESSMENT: ACTIVITIES ARE NOT PREVENTED

## 2024-10-17 NOTE — CONSULTS
CONSULTATION NOTE :GI       Patient - Dashawn Rivera,  Age - 44 y.o.    - 1980      Room Number - 6K-24/024-A   MRN -  [id number]   [facility] # - [id number]  Date of Admission -  10/14/2024  2:33 PM  Patient's PCP: Lyn Damico APRN - CNP     Requesting Physician: Edna Mchugh PA-C    REASON FOR CONSULTATION   Pt previously seen for elevated LFTs.    HISTORY OF PRESENT ILLNESS     Dashawn Rivera is a 44 y.o. male with PMHx of alcohol use disorder, questionable cirrhosis, seizures who presents to [facility] with 3-day duration dyspnea and bilateral leg pain constant R>L since his last discharge 10/3/2024.  Patient denied chest pain, palpitations, abdominal pain, hemoptysis, hematemesis, melena/hematochezia. Patient has history of recent hospitalization 10/1/2024 through 10/3/2024 for bilateral unprovoked lower extremity DVT.  He had a thrombectomy of the right LE DVT 10/1/2020 and IVC filter placement 10/2/2024 due to high risk of GI bleed secondary to liver disease. To be noted during this hospitalization, he had transaminitis. Patient has no history of cancer. He had a colonoscopy  with pathology showing tubular adenoma. PSA .69 normal. No history of surgery prior to 10/2024. Denied prolonged sitting or traveling. Denied family history of clotting disorders.  Patient stated that he only smoke 2 cigars a day and drink 1 shot of liquor a day.  Vitals remarkable for mild tachycardia 106.  Normotensive.  Labs showed improvement in liver enzymes from prior hospitalization INR 1.02. Troponin 6. CT of the chest showed PE in the distal right main pulmonary artery into right lower lobar branches with no right heart strain.  IVC filter was visualized. We continued his heparin. We consulted hematology and GI.     Last admission further liver work up was done that was negative, other than ggt 4143, ferritin 1399, iron sat 89.   Biopsy not completed due to high INR and risk 
    Social History     Socioeconomic History    Marital status: Single     Spouse name: Not on file    Number of children: 4    Years of education: Not on file    Highest education level: Not on file   Occupational History    Not on file   Tobacco Use    Smoking status: Every Day     Current packs/day: 1.00     Types: Cigarettes, Cigars    Smokeless tobacco: Never    Tobacco comments:     daily   Vaping Use    Vaping status: Never Used   Substance and Sexual Activity    Alcohol use: Yes     Alcohol/week: 70.0 standard drinks of alcohol     Types: 70 Shots of liquor per week     Comment: 1/8/24 - today I had a double shot , but I have slowed down because it interacts with my medications approximately 10 shots of vodka a day    Drug use: No    Sexual activity: Yes   Other Topics Concern    Not on file   Social History Narrative    Not on file     Social Determinants of Health     Financial Resource Strain: Low Risk  (10/14/2024)    Overall Financial Resource Strain (CARDIA)     Difficulty of Paying Living Expenses: Not hard at all   Food Insecurity: Food Insecurity Present (10/14/2024)    Hunger Vital Sign     Worried About Running Out of Food in the Last Year: Sometimes true     Ran Out of Food in the Last Year: Sometimes true   Transportation Needs: Unmet Transportation Needs (10/14/2024)    PRAPARE - Transportation     Lack of Transportation (Medical): Yes     Lack of Transportation (Non-Medical): Yes   Physical Activity: Unknown (10/11/2024)    Exercise Vital Sign     Days of Exercise per Week: 2 days     Minutes of Exercise per Session: Not on file   Stress: Not on file   Social Connections: Not on file   Intimate Partner Violence: Not on file   Housing Stability: High Risk (10/14/2024)    Housing Stability Vital Sign     Unable to Pay for Housing in the Last Year: Yes     Number of Times Moved in the Last Year: 0     Homeless in the Last Year: No     Family History    History reviewed. No pertinent family

## 2024-10-18 LAB
HEPARIN UNFRACTIONATED: 0.37 U/ML (ref 0.3–0.7)
INR PPP: 0.86 (ref 0.85–1.13)

## 2024-10-18 PROCEDURE — 99232 SBSQ HOSP IP/OBS MODERATE 35: CPT | Performed by: PHYSICIAN ASSISTANT

## 2024-10-18 PROCEDURE — 6370000000 HC RX 637 (ALT 250 FOR IP)

## 2024-10-18 PROCEDURE — 6370000000 HC RX 637 (ALT 250 FOR IP): Performed by: PHYSICIAN ASSISTANT

## 2024-10-18 PROCEDURE — 6360000002 HC RX W HCPCS

## 2024-10-18 PROCEDURE — 85520 HEPARIN ASSAY: CPT

## 2024-10-18 PROCEDURE — 85610 PROTHROMBIN TIME: CPT

## 2024-10-18 PROCEDURE — 36415 COLL VENOUS BLD VENIPUNCTURE: CPT

## 2024-10-18 PROCEDURE — 1200000000 HC SEMI PRIVATE

## 2024-10-18 RX ORDER — WARFARIN SODIUM 10 MG/1
10 TABLET ORAL
Status: COMPLETED | OUTPATIENT
Start: 2024-10-18 | End: 2024-10-18

## 2024-10-18 RX ADMIN — ACETAMINOPHEN 650 MG: 325 TABLET ORAL at 08:08

## 2024-10-18 RX ADMIN — LEVETIRACETAM 500 MG: 500 TABLET, FILM COATED ORAL at 20:07

## 2024-10-18 RX ADMIN — LEVETIRACETAM 500 MG: 500 TABLET, FILM COATED ORAL at 08:08

## 2024-10-18 RX ADMIN — HEPARIN SODIUM 24 UNITS/KG/HR: 10000 INJECTION, SOLUTION INTRAVENOUS at 05:46

## 2024-10-18 RX ADMIN — WARFARIN SODIUM 10 MG: 10 TABLET ORAL at 17:09

## 2024-10-18 ASSESSMENT — PAIN SCALES - WONG BAKER: WONGBAKER_NUMERICALRESPONSE: NO HURT

## 2024-10-18 ASSESSMENT — PAIN SCALES - GENERAL
PAINLEVEL_OUTOF10: 0
PAINLEVEL_OUTOF10: 0

## 2024-10-18 NOTE — CARE COORDINATION
10/18/24, 8:24 AM EDT    DISCHARGE ON GOING EVALUATION    [facility] day: 4  Location: [facility]24/024-A Reason for admit: Pulmonary embolism on right (HCC) [I26.99]  Other acute pulmonary embolism without acute cor pulmonale (HCC) [I26.99]     Procedures: none    Imaging since last note: none    Barriers to Discharge: INR 0.86. Heparin gtt, coumadin. Hematology will not discharge until INR is between 2-3 d/t history.     PCP: Lyn Damico APRN - CNP  Readmission Risk Score: 17.5    Patient Goals/Plan/Treatment Preferences: Home with mother. Requests cane. To follow with Coumadin Clinic.     10/18/24, 8:38 AM EDT    Patient goals/plan/ treatment preferences discussed by  and .  Patient goals/plan/ treatment preferences reviewed with patient/ family.  Patient/ family verbalize understanding of discharge plan and are in agreement with goal/plan/treatment preferences.  Understanding was demonstrated using the teach back method.  AVS provided by RN at time of discharge, which includes all necessary medical information pertaining to the patients current course of illness, treatment, post-discharge goals of care, and treatment preferences.     Services At/After Discharge: Outpatient

## 2024-10-19 LAB
ALBUMIN SERPL BCG-MCNC: 3.1 G/DL (ref 3.5–5.1)
ALP SERPL-CCNC: 170 U/L (ref 38–126)
ALT SERPL W/O P-5'-P-CCNC: 38 U/L (ref 11–66)
ANION GAP SERPL CALC-SCNC: 11 MEQ/L (ref 8–16)
AST SERPL-CCNC: 41 U/L (ref 5–40)
BILIRUB SERPL-MCNC: 0.3 MG/DL (ref 0.3–1.2)
BUN SERPL-MCNC: 9 MG/DL (ref 7–22)
CALCIUM SERPL-MCNC: 8.6 MG/DL (ref 8.5–10.5)
CHLORIDE SERPL-SCNC: 101 MEQ/L (ref 98–111)
CO2 SERPL-SCNC: 26 MEQ/L (ref 23–33)
CREAT SERPL-MCNC: 0.6 MG/DL (ref 0.4–1.2)
DEPRECATED RDW RBC AUTO: 77 FL (ref 35–45)
ERYTHROCYTE [DISTWIDTH] IN BLOOD BY AUTOMATED COUNT: 18.2 % (ref 11.5–14.5)
GFR SERPL CREATININE-BSD FRML MDRD: > 90 ML/MIN/1.73M2
GLUCOSE SERPL-MCNC: 97 MG/DL (ref 70–108)
HCT VFR BLD AUTO: 29 % (ref 42–52)
HEPARIN UNFRACTIONATED: 0.38 U/ML (ref 0.3–0.7)
HGB BLD-MCNC: 8.9 GM/DL (ref 14–18)
INR PPP: 0.88 (ref 0.85–1.13)
MCH RBC QN AUTO: 35.2 PG (ref 26–33)
MCHC RBC AUTO-ENTMCNC: 30.7 GM/DL (ref 32.2–35.5)
MCV RBC AUTO: 114.6 FL (ref 80–94)
PLATELET # BLD AUTO: 384 THOU/MM3 (ref 130–400)
PMV BLD AUTO: 9.1 FL (ref 9.4–12.4)
POTASSIUM SERPL-SCNC: 3.9 MEQ/L (ref 3.5–5.2)
PROT SERPL-MCNC: 6.2 G/DL (ref 6.1–8)
RBC # BLD AUTO: 2.53 MILL/MM3 (ref 4.7–6.1)
SODIUM SERPL-SCNC: 138 MEQ/L (ref 135–145)
WBC # BLD AUTO: 13 THOU/MM3 (ref 4.8–10.8)

## 2024-10-19 PROCEDURE — 85610 PROTHROMBIN TIME: CPT

## 2024-10-19 PROCEDURE — 85027 COMPLETE CBC AUTOMATED: CPT

## 2024-10-19 PROCEDURE — 6370000000 HC RX 637 (ALT 250 FOR IP): Performed by: PHYSICIAN ASSISTANT

## 2024-10-19 PROCEDURE — 6360000002 HC RX W HCPCS

## 2024-10-19 PROCEDURE — 1200000000 HC SEMI PRIVATE

## 2024-10-19 PROCEDURE — 99232 SBSQ HOSP IP/OBS MODERATE 35: CPT | Performed by: PHYSICIAN ASSISTANT

## 2024-10-19 PROCEDURE — 36415 COLL VENOUS BLD VENIPUNCTURE: CPT

## 2024-10-19 PROCEDURE — 80053 COMPREHEN METABOLIC PANEL: CPT

## 2024-10-19 PROCEDURE — 85520 HEPARIN ASSAY: CPT

## 2024-10-19 RX ORDER — WARFARIN SODIUM 10 MG/1
10 TABLET ORAL ONCE
Status: COMPLETED | OUTPATIENT
Start: 2024-10-19 | End: 2024-10-19

## 2024-10-19 RX ORDER — BENZONATATE 100 MG/1
100 CAPSULE ORAL 3 TIMES DAILY PRN
Status: DISCONTINUED | OUTPATIENT
Start: 2024-10-19 | End: 2024-10-28 | Stop reason: HOSPADM

## 2024-10-19 RX ADMIN — BENZONATATE 100 MG: 100 CAPSULE ORAL at 09:50

## 2024-10-19 RX ADMIN — LEVETIRACETAM 500 MG: 500 TABLET, FILM COATED ORAL at 20:44

## 2024-10-19 RX ADMIN — HEPARIN SODIUM 24 UNITS/KG/HR: 10000 INJECTION, SOLUTION INTRAVENOUS at 01:15

## 2024-10-19 RX ADMIN — HEPARIN SODIUM 24 UNITS/KG/HR: 10000 INJECTION, SOLUTION INTRAVENOUS at 20:41

## 2024-10-19 RX ADMIN — SALINE NASAL SPRAY 1 SPRAY: 1.5 SOLUTION NASAL at 09:47

## 2024-10-19 RX ADMIN — GUAIFENESIN, DEXTROMETHORPHAN HBR 1 TABLET: 600; 30 TABLET ORAL at 09:47

## 2024-10-19 RX ADMIN — LEVETIRACETAM 500 MG: 500 TABLET, FILM COATED ORAL at 08:01

## 2024-10-19 RX ADMIN — WARFARIN SODIUM 10 MG: 10 TABLET ORAL at 17:28

## 2024-10-19 ASSESSMENT — PAIN SCALES - GENERAL: PAINLEVEL_OUTOF10: 0

## 2024-10-20 LAB
ALBUMIN SERPL BCG-MCNC: 3.1 G/DL (ref 3.5–5.1)
ALP SERPL-CCNC: 153 U/L (ref 38–126)
ALT SERPL W/O P-5'-P-CCNC: 33 U/L (ref 11–66)
ANION GAP SERPL CALC-SCNC: 9 MEQ/L (ref 8–16)
AST SERPL-CCNC: 31 U/L (ref 5–40)
BILIRUB SERPL-MCNC: 0.3 MG/DL (ref 0.3–1.2)
BUN SERPL-MCNC: 8 MG/DL (ref 7–22)
CALCIUM SERPL-MCNC: 8.5 MG/DL (ref 8.5–10.5)
CHLORIDE SERPL-SCNC: 100 MEQ/L (ref 98–111)
CO2 SERPL-SCNC: 29 MEQ/L (ref 23–33)
CREAT SERPL-MCNC: 0.6 MG/DL (ref 0.4–1.2)
DEPRECATED RDW RBC AUTO: 73.8 FL (ref 35–45)
ERYTHROCYTE [DISTWIDTH] IN BLOOD BY AUTOMATED COUNT: 18.1 % (ref 11.5–14.5)
GFR SERPL CREATININE-BSD FRML MDRD: > 90 ML/MIN/1.73M2
GLUCOSE SERPL-MCNC: 102 MG/DL (ref 70–108)
HCT VFR BLD AUTO: 28.8 % (ref 42–52)
HEPARIN UNFRACTIONATED: 0.34 U/ML (ref 0.3–0.7)
HGB BLD-MCNC: 8.8 GM/DL (ref 14–18)
INR PPP: 0.92 (ref 0.85–1.13)
MCH RBC QN AUTO: 34.6 PG (ref 26–33)
MCHC RBC AUTO-ENTMCNC: 30.6 GM/DL (ref 32.2–35.5)
MCV RBC AUTO: 113.4 FL (ref 80–94)
PLATELET # BLD AUTO: 380 THOU/MM3 (ref 130–400)
PMV BLD AUTO: 9 FL (ref 9.4–12.4)
POTASSIUM SERPL-SCNC: 4.6 MEQ/L (ref 3.5–5.2)
PROT SERPL-MCNC: 6.2 G/DL (ref 6.1–8)
RBC # BLD AUTO: 2.54 MILL/MM3 (ref 4.7–6.1)
SODIUM SERPL-SCNC: 138 MEQ/L (ref 135–145)
WBC # BLD AUTO: 12.4 THOU/MM3 (ref 4.8–10.8)

## 2024-10-20 PROCEDURE — 99232 SBSQ HOSP IP/OBS MODERATE 35: CPT | Performed by: PHYSICIAN ASSISTANT

## 2024-10-20 PROCEDURE — 36415 COLL VENOUS BLD VENIPUNCTURE: CPT

## 2024-10-20 PROCEDURE — 85610 PROTHROMBIN TIME: CPT

## 2024-10-20 PROCEDURE — 6370000000 HC RX 637 (ALT 250 FOR IP): Performed by: PHYSICIAN ASSISTANT

## 2024-10-20 PROCEDURE — 1200000000 HC SEMI PRIVATE

## 2024-10-20 PROCEDURE — 6370000000 HC RX 637 (ALT 250 FOR IP): Performed by: PHARMACIST

## 2024-10-20 PROCEDURE — 85520 HEPARIN ASSAY: CPT

## 2024-10-20 PROCEDURE — 2580000003 HC RX 258

## 2024-10-20 PROCEDURE — 6360000002 HC RX W HCPCS

## 2024-10-20 PROCEDURE — 85027 COMPLETE CBC AUTOMATED: CPT

## 2024-10-20 PROCEDURE — 80053 COMPREHEN METABOLIC PANEL: CPT

## 2024-10-20 RX ORDER — SIMETHICONE 80 MG
80 TABLET,CHEWABLE ORAL EVERY 6 HOURS PRN
Status: DISCONTINUED | OUTPATIENT
Start: 2024-10-20 | End: 2024-10-28 | Stop reason: HOSPADM

## 2024-10-20 RX ORDER — WARFARIN SODIUM 7.5 MG/1
15 TABLET ORAL ONCE
Status: COMPLETED | OUTPATIENT
Start: 2024-10-20 | End: 2024-10-20

## 2024-10-20 RX ORDER — DOCUSATE SODIUM 100 MG/1
100 CAPSULE, LIQUID FILLED ORAL 2 TIMES DAILY PRN
Status: DISCONTINUED | OUTPATIENT
Start: 2024-10-20 | End: 2024-10-28 | Stop reason: HOSPADM

## 2024-10-20 RX ADMIN — LEVETIRACETAM 500 MG: 500 TABLET, FILM COATED ORAL at 20:54

## 2024-10-20 RX ADMIN — WARFARIN SODIUM 15 MG: 7.5 TABLET ORAL at 17:41

## 2024-10-20 RX ADMIN — DOCUSATE SODIUM 100 MG: 100 CAPSULE, LIQUID FILLED ORAL at 08:00

## 2024-10-20 RX ADMIN — SODIUM CHLORIDE, PRESERVATIVE FREE 10 ML: 5 INJECTION INTRAVENOUS at 20:54

## 2024-10-20 RX ADMIN — HEPARIN SODIUM 24 UNITS/KG/HR: 10000 INJECTION, SOLUTION INTRAVENOUS at 16:01

## 2024-10-20 RX ADMIN — SIMETHICONE 80 MG: 80 TABLET, CHEWABLE ORAL at 08:00

## 2024-10-20 RX ADMIN — LEVETIRACETAM 500 MG: 500 TABLET, FILM COATED ORAL at 08:00

## 2024-10-20 RX ADMIN — SODIUM CHLORIDE, PRESERVATIVE FREE 10 ML: 5 INJECTION INTRAVENOUS at 08:00

## 2024-10-20 ASSESSMENT — PAIN SCALES - GENERAL
PAINLEVEL_OUTOF10: 0
PAINLEVEL_OUTOF10: 3
PAINLEVEL_OUTOF10: 0
PAINLEVEL_OUTOF10: 0

## 2024-10-20 ASSESSMENT — PAIN DESCRIPTION - LOCATION: LOCATION: ABDOMEN

## 2024-10-20 ASSESSMENT — PAIN DESCRIPTION - ONSET: ONSET: ON-GOING

## 2024-10-20 ASSESSMENT — PAIN - FUNCTIONAL ASSESSMENT: PAIN_FUNCTIONAL_ASSESSMENT: ACTIVITIES ARE NOT PREVENTED

## 2024-10-20 ASSESSMENT — PAIN DESCRIPTION - FREQUENCY: FREQUENCY: CONTINUOUS

## 2024-10-20 ASSESSMENT — PAIN SCALES - WONG BAKER: WONGBAKER_NUMERICALRESPONSE: NO HURT

## 2024-10-20 ASSESSMENT — PAIN DESCRIPTION - DESCRIPTORS: DESCRIPTORS: PRESSURE

## 2024-10-20 ASSESSMENT — PAIN DESCRIPTION - ORIENTATION: ORIENTATION: MID

## 2024-10-20 ASSESSMENT — PAIN DESCRIPTION - PAIN TYPE: TYPE: ACUTE PAIN

## 2024-10-21 LAB
F2 C.20210G>A GENO BLD/T: NEGATIVE
F5 P.R506Q BLD/T QL: NEGATIVE
HEPARIN UNFRACTIONATED: 0.41 U/ML (ref 0.3–0.7)
INR PPP: 1.01 (ref 0.85–1.13)
SPECIMEN SOURCE: NORMAL

## 2024-10-21 PROCEDURE — 99232 SBSQ HOSP IP/OBS MODERATE 35: CPT | Performed by: PHYSICIAN ASSISTANT

## 2024-10-21 PROCEDURE — 6370000000 HC RX 637 (ALT 250 FOR IP): Performed by: PHYSICIAN ASSISTANT

## 2024-10-21 PROCEDURE — 1200000000 HC SEMI PRIVATE

## 2024-10-21 PROCEDURE — 36415 COLL VENOUS BLD VENIPUNCTURE: CPT

## 2024-10-21 PROCEDURE — 6360000002 HC RX W HCPCS

## 2024-10-21 PROCEDURE — 85520 HEPARIN ASSAY: CPT

## 2024-10-21 PROCEDURE — 85610 PROTHROMBIN TIME: CPT

## 2024-10-21 PROCEDURE — 6370000000 HC RX 637 (ALT 250 FOR IP)

## 2024-10-21 RX ORDER — WARFARIN SODIUM 7.5 MG/1
15 TABLET ORAL
Status: COMPLETED | OUTPATIENT
Start: 2024-10-21 | End: 2024-10-21

## 2024-10-21 RX ADMIN — HEPARIN SODIUM 24 UNITS/KG/HR: 10000 INJECTION, SOLUTION INTRAVENOUS at 12:01

## 2024-10-21 RX ADMIN — LEVETIRACETAM 500 MG: 500 TABLET, FILM COATED ORAL at 20:37

## 2024-10-21 RX ADMIN — WARFARIN SODIUM 15 MG: 7.5 TABLET ORAL at 17:37

## 2024-10-21 RX ADMIN — BENZONATATE 100 MG: 100 CAPSULE ORAL at 08:03

## 2024-10-21 RX ADMIN — LEVETIRACETAM 500 MG: 500 TABLET, FILM COATED ORAL at 08:02

## 2024-10-21 ASSESSMENT — PAIN SCALES - GENERAL: PAINLEVEL_OUTOF10: 0

## 2024-10-21 NOTE — CARE COORDINATION
10/21/24, 8:31 AM EDT    DISCHARGE ON GOING EVALUATION    [facility] day: 7  Location: [facility]24/024-A Reason for admit: Pulmonary embolism on right (HCC) [I26.99]  Other acute pulmonary embolism without acute cor pulmonale (HCC) [I26.99]     Procedures: none    Imaging since last note: none    Barriers to Discharge: INR 0.92 with Pharmacy dosing Coumadin, GI has signed off, Hem Onc consulted, Heparin drip, electrolyte replacement protocols.     PCP: Lyn Damico, MARIA ALEJANDRA - CNP  Readmission Risk Score: 17.7    Patient Goals/Plan/Treatment Preferences: Plans home with mother, following with Coumadin Clinic.

## 2024-10-22 PROBLEM — E44.0 MODERATE MALNUTRITION (HCC): Chronic | Status: ACTIVE | Noted: 2024-10-02

## 2024-10-22 LAB
ALBUMIN SERPL BCG-MCNC: 3.2 G/DL (ref 3.5–5.1)
ALP SERPL-CCNC: 137 U/L (ref 38–126)
ALT SERPL W/O P-5'-P-CCNC: 32 U/L (ref 11–66)
ANION GAP SERPL CALC-SCNC: 13 MEQ/L (ref 8–16)
AST SERPL-CCNC: 29 U/L (ref 5–40)
BILIRUB SERPL-MCNC: 0.2 MG/DL (ref 0.3–1.2)
BUN SERPL-MCNC: 12 MG/DL (ref 7–22)
CALCIUM SERPL-MCNC: 8.9 MG/DL (ref 8.5–10.5)
CHLORIDE SERPL-SCNC: 102 MEQ/L (ref 98–111)
CO2 SERPL-SCNC: 26 MEQ/L (ref 23–33)
CREAT SERPL-MCNC: 0.6 MG/DL (ref 0.4–1.2)
DEPRECATED RDW RBC AUTO: 76.4 FL (ref 35–45)
ERYTHROCYTE [DISTWIDTH] IN BLOOD BY AUTOMATED COUNT: 18.4 % (ref 11.5–14.5)
GFR SERPL CREATININE-BSD FRML MDRD: > 90 ML/MIN/1.73M2
GLUCOSE SERPL-MCNC: 95 MG/DL (ref 70–108)
HCT VFR BLD AUTO: 29.9 % (ref 42–52)
HEPARIN UNFRACTIONATED: 0.36 U/ML (ref 0.3–0.7)
HGB BLD-MCNC: 9 GM/DL (ref 14–18)
INR PPP: 1.05 (ref 0.85–1.13)
MCH RBC QN AUTO: 34.2 PG (ref 26–33)
MCHC RBC AUTO-ENTMCNC: 30.1 GM/DL (ref 32.2–35.5)
MCV RBC AUTO: 113.7 FL (ref 80–94)
PLATELET # BLD AUTO: 393 THOU/MM3 (ref 130–400)
PMV BLD AUTO: 9.2 FL (ref 9.4–12.4)
POTASSIUM SERPL-SCNC: 4.4 MEQ/L (ref 3.5–5.2)
PROT SERPL-MCNC: 6.3 G/DL (ref 6.1–8)
RBC # BLD AUTO: 2.63 MILL/MM3 (ref 4.7–6.1)
SODIUM SERPL-SCNC: 141 MEQ/L (ref 135–145)
WBC # BLD AUTO: 9.9 THOU/MM3 (ref 4.8–10.8)

## 2024-10-22 PROCEDURE — 1200000000 HC SEMI PRIVATE

## 2024-10-22 PROCEDURE — 85520 HEPARIN ASSAY: CPT

## 2024-10-22 PROCEDURE — 99232 SBSQ HOSP IP/OBS MODERATE 35: CPT | Performed by: PHYSICIAN ASSISTANT

## 2024-10-22 PROCEDURE — 6360000002 HC RX W HCPCS

## 2024-10-22 PROCEDURE — 80053 COMPREHEN METABOLIC PANEL: CPT

## 2024-10-22 PROCEDURE — 6370000000 HC RX 637 (ALT 250 FOR IP): Performed by: PHYSICIAN ASSISTANT

## 2024-10-22 PROCEDURE — 85027 COMPLETE CBC AUTOMATED: CPT

## 2024-10-22 PROCEDURE — 85610 PROTHROMBIN TIME: CPT

## 2024-10-22 PROCEDURE — 36415 COLL VENOUS BLD VENIPUNCTURE: CPT

## 2024-10-22 PROCEDURE — 6370000000 HC RX 637 (ALT 250 FOR IP): Performed by: PHARMACIST

## 2024-10-22 RX ORDER — NEOMYCIN/BACITRACIN/POLYMYXINB 3.5-400-5K
OINTMENT (GRAM) TOPICAL 2 TIMES DAILY
Status: DISCONTINUED | OUTPATIENT
Start: 2024-10-22 | End: 2024-10-28 | Stop reason: HOSPADM

## 2024-10-22 RX ORDER — NEOMYCIN/BACITRACIN/POLYMYXINB 3.5-400-5K
OINTMENT (GRAM) TOPICAL DAILY
Status: DISCONTINUED | OUTPATIENT
Start: 2024-10-22 | End: 2024-10-22

## 2024-10-22 RX ORDER — WARFARIN SODIUM 7.5 MG/1
15 TABLET ORAL
Status: COMPLETED | OUTPATIENT
Start: 2024-10-22 | End: 2024-10-22

## 2024-10-22 RX ADMIN — BENZONATATE 100 MG: 100 CAPSULE ORAL at 06:45

## 2024-10-22 RX ADMIN — LEVETIRACETAM 500 MG: 500 TABLET, FILM COATED ORAL at 08:01

## 2024-10-22 RX ADMIN — LEVETIRACETAM 500 MG: 500 TABLET, FILM COATED ORAL at 20:45

## 2024-10-22 RX ADMIN — HEPARIN SODIUM 24 UNITS/KG/HR: 10000 INJECTION, SOLUTION INTRAVENOUS at 06:40

## 2024-10-22 RX ADMIN — WARFARIN SODIUM 15 MG: 7.5 TABLET ORAL at 18:01

## 2024-10-22 ASSESSMENT — PAIN SCALES - GENERAL: PAINLEVEL_OUTOF10: 0

## 2024-10-22 NOTE — CARE COORDINATION
10/22/24, 8:08 AM EDT    DISCHARGE ON GOING EVALUATION    Dashawn [facility] day: 8  Location: [facility]24/024-A Reason for admit: Pulmonary embolism on right (HCC) [I26.99]  Other acute pulmonary embolism without acute cor pulmonale (HCC) [I26.99]     Procedures: n/a    Imaging since last note: none    Barriers to Discharge: INR 1.05. Heparin gtt, coumadin. Patient must be closer to therapeutic to consider discharge.     PCP: Lyn Damico APRN - CNP  Readmission Risk Score: 17.8    Patient Goals/Plan/Treatment Preferences: home with mother. To follow at [facility]madin Clinic.

## 2024-10-23 LAB
ALBUMIN SERPL BCG-MCNC: 3.1 G/DL (ref 3.5–5.1)
ALP SERPL-CCNC: 141 U/L (ref 38–126)
ALT SERPL W/O P-5'-P-CCNC: 34 U/L (ref 11–66)
ANION GAP SERPL CALC-SCNC: 14 MEQ/L (ref 8–16)
AST SERPL-CCNC: 33 U/L (ref 5–40)
BILIRUB SERPL-MCNC: 0.2 MG/DL (ref 0.3–1.2)
BUN SERPL-MCNC: 12 MG/DL (ref 7–22)
CALCIUM SERPL-MCNC: 8.8 MG/DL (ref 8.5–10.5)
CHLORIDE SERPL-SCNC: 102 MEQ/L (ref 98–111)
CO2 SERPL-SCNC: 24 MEQ/L (ref 23–33)
CREAT SERPL-MCNC: 0.6 MG/DL (ref 0.4–1.2)
DEPRECATED RDW RBC AUTO: 76.4 FL (ref 35–45)
ERYTHROCYTE [DISTWIDTH] IN BLOOD BY AUTOMATED COUNT: 18.2 % (ref 11.5–14.5)
GFR SERPL CREATININE-BSD FRML MDRD: > 90 ML/MIN/1.73M2
GLUCOSE SERPL-MCNC: 98 MG/DL (ref 70–108)
HCT VFR BLD AUTO: 30.1 % (ref 42–52)
HEPARIN UNFRACTIONATED: 0.34 U/ML (ref 0.3–0.7)
HGB BLD-MCNC: 9.1 GM/DL (ref 14–18)
INR PPP: 1.16 (ref 0.85–1.13)
MCH RBC QN AUTO: 34.5 PG (ref 26–33)
MCHC RBC AUTO-ENTMCNC: 30.2 GM/DL (ref 32.2–35.5)
MCV RBC AUTO: 114 FL (ref 80–94)
PLATELET # BLD AUTO: 369 THOU/MM3 (ref 130–400)
PMV BLD AUTO: 9.1 FL (ref 9.4–12.4)
POTASSIUM SERPL-SCNC: 4.1 MEQ/L (ref 3.5–5.2)
PROT SERPL-MCNC: 6.2 G/DL (ref 6.1–8)
RBC # BLD AUTO: 2.64 MILL/MM3 (ref 4.7–6.1)
SODIUM SERPL-SCNC: 140 MEQ/L (ref 135–145)
WBC # BLD AUTO: 9.3 THOU/MM3 (ref 4.8–10.8)

## 2024-10-23 PROCEDURE — 6370000000 HC RX 637 (ALT 250 FOR IP): Performed by: PHYSICIAN ASSISTANT

## 2024-10-23 PROCEDURE — 6370000000 HC RX 637 (ALT 250 FOR IP): Performed by: PHARMACIST

## 2024-10-23 PROCEDURE — 85610 PROTHROMBIN TIME: CPT

## 2024-10-23 PROCEDURE — 36415 COLL VENOUS BLD VENIPUNCTURE: CPT

## 2024-10-23 PROCEDURE — 80053 COMPREHEN METABOLIC PANEL: CPT

## 2024-10-23 PROCEDURE — 85520 HEPARIN ASSAY: CPT

## 2024-10-23 PROCEDURE — 85027 COMPLETE CBC AUTOMATED: CPT

## 2024-10-23 PROCEDURE — 1200000000 HC SEMI PRIVATE

## 2024-10-23 PROCEDURE — 99232 SBSQ HOSP IP/OBS MODERATE 35: CPT | Performed by: FAMILY MEDICINE

## 2024-10-23 RX ORDER — WARFARIN SODIUM 10 MG/1
20 TABLET ORAL ONCE
Status: COMPLETED | OUTPATIENT
Start: 2024-10-23 | End: 2024-10-23

## 2024-10-23 RX ADMIN — LEVETIRACETAM 500 MG: 500 TABLET, FILM COATED ORAL at 20:59

## 2024-10-23 RX ADMIN — BACITRACIN ZINC NEOMYCIN SULFATE POLYMYXIN B SULFATE: 400; 3.5; 5 OINTMENT TOPICAL at 21:00

## 2024-10-23 RX ADMIN — BACITRACIN ZINC NEOMYCIN SULFATE POLYMYXIN B SULFATE: 400; 3.5; 5 OINTMENT TOPICAL at 08:30

## 2024-10-23 RX ADMIN — WARFARIN SODIUM 20 MG: 10 TABLET ORAL at 18:02

## 2024-10-23 RX ADMIN — LEVETIRACETAM 500 MG: 500 TABLET, FILM COATED ORAL at 08:30

## 2024-10-23 ASSESSMENT — PAIN SCALES - GENERAL: PAINLEVEL_OUTOF10: 0

## 2024-10-23 NOTE — CARE COORDINATION
10/23/24, 8:28 AM EDT    DISCHARGE ON GOING EVALUATION    Dashawn L [facility] day: 9  [facility] Reason for admit: Pulmonary embolism on right (HCC) [I26.99]  Other acute pulmonary embolism without acute cor pulmonale (HCC) [I26.99]     Procedures: n/a    Imaging since last note: none    Barriers to Discharge: INR 1.16. Heparin gtt, coumadin. Patient must be closer to therapeutic to consider discharge.     PCP: Lyn Damico APRN - CNP  Readmission Risk Score: 18    Patient Goals/Plan/Treatment Preferences: home with mother. To follow at [facility]madin Clinic.

## 2024-10-24 PROBLEM — G40.909 SEIZURE DISORDER (HCC): Status: ACTIVE | Noted: 2024-10-24

## 2024-10-24 PROBLEM — K76.0 HEPATIC STEATOSIS: Status: ACTIVE | Noted: 2024-10-24

## 2024-10-24 PROBLEM — K70.9 ALCOHOLIC LIVER DISEASE (HCC): Status: ACTIVE | Noted: 2018-06-27

## 2024-10-24 PROBLEM — D64.9 CHRONIC ANEMIA: Status: ACTIVE | Noted: 2024-10-24

## 2024-10-24 PROBLEM — Z72.0 TOBACCO ABUSE: Status: ACTIVE | Noted: 2024-10-24

## 2024-10-24 PROBLEM — J43.2 CENTRILOBULAR EMPHYSEMA (HCC): Status: ACTIVE | Noted: 2024-10-24

## 2024-10-24 PROBLEM — R16.0 HEPATOMEGALY: Status: ACTIVE | Noted: 2024-10-24

## 2024-10-24 PROBLEM — D72.829 LEUKOCYTOSIS: Status: ACTIVE | Noted: 2024-10-24

## 2024-10-24 PROBLEM — F10.11 HISTORY OF ALCOHOL ABUSE: Status: ACTIVE | Noted: 2024-10-24

## 2024-10-24 LAB
HEPARIN UNFRACTIONATED: 0.3 U/ML (ref 0.3–0.7)
INR PPP: 1.29 (ref 0.85–1.13)

## 2024-10-24 PROCEDURE — 6370000000 HC RX 637 (ALT 250 FOR IP): Performed by: PHARMACIST

## 2024-10-24 PROCEDURE — 36415 COLL VENOUS BLD VENIPUNCTURE: CPT

## 2024-10-24 PROCEDURE — 85610 PROTHROMBIN TIME: CPT

## 2024-10-24 PROCEDURE — 1200000000 HC SEMI PRIVATE

## 2024-10-24 PROCEDURE — 2580000003 HC RX 258

## 2024-10-24 PROCEDURE — 85520 HEPARIN ASSAY: CPT

## 2024-10-24 PROCEDURE — 6370000000 HC RX 637 (ALT 250 FOR IP): Performed by: PHYSICIAN ASSISTANT

## 2024-10-24 PROCEDURE — 99232 SBSQ HOSP IP/OBS MODERATE 35: CPT | Performed by: FAMILY MEDICINE

## 2024-10-24 RX ORDER — WARFARIN SODIUM 10 MG/1
20 TABLET ORAL
Status: COMPLETED | OUTPATIENT
Start: 2024-10-24 | End: 2024-10-24

## 2024-10-24 RX ADMIN — LEVETIRACETAM 500 MG: 500 TABLET, FILM COATED ORAL at 08:49

## 2024-10-24 RX ADMIN — LEVETIRACETAM 500 MG: 500 TABLET, FILM COATED ORAL at 19:46

## 2024-10-24 RX ADMIN — WARFARIN SODIUM 20 MG: 10 TABLET ORAL at 18:46

## 2024-10-24 RX ADMIN — SODIUM CHLORIDE, PRESERVATIVE FREE 10 ML: 5 INJECTION INTRAVENOUS at 08:49

## 2024-10-24 RX ADMIN — BACITRACIN ZINC NEOMYCIN SULFATE POLYMYXIN B SULFATE: 400; 3.5; 5 OINTMENT TOPICAL at 19:47

## 2024-10-24 RX ADMIN — BACITRACIN ZINC NEOMYCIN SULFATE POLYMYXIN B SULFATE: 400; 3.5; 5 OINTMENT TOPICAL at 08:49

## 2024-10-24 ASSESSMENT — PAIN SCALES - GENERAL
PAINLEVEL_OUTOF10: 0

## 2024-10-24 NOTE — CARE COORDINATION
10/24/24, 2:46 PM EDT    DISCHARGE ON GOING EVALUATION    Dashawn L [facility] day: 10  Location: [facility]24/024-A Reason for admit: Pulmonary embolism on right (HCC) [I26.99]  Other acute pulmonary embolism without acute cor pulmonale (HCC) [I26.99]     Procedures: none    Imaging since last note: none     Barriers to Discharge: Hospitalist following. INR 1.29, Goal 2.0-3.0, Heparin gtt and coumadin per pharmacy.     PCP: Lyn Damico APRN - CNP  Readmission Risk Score: 18    Patient Goals/Plan/Treatment Preferences: Home w/ mother. New Coumadin Clinic.

## 2024-10-25 LAB
HEPARIN UNFRACTIONATED: 0.21 U/ML (ref 0.3–0.7)
HEPARIN UNFRACTIONATED: 0.22 U/ML (ref 0.3–0.7)
INR PPP: 1.42 (ref 0.85–1.13)

## 2024-10-25 PROCEDURE — 36415 COLL VENOUS BLD VENIPUNCTURE: CPT

## 2024-10-25 PROCEDURE — 1200000000 HC SEMI PRIVATE

## 2024-10-25 PROCEDURE — 85610 PROTHROMBIN TIME: CPT

## 2024-10-25 PROCEDURE — 6360000002 HC RX W HCPCS

## 2024-10-25 PROCEDURE — 99232 SBSQ HOSP IP/OBS MODERATE 35: CPT | Performed by: FAMILY MEDICINE

## 2024-10-25 PROCEDURE — 85520 HEPARIN ASSAY: CPT

## 2024-10-25 PROCEDURE — 6370000000 HC RX 637 (ALT 250 FOR IP): Performed by: PHYSICIAN ASSISTANT

## 2024-10-25 PROCEDURE — 6370000000 HC RX 637 (ALT 250 FOR IP): Performed by: PHARMACIST

## 2024-10-25 RX ORDER — WARFARIN SODIUM 10 MG/1
20 TABLET ORAL
Status: COMPLETED | OUTPATIENT
Start: 2024-10-25 | End: 2024-10-25

## 2024-10-25 RX ADMIN — HEPARIN SODIUM 28 UNITS/KG/HR: 10000 INJECTION, SOLUTION INTRAVENOUS at 22:01

## 2024-10-25 RX ADMIN — WARFARIN SODIUM 20 MG: 10 TABLET ORAL at 17:48

## 2024-10-25 RX ADMIN — HEPARIN SODIUM 28 UNITS/KG/HR: 10000 INJECTION, SOLUTION INTRAVENOUS at 18:58

## 2024-10-25 RX ADMIN — HEPARIN SODIUM 2200 UNITS: 1000 INJECTION INTRAVENOUS; SUBCUTANEOUS at 18:57

## 2024-10-25 RX ADMIN — HEPARIN SODIUM 26 UNITS/KG/HR: 10000 INJECTION, SOLUTION INTRAVENOUS at 09:46

## 2024-10-25 RX ADMIN — BENZONATATE 100 MG: 100 CAPSULE ORAL at 04:03

## 2024-10-25 RX ADMIN — LEVETIRACETAM 500 MG: 500 TABLET, FILM COATED ORAL at 07:44

## 2024-10-25 RX ADMIN — HEPARIN SODIUM 2200 UNITS: 1000 INJECTION INTRAVENOUS; SUBCUTANEOUS at 09:44

## 2024-10-25 RX ADMIN — LEVETIRACETAM 500 MG: 500 TABLET, FILM COATED ORAL at 21:33

## 2024-10-25 RX ADMIN — BACITRACIN ZINC NEOMYCIN SULFATE POLYMYXIN B SULFATE: 400; 3.5; 5 OINTMENT TOPICAL at 07:44

## 2024-10-25 ASSESSMENT — PAIN SCALES - GENERAL
PAINLEVEL_OUTOF10: 0

## 2024-10-25 NOTE — CARE COORDINATION
10/25/24, 2:47 PM EDT    DISCHARGE ON GOING EVALUATION    Dashawn [facility] day: 11  Location: [facility]24/024-A Reason for admit: Pulmonary embolism on right (HCC) [I26.99]  Other acute pulmonary embolism without acute cor pulmonale (HCC) [I26.99]     Procedures: none    Imaging since last note: none     Barriers to Discharge: Hospitalist following. INR 1.42, Goal 2.0-3.0, Heparin gtt and coumadin per pharmacy.     PCP: Lyn Damico APRN - CNP  Readmission Risk Score: 17.6    Patient Goals/Plan/Treatment Preferences: Home w/ mother. New Coumad[facility].     Dashawn is requesting a cane at discharge. Dr. Trevino updated. Explained to patient that he will need to take order for cane to DME provider of choice and the cane will likely not be covered by insurance due to lack of medical necessity. He verbalized understanding.

## 2024-10-26 LAB
ALBUMIN SERPL BCG-MCNC: 3.6 G/DL (ref 3.5–5.1)
ALP SERPL-CCNC: 118 U/L (ref 38–126)
ALT SERPL W/O P-5'-P-CCNC: 34 U/L (ref 11–66)
ANION GAP SERPL CALC-SCNC: 12 MEQ/L (ref 8–16)
AST SERPL-CCNC: 25 U/L (ref 5–40)
BILIRUB SERPL-MCNC: 0.2 MG/DL (ref 0.3–1.2)
BUN SERPL-MCNC: 11 MG/DL (ref 7–22)
CALCIUM SERPL-MCNC: 9.1 MG/DL (ref 8.5–10.5)
CHLORIDE SERPL-SCNC: 101 MEQ/L (ref 98–111)
CO2 SERPL-SCNC: 26 MEQ/L (ref 23–33)
CREAT SERPL-MCNC: 0.5 MG/DL (ref 0.4–1.2)
DEPRECATED RDW RBC AUTO: 73.8 FL (ref 35–45)
ERYTHROCYTE [DISTWIDTH] IN BLOOD BY AUTOMATED COUNT: 18.1 % (ref 11.5–14.5)
GFR SERPL CREATININE-BSD FRML MDRD: > 90 ML/MIN/1.73M2
GLUCOSE SERPL-MCNC: 98 MG/DL (ref 70–108)
HCT VFR BLD AUTO: 32.7 % (ref 42–52)
HEPARIN UNFRACTIONATED: 0.23 U/ML (ref 0.3–0.7)
HEPARIN UNFRACTIONATED: 0.29 U/ML (ref 0.3–0.7)
HEPARIN UNFRACTIONATED: 0.35 U/ML (ref 0.3–0.7)
HEPARIN UNFRACTIONATED: 0.41 U/ML (ref 0.3–0.7)
HGB BLD-MCNC: 10.1 GM/DL (ref 14–18)
INR PPP: 1.77 (ref 0.85–1.13)
MCH RBC QN AUTO: 34.5 PG (ref 26–33)
MCHC RBC AUTO-ENTMCNC: 30.9 GM/DL (ref 32.2–35.5)
MCV RBC AUTO: 111.6 FL (ref 80–94)
PLATELET # BLD AUTO: 386 THOU/MM3 (ref 130–400)
PMV BLD AUTO: 9.6 FL (ref 9.4–12.4)
POTASSIUM SERPL-SCNC: 4 MEQ/L (ref 3.5–5.2)
PROT SERPL-MCNC: 7 G/DL (ref 6.1–8)
RBC # BLD AUTO: 2.93 MILL/MM3 (ref 4.7–6.1)
SCAN OF BLOOD SMEAR: NORMAL
SODIUM SERPL-SCNC: 139 MEQ/L (ref 135–145)
WBC # BLD AUTO: 12.1 THOU/MM3 (ref 4.8–10.8)

## 2024-10-26 PROCEDURE — 85520 HEPARIN ASSAY: CPT

## 2024-10-26 PROCEDURE — 85610 PROTHROMBIN TIME: CPT

## 2024-10-26 PROCEDURE — 6370000000 HC RX 637 (ALT 250 FOR IP): Performed by: FAMILY MEDICINE

## 2024-10-26 PROCEDURE — 99232 SBSQ HOSP IP/OBS MODERATE 35: CPT | Performed by: FAMILY MEDICINE

## 2024-10-26 PROCEDURE — 6360000002 HC RX W HCPCS

## 2024-10-26 PROCEDURE — 1200000000 HC SEMI PRIVATE

## 2024-10-26 PROCEDURE — 80053 COMPREHEN METABOLIC PANEL: CPT

## 2024-10-26 PROCEDURE — 6370000000 HC RX 637 (ALT 250 FOR IP): Performed by: PHYSICIAN ASSISTANT

## 2024-10-26 PROCEDURE — 85027 COMPLETE CBC AUTOMATED: CPT

## 2024-10-26 PROCEDURE — 36415 COLL VENOUS BLD VENIPUNCTURE: CPT

## 2024-10-26 RX ORDER — WARFARIN SODIUM 10 MG/1
20 TABLET ORAL
Status: COMPLETED | OUTPATIENT
Start: 2024-10-26 | End: 2024-10-26

## 2024-10-26 RX ADMIN — HEPARIN SODIUM 2200 UNITS: 1000 INJECTION INTRAVENOUS; SUBCUTANEOUS at 01:22

## 2024-10-26 RX ADMIN — LEVETIRACETAM 500 MG: 500 TABLET, FILM COATED ORAL at 20:02

## 2024-10-26 RX ADMIN — WARFARIN SODIUM 20 MG: 10 TABLET ORAL at 18:47

## 2024-10-26 RX ADMIN — HEPARIN SODIUM 30 UNITS/KG/HR: 10000 INJECTION, SOLUTION INTRAVENOUS at 18:48

## 2024-10-26 RX ADMIN — LEVETIRACETAM 500 MG: 500 TABLET, FILM COATED ORAL at 08:47

## 2024-10-26 RX ADMIN — HEPARIN SODIUM 2200 UNITS: 1000 INJECTION INTRAVENOUS; SUBCUTANEOUS at 22:50

## 2024-10-26 RX ADMIN — BENZONATATE 100 MG: 100 CAPSULE ORAL at 05:08

## 2024-10-26 ASSESSMENT — PAIN SCALES - GENERAL
PAINLEVEL_OUTOF10: 0
PAINLEVEL_OUTOF10: 0

## 2024-10-27 LAB
HEPARIN UNFRACTIONATED: 0.27 U/ML (ref 0.3–0.7)
HEPARIN UNFRACTIONATED: 0.39 U/ML (ref 0.3–0.7)
HEPARIN UNFRACTIONATED: 0.45 U/ML (ref 0.3–0.7)
INR PPP: 1.93 (ref 0.85–1.13)
SCAN OF BLOOD SMEAR: NORMAL

## 2024-10-27 PROCEDURE — 85025 COMPLETE CBC W/AUTO DIFF WBC: CPT

## 2024-10-27 PROCEDURE — 36415 COLL VENOUS BLD VENIPUNCTURE: CPT

## 2024-10-27 PROCEDURE — 85610 PROTHROMBIN TIME: CPT

## 2024-10-27 PROCEDURE — 6370000000 HC RX 637 (ALT 250 FOR IP): Performed by: PHYSICIAN ASSISTANT

## 2024-10-27 PROCEDURE — 1200000000 HC SEMI PRIVATE

## 2024-10-27 PROCEDURE — 6370000000 HC RX 637 (ALT 250 FOR IP): Performed by: FAMILY MEDICINE

## 2024-10-27 PROCEDURE — 94761 N-INVAS EAR/PLS OXIMETRY MLT: CPT

## 2024-10-27 PROCEDURE — 6360000002 HC RX W HCPCS

## 2024-10-27 PROCEDURE — 99232 SBSQ HOSP IP/OBS MODERATE 35: CPT | Performed by: FAMILY MEDICINE

## 2024-10-27 PROCEDURE — 6360000002 HC RX W HCPCS: Performed by: NURSE PRACTITIONER

## 2024-10-27 PROCEDURE — 85520 HEPARIN ASSAY: CPT

## 2024-10-27 RX ORDER — WARFARIN SODIUM 7.5 MG/1
15 TABLET ORAL
Status: COMPLETED | OUTPATIENT
Start: 2024-10-27 | End: 2024-10-27

## 2024-10-27 RX ADMIN — HEPARIN SODIUM 32 UNITS/KG/HR: 10000 INJECTION, SOLUTION INTRAVENOUS at 15:25

## 2024-10-27 RX ADMIN — HEPARIN SODIUM 2200 UNITS: 1000 INJECTION INTRAVENOUS; SUBCUTANEOUS at 19:36

## 2024-10-27 RX ADMIN — WARFARIN SODIUM 15 MG: 7.5 TABLET ORAL at 18:15

## 2024-10-27 RX ADMIN — BENZONATATE 100 MG: 100 CAPSULE ORAL at 06:42

## 2024-10-27 RX ADMIN — LEVETIRACETAM 500 MG: 500 TABLET, FILM COATED ORAL at 09:29

## 2024-10-27 RX ADMIN — HEPARIN SODIUM 2200 UNITS: 1000 INJECTION INTRAVENOUS; SUBCUTANEOUS at 18:17

## 2024-10-27 RX ADMIN — GUAIFENESIN, DEXTROMETHORPHAN HBR 1 TABLET: 600; 30 TABLET ORAL at 06:42

## 2024-10-27 RX ADMIN — LEVETIRACETAM 500 MG: 500 TABLET, FILM COATED ORAL at 19:39

## 2024-10-28 VITALS
HEART RATE: 71 BPM | TEMPERATURE: 98.7 F | OXYGEN SATURATION: 100 % | SYSTOLIC BLOOD PRESSURE: 112 MMHG | HEIGHT: 70 IN | RESPIRATION RATE: 20 BRPM | BODY MASS INDEX: 20.58 KG/M2 | WEIGHT: 143.74 LBS | DIASTOLIC BLOOD PRESSURE: 71 MMHG

## 2024-10-28 LAB
ANISOCYTOSIS BLD QL SMEAR: PRESENT
BASO STIPL BLD QL SMEAR: ABNORMAL
BASOPHILS ABSOLUTE: 0.1 THOU/MM3 (ref 0–0.1)
BASOPHILS NFR BLD AUTO: 0.5 %
DEPRECATED RDW RBC AUTO: 71.9 FL (ref 35–45)
EOSINOPHIL NFR BLD AUTO: 1.2 %
EOSINOPHILS ABSOLUTE: 0.1 THOU/MM3 (ref 0–0.4)
ERYTHROCYTE [DISTWIDTH] IN BLOOD BY AUTOMATED COUNT: 17.9 % (ref 11.5–14.5)
HCT VFR BLD AUTO: 31.5 % (ref 42–52)
HEPARIN UNFRACTIONATED: 0.52 U/ML (ref 0.3–0.7)
HEPARIN UNFRACTIONATED: 0.53 U/ML (ref 0.3–0.7)
HGB BLD-MCNC: 9.6 GM/DL (ref 14–18)
HOWELL-JOLLY BOD BLD QL SMEAR: PRESENT
IMM GRANULOCYTES # BLD AUTO: 0.52 THOU/MM3 (ref 0–0.07)
IMM GRANULOCYTES NFR BLD AUTO: 4.4 %
INR PPP: 2.05 (ref 0.85–1.13)
LYMPHOCYTES ABSOLUTE: 3.5 THOU/MM3 (ref 1–4.8)
LYMPHOCYTES NFR BLD AUTO: 29.2 %
MCH RBC QN AUTO: 33.4 PG (ref 26–33)
MCHC RBC AUTO-ENTMCNC: 30.5 GM/DL (ref 32.2–35.5)
MCV RBC AUTO: 109.8 FL (ref 80–94)
MONOCYTES ABSOLUTE: 1.7 THOU/MM3 (ref 0.4–1.3)
MONOCYTES NFR BLD AUTO: 14.1 %
NEUTROPHILS ABSOLUTE: 6 THOU/MM3 (ref 1.8–7.7)
NEUTROPHILS NFR BLD AUTO: 50.6 %
NRBC BLD AUTO-RTO: 10 /100 WBC
PATHOLOGIST REVIEW: ABNORMAL
PLATELET # BLD AUTO: 354 THOU/MM3 (ref 130–400)
PLATELET BLD QL SMEAR: ADEQUATE
PMV BLD AUTO: 9.6 FL (ref 9.4–12.4)
POLYCHROMASIA BLD QL SMEAR: ABNORMAL
RBC # BLD AUTO: 2.87 MILL/MM3 (ref 4.7–6.1)
STOMATOCYTES: ABNORMAL
TARGETS BLD QL SMEAR: ABNORMAL
VARIANT LYMPHS BLD QL SMEAR: ABNORMAL %
WBC # BLD AUTO: 11.9 THOU/MM3 (ref 4.8–10.8)

## 2024-10-28 PROCEDURE — 85520 HEPARIN ASSAY: CPT

## 2024-10-28 PROCEDURE — 6370000000 HC RX 637 (ALT 250 FOR IP): Performed by: PHYSICIAN ASSISTANT

## 2024-10-28 PROCEDURE — 85610 PROTHROMBIN TIME: CPT

## 2024-10-28 PROCEDURE — 36415 COLL VENOUS BLD VENIPUNCTURE: CPT

## 2024-10-28 PROCEDURE — 6370000000 HC RX 637 (ALT 250 FOR IP): Performed by: PHARMACIST

## 2024-10-28 PROCEDURE — 99239 HOSP IP/OBS DSCHRG MGMT >30: CPT | Performed by: FAMILY MEDICINE

## 2024-10-28 RX ORDER — WARFARIN SODIUM 5 MG/1
TABLET ORAL
Qty: 90 TABLET | Refills: 1 | Status: SHIPPED | OUTPATIENT
Start: 2024-10-28

## 2024-10-28 RX ORDER — WARFARIN SODIUM 10 MG/1
20 TABLET ORAL ONCE
Status: COMPLETED | OUTPATIENT
Start: 2024-10-28 | End: 2024-10-28

## 2024-10-28 RX ORDER — LEVETIRACETAM 500 MG/1
500 TABLET ORAL 2 TIMES DAILY
Qty: 60 TABLET | Refills: 0 | Status: SHIPPED | OUTPATIENT
Start: 2024-10-28 | End: 2024-10-30 | Stop reason: SDUPTHER

## 2024-10-28 RX ADMIN — BENZONATATE 100 MG: 100 CAPSULE ORAL at 08:32

## 2024-10-28 RX ADMIN — LEVETIRACETAM 500 MG: 500 TABLET, FILM COATED ORAL at 08:32

## 2024-10-28 RX ADMIN — GUAIFENESIN, DEXTROMETHORPHAN HBR 1 TABLET: 600; 30 TABLET ORAL at 08:32

## 2024-10-28 RX ADMIN — WARFARIN SODIUM 20 MG: 10 TABLET ORAL at 12:09

## 2024-10-28 NOTE — DISCHARGE SUMMARY
Hospitalist Discharge Summary     Patient Identification:  Dashawn Rivera  : 1980  MRN: [id number]   Account: [id number]     Admit date: 10/14/2024  Discharge date: 10/28/2024   Attending provider: Kami Trevino MD        Primary care provider: Lyn Damico APRN - CNP     Discharge Diagnoses:   Acute Pulmonary embolism -- POA  Recent DVTs    Hypercoagulable state   Leukocytosis  Abdominal distention  Alcoholic liver disease   Centrilobular emphysema  Seizure disorder  Chronic macrocytic anemia  History of alcohol abuse   Hepatomegaly/hepatic steatosis   Tobacco abuse        Hospital Course:   Dashawn Rivera is a 44 y.o. male admitted to [facility] on 10/14/2024 for worsening dyspnea and bilateral leg pain with recent admission for DVTs.   See H&P by resident Dr. Camara and attending Dr. Rocha on 10/14/24 for admitting details.     Acute Pulmonary embolism  -- POA -- CTA Chest 10/14 with filling defects noted in distal right main pulmonary artery extending to right lower lobe branches.  No evidence of right heart strain. Heparin gtt currently and coumadin with pharm to dose as below per hem/onc recs and to monitor closely for any bleeding with liver dysfxn  -- Failed IVC?? Vs ?clot move prior to filter placement 10/1/24 as CT chest prior to that was 24 which was (-) for pE  -- Hematology consulted. Recommending coumadin. INR goal 2-3. Pharm dosing and f/u CC -->  INR 10/28/2024 = 2.05  -> home with pharmacy to dose coumadin and close CC follow up  Recent DVTs -- Doppler 24 reported extensive acute DVT through RLE. Patient was discharged on Xarelto. B/l venous dopplers 10/1 showed persistent extensive acute appearing DVT throughout RLE, acute DVT in 1 of left posterior tibial veins.   -- S/p right lower extremity thrombectomy 10/1. IVC filter placed 10/2. Was not discharged on anticoagulation d/t liver dysfunction with elevated INR and APTT  -- hematology and GI consulted and

## 2024-10-28 NOTE — CARE COORDINATION
10/28/24, 8:42 AM EDT    DISCHARGE ON GOING EVALUATION    Dashawn L [facility] day: 14  Location: -24/024-A Reason for admit: Pulmonary embolism on right (HCC) [I26.99]  Other acute pulmonary embolism without acute cor pulmonale (HCC) [I26.99]     Procedures: none    Imaging since last note: none    Barriers to Discharge: Hospitalist and Pharmacy following. Heparin gtt.    Latest Reference Range & Units 10/27/24 07:00 10/28/24 05:36   INR 0.85 - 1.13  1.93 (H) 2.05 (H)       PCP: Lyn Damico APRN - CNP  Readmission Risk Score: 16.6    Patient Goals/Plan/Treatment Preferences: From home with mother.   New Coumadin Clinic.     Previous CM documented request for can, patient was informed at that time that it will likely not be covered due to lack of medical necessity.     10/28/24, 11:59 AM EDT    Patient goals/plan/ treatment preferences discussed by  and .  Patient goals/plan/ treatment preferences reviewed with patient/ family.  Patient/ family verbalize understanding of discharge plan and are in agreement with goal/plan/treatment preferences.  Understanding was demonstrated using the teach back method.  AVS provided by RN at time of discharge, which includes all necessary medical information pertaining to the patients current course of illness, treatment, post-discharge goals of care, and treatment preferences.     Services At/After Discharge: None    Referral to [facility] Coumadin clinic

## 2024-10-28 NOTE — PLAN OF CARE
Problem: Discharge Planning  Goal: Discharge to home or other facility with appropriate resources  10/15/2024 1938 by Elisa Martin RN  Outcome: Progressing     Problem: Pain  Goal: Verbalizes/displays adequate comfort level or baseline comfort level  Outcome: Progressing     Problem: Safety - Adult  Goal: Free from fall injury  10/15/2024 1938 by Elisa Martin RN  Outcome: Progressing     Problem: Skin/Tissue Integrity  Goal: Absence of new skin breakdown  Description: 1.  Monitor for areas of redness and/or skin breakdown  2.  Assess vascular access sites hourly  3.  Every 4-6 hours minimum:  Change oxygen saturation probe site  4.  Every 4-6 hours:  If on nasal continuous positive airway pressure, respiratory therapy assess nares and determine need for appliance change or resting period.  10/15/2024 1938 by Elisa Martin RN  Outcome: Progressing     Problem: Neurosensory - Adult  Goal: Achieves stable or improved neurological status  Outcome: Progressing     Problem: Neurosensory - Adult  Goal: Absence of seizures  Outcome: Progressing     Problem: Neurosensory - Adult  Goal: Remains free of injury related to seizures activity  Outcome: Progressing     Problem: Neurosensory - Adult  Goal: Achieves maximal functionality and self care  Outcome: Progressing     Problem: Respiratory - Adult  Goal: Achieves optimal ventilation and oxygenation  Outcome: Progressing     Problem: Cardiovascular - Adult  Goal: Maintains optimal cardiac output and hemodynamic stability  Outcome: Progressing     Problem: Skin/Tissue Integrity - Adult  Goal: Skin integrity remains intact  Outcome: Progressing     Problem: Gastrointestinal - Adult  Goal: Minimal or absence of nausea and vomiting  Outcome: Progressing     Problem: Gastrointestinal - Adult  Goal: Maintains or returns to baseline bowel function  Outcome: Progressing     Problem: Gastrointestinal - Adult  Goal: Maintains adequate nutritional intake  Outcome: 
  Problem: Discharge Planning  Goal: Discharge to home or other facility with appropriate resources  10/16/2024 0921 by Homar Lee, RN  Outcome: Progressing  Flowsheets (Taken 10/16/2024 0921)  Discharge to home or other facility with appropriate resources: Identify barriers to discharge with patient and caregiver     Problem: Pain  Goal: Verbalizes/displays adequate comfort level or baseline comfort level  10/16/2024 0921 by Homar Lee, RN  Outcome: Progressing  Flowsheets (Taken 10/16/2024 0921)  Verbalizes/displays adequate comfort level or baseline comfort level: Encourage patient to monitor pain and request assistance     Problem: Safety - Adult  Goal: Free from fall injury  10/16/2024 0921 by Homar Lee, RN  Outcome: Progressing  Flowsheets (Taken 10/16/2024 0921)  Free From Fall Injury: Instruct family/caregiver on patient safety     
  Problem: Discharge Planning  Goal: Discharge to home or other facility with appropriate resources  10/17/2024 1843 by Yue Abbott RN  Outcome: Progressing  Flowsheets (Taken 10/17/2024 0845)  Discharge to home or other facility with appropriate resources: Identify barriers to discharge with patient and caregiver  10/17/2024 1842 by Yue Abbott RN  Outcome: Progressing  Flowsheets (Taken 10/17/2024 0845)  Discharge to home or other facility with appropriate resources: Identify barriers to discharge with patient and caregiver     Problem: Pain  Goal: Verbalizes/displays adequate comfort level or baseline comfort level  10/17/2024 1843 by Yue Abbott RN  Outcome: Progressing  Flowsheets (Taken 10/17/2024 0845)  Verbalizes/displays adequate comfort level or baseline comfort level: Encourage patient to monitor pain and request assistance  10/17/2024 1842 by Yue Abbott RN  Outcome: Progressing  Flowsheets (Taken 10/17/2024 0845)  Verbalizes/displays adequate comfort level or baseline comfort level: Encourage patient to monitor pain and request assistance     Problem: Safety - Adult  Goal: Free from fall injury  10/17/2024 1843 by Yue Abbott RN  Outcome: Progressing  Flowsheets (Taken 10/16/2024 0921 by Homar Lee RN)  Free From Fall Injury: Instruct family/caregiver on patient safety  10/17/2024 1842 by Yue Abbott RN  Outcome: Progressing  Flowsheets (Taken 10/16/2024 0921 by Homar Lee RN)  Free From Fall Injury: Instruct family/caregiver on patient safety     Problem: Skin/Tissue Integrity  Goal: Absence of new skin breakdown  Description: 1.  Monitor for areas of redness and/or skin breakdown  2.  Assess vascular access sites hourly  3.  Every 4-6 hours minimum:  Change oxygen saturation probe site  4.  Every 4-6 hours:  If on nasal continuous positive airway pressure, respiratory therapy assess nares and determine need for appliance change or resting period.  10/17/2024 1843 by 
  Problem: Discharge Planning  Goal: Discharge to home or other facility with appropriate resources  10/18/2024 0234 by Cristina Sebastian RN  Outcome: Progressing     Problem: Pain  Goal: Verbalizes/displays adequate comfort level or baseline comfort level  10/18/2024 0234 by Cristina Sebastian RN  Outcome: Progressing       Problem: Safety - Adult  Goal: Free from fall injury  10/18/2024 0234 by Cristina Sebastian RN  Outcome: Progressing     Problem: Skin/Tissue Integrity  Goal: Absence of new skin breakdown  Description: 1.  Monitor for areas of redness and/or skin breakdown  2.  Assess vascular access sites hourly  3.  Every 4-6 hours minimum:  Change oxygen saturation probe site  4.  Every 4-6 hours:  If on nasal continuous positive airway pressure, respiratory therapy assess nares and determine need for appliance change or resting period.  10/18/2024 0234 by Cristina Sebastian RN  Outcome: Progressing     Problem: Neurosensory - Adult  Goal: Achieves stable or improved neurological status  10/18/2024 0234 by Cristina Sebastian RN  Outcome: Progressing     Problem: Neurosensory - Adult  Goal: Absence of seizures  10/18/2024 0234 by Cristina Sebastian RN  Outcome: Progressing     Problem: Neurosensory - Adult  Goal: Remains free of injury related to seizures activity  10/18/2024 0234 by Cristina Sebastian RN  Outcome: Progressing     
  Problem: Discharge Planning  Goal: Discharge to home or other facility with appropriate resources  10/19/2024 1050 by Yue Abbott RN  Outcome: Progressing  Flowsheets (Taken 10/19/2024 0745)  Discharge to home or other facility with appropriate resources: Identify barriers to discharge with patient and caregiver  10/19/2024 0216 by Cristina Sebastian RN  Outcome: Progressing     Problem: Pain  Goal: Verbalizes/displays adequate comfort level or baseline comfort level  10/19/2024 1050 by Yue Abbott RN  Outcome: Progressing  Flowsheets (Taken 10/19/2024 0745)  Verbalizes/displays adequate comfort level or baseline comfort level: Encourage patient to monitor pain and request assistance  10/19/2024 0216 by Cristina Sebastian RN  Outcome: Progressing  Flowsheets (Taken 10/18/2024 1700 by Yue Abbott RN)  Verbalizes/displays adequate comfort level or baseline comfort level: Encourage patient to monitor pain and request assistance     Problem: Safety - Adult  Goal: Free from fall injury  10/19/2024 1050 by Yue Abbott RN  Outcome: Progressing  Flowsheets (Taken 10/16/2024 0921 by Homar Lee RN)  Free From Fall Injury: Instruct family/caregiver on patient safety  10/19/2024 0216 by Cristina Sebastian RN  Outcome: Progressing     Problem: Skin/Tissue Integrity  Goal: Absence of new skin breakdown  Description: 1.  Monitor for areas of redness and/or skin breakdown  2.  Assess vascular access sites hourly  3.  Every 4-6 hours minimum:  Change oxygen saturation probe site  4.  Every 4-6 hours:  If on nasal continuous positive airway pressure, respiratory therapy assess nares and determine need for appliance change or resting period.  10/19/2024 1050 by Yue Abbott RN  Outcome: Progressing  10/19/2024 0216 by Cristina Sebastian RN  Outcome: Progressing     Problem: Neurosensory - Adult  Goal: Achieves stable or improved neurological status  10/19/2024 1050 by Yue Abbott RN  Outcome: 
  Problem: Discharge Planning  Goal: Discharge to home or other facility with appropriate resources  10/20/2024 1301 by Yue Abbott RN  Outcome: Progressing  Flowsheets (Taken 10/20/2024 0800)  Discharge to home or other facility with appropriate resources: Identify barriers to discharge with patient and caregiver  10/20/2024 0223 by Cristina Sebastian RN  Outcome: Progressing     Problem: Pain  Goal: Verbalizes/displays adequate comfort level or baseline comfort level  10/20/2024 1301 by Yue Abbott RN  Outcome: Progressing  Flowsheets (Taken 10/20/2024 0800)  Verbalizes/displays adequate comfort level or baseline comfort level: Encourage patient to monitor pain and request assistance  10/20/2024 0223 by Cristina Sebastian RN  Outcome: Progressing     Problem: Safety - Adult  Goal: Free from fall injury  10/20/2024 1301 by Yue Abbott RN  Outcome: Progressing  Flowsheets (Taken 10/16/2024 0921 by Homar Lee RN)  Free From Fall Injury: Instruct family/caregiver on patient safety  10/20/2024 0223 by Cristina Sebastian RN  Outcome: Progressing     Problem: Skin/Tissue Integrity  Goal: Absence of new skin breakdown  Description: 1.  Monitor for areas of redness and/or skin breakdown  2.  Assess vascular access sites hourly  3.  Every 4-6 hours minimum:  Change oxygen saturation probe site  4.  Every 4-6 hours:  If on nasal continuous positive airway pressure, respiratory therapy assess nares and determine need for appliance change or resting period.  10/20/2024 1301 by Yue Abbott RN  Outcome: Progressing  10/20/2024 0223 by Cristina Sebastian RN  Outcome: Progressing     Problem: Neurosensory - Adult  Goal: Achieves stable or improved neurological status  10/20/2024 1301 by Yue Abbott RN  Outcome: Progressing  Flowsheets (Taken 10/19/2024 0745)  Achieves stable or improved neurological status: Assess for and report changes in neurological status  10/20/2024 0223 by Jaz 
  Problem: Discharge Planning  Goal: Discharge to home or other facility with appropriate resources  10/20/2024 2342 by Cristina Sebastian RN  Outcome: Progressing    Problem: Pain  Goal: Verbalizes/displays adequate comfort level or baseline comfort level  10/20/2024 2342 by Cristina Sebastian RN  Outcome: Progressing     Problem: Safety - Adult  Goal: Free from fall injury  10/20/2024 2342 by Cristina Sebastian RN  Outcome: Progressing     Problem: Skin/Tissue Integrity  Goal: Absence of new skin breakdown  Description: 1.  Monitor for areas of redness and/or skin breakdown  2.  Assess vascular access sites hourly  3.  Every 4-6 hours minimum:  Change oxygen saturation probe site  4.  Every 4-6 hours:  If on nasal continuous positive airway pressure, respiratory therapy assess nares and determine need for appliance change or resting period.  10/20/2024 2342 by Cristina Sebastian RN  Outcome: Progressing     Problem: Neurosensory - Adult  Goal: Achieves stable or improved neurological status  10/20/2024 2342 by Cristina Sebastian RN  Outcome: Progressing     Problem: Neurosensory - Adult  Goal: Absence of seizures  10/20/2024 2342 by Cristina Sebastian RN  Outcome: Progressing     Problem: Neurosensory - Adult  Goal: Remains free of injury related to seizures activity  10/20/2024 2342 by Cristina Sebastian RN  Outcome: Progressing     
  Problem: Discharge Planning  Goal: Discharge to home or other facility with appropriate resources  10/21/2024 2157 by Temo Camarena RN  Outcome: Progressing  Flowsheets (Taken 10/21/2024 0801 by Grecia Leonardo, RN)  Discharge to home or other facility with appropriate resources: Identify barriers to discharge with patient and caregiver  10/21/2024 1248 by Grecia Leonardo, RN  Outcome: Progressing  Flowsheets (Taken 10/21/2024 0801)  Discharge to home or other facility with appropriate resources: Identify barriers to discharge with patient and caregiver  Note: Discharge plan reviewed with patient. Patient verbalizes understanding of this discharge plan.       Problem: Pain  Goal: Verbalizes/displays adequate comfort level or baseline comfort level  10/21/2024 2157 by Temo Camarena RN  Outcome: Progressing  Flowsheets (Taken 10/21/2024 1521 by Grecia Leonardo, RN)  Verbalizes/displays adequate comfort level or baseline comfort level: Encourage patient to monitor pain and request assistance  10/21/2024 1248 by Grecia Leonardo, RN  Outcome: Progressing  Flowsheets (Taken 10/21/2024 0800)  Verbalizes/displays adequate comfort level or baseline comfort level: Encourage patient to monitor pain and request assistance  Note: Pts chronic conditions remain at baseline. VSS.      Problem: Safety - Adult  Goal: Free from fall injury  10/21/2024 2157 by Temo Camarena RN  Outcome: Progressing  Flowsheets (Taken 10/16/2024 0921 by Homar Lee, RN)  Free From Fall Injury: Instruct family/caregiver on patient safety  10/21/2024 1248 by Grecia Leonardo, RN  Outcome: Progressing  Note: No falls noted this shift. Continue falling star program. Bed alarm on, bed in low position. Call light and personal belongings in reach.  Patient uses call light appropriately.      Problem: Skin/Tissue Integrity  Goal: Absence of new skin breakdown  Description: 1.  Monitor for areas of redness and/or skin breakdown  2.  Assess 
  Problem: Discharge Planning  Goal: Discharge to home or other facility with appropriate resources  10/24/2024 1000 by Homar Lee, RN  Outcome: Progressing  Flowsheets (Taken 10/24/2024 1000)  Discharge to home or other facility with appropriate resources: Identify barriers to discharge with patient and caregiver     Problem: Pain  Goal: Verbalizes/displays adequate comfort level or baseline comfort level  10/24/2024 1000 by Homar Lee, RN  Outcome: Progressing  Flowsheets (Taken 10/24/2024 1000)  Verbalizes/displays adequate comfort level or baseline comfort level: Encourage patient to monitor pain and request assistance     Problem: Safety - Adult  Goal: Free from fall injury  10/24/2024 1000 by Homar Lee, RN  Outcome: Progressing  Flowsheets (Taken 10/16/2024 0921)  Free From Fall Injury: Instruct family/caregiver on patient safety     
  Problem: Discharge Planning  Goal: Discharge to home or other facility with appropriate resources  10/24/2024 2138 by Chely Aguilar RN  Outcome: Progressing  Flowsheets (Taken 10/24/2024 1000 by Homar Lee RN)  Discharge to home or other facility with appropriate resources: Identify barriers to discharge with patient and caregiver  10/24/2024 1000 by Homar Lee RN  Outcome: Progressing  Flowsheets (Taken 10/24/2024 1000)  Discharge to home or other facility with appropriate resources: Identify barriers to discharge with patient and caregiver     Problem: Pain  Goal: Verbalizes/displays adequate comfort level or baseline comfort level  10/24/2024 2138 by Chely Aguilar RN  Outcome: Progressing  Flowsheets (Taken 10/24/2024 1000 by Homar Lee RN)  Verbalizes/displays adequate comfort level or baseline comfort level: Encourage patient to monitor pain and request assistance  10/24/2024 1000 by Homar Lee RN  Outcome: Progressing  Flowsheets (Taken 10/24/2024 1000)  Verbalizes/displays adequate comfort level or baseline comfort level: Encourage patient to monitor pain and request assistance     Problem: Safety - Adult  Goal: Free from fall injury  10/24/2024 2138 by Chely Aguilar RN  Outcome: Progressing  Flowsheets (Taken 10/16/2024 0921 by Homar Lee RN)  Free From Fall Injury: Instruct family/caregiver on patient safety  10/24/2024 1000 by Homar Lee RN  Outcome: Progressing  Flowsheets (Taken 10/16/2024 0921)  Free From Fall Injury: Instruct family/caregiver on patient safety     Problem: Skin/Tissue Integrity  Goal: Absence of new skin breakdown  Description: 1.  Monitor for areas of redness and/or skin breakdown  2.  Assess vascular access sites hourly  3.  Every 4-6 hours minimum:  Change oxygen saturation probe site  4.  Every 4-6 hours:  If on nasal continuous positive airway pressure, respiratory therapy assess nares and determine need for appliance change or resting 
  Problem: Discharge Planning  Goal: Discharge to home or other facility with appropriate resources  10/27/2024 2102 by Preet Estevez RN  Outcome: Progressing  Flowsheets (Taken 10/27/2024 2102)  Discharge to home or other facility with appropriate resources:   Identify barriers to discharge with patient and caregiver   Arrange for needed discharge resources and transportation as appropriate   Arrange for interpreters to assist at discharge as needed   Identify discharge learning needs (meds, wound care, etc)   Refer to discharge planning if patient needs post-hospital services based on physician order or complex needs related to functional status, cognitive ability or social support system     Problem: Pain  Goal: Verbalizes/displays adequate comfort level or baseline comfort level  10/27/2024 2102 by Preet Estevez RN  Outcome: Progressing  Flowsheets (Taken 10/27/2024 2102)  Verbalizes/displays adequate comfort level or baseline comfort level:   Encourage patient to monitor pain and request assistance   Assess pain using appropriate pain scale   Administer analgesics based on type and severity of pain and evaluate response   Implement non-pharmacological measures as appropriate and evaluate response   Consider cultural and social influences on pain and pain management   Notify Licensed Independent Practitioner if interventions unsuccessful or patient reports new pain     Problem: Safety - Adult  Goal: Free from fall injury  10/27/2024 2102 by Preet Estevez RN  Outcome: Progressing  Flowsheets (Taken 10/27/2024 2102)  Free From Fall Injury:   Instruct family/caregiver on patient safety   Based on caregiver fall risk screen, instruct family/caregiver to ask for assistance with transferring infant if caregiver noted to have fall risk factors     Problem: Neurosensory - Adult  Goal: Achieves stable or improved neurological status  10/27/2024 2102 by Preet Estevez RN  Outcome: Progressing  Flowsheets (Taken 
  Problem: Discharge Planning  Goal: Discharge to home or other facility with appropriate resources  Outcome: Progressing     Problem: Discharge Planning  Goal: Discharge to home or other facility with appropriate resources  Outcome: Progressing     Problem: Pain  Goal: Verbalizes/displays adequate comfort level or baseline comfort level  Outcome: Progressing     Problem: Safety - Adult  Goal: Free from fall injury  Outcome: Progressing     Problem: Skin/Tissue Integrity  Goal: Absence of new skin breakdown  Description: 1.  Monitor for areas of redness and/or skin breakdown  2.  Assess vascular access sites hourly  3.  Every 4-6 hours minimum:  Change oxygen saturation probe site  4.  Every 4-6 hours:  If on nasal continuous positive airway pressure, respiratory therapy assess nares and determine need for appliance change or resting period.  Outcome: Progressing     Problem: Neurosensory - Adult  Goal: Achieves stable or improved neurological status  Outcome: Progressing     Problem: Neurosensory - Adult  Goal: Absence of seizures  Outcome: Progressing     
  Problem: Discharge Planning  Goal: Discharge to home or other facility with appropriate resources  Outcome: Progressing     Problem: Pain  Goal: Verbalizes/displays adequate comfort level or baseline comfort level  Outcome: Progressing  Flowsheets (Taken 10/18/2024 1700 by Yue Abbott RN)  Verbalizes/displays adequate comfort level or baseline comfort level: Encourage patient to monitor pain and request assistance     Problem: Safety - Adult  Goal: Free from fall injury  Outcome: Progressing     Problem: Skin/Tissue Integrity  Goal: Absence of new skin breakdown  Description: 1.  Monitor for areas of redness and/or skin breakdown  2.  Assess vascular access sites hourly  3.  Every 4-6 hours minimum:  Change oxygen saturation probe site  4.  Every 4-6 hours:  If on nasal continuous positive airway pressure, respiratory therapy assess nares and determine need for appliance change or resting period.  Outcome: Progressing     Problem: Neurosensory - Adult  Goal: Achieves stable or improved neurological status  Outcome: Progressing     Problem: Neurosensory - Adult  Goal: Absence of seizures  Outcome: Progressing     
  Problem: Discharge Planning  Goal: Discharge to home or other facility with appropriate resources  Outcome: Progressing  Discharge to home or other facility with appropriate resources: Identify barriers to discharge with patient and caregiver  Note: Discharge plan reviewed with patient. Pt verbalizes understanding of said plan.      Problem: Pain  Goal: Verbalizes/displays adequate comfort level or baseline comfort level  Outcome: Progressing  Flowsheets  Verbalizes/displays adequate comfort level or baseline comfort level: Encourage patient to monitor pain and request assistance     Problem: Safety - Adult  Goal: Free from fall injury  Outcome: Progressing  Note: No falls noted this shift. Continue falling star program. Bed alarm on, bed in low position. Call light and personal belongings in reach.  Patient uses call light appropriately.      Problem: Skin/Tissue Integrity  Goal: Absence of new skin breakdown  Description: 1.  Monitor for areas of redness and/or skin breakdown  2.  Assess vascular access sites hourly  3.  Every 4-6 hours minimum:  Change oxygen saturation probe site  4.  Every 4-6 hours:  If on nasal continuous positive airway pressure, respiratory therapy assess nares and determine need for appliance change or resting period.  Outcome: Progressing  Note: No skin break down noted at this time. Encouraged patient to reposition self in bed.     Problem: Neurosensory - Adult  Goal: Achieves stable or improved neurological status  Outcome: Progressing  Flowsheets  Achieves stable or improved neurological status: Assess for and report changes in neurological status  Goal: Absence of seizures  Outcome: Progressing  Flowsheets  Absence of seizures: Administer anticonvulsants as ordered  Goal: Remains free of injury related to seizures activity  Outcome: Progressing  Flowsheets  Remains free of injury related to seizure activity: Seizure pads on all 4 side rails  Goal: Achieves maximal functionality and 
  Problem: Discharge Planning  Goal: Discharge to home or other facility with appropriate resources  Outcome: Progressing  Discharge to home or other facility with appropriate resources: Identify barriers to discharge with patient and caregiver  Note: Discharge plan reviewed with patient. Pt verbalizes understanding of the plan.      Problem: Safety - Adult  Goal: Free from fall injury  Outcome: Progressing  Note: No falls noted this shift. Continue falling star program. Bed alarm on, bed in low position. Call light and personal belongings in reach.  Patient uses call light appropriately.      Problem: Skin/Tissue Integrity  Goal: Absence of new skin breakdown  Description: 1.  Monitor for areas of redness and/or skin breakdown  2.  Assess vascular access sites hourly  3.  Every 4-6 hours minimum:  Change oxygen saturation probe site  4.  Every 4-6 hours:  If on nasal continuous positive airway pressure, respiratory therapy assess nares and determine need for appliance change or resting period.  Outcome: Progressing  Note: No skin break down noted at this time. Encouraged patient to reposition self in bed.      Problem: Cardiovascular - Adult  Goal: Maintains optimal cardiac output and hemodynamic stability  Outcome: Progressing  Maintains optimal cardiac output and hemodynamic stability: Monitor blood pressure and heart rate  Note: Monitor pts INR and aptt. VSS.    Care plan reviewed with patient. Patient verbalizes understanding of plan of care and contributes to goal setting.   
  Problem: Discharge Planning  Goal: Discharge to home or other facility with appropriate resources  Outcome: Progressing  Flowsheets (Taken 10/15/2024 0503)  Discharge to home or other facility with appropriate resources:   Identify barriers to discharge with patient and caregiver   Arrange for needed discharge resources and transportation as appropriate   Identify discharge learning needs (meds, wound care, etc)   Arrange for interpreters to assist at discharge as needed   Refer to discharge planning if patient needs post-hospital services based on physician order or complex needs related to functional status, cognitive ability or social support system     Problem: Pain  Goal: Verbalizes/displays adequate comfort level or baseline comfort level  Outcome: Progressing  Flowsheets (Taken 10/15/2024 0503)  Verbalizes/displays adequate comfort level or baseline comfort level:   Encourage patient to monitor pain and request assistance   Assess pain using appropriate pain scale   Administer analgesics based on type and severity of pain and evaluate response   Implement non-pharmacological measures as appropriate and evaluate response   Consider cultural and social influences on pain and pain management   Notify Licensed Independent Practitioner if interventions unsuccessful or patient reports new pain     Problem: Safety - Adult  Goal: Free from fall injury  Outcome: Progressing  Flowsheets (Taken 10/15/2024 0503)  Free From Fall Injury:   Instruct family/caregiver on patient safety   Based on caregiver fall risk screen, instruct family/caregiver to ask for assistance with transferring infant if caregiver noted to have fall risk factors     Problem: Skin/Tissue Integrity  Goal: Absence of new skin breakdown  Description: 1.  Monitor for areas of redness and/or skin breakdown  2.  Assess vascular access sites hourly  3.  Every 4-6 hours minimum:  Change oxygen saturation probe site  4.  Every 4-6 hours:  If on nasal 
  Problem: Discharge Planning  Goal: Discharge to home or other facility with appropriate resources  Outcome: Progressing  Flowsheets (Taken 10/16/2024 0921 by Homar Lee, RN)  Discharge to home or other facility with appropriate resources: Identify barriers to discharge with patient and caregiver     Problem: Pain  Goal: Verbalizes/displays adequate comfort level or baseline comfort level  Outcome: Progressing  Flowsheets (Taken 10/16/2024 0921 by Homar Lee, RN)  Verbalizes/displays adequate comfort level or baseline comfort level: Encourage patient to monitor pain and request assistance     Problem: Gastrointestinal - Adult  Goal: Minimal or absence of nausea and vomiting  Outcome: Progressing  Flowsheets (Taken 10/15/2024 0503 by Mari Campos, RN)  Minimal or absence of nausea and vomiting:   Administer IV fluids as ordered to ensure adequate hydration   Nasogastric tube to low intermittent suction as ordered   Provide nonpharmacologic comfort measures as appropriate   Nutrition consult to assist patient with adequate nutrition and appropriate food choices   Maintain NPO status until nausea and vomiting are resolved   Administer ordered antiemetic medications as needed   Advance diet as tolerated, if ordered     
  Problem: Discharge Planning  Goal: Discharge to home or other facility with appropriate resources  Outcome: Progressing  Flowsheets (Taken 10/22/2024 0757 by Grecia Leonardo, RN)  Discharge to home or other facility with appropriate resources: Identify barriers to discharge with patient and caregiver     Problem: Pain  Goal: Verbalizes/displays adequate comfort level or baseline comfort level  Outcome: Progressing  Flowsheets (Taken 10/22/2024 1104 by Grecia Leonardo, RN)  Verbalizes/displays adequate comfort level or baseline comfort level: Encourage patient to monitor pain and request assistance     Problem: Safety - Adult  Goal: Free from fall injury  Outcome: Progressing  Flowsheets (Taken 10/16/2024 0921 by Homar Lee RN)  Free From Fall Injury: Instruct family/caregiver on patient safety     Problem: Skin/Tissue Integrity  Goal: Absence of new skin breakdown  Description: 1.  Monitor for areas of redness and/or skin breakdown  2.  Assess vascular access sites hourly  3.  Every 4-6 hours minimum:  Change oxygen saturation probe site  4.  Every 4-6 hours:  If on nasal continuous positive airway pressure, respiratory therapy assess nares and determine need for appliance change or resting period.  Outcome: Progressing  Note: No new skin breakdown noted.  Encouraged patient to reposition in bed.       Problem: Neurosensory - Adult  Goal: Achieves stable or improved neurological status  Outcome: Progressing  Flowsheets (Taken 10/22/2024 1104 by Grecia Leonardo, RN)  Achieves stable or improved neurological status: Assess for and report changes in neurological status  Goal: Absence of seizures  Outcome: Progressing  Flowsheets (Taken 10/22/2024 1104 by Grecia Leonardo, RN)  Absence of seizures: Administer anticonvulsants as ordered  Goal: Remains free of injury related to seizures activity  Outcome: Progressing  Flowsheets (Taken 10/22/2024 1104 by Grecia Leonardo, RN)  Remains free of 
Monitor labs and assess patient for signs and symptoms of electrolyte imbalances    Goal: Hemodynamic stability and optimal renal function maintained  Outcome: Progressing  Flowsheets  Hemodynamic stability and optimal renal function maintained: Monitor labs and assess for signs and symptoms of volume excess or deficit     Care plan reviewed with patient. Patient verbalizes understanding of plan of care and contributes to goal setting.   
intact  Outcome: Progressing  Flowsheets (Taken 10/25/2024 1711)  Skin Integrity Remains Intact: Monitor for areas of redness and/or skin breakdown     Problem: Gastrointestinal - Adult  Goal: Minimal or absence of nausea and vomiting  Outcome: Progressing  Flowsheets (Taken 10/25/2024 1711)  Minimal or absence of nausea and vomiting:   Administer IV fluids as ordered to ensure adequate hydration   Maintain NPO status until nausea and vomiting are resolved   Administer ordered antiemetic medications as needed  Goal: Maintains or returns to baseline bowel function  Outcome: Progressing  Flowsheets (Taken 10/25/2024 1711)  Maintains or returns to baseline bowel function:   Encourage oral fluids to ensure adequate hydration   Assess bowel function   Encourage mobilization and activity  Goal: Maintains adequate nutritional intake  Outcome: Progressing  Flowsheets (Taken 10/25/2024 1711)  Maintains adequate nutritional intake:   Monitor percentage of each meal consumed   Identify factors contributing to decreased intake, treat as appropriate     Problem: Metabolic/Fluid and Electrolytes - Adult  Goal: Electrolytes maintained within normal limits  Outcome: Progressing  Flowsheets (Taken 10/25/2024 1711)  Electrolytes maintained within normal limits:   Administer electrolyte replacement as ordered   Monitor response to electrolyte replacements, including repeat lab results as appropriate  Goal: Hemodynamic stability and optimal renal function maintained  Outcome: Progressing  Flowsheets (Taken 10/25/2024 1711)  Hemodynamic stability and optimal renal function maintained:   Monitor intake, output and patient weight   Monitor labs and assess for signs and symptoms of volume excess or deficit     Problem: Nutrition Deficit:  Goal: Optimize nutritional status  Outcome: Progressing  Flowsheets (Taken 10/25/2024 1711)  Nutrient intake appropriate for improving, restoring, or maintaining nutritional needs: Assess nutritional 
patient/family to call for assistance with activity based on assessment    Problem: Neurosensory - Adult  Goal: Achieves maximal functionality and self care  Outcome: Progressing  Flowsheets  Taken 10/26/2024 2204 by Preet Estevez RN  Achieves maximal functionality and self care:   Encourage visually impaired, hearing impaired and aphasic patients to use assistive/communication devices   Encourage and assist patient to increase activity and self care with guidance from physical therapy/occupational therapy   Monitor swallowing and airway patency with patient fatigue and changes in neurological status  Problem: Respiratory - Adult  Goal: Achieves optimal ventilation and oxygenation  Outcome: Progressing  Flowsheets  Taken 10/26/2024 2204 by Preet Estevez RN  Achieves optimal ventilation and oxygenation:   Assess for changes in respiratory status   Assess for changes in mentation and behavior   Position to facilitate oxygenation and minimize respiratory effort   Oxygen supplementation based on oxygen saturation or arterial blood gases   Initiate smoking cessation protocol as indicated   Encourage broncho-pulmonary hygiene including cough, deep breathe, incentive spirometry   Assess the need for suctioning and aspirate as needed   Assess and instruct to report shortness of breath or any respiratory difficulty   Respiratory therapy support as indicated    Problem: Cardiovascular - Adult  Goal: Maintains optimal cardiac output and hemodynamic stability  10/26/2024 2204 by Preet Estevez RN  Outcome: Progressing  Flowsheets (Taken 10/26/2024 2204)  Maintains optimal cardiac output and hemodynamic stability:   Monitor urine output and notify Licensed Independent Practitioner for values outside of normal range   Monitor blood pressure and heart rate   Assess for signs of decreased cardiac output   Administer fluid and/or volume expanders as ordered   Administer vasoactive medications as ordered   For [facility] infants, 
10/22/2024 0757  Skin Integrity Remains Intact: Monitor for areas of redness and/or skin breakdown     Problem: Gastrointestinal - Adult  Goal: Minimal or absence of nausea and vomiting  10/22/2024 1104 by Grecia Leonardo RN  Outcome: Progressing  Flowsheets  Taken 10/22/2024 1104  Minimal or absence of nausea and vomiting: Administer IV fluids as ordered to ensure adequate hydration  Taken 10/22/2024 0757  Minimal or absence of nausea and vomiting: Administer IV fluids as ordered to ensure adequate hydration  Goal: Maintains or returns to baseline bowel function  10/22/2024 1104 by Grecia Leonardo RN  Outcome: Progressing  Flowsheets  Taken 10/22/2024 1104  Maintains or returns to baseline bowel function: Assess bowel function  Taken 10/22/2024 0757  Maintains or returns to baseline bowel function: Assess bowel function  Goal: Maintains adequate nutritional intake  10/22/2024 1104 by Grecia Leonardo RN  Outcome: Progressing  Flowsheets  Taken 10/22/2024 1104  Maintains adequate nutritional intake: Monitor percentage of each meal consumed  Taken 10/22/2024 0757  Maintains adequate nutritional intake: Monitor percentage of each meal consumed     Problem: Metabolic/Fluid and Electrolytes - Adult  Goal: Electrolytes maintained within normal limits  10/22/2024 1104 by Grecia Leonardo RN  Outcome: Progressing  Flowsheets  Taken 10/22/2024 1104  Electrolytes maintained within normal limits: Monitor response to electrolyte replacements, including repeat lab results as appropriate  Taken 10/22/2024 0757  Electrolytes maintained within normal limits: Monitor labs and assess patient for signs and symptoms of electrolyte imbalances  Goal: Hemodynamic stability and optimal renal function maintained  10/22/2024 1104 by Grecia Leonardo RN  Outcome: Progressing  Flowsheets (Taken 10/22/2024 0757)  Hemodynamic stability and optimal renal function maintained: Monitor labs and assess for signs and symptoms of 
Assist with meals as needed   Monitor percentage of each meal consumed   Identify factors contributing to decreased intake, treat as appropriate   Monitor intake and output, weight and lab values     Problem: Metabolic/Fluid and Electrolytes - Adult  Goal: Electrolytes maintained within normal limits  10/26/2024 0251 by Preet Estevez RN  Outcome: Progressing  Flowsheets (Taken 10/26/2024 0251)  Electrolytes maintained within normal limits:   Monitor labs and assess patient for signs and symptoms of electrolyte imbalances   Administer electrolyte replacement as ordered   Monitor response to electrolyte replacements, including repeat lab results as appropriate   Fluid restriction as ordered   Instruct patient on fluid and nutrition restrictions as appropriate     Problem: Metabolic/Fluid and Electrolytes - Adult  Goal: Hemodynamic stability and optimal renal function maintained  10/26/2024 0251 by Preet Estevez RN  Outcome: Progressing  Flowsheets (Taken 10/26/2024 0251)  Hemodynamic stability and optimal renal function maintained:   Monitor intake, output and patient weight   Monitor labs and assess for signs and symptoms of volume excess or deficit   Monitor urine specific gravity, serum osmolarity and serum sodium as indicated or ordered   Monitor response to interventions for patient's volume status, including labs, urine output, blood pressure (other measures as available)   Encourage oral intake as appropriate   Instruct patient on fluid and nutrition restrictions as appropriate     Problem: Nutrition Deficit:  Goal: Optimize nutritional status  10/26/2024 0251 by Preet Estevez RN  Outcome: Progressing  Flowsheets (Taken 10/26/2024 0251)  Nutrient intake appropriate for improving, restoring, or maintaining nutritional needs:   Provide specific nutrition education to patient or family as appropriate   Recommend, monitor, and adjust tube feedings and TPN/PPN based on assessed needs   Order, calculate, and 
patient weight   Monitor urine specific gravity, serum osmolarity and serum sodium as indicated or ordered   Monitor response to interventions for patient's volume status, including labs, urine output, blood pressure (other measures as available)   Encourage oral intake as appropriate   Instruct patient on fluid and nutrition restrictions as appropriate

## 2024-10-28 NOTE — DISCHARGE INSTRUCTIONS
Warfarin Discharge Instructions:   Date Warfarin Dose   10/29 (tomorrow) 17.5 mg (3 and 1/2 tablets)   10/30 20 mg (4 tablets)     Appointment at Coumadin clinic on 10/31 at 9:40 am

## 2024-10-28 NOTE — PROGRESS NOTES
Clinical Pharmacy Note                                               Warfarin Discharge Recommendations    Patient discharged from [facility] today on warfarin.    Warfarin indication: Acute DVT & PE  INR goal during admission: 2.0-3.0  Previous home warfarin regimen: none, new start  Interacting medications at discharge: Ensure   Coumadin 5 mg tabs    Hospital Warfarin Doses & INR Results  Date INR Warfarin Dose   10/16/2024 0.91 5 mg    10/17/2024 0.88  5 mg     10/18/2024 0.88 10 mg     10/19/2024 0.88 10 mg    10/20/2024  0.92  15 mg    10/21/2024  1.01  15 mg     10/22/2024 1.05 15 mg    10/23/2024  1.16  20 mg    10/24/2024 1.29 20 mg     10/25/2024 1.42  20 mg     10/26/2024 1.77 20 mg   10/27/2024 1.93 15 mg   10/28/24 2.05 20 mg       Recent INRs:  Recent Labs     10/28/24  0536   INR 2.05*     Warfarin Discharge Instructions:   Date Warfarin Dose   10/29 (tomorrow) 17.5 mg (3 and 1/2 tablets)   10/30 20 mg (4 tablets)     Provider dosing warfarin: [facility]  Next INR date: 10/31 at 0940    Elise Boyer, Pharm.D., BCPS, BCCCP 10/28/2024 11:12 AM      
    Hospitalist Progress Note      Patient:  Dashawn Rivera 44 y.o. male       : 1980  Unit/Bed:-24/024-A    Date of Admission: 10/14/2024      ASSESSMENT AND PLAN    Active Problems  Acute Pulmonary embolism   CTA Chest 10/14 with filling defects noted in distal right main pulmonary artery extending to right lower lobe branches.  No evidence of right heart strain.  Failed IVC.  Hematology consulted. Recommending coumadin. INR goal 2-3. Pharm to dose. Continue to check Anti-Xa and INR daily.   Recent DVTs   Doppler 24 reported extensive acute DVT through RLE. Patient was discharged on Xarelto. B/l venous dopplers 10/1 showed persistent extensive acute appearing DVT throughout RLE, acute DVT in 1 of left posterior tibial veins.   S/p right lower extremity thrombectomy 10/1. IVC filter placed 10/2. Was not discharged on anticoagulation d/t liver dysfunction with elevated INR and APTT.   Hypercoagulable state  Hematology consulted.   Factor V Leiden, prothrombin gene mutation pending per hematology.  Hematology recommending Coumadin with close monitoring for 7 days of monitoring at Coumadin clinic.  Recommend him to stay inpatient until INR closer to 2 with Coumadin clinic follow-up within 1 to 2 days.  Pharmacy to dose coumadin.   Alcoholic liver disease   LFTs slightly improved from previous.   GI consulted -patient high risk for bleeding with alcoholic liver disease however agree benefits outweigh the risks with taking Coumadin for recurrent blood clots.  Okay to continue this from GI standpoint.  GI will continue to follow-up with patient in office.    Chronic Conditions (reviewed, stable, and home medications resumed, unless otherwise stated)  Seizure disorder  Chronic macrocytic anemia  History of alcohol abuse       LDA: []CVC / []PICC / []Midline / []Raygoza / []Drains / []Mediport / [x]None  Antibiotics: no  Steroids: no  Labs (still needed?): [x]Yes / []No  IVF (still needed?): []Yes / 
    Hospitalist Progress Note      Patient:  Dashawn Rivera 44 y.o. male       : 1980  Unit/Bed:-24/024-A    Date of Admission: 10/14/2024      ASSESSMENT AND PLAN    Active Problems  Acute Pulmonary embolism   CTA Chest 10/14 with filling defects noted in distal right main pulmonary artery extending to right lower lobe branches.  No evidence of right heart strain.  Failed IVC.  Hematology consulted. Recommending coumadin. INR goal 2-3. Pharm to dose. Continue to check Anti-Xa and INR daily.   Recent DVTs   Doppler 24 reported extensive acute DVT through RLE. Patient was discharged on Xarelto. B/l venous dopplers 10/1 showed persistent extensive acute appearing DVT throughout RLE, acute DVT in 1 of left posterior tibial veins.   S/p right lower extremity thrombectomy 10/1. IVC filter placed 10/2. Was not discharged on anticoagulation d/t liver dysfunction with elevated INR and APTT.   Hypercoagulable state  Hematology consulted.   Factor V Leiden, prothrombin gene mutation pending per hematology.  Hematology recommending Coumadin with close monitoring for 7 days of monitoring at Coumadin clinic.  Recommend him to stay inpatient until INR closer to 2 with Coumadin clinic follow-up within 1 to 2 days.  Pharmacy to dose coumadin.   Alcoholic liver disease   LFTs slightly improved from previous.   GI consulted -patient high risk for bleeding with alcoholic liver disease however agree benefits outweigh the risks with taking Coumadin for recurrent blood clots.  Okay to continue this from GI standpoint.  GI will continue to follow-up with patient in office.    Chronic Conditions (reviewed, stable, and home medications resumed, unless otherwise stated)  Seizure disorder  Chronic macrocytic anemia  History of alcohol abuse       LDA: []CVC / []PICC / []Midline / []Raygoza / []Drains / []Mediport / [x]None  Antibiotics: no  Steroids: no  Labs (still needed?): [x]Yes / []No  IVF (still needed?): []Yes / 
    Hospitalist Progress Note      Patient:  Dashawn Rivera 44 y.o. male       : 1980  Unit/Bed:[facility]24/024-A    Date of Admission: 10/14/2024      ASSESSMENT AND PLAN    Active Problems  Acute Pulmonary embolism   CTA Chest 10/14 with filling defects noted in distal right main pulmonary artery extending to right lower lobe branches.  No evidence of right heart strain.  Failed IVC.  Hematology consulted. Recommending coumadin. INR goal 2-3. Pharm to dose. Continue to check Anti-Xa and INR daily.   10/22: INR 1.05. Have discussed with pharmacy, if INR gets close to 2, can consider sending home with a couple days of Lovenox for continued bridging.   Recent DVTs   Doppler 24 reported extensive acute DVT through RLE. Patient was discharged on Xarelto. B/l venous dopplers 10/1 showed persistent extensive acute appearing DVT throughout RLE, acute DVT in 1 of left posterior tibial veins.   S/p right lower extremity thrombectomy 10/1. IVC filter placed 10/2. Was not discharged on anticoagulation d/t liver dysfunction with elevated INR and APTT.   Hypercoagulable state  Hematology consulted.   Factor V Leiden, prothrombin gene mutation pending per hematology.  Hematology recommending Coumadin with close monitoring for 7 days of monitoring at Coumadin clinic.  Recommend him to stay inpatient until INR closer to 2 with Coumadin clinic follow-up within 1 to 2 days.  Pharmacy to dose coumadin.   Alcoholic liver disease   LFTs slightly improved from previous.   GI consulted -patient high risk for bleeding with alcoholic liver disease however agree benefits outweigh the risks with taking Coumadin for recurrent blood clots.  Okay to continue this from GI standpoint.  GI will continue to follow-up with patient in office.    Chronic Conditions (reviewed, stable, and home medications resumed, unless otherwise stated)  Seizure disorder  Chronic macrocytic anemia  History of alcohol abuse       LDA: []CVC / []PICC / []Midline / 
    Hospitalist Progress Note      Patient:  Dashawn Rivera 44 y.o. male       : 1980  Unit[facility]Bed:[facility]24/024-A    Date of Admission: 10/14/2024      ASSESSMENT AND PLAN    Active Problems  Acute Pulmonary embolism   CTA Chest 10/14 with filling defects noted in distal right main pulmonary artery extending to right lower lobe branches.  No evidence of right heart strain.  Failed IVC.  Hematology consulted. Recommending coumadin. INR goal 2-3. Pharm to dose.   Recent DVTs   Doppler 24 reported extensive acute DVT through RLE. Patient was discharged on Xarelto. B/l venous dopplers 10/1 showed persistent extensive acute appearing DVT throughout RLE, acute DVT in 1 of left posterior tibial veins.   S/p right lower extremity thrombectomy 10/1. IVC filter placed 10/2. Was not discharged on anticoagulation d/t liver dysfunction with elevated INR and APTT.   Hypercoagulable state  Hematology consulted.   Factor V Leiden, prothrombin gene mutation pending per hematology.  Hematology recommending Coumadin with close monitoring for 7 days of monitoring at Coumadin clinic.  Recommend him to stay inpatient until INR closer to 2 with Coumadin clinic follow-up within 1 to 2 days.  Pharmacy to dose coumadin.   Alcoholic liver disease   LFTs slightly improved from previous.   GI consulted -patient high risk for bleeding with alcoholic liver disease however agree benefits outweigh the risks with taking Coumadin for recurrent blood clots.  Okay to continue this from GI standpoint.  GI will continue to follow-up with patient in office.    Chronic Conditions (reviewed, stable, and home medications resumed, unless otherwise stated)  Seizure disorder  Chronic macrocytic anemia  History of alcohol abuse       LDA: []CVC / []PICC / []Midline / []Raygoza / []Drains / []Mediport / [x]None  Antibiotics: no  Steroids: no  Labs (still needed?): [x]Yes / []No  IVF (still needed?): []Yes / [x]No    Level of care: []Step Down / 
  Hospitalist Progress Note      Patient:  Dashawn Rivera      Unit/Bed:6K-24/024-A    YOB: 1980    MRN: [id number]       Acct: [id number]     PCP: Lyn Damico APRN - CNP    Date of Admission: 10/14/2024    Date/Time of Evaluation:  10/23/2024 at 11:23 AM    Assessment/Plan:    Acute Pulmonary embolism  -- POA -- CTA Chest 10/14 with filling defects noted in distal right main pulmonary artery extending to right lower lobe branches.  No evidence of right heart strain. Heparin gtt currently and coumadin with pharm to dose as below per hem/onc recs and to monitor closely for any bleeding with liver dysfxn  -- Failed IVC?? Vs ?clot move prior to filter placement 10/1/24 as CT chest prior to that was 9/24/24 which was (-) for pE  -- Hematology consulted. Recommending coumadin. INR goal 2-3. Pharm dosing and f/u CC --> Continue to check Anti-Xa and INR daily -> INR 10/23/2024 1.16 -- pt does NOT wish to do lovenox injections   Recent DVTs -- Doppler 9/24/24 reported extensive acute DVT through RLE. Patient was discharged on Xarelto. B/l venous dopplers 10/1 showed persistent extensive acute appearing DVT throughout RLE, acute DVT in 1 of left posterior tibial veins.   -- S/p right lower extremity thrombectomy 10/1. IVC filter placed 10/2. Was not discharged on anticoagulation d/t liver dysfunction with elevated INR and APTT  -- hematology and GI consulted and benefit of anticoagulation out weighs risk -> heparin gtt and started on coumadin per hem/onc recs -> Pharm dosing to INR 2-3 and per GI rec cont remain inpatient to watch for   -- INR 1.16 on 10/23/2024   Hypercoagulable state -- apprec Hematology consult.   -- Hematology recommending Coumadin with close monitoring for 7 days of monitoring at Coumadin clinic.  Recommend him to stay inpatient on heparin gtt until INR closer to 2 with Coumadin clinic follow-up within 1 to 2 day due to bleeding risk with liver dysfxn  -- Pharmacy dosing coumadin 
  Hospitalist Progress Note      Patient:  Dashawn Rivera      Unit/Bed:6K-24/024-A    YOB: 1980    MRN: [id number]       Acct: [id number]     PCP: Lyn Damico APRN - CNP    Date of Admission: 10/14/2024    Date/Time of Evaluation:  10/27/2024 at 11:40 AM    Assessment/Plan:    Acute Pulmonary embolism  -- POA -- CTA Chest 10/14 with filling defects noted in distal right main pulmonary artery extending to right lower lobe branches.  No evidence of right heart strain. Heparin gtt currently and coumadin with pharm to dose as below per hem/onc recs and to monitor closely for any bleeding with liver dysfxn  -- Failed IVC?? Vs ?clot move prior to filter placement 10/1/24 as CT chest prior to that was 9/24/24 which was (-) for pE  -- Hematology consulted. Recommending coumadin. INR goal 2-3. Pharm dosing and f/u CC --> Continue to check Anti-Xa and INR daily -> INR 10/27/2024 1.93 -- pt does NOT wish to do lovenox injections for earlier d/c  Recent DVTs -- Doppler 9/24/24 reported extensive acute DVT through RLE. Patient was discharged on Xarelto. B/l venous dopplers 10/1 showed persistent extensive acute appearing DVT throughout RLE, acute DVT in 1 of left posterior tibial veins.   -- S/p right lower extremity thrombectomy 10/1. IVC filter placed 10/2. Was not discharged on anticoagulation d/t liver dysfunction with elevated INR and APTT  -- hematology and GI consulted and benefit of anticoagulation out weighs risk -> heparin gtt and started on coumadin per hem/onc recs -> Pharm dosing to INR 2-3 and per GI rec cont remain inpatient to watch for bleeding   -- INR 1.93 on 10/27/2024   Hypercoagulable state -- apprec Hematology consult.   -- Hematology recommending \"Coumadin with close monitoring for 7 days of monitoring at Coumadin clinic.  Recommend him to stay inpatient on heparin gtt until INR closer to 2 with Coumadin clinic follow-up within 1 to 2 day due to bleeding risk with liver dysfxn\"  -- 
  Hospitalist Progress Note      Patient:  Dashawn Rivera      Unit/Bed:6K-24/024-A    YOB: 1980    MRN: [id number]       Acct: [id number]     PCP: Lyn Damico APRN - CNP    Date of Admission: 10/14/2024    Date/Time of Evaluation:  10/24/2024 at 8:26 AM    Assessment/Plan:    Acute Pulmonary embolism  -- POA -- CTA Chest 10/14 with filling defects noted in distal right main pulmonary artery extending to right lower lobe branches.  No evidence of right heart strain. Heparin gtt currently and coumadin with pharm to dose as below per hem/onc recs and to monitor closely for any bleeding with liver dysfxn  -- Failed IVC?? Vs ?clot move prior to filter placement 10/1/24 as CT chest prior to that was 9/24/24 which was (-) for pE  -- Hematology consulted. Recommending coumadin. INR goal 2-3. Pharm dosing and f/u CC --> Continue to check Anti-Xa and INR daily -> INR 10/24/2024 1.29 -- pt does NOT wish to do lovenox injections   Recent DVTs -- Doppler 9/24/24 reported extensive acute DVT through RLE. Patient was discharged on Xarelto. B/l venous dopplers 10/1 showed persistent extensive acute appearing DVT throughout RLE, acute DVT in 1 of left posterior tibial veins.   -- S/p right lower extremity thrombectomy 10/1. IVC filter placed 10/2. Was not discharged on anticoagulation d/t liver dysfunction with elevated INR and APTT  -- hematology and GI consulted and benefit of anticoagulation out weighs risk -> heparin gtt and started on coumadin per hem/onc recs -> Pharm dosing to INR 2-3 and per GI rec cont remain inpatient to watch for   -- INR 1.29 on 10/24/2024   Hypercoagulable state -- apprec Hematology consult.   -- Hematology recommending Coumadin with close monitoring for 7 days of monitoring at Coumadin clinic.  Recommend him to stay inpatient on heparin gtt until INR closer to 2 with Coumadin clinic follow-up within 1 to 2 day due to bleeding risk with liver dysfxn  -- Pharmacy dosing coumadin goal 
Comprehensive Nutrition Assessment    Type and Reason for Visit:  RD Nutrition Re-Screen/LOS    Nutrition Recommendations/Plan:   Recommend Multivitamin, Folic Acid and Thiamine daily.  ONS Initiated: Ensure Plus TID (prefers chocolate or vanilla).  Continue current diet. Encouraged oral intake.     Malnutrition Assessment:  Malnutrition Status:  Moderate malnutrition (10/22/24 1342)    Context:  Social/Environmental Circumstances (alcohol abuse/food availability)     Findings of the 6 clinical characteristics of malnutrition:  Energy Intake:  Less than 75% estimated energy requirements for 3 months or longer  Weight Loss:  Unable to assess (Limited weight per EMR. Pt reported 7.1% loss in the last 3-4 months per patient)     Body Fat Loss:   (Moderate Body Fat Loss) Orbital, Triceps, Fat Overlying Ribs   Muscle Mass Loss:   (Moderate Muscle Mass Loss) Temples (temporalis), Clavicles (pectoralis & deltoids), Thigh (quadraceps), Calf (gastrocnemius)  Fluid Accumulation:  No significant fluid accumulation     Strength:  Not Performed    Nutrition Assessment: Pt. moderately malnourished AEB criteria as listed above. At risk for further nutrition compromise r/t admit with pulmonary embolism, recent DVT's s/p IVC filter placement 10/2/24, hypercoagulable state, alcoholic liver disease, alcohol abuse history, elevated liver enzymes, and underlying medical condition (hx: liver disease, seizures, smoking, ETOH abuse-10 shots of vodka daily).        Nutrition Related Findings:    Pt. Report/Treatments/Miscellaneous: Patient seen- this morning. Pt reports poor appetite and intake of meals over the past 3-4 months with unplanned weight loss of ~10 lbs d/t drinking alcohol and smoking. Pt denies any nausea or abdominal pain. Alcohol abuse hx. Pt reports consuming 1-2 meals dailt PTA and reports he is eating better here than he does at home. Agreeable to ONS.    GI Status: Last BM x1 on 10/21.  Pertinent Labs: 10/22/24: Alk 
Comprehensive Nutrition Assessment    Type and Reason for Visit:  Reassess    Nutrition Recommendations/Plan:   Recommend MVI, Folic Acid and Thiamine supplementation with hx ETOH  Continue current diet and ONS (Ensure Plus TID)  Encouraged good nutrition /intake and ONS options for home use      Malnutrition Assessment:  Malnutrition Status:  Moderate malnutrition (10/22/24 1342)    Context:  Social/Environmental Circumstances (alcohol abuse/food availability)     Findings of the 6 clinical characteristics of malnutrition:  Energy Intake:  Less than 75% estimated energy requirements for 3 months or longer  Weight Loss:  Unable to assess (Limited weight per EMR. Pt reported 7.1% loss in the last 3-4 months per patient)     Body Fat Loss:   (Moderate Body Fat Loss) Orbital, Triceps, Fat Overlying Ribs   Muscle Mass Loss:   (Moderate Muscle Mass Loss) Temples (temporalis), Clavicles (pectoralis & deltoids), Thigh (quadraceps), Calf (gastrocnemius)  Fluid Accumulation:  No significant fluid accumulation     Strength:  Not Performed    Nutrition Assessment:     Pt. Improving from nutritional standpoint AEB good intake of meals and ONS. At risk for further nutrition compromise r/t meets criteria for moderate malnutrition, admit with pulmonary embolism, recent DVT's s/p IVC filter placement 10/2/24, hypercoagulable state, alcoholic liver disease, alcohol abuse history, elevated liver enzymes, and underlying medical condition (hx: liver disease, seizures, smoking, ETOH abuse-10 shots of vodka daily).        Nutrition Related Findings:    Pt. Report/Treatments/Miscellaneous: Patient seen and reports plans for discharge home today, reports tolerating po well, good intake of meals and Ensure Plus.  10/22/24: Pt reports poor appetite and intake of meals over the past 3-4 months with unplanned weight loss of ~10 lbs d/t drinking alcohol and smoking. Pt denies any nausea or abdominal pain. Alcohol abuse hx. Pt reports 
Discharge instructions was given.Coumadin for today wasbgiven. States they understands that they need to f/u with CC.   
No changes made to heparin gtt per order.  
Patient has had 2 consecutive Anti-Xa the first one was at 0056 (0.53) and the 2nd one was at 0536 (0.52) this nurse changed the labs to daily starting 10/29/24  
Patient is alert and oriented x4. Patient is awake and lying in bed on phone. No abnormal VS noted. Lungs sounds are clear bilaterally, with respirations being easy and unlabored. Patient was given cup of ice per their request.   
Patient is alert and oriented x4. Patient is awake and lying in bed with television on. Visitor in room. Lungs sounds are clear, but diminished in lower lobes. Respirations are easy and unlabored. No abnormal VS noted.   
Prayer and encouragement   10/17/24 8905   Encounter Summary   Encounter Overview/Reason Initial Encounter   Service Provided For Patient   Referral/Consult From Rounding   Support System Family members   Last Encounter  10/17/24   Complexity of Encounter Low   Spiritual/Emotional needs   Type Spiritual Support   Assessment/Intervention/Outcome   Assessment Hopeful   Intervention Empowerment        
Sent secure message regarding Keppra not being on pt MAR pt states he takes  confirmed on home med list. Waiting on response.   
Warfarin Pharmacy Consult Note    Dashawn Rivera is a 44 y.o. male for whom pharmacy has been asked to manage warfarin therapy.     Consulting Provider: JAIME Abraham   Indication:  DVT  Target INR: 2.0-3.0   Warfarin dose prior to admission: none - new start  Outpatient warfarin provider: [facility]    Recent Labs     10/14/24  1445 10/15/24  0357 10/16/24  0431   HGB 9.8* 8.7* 8.7*    215 258     Recent Labs     10/14/24  1445 10/14/24  1618 10/16/24  0959   INR 0.97 1.02 0.91     Concurrent anticoagulants/antiplatelets: heparin bridge  Significant warfarin drug-drug interactions: none at this time    Date INR Warfarin Dose   10/16/2024 0.91 5 mg                                   INR will be monitored routinely until therapeutic INR is achieved.    Pharmacy will continue to follow. Thank you for the consult,   Summer Borden, PharmD, BCPS, BCCCP  10/16/2024 2:23 PM      
Warfarin Pharmacy Consult Note    Warfarin Indication: Acute DVT  Target INR: 2.0-3.0  Dose prior to admission: none- new start    Recent Labs     10/15/24  0357 10/16/24  0431 10/17/24  0607   HGB 8.7* 8.7* 9.1*    258 324     Recent Labs     10/14/24  1618 10/16/24  0959 10/17/24  0607   INR 1.02 0.91 0.88     Concurrent anticoagulants/antiplatelets: heparin bridge  Significant warfarin drug-drug interactions: none at this time     Date INR Warfarin Dose   10/16/2024 0.91 5 mg    10/17/2024 0.88  5 mg                                                 Monitoring:                   INR will be monitored daily until therapeutic INR is achieved.    **Please contact pharmacy for discharge instructions when indicated**    Eula Escobedo PharmD, BCPS 10/17/2024 7:44 AM      
Warfarin Pharmacy Consult Note    Warfarin Indication: Acute DVT & PE  Target INR: 2.0-3.0  Dose prior to admission: new start     Recent Labs     10/22/24  0544 10/23/24  0559   HGB 9.0* 9.1*    369     Recent Labs     10/21/24  0733 10/22/24  0544 10/23/24  0559   INR 1.01 1.05 1.16*     Concurrent anticoagulants/antiplatelets: IV heparin   Significant warfarin drug-drug interactions: Ensure TID     Date INR Warfarin Dose   10/16/2024 0.91 5 mg    10/17/2024 0.88  5 mg     10/18/2024  0.88 10 mg     10/19/2024  0.88  10 mg    10/20/2024  0.92  15 mg    10/21/2024  1.01  15 mg     10/22/2024  1.05 15 mg    10/23/2024  1.16   20 mg                             Monitoring:                   INR will be monitored daily until therapeutic INR is achieved.    **Please contact pharmacy for discharge instructions when indicated**    Fabienne Mas PharmD 10/23/2024 12:14 PM      
Warfarin Pharmacy Consult Note    Warfarin Indication: Acute DVT & PE  Target INR: 2.0-3.0  Dose prior to admission: new start    Recent Labs     10/22/24  0544 10/23/24  0559   HGB 9.0* 9.1*    369     Recent Labs     10/22/24  0544 10/23/24  0559 10/24/24  0723   INR 1.05 1.16* 1.29*     Concurrent anticoagulants/antiplatelets: IV heparin   Significant warfarin drug-drug interactions: Ensure TID     Date INR Warfarin Dose   10/16/2024 0.91 5 mg    10/17/2024 0.88  5 mg     10/18/2024  0.88 10 mg     10/19/2024  0.88  10 mg    10/20/2024  0.92  15 mg    10/21/2024  1.01  15 mg     10/22/2024  1.05 15 mg    10/23/2024  1.16   20 mg    10/24/2024  1.29 20 mg                        Monitoring:                   INR will be monitored daily until therapeutic INR is achieved.    **Please contact pharmacy for discharge instructions when indicated**    Ev Hernandez, PharmD, BCPS 10/24/2024 11:50 AM     
Warfarin Pharmacy Consult Note    Warfarin Indication: Acute DVT & PE  Target INR: 2.0-3.0  Dose prior to admission: new start    Recent Labs     10/23/24  0559   HGB 9.1*        Recent Labs     10/23/24  0559 10/24/24  0723 10/25/24  0717   INR 1.16* 1.29* 1.42*     Concurrent anticoagulants/antiplatelets: IV heparin   Significant warfarin drug-drug interactions: Ensure TID     Date INR Warfarin Dose   10/16/2024 0.91 5 mg    10/17/2024 0.88  5 mg     10/18/2024  0.88 10 mg     10/19/2024  0.88  10 mg    10/20/2024  0.92  15 mg    10/21/2024  1.01  15 mg     10/22/2024  1.05 15 mg    10/23/2024  1.16   20 mg    10/24/2024  1.29 20 mg     10/25/2024 1.42  20 mg                Monitoring:                   INR will be monitored daily until therapeutic INR is achieved.    **Please contact pharmacy for discharge instructions when indicated**    Ev Hernandez, NickD, BCPS 10/25/2024 12:03 PM     
Warfarin Pharmacy Consult Note    Warfarin Indication: Acute DVT & PE  Target INR: 2.0-3.0  Dose prior to admission: new start    Recent Labs     10/26/24  0651   HGB 10.1*        Recent Labs     10/24/24  0723 10/25/24  0717 10/26/24  0651   INR 1.29* 1.42* 1.77*     Concurrent anticoagulants/antiplatelets: IV heparin   Significant warfarin drug-drug interactions: Ensure TID     Date INR Warfarin Dose   10/16/2024 0.91 5 mg    10/17/2024 0.88  5 mg     10/18/2024 0.88 10 mg     10/19/2024 0.88 10 mg    10/20/2024  0.92  15 mg    10/21/2024  1.01  15 mg     10/22/2024 1.05 15 mg    10/23/2024  1.16  20 mg    10/24/2024 1.29 20 mg     10/25/2024 1.42  20 mg     10/26/2024  1.77 20 mg       Monitoring:                   INR will be monitored daily until therapeutic INR is achieved.    **Please contact pharmacy for discharge instructions when indicated**    Coleman Kincaid [facility] 10/26/2024 7:41 AM      
Warfarin Pharmacy Consult Note    Warfarin Indication: Acute DVT & PE  Target INR: 2.0-3.0  Dose prior to admission: new start    Recent Labs     10/26/24  0651 10/27/24  0700   HGB 10.1* 9.6*    354     Recent Labs     10/25/24  0717 10/26/24  0651 10/27/24  0700   INR 1.42* 1.77* 1.93*     Concurrent anticoagulants/antiplatelets: IV heparin   Significant warfarin drug-drug interactions: Ensure TID     Date INR Warfarin Dose   10/16/2024 0.91 5 mg    10/17/2024 0.88  5 mg     10/18/2024 0.88 10 mg     10/19/2024 0.88 10 mg    10/20/2024  0.92  15 mg    10/21/2024  1.01  15 mg     10/22/2024 1.05 15 mg    10/23/2024  1.16  20 mg    10/24/2024 1.29 20 mg     10/25/2024 1.42  20 mg     10/26/2024 1.77 20 mg   10/27/2024 1.93 15 mg              Monitoring:                   INR will be monitored daily until therapeutic INR is achieved.    **Please contact pharmacy for discharge instructions when indicated**    Coleman Kincaid [facility] 10/27/2024 9:03 AM      
Warfarin Pharmacy Consult Note    Warfarin Indication: Acute DVT & PE  Target INR: 2.0-3.0  Dose prior to admission: new start    Recent Labs     10/26/24  0651 10/27/24  0700   HGB 10.1* 9.6*    354     Recent Labs     10/26/24  0651 10/27/24  0700 10/28/24  0536   INR 1.77* 1.93* 2.05*     Concurrent anticoagulants/antiplatelets: IV heparin   Significant warfarin drug-drug interactions: Ensure TID     Date INR Warfarin Dose   10/16/2024 0.91 5 mg    10/17/2024 0.88  5 mg     10/18/2024 0.88 10 mg     10/19/2024 0.88 10 mg    10/20/2024  0.92  15 mg    10/21/2024  1.01  15 mg     10/22/2024 1.05 15 mg    10/23/2024  1.16  20 mg    10/24/2024 1.29 20 mg     10/25/2024 1.42  20 mg     10/26/2024 1.77 20 mg   10/27/2024 1.93 15 mg   10/28/24 2.05 20 mg       Monitoring:                   INR will be monitored daily while admitted    **Please contact pharmacy for discharge instructions when indicated**    Elise Boyer, Pharm.D., BCPS, BCCCP 10/28/2024 10:49 AM        
Warfarin Pharmacy Consult Note    Warfarin Indication: Acute DVT & PE  Target INR: 2.0-3.0  Dose prior to admission: none - new start    Recent Labs     10/17/24  0607 10/19/24  0657   HGB 9.1* 8.9*    384     Recent Labs     10/17/24  0607 10/18/24  0629 10/19/24  0657   INR 0.88 0.86 0.88     Concurrent anticoagulants/antiplatelets: heparin bridge  Significant warfarin drug-drug interactions: none at this time     Date INR Warfarin Dose   10/16/2024 0.91 5 mg    10/17/2024 0.88  5 mg     10/18/2024  0.88 10 mg     10/19/2024  0.88  10 mg                               Monitoring:                   INR will be monitored daily until therapeutic INR is achieved.    **Please contact pharmacy for discharge instructions when indicated**    Elise Boyer, Pharm.D., BCPS, BCCCP 10/19/2024 2:13 PM        
Warfarin Pharmacy Consult Note    Warfarin Indication: Acute DVT & PE  Target INR: 2.0-3.0  Dose prior to admission: none - new start    Recent Labs     10/19/24  0657 10/20/24  0556   HGB 8.9* 8.8*    380     Recent Labs     10/19/24  0657 10/20/24  0556 10/21/24  0733   INR 0.88 0.92 1.01     Concurrent anticoagulants/antiplatelets: heparin bridge  Significant warfarin drug-drug interactions: none at this time     Date INR Warfarin Dose   10/16/2024 0.91 5 mg    10/17/2024 0.88  5 mg     10/18/2024  0.88 10 mg     10/19/2024  0.88  10 mg    10/20/2024  0.92  15 mg    10/21/24  1.01  15 mg              Monitoring:                   INR will be monitored daily until therapeutic INR is achieved.    **Please contact pharmacy for discharge instructions when indicated**    Georgia Johnson PharmD  PGY2 Resident   10/21/2024 2:36 PM  
Warfarin Pharmacy Consult Note    Warfarin Indication: Acute DVT & PE  Target INR: 2.0-3.0  Dose prior to admission: none- new start    Recent Labs     10/16/24  0431 10/17/24  0607   HGB 8.7* 9.1*    324     Recent Labs     10/16/24  0959 10/17/24  0607 10/18/24  0629   INR 0.91 0.88 0.86     Concurrent anticoagulants/antiplatelets: heparin bridge  Significant warfarin drug-drug interactions: none at this time     Date INR Warfarin Dose   10/16/2024 0.91 5 mg    10/17/2024 0.88  5 mg     10/18/2024  0.88 10 mg                                         Monitoring:                   INR will be monitored daily until therapeutic INR is achieved.    **Please contact pharmacy for discharge instructions when indicated**    Eula Escobedo PharmD, BCPS 10/18/2024 8:43 AM      
Warfarin Pharmacy Consult Note    Warfarin Indication: Acute DVT & PE  Target INR: 2.0-3.0  Dose prior to admission: none--new start    Recent Labs     10/20/24  0556 10/22/24  0544   HGB 8.8* 9.0*    393     Recent Labs     10/20/24  0556 10/21/24  0733 10/22/24  0544   INR 0.92 1.01 1.05     Concurrent anticoagulants/antiplatelets: heparin bridge  Significant warfarin drug-drug interactions: none at this time     Date INR Warfarin Dose   10/16/2024 0.91 5 mg    10/17/2024 0.88  5 mg     10/18/2024  0.88 10 mg     10/19/2024  0.88  10 mg    10/20/2024  0.92  15 mg    10/21/2024  1.01  15 mg     10/22/2024  1.05 15 mg                            Monitoring:                   INR will be monitored daily until therapeutic INR is achieved.    **Please contact pharmacy for discharge instructions when indicated**    Gia Keith [facility] BCPS  10/22/2024 8:17 AM      
coumadin goal 2-3 -> INR 1.42 10/25/2024   Leukocytosis -- likely reactive - up to 13 on 10/19 but down to WNL since 10/22 -- cont trend - afeb, no signs of infection on CTA chest or CT abd -- monitor off any atbx  Alcoholic liver disease -- LFTs slightly improved from previous -- cont monitor and counseled on cessation  -- GI consulted -patient high risk for bleeding with alcoholic liver disease however agree benefits outweigh the risks with taking Coumadin for recurrent blood clots.  Okay to continue this from GI standpoint.  GI will continue to follow-up with patient in office.  -- CT abd 10/14/24 notes hepatomegaly, hepatic steatosis  -- MRI abd 10/1/24 = hepatomegaly with patchy areas of steatosis, no liver lesions, benighn right renal cyst  Centrilobular emphysema -- noted on CTA chest 10/14/24 -- stable on RA - no wheezing -- outpt PFT -- cont monitor   Seizure disorder -- no seizures since admission - not currently on any tx monitor  Chronic macrocytic anemia -- hgb down to 8-9's since admission and prior last week 8.1 on day of d/c 10/3/24 -- iron normal but <158 sat??? B12 high and normal cokulc12/1/2024 -- cont monitor for any bleeding on anticoagulation and liver dysfxn   History of alcohol abuse -- cessation counseling but has quit with recent clotting issues-- cont encourage cessation.  Hepatomegaly/hepatic steatosis -- noted on Ct abd 10/14/24 -- f/u GI as outpt -- acute hepatitis panel 10/1/24 (-), JACKIE (-), F-actin IgG (-), Mitochondrial Ab normal  Tobacco abuse -- advised on cessation due to clotting issues    Dispo  -- 10/25/2024 -> cont heparin gtt and coumadin with pharm dosing until INR close to 2 and then d/c - monitoring on gtt as high risk for bleeding with liver dysfxn per hem and GI c/s's -- cont monitor for bleeding and h/h, lytes tomorrow.        Chief Complaint: dyspnea, bilateral leg pain    \"Initial HPI 10/14/2024 per chart review:  Dashawn Rivera is a 44 y.o. male with PMHx of alcohol 
branches with no right heart strain.  IVC filter was visualized. We continued his heparin. We consulted hematology and GI.     Medications:    Infusion Medications    heparin (PORCINE) Infusion 24 Units/kg/hr (10/18/24 0546)    sodium chloride      Scheduled Medications    warfarin placeholder: dosing by pharmacy   Other RX Placeholder    levETIRAcetam  500 mg Oral BID    sodium chloride flush  5-40 mL IntraVENous 2 times per day    PRN Meds: heparin (porcine), heparin (porcine), sodium chloride flush, sodium chloride, potassium chloride **OR** potassium alternative oral replacement **OR** potassium chloride, magnesium sulfate, ondansetron **OR** ondansetron, polyethylene glycol, acetaminophen **OR** acetaminophen    Exam:  /76   Pulse 98   Temp 98.7 °F (37.1 °C) (Oral)   Resp 16   Ht 1.778 m (5' 10\")   Wt 54.4 kg (120 lb)   SpO2 100%   BMI 17.22 kg/m²   General: No distress, appears stated age.   Eyes:  PERRL. Conjunctivae/corneas clear.  HENT: Head normal appearing. Nares normal. Oral mucosa moist.  Hearing intact.   Neck: Supple, with full range of motion. Trachea midline.  No gross JVD appreciated.  Respiratory:  Normal effort. Diminished.   Cardiovascular: Normal rate, regular rhythm with normal S1/S2 without murmurs.    No lower extremity edema.   Abdomen: Soft, non-tender, non-distended with normal bowel sounds.  Musculoskeletal: No joint swelling or tenderness. Normal tone. No abnormal movements.   Skin: Warm and dry. No rashes or lesions.  Neurologic:  No focal sensory/motor deficits in the upper or lower extremities. Cranial nerves:  grossly non-focal 2-12.     Psychiatric: Alert and oriented, normal insight and thought content.   Capillary Refill: Brisk,< 3 seconds.  Peripheral Pulses: +2 palpable, equal bilaterally.       Labs/Radiology: See chart or assessment above.     Electronically signed by Edna Mchugh PA-C on 10/18/2024 at 6:31 PM    
hospitalization INR 1.02. Troponin 6. CT of the chest showed PE in the distal right main pulmonary artery into right lower lobar branches with no right heart strain.  IVC filter was visualized. We continued his heparin. We consulted hematology and GI.     Medications:    Infusion Medications    heparin (PORCINE) Infusion 24 Units/kg/hr (10/21/24 1201)    sodium chloride      Scheduled Medications    warfarin  15 mg Oral Once    warfarin placeholder: dosing by pharmacy   Other RX Placeholder    levETIRAcetam  500 mg Oral BID    sodium chloride flush  5-40 mL IntraVENous 2 times per day    PRN Meds: simethicone, docusate sodium, benzonatate, dextromethorphan-guaiFENesin, sodium chloride, heparin (porcine), heparin (porcine), sodium chloride flush, sodium chloride, potassium chloride **OR** potassium alternative oral replacement **OR** potassium chloride, magnesium sulfate, ondansetron **OR** ondansetron, polyethylene glycol, acetaminophen **OR** acetaminophen    Exam:  BP 99/64   Pulse 86   Temp 98.6 °F (37 °C) (Oral)   Resp 18   Ht 1.778 m (5' 10\")   Wt 54.4 kg (120 lb)   SpO2 100%   BMI 17.22 kg/m²   General: No distress, appears stated age.   Eyes:  PERRL. Conjunctivae/corneas clear.  HENT: Head normal appearing. Nares normal. Oral mucosa moist.  Hearing intact.   Neck: Supple, with full range of motion. Trachea midline.  No gross JVD appreciated.  Respiratory:  Normal effort. Diminished.   Cardiovascular: Normal rate, regular rhythm with normal S1/S2 without murmurs.    No lower extremity edema.   Abdomen: Soft, non-tender, non-distended with normal bowel sounds.  Musculoskeletal: No joint swelling or tenderness. Normal tone. No abnormal movements.   Skin: Warm and dry. No rashes or lesions.  Neurologic:  No focal sensory/motor deficits in the upper or lower extremities. Cranial nerves:  grossly non-focal 2-12.     Psychiatric: Alert and oriented, normal insight and thought content.   Capillary Refill: 
sodium chloride      Scheduled Medications    warfarin placeholder: dosing by pharmacy   Other RX Placeholder    levETIRAcetam  500 mg Oral BID    sodium chloride flush  5-40 mL IntraVENous 2 times per day    PRN Meds: heparin (porcine), heparin (porcine), sodium chloride flush, sodium chloride, potassium chloride **OR** potassium alternative oral replacement **OR** potassium chloride, magnesium sulfate, ondansetron **OR** ondansetron, polyethylene glycol, acetaminophen **OR** acetaminophen    Exam:  /66   Pulse 98   Temp 98.3 °F (36.8 °C) (Oral)   Resp 16   Ht 1.778 m (5' 10\")   Wt 54.4 kg (120 lb)   SpO2 100%   BMI 17.22 kg/m²   General: No distress, appears stated age.   Eyes:  PERRL. Conjunctivae/corneas clear.  HENT: Head normal appearing. Nares normal. Oral mucosa moist.  Hearing intact.   Neck: Supple, with full range of motion. Trachea midline.  No gross JVD appreciated.  Respiratory:  Normal effort. Diminished.   Cardiovascular: Normal rate, regular rhythm with normal S1/S2 without murmurs.    No lower extremity edema.   Abdomen: Soft, non-tender, non-distended with normal bowel sounds.  Musculoskeletal: No joint swelling or tenderness. Normal tone. No abnormal movements.   Skin: Warm and dry. No rashes or lesions.  Neurologic:  No focal sensory/motor deficits in the upper or lower extremities. Cranial nerves:  grossly non-focal 2-12.     Psychiatric: Alert and oriented, normal insight and thought content.   Capillary Refill: Brisk,< 3 seconds.  Peripheral Pulses: +2 palpable, equal bilaterally.       Labs/Radiology: See chart or assessment above.     Electronically signed by Edna Mchugh PA-C on 10/16/2024 at 5:50 PM    
Margaux at 1:51 p.m. on 10/14/2024. **This report has been created using voice recognition software.  It may contain minor errors which are inherent in voice recognition technology.** Electronically signed by Dr. Grecia Franz      Electronically signed by Анна Panchal PA-C on 10/15/2024 at 12:56 PM  
Facility       [] Other-    Code Status: Full Code      Electronically signed by DORENE COATS MD on 10/26/2024 at 11:30 AM

## 2024-10-29 ENCOUNTER — TELEPHONE (OUTPATIENT)
Dept: FAMILY MEDICINE CLINIC | Age: 44
End: 2024-10-29

## 2024-10-29 NOTE — TELEPHONE ENCOUNTER
Care Transitions Initial Follow Up Call    Outreach made within 2 business days of discharge: Yes    Patient: Dashawn Rivera Patient : 1980   MRN: [id number]  Reason for Admission: PE, SOB  Discharge Date: 10/28/24       Spoke with: patient     Discharge department/facility: [facility] Interactive Patient Contact:   Pt reminded of appt times and location     Additional needs identified to be addressed with provider  No needs identified             Scheduled appointment with PCP within 7-14 days    Follow Up  Future Appointments   Date Time Provider Department Center   10/30/2024  9:00 AM Nani Flores APRN - CNP Fam Medicine [facility] ECC DEP   10/30/2024 10:00 AM Tonie Stanford APRN - CNP N Oncology P - [place]   10/30/2024 10:00 AM STR OUT PT ONC CMA STRZ OP ONC [place] [facility]\""   10/31/2024  9:40 AM Radha Ferris RPH STR MED MGMT P - [place]   2024  2:20 PM Wojciech Vasquez MD SRPX Physic [facility] E[facility] DEP       Taylor Kocher, CMA (AAMA)

## 2024-10-30 ENCOUNTER — HOSPITAL ENCOUNTER (OUTPATIENT)
Dept: INFUSION THERAPY | Age: 44
Discharge: HOME | End: 2024-10-30
Payer: MEDICAID

## 2024-10-30 ENCOUNTER — OFFICE VISIT (OUTPATIENT)
Dept: ONCOLOGY | Age: 44
End: 2024-10-30
Payer: MEDICAID

## 2024-10-30 ENCOUNTER — OFFICE VISIT (OUTPATIENT)
Dept: FAMILY MEDICINE CLINIC | Age: 44
End: 2024-10-30

## 2024-10-30 VITALS
TEMPERATURE: 97.8 F | SYSTOLIC BLOOD PRESSURE: 96 MMHG | HEART RATE: 97 BPM | OXYGEN SATURATION: 98 % | DIASTOLIC BLOOD PRESSURE: 57 MMHG | RESPIRATION RATE: 16 BRPM

## 2024-10-30 VITALS
SYSTOLIC BLOOD PRESSURE: 108 MMHG | HEART RATE: 88 BPM | HEIGHT: 70 IN | RESPIRATION RATE: 16 BRPM | DIASTOLIC BLOOD PRESSURE: 68 MMHG | BODY MASS INDEX: 20.19 KG/M2 | WEIGHT: 141 LBS

## 2024-10-30 VITALS
HEIGHT: 70 IN | OXYGEN SATURATION: 98 % | TEMPERATURE: 97.8 F | HEART RATE: 97 BPM | WEIGHT: 141 LBS | BODY MASS INDEX: 20.19 KG/M2 | DIASTOLIC BLOOD PRESSURE: 57 MMHG | RESPIRATION RATE: 16 BRPM | SYSTOLIC BLOOD PRESSURE: 96 MMHG

## 2024-10-30 DIAGNOSIS — G40.909 SEIZURE DISORDER (HCC): ICD-10-CM

## 2024-10-30 DIAGNOSIS — D68.59 HYPERCOAGULABLE STATE (HCC): Primary | ICD-10-CM

## 2024-10-30 DIAGNOSIS — I26.99 ACUTE PULMONARY EMBOLISM WITHOUT ACUTE COR PULMONALE, UNSPECIFIED PULMONARY EMBOLISM TYPE (HCC): ICD-10-CM

## 2024-10-30 DIAGNOSIS — Z09 HOSPITAL DISCHARGE FOLLOW-UP: Primary | ICD-10-CM

## 2024-10-30 DIAGNOSIS — K70.9 ALCOHOLIC LIVER DISEASE (HCC): ICD-10-CM

## 2024-10-30 DIAGNOSIS — I82.4Y3 DEEP VEIN THROMBOSIS (DVT) OF PROXIMAL VEIN OF BOTH LOWER EXTREMITIES, UNSPECIFIED CHRONICITY (HCC): ICD-10-CM

## 2024-10-30 PROCEDURE — 99214 OFFICE O/P EST MOD 30 MIN: CPT | Performed by: NURSE PRACTITIONER

## 2024-10-30 PROCEDURE — 99211 OFF/OP EST MAY X REQ PHY/QHP: CPT

## 2024-10-30 RX ORDER — LEVETIRACETAM 500 MG/1
500 TABLET ORAL 2 TIMES DAILY
Qty: 180 TABLET | Refills: 1 | Status: SHIPPED | OUTPATIENT
Start: 2024-10-30

## 2024-10-30 NOTE — PROGRESS NOTES
due to bleeding risk with liver dysfxn\"  -- Pharmacy dosing coumadin goal 2-3 -> INR 1.93 10/27/2024   Leukocytosis -- likely reactive - up to 13 on 10/19 but down to WNL since 10/22 but back up 12.1 pn 10/26 and down to 11.9 on 10/27/2024 -- afeb, no signs of infection on CTA chest or CT abd or clinically -- monitor off any atbx and trend prn  Abdominal distention -- slightly distended 10/26/2024 -> ?related to #6 - no abd pain or feeling of abd pain/tightness 10/27  -- monitor for now  Alcoholic liver disease -- LFTs slightly improved from previous -- cont monitor and counseled on alcohol cessation  -- GI consulted -patient high risk for bleeding with alcoholic liver disease however agree benefits outweigh the risks with taking Coumadin for recurrent blood clots.  Okay to continue this from GI standpoint.  GI will continue to follow-up with patient in office.  -- CT abd 10/14/24 notes hepatomegaly, hepatic steatosis  -- MRI abd 10/1/24 = hepatomegaly with patchy areas of steatosis, no liver lesions, benign right renal cyst  Centrilobular emphysema -- noted on CTA chest 10/14/24 -- stable on RA - no wheezing -- outpt PFT -- cont monitor   Seizure disorder -- no seizures since admission - cont keppra - monitor  Chronic macrocytic anemia -- hgb stable 9.6 on 10/27/2024 -- hgb down to 8-9's since admission and prior last week 8.1 on day of d/c 10/3/24 -- iron normal but <158 sat??? B12 high and normal uaqaou26/1/2024 -- cont monitor for any bleeding on anticoagulation and liver dysfxn   History of alcohol abuse -- cessation counseling but has quit with recent clotting issues-- cont encourage cessation.  Hepatomegaly/hepatic steatosis -- noted on Ct abd 10/14/24 -- f/u GI as outpt -- acute hepatitis panel 10/1/24 (-), JACKIE (-), F-actin IgG (-), Mitochondrial Ab normal  Tobacco abuse -- advised on cessation due to clotting issues     Dispo  -- 10/27/2024 -> cont heparin gtt and coumadin with pharm dosing until INR 2-3

## 2024-10-30 NOTE — PROGRESS NOTES
Coumadin recommended.    3. Acute pulmonary embolism without acute cor pulmonale, unspecified pulmonary embolism type (HCC)  CTA Chest with findings of pulmonary emboli with filling defects noted in distal right main pulmonary artery extending to right lower lobe lobar branches without heart strain.  Recent IVC filter placement d/t bilateral DVTs and supratherapeutic INR/high risk of bleeding.  IVC filter failure.  Obtain Factor V Leiden mutation, Prothrombin gene mutation.  Only option for anticoagulation at this point is coumadin with extremely close monitoring with Coumadin Clinic if GI ok with resuming anticoagulation.  Pt to accept risk of bleeding if anticoagulation resumed vs risk of clotting if not resumed.  Pt will need lifelong anticoagulation with coumadin.  He has f/u appnt with Coumadin Clinic tomorrow.  Most recent INR 2.05 on 10/28/2024.      Return in about 3 months (around 1/30/2025).   Pt prefers close follow up in our office since it is closer to his home.  He walks to his office appnts.  Referral to Coumadin Clinic made by hospital.  Our office will renew yearly.    All patient questions answered. Pt voiced understanding. Patient agreed with treatment plan. Follow up as directed. Patient instructed to call for questions or concerns.        NOTE: This report was transcribed using voice recognition software.  Every effort was made to ensure accuracy; however, inadvertent computerized transcription errors may be present.    Electronically signed by   MARIA ALEJANDRA Miguel CNP

## 2024-10-31 ENCOUNTER — ANTI-COAG VISIT (OUTPATIENT)
Age: 44
End: 2024-10-31
Payer: MEDICAID

## 2024-10-31 DIAGNOSIS — I26.99 ACUTE PULMONARY EMBOLISM WITHOUT ACUTE COR PULMONALE, UNSPECIFIED PULMONARY EMBOLISM TYPE (HCC): Primary | ICD-10-CM

## 2024-10-31 DIAGNOSIS — Z51.81 ENCOUNTER FOR THERAPEUTIC DRUG MONITORING: ICD-10-CM

## 2024-10-31 DIAGNOSIS — I82.4Y3 DEEP VEIN THROMBOSIS (DVT) OF PROXIMAL VEIN OF BOTH LOWER EXTREMITIES, UNSPECIFIED CHRONICITY (HCC): ICD-10-CM

## 2024-10-31 DIAGNOSIS — Z79.01 ANTICOAGULATED ON COUMADIN: ICD-10-CM

## 2024-10-31 LAB — POC INR: 2.3 (ref 0.8–1.2)

## 2024-10-31 PROCEDURE — 85610 PROTHROMBIN TIME: CPT

## 2024-10-31 PROCEDURE — 99213 OFFICE O/P EST LOW 20 MIN: CPT

## 2024-10-31 NOTE — PROGRESS NOTES
Medication Management Clinic  [facility]  AnticoagulationClinic  [phone] (phone)  [phone] (fax)     Patient presents to the Anticoagulation clinic today for assessment and initial dosing of warfarin.  Patient has a diagnosis of DVT, PE, hypercoagulable state  and has been placed on Coumadin with a goal INR 2.0-3.0.  Pt started Coumadin 10/16.     Dashawn Rivera was given full education today in the clinic including written materials, supplemental oral instructions, and all questions were answered.  Specifically, Dashawn was instructed to notfiy the Anticoagulation clinic immediately if there is any unusual bleeding, such as throwing or coughing up blood, bleeding from the nose, mouth, ears, or pink-red tinge in the urine or if the stools contain bright red blood or are black and tarry.       In addition, Dashawn was informed to notify the clinic about any and all medicine changes, including prescriptions, “over-the-counter” or nonprescription medicines, vitamins, herbs, supplements, creams, rubs, eye or ear drops, and injections, whether to be used short- or long-term, within 24 hours.  The patient was also instructed to let all other physicians and pharmacists know that warfarin was started as a double-check against drug interactions.  The patient was further instructed on the effects of vitamin K containing foods on warfarin and the importance of consistency was stressed.  Dashawn was also advised to avoid large amounts of alcohol, grapefruit juice or cranberry juice while on warfarin.      HPI:    Medication changes: None  Tablet strength per patient: 5 mg tablets  Patient reported dosing regimen over last 1 week: 17.5 mg T, 20 mg W  Missed doses in the last 1-2 weeks: No  Extra doses in the last 1-2 weeks: No  Any problems with bleeding/bruising? No  Any recent falls? No   Any signs or symptoms of DVT/PE or stroke?  Legs are little swollen, walked a lot yesterday.  Alcohol use: Now that he is out of

## 2024-11-01 ENCOUNTER — CARE COORDINATION (OUTPATIENT)
Dept: CARE COORDINATION | Age: 44
End: 2024-11-01

## 2024-11-01 NOTE — CARE COORDINATION
Ambulatory Care Coordination Note     11/1/2024 9:30 AM     Patient outreach attempt by this ACM today to perform care management follow up . ACM was unable to reach the patient by telephone today; voicemail full and unable to leave a message.      ACM: Kourtney Horta, RN     Care Summary Note: PCP referral for support    PCP/Specialist follow up:   Future Appointments         Provider Specialty Dept Phone    11/4/2024 9:30 AM STR NEURODIAG ROOM [phone]    11/4/2024 3:30 PM Robel Reina, DO Internal Medicine [phone]    11/7/2024 8:20 AM Michelle Sharpe, [facility] [phone]    1/28/2025 3:00 PM Tonie Stanford, APRN - [facility] Oncology [phone]            Follow Up:   Plan for next ACM outreach in approximately 1 week to complete:  Enrollment attempt .

## 2024-11-02 ENCOUNTER — HOSPITAL ENCOUNTER (EMERGENCY)
Age: 44
Discharge: HOME | End: 2024-11-02
Payer: MEDICAID

## 2024-11-02 VITALS
RESPIRATION RATE: 16 BRPM | BODY MASS INDEX: 20.04 KG/M2 | HEIGHT: 70 IN | OXYGEN SATURATION: 95 % | WEIGHT: 140 LBS | SYSTOLIC BLOOD PRESSURE: 99 MMHG | HEART RATE: 113 BPM | DIASTOLIC BLOOD PRESSURE: 63 MMHG | TEMPERATURE: 98.7 F

## 2024-11-02 DIAGNOSIS — T45.515A WARFARIN-INDUCED COAGULOPATHY (HCC): ICD-10-CM

## 2024-11-02 DIAGNOSIS — S61.214A LACERATION OF RIGHT RING FINGER WITHOUT FOREIGN BODY WITHOUT DAMAGE TO NAIL, INITIAL ENCOUNTER: Primary | ICD-10-CM

## 2024-11-02 DIAGNOSIS — D68.32 WARFARIN-INDUCED COAGULOPATHY (HCC): ICD-10-CM

## 2024-11-02 PROCEDURE — 12001 RPR S/N/AX/GEN/TRNK 2.5CM/<: CPT

## 2024-11-02 PROCEDURE — 99282 EMERGENCY DEPT VISIT SF MDM: CPT

## 2024-11-02 ASSESSMENT — PAIN - FUNCTIONAL ASSESSMENT: PAIN_FUNCTIONAL_ASSESSMENT: NONE - DENIES PAIN

## 2024-11-02 NOTE — ED PROVIDER NOTES
[facility] EMERGENCY DEPT      Pt Name: Dashawn Rivera  MRN: [id number]  Birthdate 1980  Date of evaluation: 11/2/2024  Provider: Gina Olsen PA-C    CHIEF COMPLAINT       Chief Complaint   Patient presents with    Laceration       Nurses Notes reviewed and I agree except as noted in the HPI.      HISTORY OF PRESENT ILLNESS    Dashawn Rivera is a 44 y.o. male who presents through the lobby from Coumadin clinic for finger wound.  Patient cut his right ring finger about an hour and a half ago when he was giving his aunt a hug.  He is not sure what he cut it on.  However he is on Coumadin and it has been bleeding since.  Patient was recently admitted for blood clots that were in his legs and his lungs.  Patient was at the Coumadin clinic and tells me his INR was 2.03.  Patient denies pain, numbness, or weakness.  Patient is right-hand dominant.     PAST MEDICAL HISTORY    has a past medical history of Liver disease and Seizures (HCC).    SURGICAL HISTORY      has a past surgical history that includes Colonoscopy (N/A, 12/17/2019); IR GUIDED THROMB MECH VEIN (10/1/2024); and IR GUIDED IVC FILTER PLACEMENT (10/2/2024).    CURRENT MEDICATIONS       Discharge Medication List as of 11/2/2024  7:04 PM        CONTINUE these medications which have NOT CHANGED    Details   levETIRAcetam (KEPPRA) 500 MG tablet Take 1 tablet by mouth 2 times daily, Disp-180 tablet, R-1Normal      omeprazole (PRILOSEC) 20 MG delayed release capsule Take 2 capsules by mouth Daily, Disp-60 capsule, R-0Normal      warfarin (COUMADIN) 5 MG tablet Dosing per coumadin clinic, Disp-90 tablet, R-1Normal             ALLERGIES     has No Known Allergies.    FAMILY HISTORY     He indicated that his mother is alive. He indicated that his father is alive.   family history is not on file.    SOCIAL HISTORY    reports that he has been smoking cigarettes and cigars. He has never used smokeless tobacco. He reports current alcohol use of about 70.0  INR, fracture, foreign body    DIAGNOSTIC RESULTS     EKG: All [facility]'s [facility] who either signs or Co-signs this chart in the absence of a cardiologist.  None    RADIOLOGY: non-plain film images(s) such as CT,Ultrasound and MRI are read by the radiologist.  Plain radiographic images are visualized and preliminarily interpreted by the emergency physician unless otherwise stated below.  No orders to display       LABS:   Labs Reviewed - No data to display    EMERGENCY DEPARTMENT COURSE:   Vitals:    Vitals:    11/02/24 1813   BP: 99/63   Pulse: (!) 113   Resp: 16   Temp: 98.7 °F (37.1 °C)   SpO2: 95%   Weight: 63.5 kg (140 lb)   Height: 1.778 m (5' 10\")     MDM:  The patient was seen and evaluated by me in the intake area. Vital signs were reviewed and noted stable. Physical exam revealed a 5 mm linear laceration to the tip of the right ring finger.  Currently no active bleeding.  No labs or imaging were deemed indicated at this time especially since patient just had his INR checked today and it was not supratherapeutic.  Wound was cleansed by me and closed using Dermabond.  No bleeding noted.  Patient given a protective splint.  Patient was comfortable with plan of care. I have given the patient strict written and verbal instructions about care at home, follow-up, and signs and symptoms of worsening of condition and they did verbalize understanding.       CRITICAL CARE:   None    CONSULTS:  None    PROCEDURES:  5 mm right fourth upper digit laceration cleansed by me and closed with Dermabond.  Patient tolerated procedure well.    FINAL IMPRESSION      1. Laceration of right ring finger without foreign body without damage to nail, initial encounter    2. Warfarin-induced coagulopathy (HCC)          DISPOSITION/PLAN     1. Laceration of right ring finger without foreign body without damage to nail, initial encounter    2. Warfarin-induced coagulopathy (HCC)        PATIENT

## 2024-11-04 ENCOUNTER — HOSPITAL ENCOUNTER (OUTPATIENT)
Dept: NEUROLOGY | Age: 44
Discharge: HOME | End: 2024-11-04
Payer: MEDICAID

## 2024-11-04 ENCOUNTER — CARE COORDINATION (OUTPATIENT)
Dept: CARE COORDINATION | Age: 44
End: 2024-11-04

## 2024-11-04 DIAGNOSIS — G40.909 SEIZURE DISORDER (HCC): ICD-10-CM

## 2024-11-04 PROCEDURE — 95816 EEG AWAKE AND DROWSY: CPT

## 2024-11-04 PROCEDURE — 95816 EEG AWAKE AND DROWSY: CPT | Performed by: PSYCHIATRY & NEUROLOGY

## 2024-11-04 NOTE — CARE COORDINATION
Ambulatory Care Coordination Note     11/4/2024 10:12 AM     Patient outreach attempt by this ACM today to offer care management services. ACM was unable to reach the patient by telephone today; voicemail full and unable to leave a message.      ACM: Kourtney Horta RN     Care Summary Note: ED follow up and enrollment attempt from PCP referral for support    PCP/Specialist follow up:   Future Appointments         Provider Specialty Dept Phone    11/4/2024 3:30 PM Robel Reina,  Internal Medicine [phone]    11/7/2024 8:20 AM Michelle Sharpe, [facility] [phone]    1/28/2025 3:00 PM Tonie Stanford, APRN - [facility] [phone]            Follow Up:   Plan for next ACM outreach in approximately 1 week to complete:  Enrollment attempt .

## 2024-11-04 NOTE — PROGRESS NOTES
[facility]     Neurodiagnostic Laboratory Technician Worksheet      EEG Date: 2024    Name: Dashawn Rivera  : 1980  Age: 44 y.o.  Sex: male  MRN: [id number]  CSN: [id number]    Room/Location: Room/bed info not found  Ordering Provider: MINAL Davis    EEG Number: [id number]    Time In: 1025  Time Out: 1045  Total Treatment Time: 20    Clinical History:  Patient here for eval on seizure from alcohol withdrawal,  this tech smelled alcohol on him, asked if he drank this am.  Patient confirmed was drinking this am.  Was in the emergency room last night for finger cut,  hx of dvt, alcohol withdrawal seizure,    Ct 2023   IMPRESSION:  1. No acute intracranial abnormality.  Hand Dominance: Right   Sedation: No   H.V. Completed: Yes,with fair effort   Photic: Yes   Sleep: No   Drowsy: Yes   Sleep Deprived: Yes   Seizures Observed: No   Mentality: Alertness altered with alcohol     Technician: Clarita Childers 2024

## 2024-11-06 NOTE — PROCEDURES
[address] [address]                       ELECTROENCEPHALOGRAM REPORT      PATIENT NAME: CHADD VIVAR                 : 1980  MED REC NO: [id number]                       ROOM:   ACCOUNT NO: [id number]                       ADMIT DATE: 2024  PROVIDER: Milka Gonzalez MD      REFERRING PHYSICIAN:  ROHAN ANDREA    CLINICAL HISTORY:  This is a 44-year-old male presenting with seizure from alcohol withdrawal.  The patient's smelled alcohol as the study was being done.  The patient confirmed having alcohol prior to study performed in the morning at 9:30.  History of DVT, and alcohol withdrawal seizures.    MEDICATIONS:  Listed none.    DESCRIPTION OF PROCEDURE:  This is a routine 20-minute EEG recording using the [employer] 10/20 system on [employer] workstation.  Automated spike and seizure detection algorithms were applied.  The patient is described as alert.    The background rhythm activity is noted to be 9 hertz in the posterior parietal area, symmetric, attenuates with eye opening.  Lead and muscle artifacts were noted limiting quality of data obtained.  Hyperventilation was performed for 3 minutes with fair effort without abnormality.  Photic stimulation was performed with driving seen through some of frequencies.  There was no evidence of epileptiform activity appreciated.  No clinical seizures were observed.    IMPRESSION:  This is a normal awake and drowsy EEG.  There was no evidence of epileptiform activity appreciated.          MILKA GONZALEZ MD      D:  2024 14:26:47     T:  2024 17:13:03     ASA/AQS  Job #:  [id number]     Doc#:  [id number]

## 2024-11-07 ENCOUNTER — ANTI-COAG VISIT (OUTPATIENT)
Age: 44
End: 2024-11-07
Payer: MEDICAID

## 2024-11-07 ENCOUNTER — TELEPHONE (OUTPATIENT)
Age: 44
End: 2024-11-07

## 2024-11-07 VITALS — DIASTOLIC BLOOD PRESSURE: 81 MMHG | SYSTOLIC BLOOD PRESSURE: 131 MMHG | HEART RATE: 96 BPM

## 2024-11-07 DIAGNOSIS — Z79.01 ANTICOAGULATED ON COUMADIN: ICD-10-CM

## 2024-11-07 DIAGNOSIS — Z51.81 ENCOUNTER FOR THERAPEUTIC DRUG MONITORING: ICD-10-CM

## 2024-11-07 DIAGNOSIS — I26.99 ACUTE PULMONARY EMBOLISM WITHOUT ACUTE COR PULMONALE, UNSPECIFIED PULMONARY EMBOLISM TYPE (HCC): ICD-10-CM

## 2024-11-07 DIAGNOSIS — I82.4Y3 DEEP VEIN THROMBOSIS (DVT) OF PROXIMAL VEIN OF BOTH LOWER EXTREMITIES, UNSPECIFIED CHRONICITY (HCC): Primary | ICD-10-CM

## 2024-11-07 LAB
HCT VFR BLD AUTO: 34.5 % (ref 42–52)
HGB BLD-MCNC: 10.8 GM/DL (ref 14–18)
INR PPP: 11.14 (ref 0.85–1.13)

## 2024-11-07 PROCEDURE — 85014 HEMATOCRIT: CPT

## 2024-11-07 PROCEDURE — 85610 PROTHROMBIN TIME: CPT

## 2024-11-07 PROCEDURE — 85018 HEMOGLOBIN: CPT

## 2024-11-07 PROCEDURE — 99212 OFFICE O/P EST SF 10 MIN: CPT

## 2024-11-07 RX ORDER — PHYTONADIONE 5 MG/1
2.5 TABLET ORAL ONCE
Status: COMPLETED | OUTPATIENT
Start: 2024-11-07 | End: 2024-11-07

## 2024-11-07 RX ORDER — PHYTONADIONE 5 MG/1
2.5 TABLET ORAL ONCE
Status: DISCONTINUED | OUTPATIENT
Start: 2024-11-07 | End: 2024-11-07

## 2024-11-07 RX ADMIN — PHYTONADIONE 2.5 MG: 5 TABLET ORAL at 10:14

## 2024-11-07 NOTE — PROGRESS NOTES
Optimization and Lab(s) Ordered  Total # of Interventions Recommended: 1  Total # of Interventions Accepted: 1  Time Spent (min): 20     Plan discussed with Rosa Rachel, NickD and Nick CruzD     Michelle M Sharpe, PharmD  PGY1 Pharmacy Resident  11/7/2024 10:19 AM

## 2024-11-07 NOTE — TELEPHONE ENCOUNTER
Called Tonie Stanford's office to ask for a referral to be sent over.  said she would have the referral filled out and sent over via [employer].

## 2024-11-08 ENCOUNTER — ANTI-COAG VISIT (OUTPATIENT)
Age: 44
End: 2024-11-08
Payer: MEDICAID

## 2024-11-08 VITALS — DIASTOLIC BLOOD PRESSURE: 75 MMHG | SYSTOLIC BLOOD PRESSURE: 116 MMHG | HEART RATE: 103 BPM

## 2024-11-08 DIAGNOSIS — I26.99 ACUTE PULMONARY EMBOLISM WITHOUT ACUTE COR PULMONALE, UNSPECIFIED PULMONARY EMBOLISM TYPE (HCC): Primary | ICD-10-CM

## 2024-11-08 DIAGNOSIS — Z79.01 ANTICOAGULATED ON COUMADIN: ICD-10-CM

## 2024-11-08 DIAGNOSIS — Z51.81 ENCOUNTER FOR THERAPEUTIC DRUG MONITORING: ICD-10-CM

## 2024-11-08 DIAGNOSIS — I82.4Y3 DEEP VEIN THROMBOSIS (DVT) OF PROXIMAL VEIN OF BOTH LOWER EXTREMITIES, UNSPECIFIED CHRONICITY (HCC): ICD-10-CM

## 2024-11-08 DIAGNOSIS — D68.59 HYPERCOAGULABLE STATE (HCC): Primary | ICD-10-CM

## 2024-11-08 LAB
ALBUMIN SERPL BCG-MCNC: 4 G/DL (ref 3.5–5.1)
ALP SERPL-CCNC: 88 U/L (ref 38–126)
ALT SERPL W/O P-5'-P-CCNC: 30 U/L (ref 11–66)
AST SERPL-CCNC: 35 U/L (ref 5–40)
BILIRUB CONJ SERPL-MCNC: 0.2 MG/DL (ref 0.1–13.8)
BILIRUB SERPL-MCNC: 0.3 MG/DL (ref 0.3–1.2)
HCT VFR BLD AUTO: 34.6 % (ref 42–52)
HGB BLD-MCNC: 10.8 GM/DL (ref 14–18)
INR PPP: 5.26 (ref 0.85–1.13)
PROT SERPL-MCNC: 6.8 G/DL (ref 6.1–8)

## 2024-11-08 PROCEDURE — 85610 PROTHROMBIN TIME: CPT

## 2024-11-08 PROCEDURE — 80076 HEPATIC FUNCTION PANEL: CPT

## 2024-11-08 PROCEDURE — 99212 OFFICE O/P EST SF 10 MIN: CPT

## 2024-11-08 PROCEDURE — 85014 HEMATOCRIT: CPT

## 2024-11-08 PROCEDURE — 85018 HEMOGLOBIN: CPT

## 2024-11-08 NOTE — PROGRESS NOTES
Medication Management Clinic  [facility]  Anticoagulation Clinic  [phone] (phone)  [phone] (fax)    Mr. Dashawn Rivera is a 44 y.o.  male with history of DVT, PE who presents today for anticoagulation monitoring and adjustment.    Patient verifies current dosing regimen and tablet strength.  No missed or extra doses.  Patient denies s/s bleeding/bruising/swelling/SOB  No blood in urine or stool.  No dietary changes.  No changes in medication/OTC agents/Herbals.  No change in alcohol use or tobacco use.  No change in activity level.  Patient denies falls.  No vomiting/diarrhea or acute illness.   Has been dealing with a cold for the last few days   No Procedures scheduled in the future at this time.    Assessment:   POCT INR = 5.9  Lab Results   Component Value Date    INR 5.26 (HH) 11/08/2024    INR 11.14 (HH) 11/07/2024    INR 2.30 (H) 10/31/2024     INR supratherapeutic   Recent Labs     11/08/24  1026   INR 5.26*      Hemoglobin  14.0 - 18.0 gm/dl 10.8 Low    Hematocrit  42.0 - 52.0 % 34.6 Low      LFTs WNL     Vitals:    11/08/24 1018   BP: 116/75   Pulse: (!) 103     Patient presents with supra therapeutic INR 1 day after administration of Vitamin K 2.5 mg. Given vitamin K administration and recent clot history, will give 5 mg Coumadin on Saturday and Sunday and recheck INR.      Plan:  HOLD Coumadin today (11/8) then take Coumadin 5 mg on Saturday (11/9) and Sunday (11/10). Recheck INR in 3 day(s).  Patient reminded to call the Anticoagulation Clinic with any signs or symptoms of bleeding or with any medication changes.  Patient given instructions utilizing the teach back method.      After visit summary printed and reviewed with patient.      Discharged ambulatory in no apparent distress.    For Pharmacy Admin Tracking Only    Intervention Detail: Dose Adjustment: 1, reason: Therapy Optimization and Lab(s) Ordered  Total # of Interventions Recommended: 1  Total # of Interventions

## 2024-11-11 ENCOUNTER — ANTI-COAG VISIT (OUTPATIENT)
Age: 44
End: 2024-11-11
Payer: MEDICAID

## 2024-11-11 DIAGNOSIS — I82.4Y3 DEEP VEIN THROMBOSIS (DVT) OF PROXIMAL VEIN OF BOTH LOWER EXTREMITIES, UNSPECIFIED CHRONICITY (HCC): ICD-10-CM

## 2024-11-11 DIAGNOSIS — Z51.81 ENCOUNTER FOR THERAPEUTIC DRUG MONITORING: ICD-10-CM

## 2024-11-11 DIAGNOSIS — Z79.01 ANTICOAGULATED ON COUMADIN: ICD-10-CM

## 2024-11-11 DIAGNOSIS — I26.99 ACUTE PULMONARY EMBOLISM WITHOUT ACUTE COR PULMONALE, UNSPECIFIED PULMONARY EMBOLISM TYPE (HCC): Primary | ICD-10-CM

## 2024-11-11 LAB — POC INR: 1.6 (ref 0.8–1.2)

## 2024-11-11 PROCEDURE — 99213 OFFICE O/P EST LOW 20 MIN: CPT

## 2024-11-11 PROCEDURE — 85610 PROTHROMBIN TIME: CPT

## 2024-11-11 RX ORDER — ENOXAPARIN SODIUM 100 MG/ML
100 INJECTION SUBCUTANEOUS DAILY
Qty: 3 EACH | Refills: 0 | Status: SHIPPED | OUTPATIENT
Start: 2024-11-11

## 2024-11-11 NOTE — PROGRESS NOTES
Medication Management Clinic  [facility]  Anticoagulation Clinic  [phone] (phone)  [phone] (fax)    Mr. Dashawn Rivera is a 44 y.o.  male with history of DVT, PE who presents today for anticoagulation monitoring and adjustment.    Patient verifies current dosing regimen and tablet strength.  No missed or extra doses.  Patient denies s/s bleeding/bruising/swelling. SOB at baseline.  No blood in urine or stool.  No dietary changes.  No changes in medication/OTC agents/Herbals.  No change in alcohol use or tobacco use.  No change in activity level.  Patient denies falls.  No vomiting/diarrhea or acute illness.   No Procedures scheduled in the future at this time.    Assessment:   Lab Results   Component Value Date    INR 1.60 (H) 11/11/2024    INR 5.26 (HH) 11/08/2024    INR 11.14 (HH) 11/07/2024     INR subtherapeutic   Recent Labs     11/11/24  0858   INR 1.60*       Plan:  Initiate Lovenox 100 mg once daily since the patient had a recent PE on 10/14/24 and multiple acute DVTs. Patient would prefer to take Lovenox once daily (1.5 mg/kg). Prescription was sent to pharmacy and patient was properly counseled on injection technique. He was also instructed to take Coumadin 20 mg today, and 15 mg from 11/12/24-11/13/24.  Recheck INR in 3 day(s).  Patient reminded to call the Anticoagulation Clinic with any signs or symptoms of bleeding or with any medication changes.  Patient given instructions utilizing the teach back method.     After visit summary printed and reviewed with patient.      Discharged ambulatory in no apparent distress.    For Pharmacy Admin Tracking Only    Intervention Detail: Dose Adjustment: 1, reason: Therapy Optimization  Total # of Interventions Recommended: 1  Total # of Interventions Accepted: 1  Time Spent (min): 20    Nick SalcidoD  PGY2 Resident   11/11/2024 10:26 AM

## 2024-11-14 ENCOUNTER — APPOINTMENT (OUTPATIENT)
Age: 44
End: 2024-11-14
Payer: MEDICAID

## 2024-11-18 ENCOUNTER — APPOINTMENT (OUTPATIENT)
Age: 44
End: 2024-11-18
Payer: MEDICAID

## 2024-11-18 ENCOUNTER — ANTI-COAG VISIT (OUTPATIENT)
Age: 44
End: 2024-11-18
Payer: MEDICAID

## 2024-11-18 VITALS — DIASTOLIC BLOOD PRESSURE: 74 MMHG | HEART RATE: 94 BPM | SYSTOLIC BLOOD PRESSURE: 105 MMHG

## 2024-11-18 DIAGNOSIS — Z51.81 ENCOUNTER FOR THERAPEUTIC DRUG MONITORING: ICD-10-CM

## 2024-11-18 DIAGNOSIS — Z79.01 ANTICOAGULATED ON COUMADIN: Primary | ICD-10-CM

## 2024-11-18 LAB
HCT VFR BLD AUTO: 38.6 % (ref 42–52)
HGB BLD-MCNC: 12.4 GM/DL (ref 14–18)
INR PPP: 5.59 (ref 0.85–1.13)

## 2024-11-18 PROCEDURE — 85610 PROTHROMBIN TIME: CPT

## 2024-11-18 PROCEDURE — 85014 HEMATOCRIT: CPT

## 2024-11-18 PROCEDURE — 85018 HEMOGLOBIN: CPT

## 2024-11-18 PROCEDURE — 99212 OFFICE O/P EST SF 10 MIN: CPT | Performed by: PHARMACIST

## 2024-11-18 NOTE — PROGRESS NOTES
Hospital: [facility]  HOME: [phone]   WORK: N/A   CELL: [phone]     Procedure Date: Wednesday 4/13/2022  Procedure Time: 8AM  Arrival Time: 6AM  Scheduled by Anai on 3/3/2022  Scheduled with Patient: 3/3/2022  Entered on Calendar: 3/3/2022  Entered Case Request and Pre-Cert: [x] yes  Follow-up appointment(s): 5/23/2022 and 8/19/2022  Patient currently has device? Yes  Notified Device Rep: N/A  Notified [facility]: n/a  Hibiclens: n/a  Letter reviewed by Nurse: [x] yes on 3/3/2022  Letter Sent: [x] yes on 3/3/2022 via mail and [employer]  Appointment in EPIC (drug loading): n/a     Medication Management Clinic  [facility]  Anticoagulation Clinic  [phone] (phone)  [phone] (fax)    Mr. Dashawn Rivera is a 44 y.o.  male with history of  hypercoagulable state  hx of DVT PE who presents today for anticoagulation monitoring and adjustment.  Patient is 4 days late for last scheduled appointment.    Patient verifies current dosing regimen and tablet strength.  Patient was prescribed lovenox last visit but did not use it. He states he followed the calendar and took 20mg 11/11, 15mg 11/12, & 11/13, 20mg 11/14, 15mg 11/15 and then 20 mg 11/16 and 11/17.   Patient denies s/s bleeding/bruising/swelling/SOB  No blood in urine or stool.  No dietary changes.  No changes in medication/OTC agents/Herbals.  No change in tobacco use. More alcohol this past weekend. It just depends when he has a drink. He does not drink beer. He drank more Friday because of the boxing match on tv.   No change in activity level.  Patient denies falls.  No vomiting/diarrhea or acute illness.   No Procedures scheduled in the future at this time.    Assessment:   Lab Results   Component Value Date    INR 5.59 (HH) 11/18/2024    INR 1.60 (H) 11/11/2024    INR 5.26 (HH) 11/08/2024     INR supratherapeutic   Recent Labs     11/18/24  1034   INR 5.59*   POC 5.5    Vitals:    11/18/24 1033   BP: 105/74   Pulse: 94      Latest Reference Range & Units 11/18/24 10:34   Hemoglobin Quant 14.0 - 18.0 gm/dl 12.4 (L)   Hematocrit 42.0 - 52.0 % 38.6 (L)       Plan:  HOLD Coumadin today then Coumadin 5mg tomorrow and coumadin 10mg Wed.  Recheck INR in 3 days.  Patient reminded to call the Anticoagulation Clinic with any signs or symptoms of bleeding or with any medication changes.  Patient given instructions utilizing the teach back method. Instructed patient to be extra cautious with high INR result.     After visit summary printed and reviewed with patient.      Discharged ambulatory in no apparent distress.    For Pharmacy

## 2024-11-21 ENCOUNTER — ANTI-COAG VISIT (OUTPATIENT)
Age: 44
End: 2024-11-21
Payer: MEDICAID

## 2024-11-21 DIAGNOSIS — I82.4Y3 DEEP VEIN THROMBOSIS (DVT) OF PROXIMAL VEIN OF BOTH LOWER EXTREMITIES, UNSPECIFIED CHRONICITY (HCC): ICD-10-CM

## 2024-11-21 DIAGNOSIS — Z79.01 ANTICOAGULATED ON COUMADIN: ICD-10-CM

## 2024-11-21 DIAGNOSIS — Z51.81 ENCOUNTER FOR THERAPEUTIC DRUG MONITORING: ICD-10-CM

## 2024-11-21 DIAGNOSIS — I26.99 ACUTE PULMONARY EMBOLISM WITHOUT ACUTE COR PULMONALE, UNSPECIFIED PULMONARY EMBOLISM TYPE (HCC): Primary | ICD-10-CM

## 2024-11-21 LAB — POC INR: 2 (ref 0.8–1.2)

## 2024-11-21 PROCEDURE — 85610 PROTHROMBIN TIME: CPT

## 2024-11-21 PROCEDURE — 99211 OFF/OP EST MAY X REQ PHY/QHP: CPT

## 2024-11-21 NOTE — PROGRESS NOTES
Medication Management Clinic  [facility]  Anticoagulation Clinic  [phone] (phone)  [phone] (fax)    Mr. Dashawn Rivera is a 44 y.o.  male with history of  hypercoagulable state, hx of DVT and PE  who presents today for anticoagulation monitoring and adjustment.    Patient verifies current dosing regimen and tablet strength.  No missed or extra doses.  Patient denies s/s bleeding/bruising/swelling/SOB, feeling like SOB is improving.  No blood in urine or stool.  No dietary changes.  No changes in medication/OTC agents/Herbals.  No change in alcohol use or tobacco use.  No change in activity level.  Patient denies falls.  No vomiting/diarrhea or acute illness.   No Procedures scheduled in the future at this time.    Assessment:   Lab Results   Component Value Date    INR 2.00 (H) 11/21/2024    INR 5.59 (HH) 11/18/2024    INR 1.60 (H) 11/11/2024     INR therapeutic   Recent Labs     11/21/24  1009   INR 2.00*      Plan:  Take coumadin 10 mg MF and 15 mg all other days.  Recheck INR in 1 week(s). Patient reminded to call the Anticoagulation Clinic with signs or symptoms of bleeding or with any medication changes.  Patient given instructions utilizing the teach back method.   Plan discussed and reviewed with Nick TanD.    After visit summary printed and reviewed with patient.      Discharged ambulatory in no apparent distress.  Radha Ferris PharmD 11/21/2024 10:31 AM      For Pharmacy Admin Tracking Only    Intervention Detail: Dose Adjustment: 1, reason: Therapy Optimization  Total # of Interventions Recommended: 1  Total # of Interventions Accepted: 1  Time Spent (min): 20

## 2024-11-27 ENCOUNTER — CARE COORDINATION (OUTPATIENT)
Dept: CARE COORDINATION | Age: 44
End: 2024-11-27

## 2024-11-27 ENCOUNTER — ANTI-COAG VISIT (OUTPATIENT)
Age: 44
End: 2024-11-27
Payer: MEDICAID

## 2024-11-27 DIAGNOSIS — Z79.01 ANTICOAGULATED ON COUMADIN: ICD-10-CM

## 2024-11-27 DIAGNOSIS — I82.4Y3 DEEP VEIN THROMBOSIS (DVT) OF PROXIMAL VEIN OF BOTH LOWER EXTREMITIES, UNSPECIFIED CHRONICITY (HCC): ICD-10-CM

## 2024-11-27 DIAGNOSIS — Z51.81 ENCOUNTER FOR THERAPEUTIC DRUG MONITORING: ICD-10-CM

## 2024-11-27 DIAGNOSIS — I26.99 ACUTE PULMONARY EMBOLISM WITHOUT ACUTE COR PULMONALE, UNSPECIFIED PULMONARY EMBOLISM TYPE (HCC): Primary | ICD-10-CM

## 2024-11-27 LAB — POC INR: 2.6 (ref 0.8–1.2)

## 2024-11-27 PROCEDURE — 99211 OFF/OP EST MAY X REQ PHY/QHP: CPT | Performed by: PHARMACIST

## 2024-11-27 PROCEDURE — 85610 PROTHROMBIN TIME: CPT | Performed by: PHARMACIST

## 2024-11-27 NOTE — PROGRESS NOTES
Medication Management Clinic  [facility]  Anticoagulation Clinic  [phone] (phone)  [phone] (fax)    Mr. Dashawn Rivera is a 44 y.o.  male with history of DVT, PE who presents today for anticoagulation monitoring and adjustment.    Patient verifies current dosing regimen and tablet strength.  Held dose 11/18, 5 mg 11/19, 10 mg 11/20  No missed or extra doses.  Patient denies s/s bleeding/bruising/swelling/SOB  No blood in urine or stool.  No dietary changes.  No changes in medication/OTC agents/Herbals.  Drinks a 1-2 shot daily, pt reported went hard over the weekend and drank all day. Educated that alcohol does increase risk of bleeding with Couamdin and encouraged max of 2 alcoholic beverages per day.    No change in activity level.  Patient denies falls.  No vomiting/diarrhea or acute illness.   No Procedures scheduled in the future at this time.    Assessment:   Lab Results   Component Value Date    INR 2.60 (H) 11/27/2024    INR 2.00 (H) 11/21/2024    INR 5.59 (HH) 11/18/2024     INR therapeutic   Recent Labs     11/27/24  1028   INR 2.60*     Plan:  Decrease Coumadin 10 mg MWF, 15 mg TThSaSu (5.3 % decrease) Recheck INR in 1 week(s).  Patient reminded to call the Anticoagulation Clinic with any signs or symptoms of bleeding or with any medication changes.  Patient given instructions utilizing the teach back method.      After visit summary printed and reviewed with patient.      Discharged ambulatory in no apparent distress.    Fabienne RomeroD 11/27/2024 11:10 AM    For Pharmacy Admin Tracking Only    Intervention Detail: Dose Adjustment: 1, reason: Therapy Optimization  Total # of Interventions Recommended: 1  Total # of Interventions Accepted: 1  Time Spent (min): 20

## 2024-12-05 ENCOUNTER — TELEPHONE (OUTPATIENT)
Age: 44
End: 2024-12-05

## 2024-12-05 NOTE — TELEPHONE ENCOUNTER
Patient called in to reschedule yesterday's missed appt.  Offered time today - but couldn't come.  Clinic closed tomorrow.  Will see Monday, 12/9 at 2:40.  Continue Coumadin 10 mg MWF, 15 mg other days.  Understanding/agreement voiced.

## 2024-12-06 ENCOUNTER — APPOINTMENT (OUTPATIENT)
Dept: CT IMAGING | Age: 44
DRG: 661 | End: 2024-12-06
Payer: MEDICAID

## 2024-12-06 ENCOUNTER — HOSPITAL ENCOUNTER (INPATIENT)
Age: 44
LOS: 4 days | Discharge: HOME | DRG: 661 | End: 2024-12-10
Attending: EMERGENCY MEDICINE | Admitting: STUDENT IN AN ORGANIZED HEALTH CARE EDUCATION/TRAINING PROGRAM
Payer: MEDICAID

## 2024-12-06 ENCOUNTER — APPOINTMENT (OUTPATIENT)
Dept: GENERAL RADIOLOGY | Age: 44
DRG: 661 | End: 2024-12-06
Payer: MEDICAID

## 2024-12-06 DIAGNOSIS — K92.1 HEMATOCHEZIA: ICD-10-CM

## 2024-12-06 DIAGNOSIS — R79.1 ABNORMAL INR: Primary | ICD-10-CM

## 2024-12-06 PROBLEM — K92.0 HEMATEMESIS: Status: ACTIVE | Noted: 2024-12-06

## 2024-12-06 LAB
ABO: NORMAL
ALBUMIN SERPL BCG-MCNC: 4.2 G/DL (ref 3.5–5.1)
ALP SERPL-CCNC: 108 U/L (ref 38–126)
ALT SERPL W/O P-5'-P-CCNC: 22 U/L (ref 11–66)
ANION GAP SERPL CALC-SCNC: 18 MEQ/L (ref 8–16)
ANTIBODY SCREEN: NORMAL
APTT PPP: 52.6 SECONDS (ref 22–38)
AST SERPL-CCNC: 64 U/L (ref 5–40)
BACTERIA: ABNORMAL
BASOPHILS ABSOLUTE: 0 THOU/MM3 (ref 0–0.1)
BASOPHILS NFR BLD AUTO: 0.4 %
BILIRUB CONJ SERPL-MCNC: < 0.1 MG/DL (ref 0.1–13.8)
BILIRUB SERPL-MCNC: 0.2 MG/DL (ref 0.3–1.2)
BILIRUB UR QL STRIP: NEGATIVE
BUN SERPL-MCNC: 4 MG/DL (ref 7–22)
CALCIUM SERPL-MCNC: 9.7 MG/DL (ref 8.5–10.5)
CASTS #/AREA URNS LPF: ABNORMAL /LPF
CASTS #/AREA URNS LPF: ABNORMAL /LPF
CHARACTER UR: CLEAR
CHARCOAL URNS QL MICRO: ABNORMAL
CHLORIDE SERPL-SCNC: 89 MEQ/L (ref 98–111)
CO2 SERPL-SCNC: 25 MEQ/L (ref 23–33)
COLOR UR: YELLOW
CREAT SERPL-MCNC: 0.7 MG/DL (ref 0.4–1.2)
CRYSTALS URNS QL MICRO: ABNORMAL
DEPRECATED RDW RBC AUTO: 51.5 FL (ref 35–45)
EKG ATRIAL RATE: 100 BPM
EKG P AXIS: 84 DEGREES
EKG P-R INTERVAL: 126 MS
EKG Q-T INTERVAL: 348 MS
EKG QRS DURATION: 78 MS
EKG QTC CALCULATION (BAZETT): 448 MS
EKG R AXIS: 82 DEGREES
EKG T AXIS: 74 DEGREES
EKG VENTRICULAR RATE: 100 BPM
EOSINOPHIL NFR BLD AUTO: 1.4 %
EOSINOPHILS ABSOLUTE: 0.1 THOU/MM3 (ref 0–0.4)
EPITHELIAL CELLS, UA: ABNORMAL /HPF
ERYTHROCYTE [DISTWIDTH] IN BLOOD BY AUTOMATED COUNT: 15.2 % (ref 11.5–14.5)
ETHANOL SERPL-MCNC: < 0.01 % (ref 0–0.08)
FLUAV RNA RESP QL NAA+PROBE: NOT DETECTED
FLUBV RNA RESP QL NAA+PROBE: NOT DETECTED
GFR SERPL CREATININE-BSD FRML MDRD: > 90 ML/MIN/1.73M2
GLUCOSE SERPL-MCNC: 107 MG/DL (ref 70–108)
GLUCOSE UR QL STRIP.AUTO: NEGATIVE MG/DL
HCT VFR BLD AUTO: 38.2 % (ref 42–52)
HCT VFR BLD AUTO: 42.6 % (ref 42–52)
HGB BLD-MCNC: 12.6 GM/DL (ref 14–18)
HGB BLD-MCNC: 14.3 GM/DL (ref 14–18)
HGB UR QL STRIP.AUTO: ABNORMAL
IMM GRANULOCYTES # BLD AUTO: 0.02 THOU/MM3 (ref 0–0.07)
IMM GRANULOCYTES NFR BLD AUTO: 0.4 %
INR PPP: 14.68 (ref 0.85–1.13)
INR PPP: 15.57 (ref 0.85–1.13)
INR PPP: 2.13 (ref 0.85–1.13)
INR PPP: 2.16 (ref 0.85–1.13)
INR PPP: 2.18 (ref 0.85–1.13)
KETONES UR QL STRIP.AUTO: 40
LEUKOCYTE ESTERASE UR QL STRIP.AUTO: NEGATIVE
LIPASE SERPL-CCNC: 25.7 U/L (ref 5.6–51.3)
LYMPHOCYTES ABSOLUTE: 1.4 THOU/MM3 (ref 1–4.8)
LYMPHOCYTES NFR BLD AUTO: 23.9 %
MAGNESIUM SERPL-MCNC: 1.7 MG/DL (ref 1.6–2.4)
MCH RBC QN AUTO: 31 PG (ref 26–33)
MCHC RBC AUTO-ENTMCNC: 33.6 GM/DL (ref 32.2–35.5)
MCV RBC AUTO: 92.2 FL (ref 80–94)
MONOCYTES ABSOLUTE: 0.5 THOU/MM3 (ref 0.4–1.3)
MONOCYTES NFR BLD AUTO: 9.2 %
NEUTROPHILS ABSOLUTE: 3.7 THOU/MM3 (ref 1.8–7.7)
NEUTROPHILS NFR BLD AUTO: 64.7 %
NITRITE UR QL STRIP.AUTO: NEGATIVE
NRBC BLD AUTO-RTO: 0 /100 WBC
OSMOLALITY SERPL CALC.SUM OF ELEC: 261.9 MOSMOL/KG (ref 275–300)
PH UR STRIP.AUTO: 7 [PH] (ref 5–9)
PLATELET # BLD AUTO: 181 THOU/MM3 (ref 130–400)
PMV BLD AUTO: 10 FL (ref 9.4–12.4)
POTASSIUM SERPL-SCNC: 3.3 MEQ/L (ref 3.5–5.2)
PROT SERPL-MCNC: 8.2 G/DL (ref 6.1–8)
PROT UR STRIP.AUTO-MCNC: ABNORMAL MG/DL
RBC # BLD AUTO: 4.62 MILL/MM3 (ref 4.7–6.1)
RBC #/AREA URNS HPF: ABNORMAL /HPF
RENAL EPI CELLS #/AREA URNS HPF: ABNORMAL /[HPF]
RH FACTOR: NORMAL
SARS-COV-2 RNA RESP QL NAA+PROBE: NOT DETECTED
SODIUM SERPL-SCNC: 132 MEQ/L (ref 135–145)
SP GR UR REFRACT.AUTO: 1.01 (ref 1–1.03)
TROPONIN, HIGH SENSITIVITY: < 6 NG/L (ref 0–12)
UROBILINOGEN UR QL STRIP.AUTO: 0.2 EU/DL (ref 0–1)
WBC # BLD AUTO: 5.7 THOU/MM3 (ref 4.8–10.8)
WBC #/AREA URNS HPF: ABNORMAL /HPF
YEAST LIKE FUNGI URNS QL MICRO: ABNORMAL

## 2024-12-06 PROCEDURE — 2580000003 HC RX 258: Performed by: PHYSICIAN ASSISTANT

## 2024-12-06 PROCEDURE — 74177 CT ABD & PELVIS W/CONTRAST: CPT

## 2024-12-06 PROCEDURE — 6360000002 HC RX W HCPCS

## 2024-12-06 PROCEDURE — 96374 THER/PROPH/DIAG INJ IV PUSH: CPT

## 2024-12-06 PROCEDURE — 85610 PROTHROMBIN TIME: CPT

## 2024-12-06 PROCEDURE — 30233N1 TRANSFUSION OF NONAUTOLOGOUS RED BLOOD CELLS INTO PERIPHERAL VEIN, PERCUTANEOUS APPROACH: ICD-10-PCS | Performed by: STUDENT IN AN ORGANIZED HEALTH CARE EDUCATION/TRAINING PROGRAM

## 2024-12-06 PROCEDURE — 99285 EMERGENCY DEPT VISIT HI MDM: CPT

## 2024-12-06 PROCEDURE — 30233K1 TRANSFUSION OF NONAUTOLOGOUS FROZEN PLASMA INTO PERIPHERAL VEIN, PERCUTANEOUS APPROACH: ICD-10-PCS | Performed by: STUDENT IN AN ORGANIZED HEALTH CARE EDUCATION/TRAINING PROGRAM

## 2024-12-06 PROCEDURE — 84484 ASSAY OF TROPONIN QUANT: CPT

## 2024-12-06 PROCEDURE — 2060000000 HC ICU INTERMEDIATE R&B

## 2024-12-06 PROCEDURE — P9017 PLASMA 1 DONOR FRZ W/IN 8 HR: HCPCS

## 2024-12-06 PROCEDURE — 71045 X-RAY EXAM CHEST 1 VIEW: CPT

## 2024-12-06 PROCEDURE — 6360000004 HC RX CONTRAST MEDICATION: Performed by: EMERGENCY MEDICINE

## 2024-12-06 PROCEDURE — 36415 COLL VENOUS BLD VENIPUNCTURE: CPT

## 2024-12-06 PROCEDURE — 6370000000 HC RX 637 (ALT 250 FOR IP): Performed by: PHYSICIAN ASSISTANT

## 2024-12-06 PROCEDURE — 93005 ELECTROCARDIOGRAM TRACING: CPT

## 2024-12-06 PROCEDURE — 80053 COMPREHEN METABOLIC PANEL: CPT

## 2024-12-06 PROCEDURE — 82248 BILIRUBIN DIRECT: CPT

## 2024-12-06 PROCEDURE — 85025 COMPLETE CBC W/AUTO DIFF WBC: CPT

## 2024-12-06 PROCEDURE — 85018 HEMOGLOBIN: CPT

## 2024-12-06 PROCEDURE — 85014 HEMATOCRIT: CPT

## 2024-12-06 PROCEDURE — 85730 THROMBOPLASTIN TIME PARTIAL: CPT

## 2024-12-06 PROCEDURE — 83735 ASSAY OF MAGNESIUM: CPT

## 2024-12-06 PROCEDURE — 81001 URINALYSIS AUTO W/SCOPE: CPT

## 2024-12-06 PROCEDURE — 2580000003 HC RX 258

## 2024-12-06 PROCEDURE — 86900 BLOOD TYPING SEROLOGIC ABO: CPT

## 2024-12-06 PROCEDURE — 86850 RBC ANTIBODY SCREEN: CPT

## 2024-12-06 PROCEDURE — 87636 SARSCOV2 & INF A&B AMP PRB: CPT

## 2024-12-06 PROCEDURE — 99223 1ST HOSP IP/OBS HIGH 75: CPT | Performed by: PHYSICIAN ASSISTANT

## 2024-12-06 PROCEDURE — 6360000002 HC RX W HCPCS: Performed by: PHYSICIAN ASSISTANT

## 2024-12-06 PROCEDURE — 93010 ELECTROCARDIOGRAM REPORT: CPT | Performed by: INTERNAL MEDICINE

## 2024-12-06 PROCEDURE — 82077 ASSAY SPEC XCP UR&BREATH IA: CPT

## 2024-12-06 PROCEDURE — 86901 BLOOD TYPING SEROLOGIC RH(D): CPT

## 2024-12-06 PROCEDURE — 36430 TRANSFUSION BLD/BLD COMPNT: CPT

## 2024-12-06 PROCEDURE — 83690 ASSAY OF LIPASE: CPT

## 2024-12-06 RX ORDER — SODIUM CHLORIDE 9 MG/ML
INJECTION, SOLUTION INTRAVENOUS PRN
Status: DISCONTINUED | OUTPATIENT
Start: 2024-12-06 | End: 2024-12-10 | Stop reason: HOSPADM

## 2024-12-06 RX ORDER — MAGNESIUM SULFATE IN WATER 40 MG/ML
2000 INJECTION, SOLUTION INTRAVENOUS PRN
Status: DISCONTINUED | OUTPATIENT
Start: 2024-12-06 | End: 2024-12-10 | Stop reason: HOSPADM

## 2024-12-06 RX ORDER — LORAZEPAM 2 MG/ML
2 INJECTION INTRAMUSCULAR
Status: DISCONTINUED | OUTPATIENT
Start: 2024-12-06 | End: 2024-12-10 | Stop reason: HOSPADM

## 2024-12-06 RX ORDER — IOPAMIDOL 755 MG/ML
80 INJECTION, SOLUTION INTRAVASCULAR
Status: COMPLETED | OUTPATIENT
Start: 2024-12-06 | End: 2024-12-06

## 2024-12-06 RX ORDER — LORAZEPAM 2 MG/ML
1 INJECTION INTRAMUSCULAR
Status: DISCONTINUED | OUTPATIENT
Start: 2024-12-06 | End: 2024-12-10 | Stop reason: HOSPADM

## 2024-12-06 RX ORDER — SODIUM CHLORIDE 9 MG/ML
INJECTION, SOLUTION INTRAVENOUS PRN
Status: DISCONTINUED | OUTPATIENT
Start: 2024-12-06 | End: 2024-12-06 | Stop reason: SDUPTHER

## 2024-12-06 RX ORDER — SODIUM CHLORIDE 0.9 % (FLUSH) 0.9 %
5-40 SYRINGE (ML) INJECTION PRN
Status: DISCONTINUED | OUTPATIENT
Start: 2024-12-06 | End: 2024-12-10 | Stop reason: HOSPADM

## 2024-12-06 RX ORDER — SODIUM CHLORIDE 0.9 % (FLUSH) 0.9 %
5-40 SYRINGE (ML) INJECTION EVERY 12 HOURS SCHEDULED
Status: DISCONTINUED | OUTPATIENT
Start: 2024-12-06 | End: 2024-12-10 | Stop reason: HOSPADM

## 2024-12-06 RX ORDER — POLYETHYLENE GLYCOL 3350 17 G/17G
17 POWDER, FOR SOLUTION ORAL DAILY PRN
Status: DISCONTINUED | OUTPATIENT
Start: 2024-12-06 | End: 2024-12-10 | Stop reason: HOSPADM

## 2024-12-06 RX ORDER — LORAZEPAM 1 MG/1
3 TABLET ORAL
Status: DISCONTINUED | OUTPATIENT
Start: 2024-12-06 | End: 2024-12-10 | Stop reason: HOSPADM

## 2024-12-06 RX ORDER — ONDANSETRON 2 MG/ML
4 INJECTION INTRAMUSCULAR; INTRAVENOUS EVERY 6 HOURS PRN
Status: DISCONTINUED | OUTPATIENT
Start: 2024-12-06 | End: 2024-12-10 | Stop reason: HOSPADM

## 2024-12-06 RX ORDER — POTASSIUM CHLORIDE 7.45 MG/ML
10 INJECTION INTRAVENOUS
Status: DISPENSED | OUTPATIENT
Start: 2024-12-06 | End: 2024-12-06

## 2024-12-06 RX ORDER — POTASSIUM CHLORIDE 7.45 MG/ML
10 INJECTION INTRAVENOUS
Status: COMPLETED | OUTPATIENT
Start: 2024-12-06 | End: 2024-12-07

## 2024-12-06 RX ORDER — NICOTINE 21 MG/24HR
1 PATCH, TRANSDERMAL 24 HOURS TRANSDERMAL DAILY
Status: DISCONTINUED | OUTPATIENT
Start: 2024-12-06 | End: 2024-12-10 | Stop reason: HOSPADM

## 2024-12-06 RX ORDER — LORAZEPAM 2 MG/ML
4 INJECTION INTRAMUSCULAR
Status: DISCONTINUED | OUTPATIENT
Start: 2024-12-06 | End: 2024-12-10 | Stop reason: HOSPADM

## 2024-12-06 RX ORDER — POTASSIUM CHLORIDE 1500 MG/1
40 TABLET, EXTENDED RELEASE ORAL PRN
Status: DISCONTINUED | OUTPATIENT
Start: 2024-12-06 | End: 2024-12-10 | Stop reason: HOSPADM

## 2024-12-06 RX ORDER — POTASSIUM CHLORIDE 7.45 MG/ML
10 INJECTION INTRAVENOUS PRN
Status: DISCONTINUED | OUTPATIENT
Start: 2024-12-06 | End: 2024-12-10 | Stop reason: HOSPADM

## 2024-12-06 RX ORDER — ACETAMINOPHEN 650 MG/1
650 SUPPOSITORY RECTAL EVERY 6 HOURS PRN
Status: DISCONTINUED | OUTPATIENT
Start: 2024-12-06 | End: 2024-12-10 | Stop reason: HOSPADM

## 2024-12-06 RX ORDER — LORAZEPAM 1 MG/1
2 TABLET ORAL
Status: DISCONTINUED | OUTPATIENT
Start: 2024-12-06 | End: 2024-12-10 | Stop reason: HOSPADM

## 2024-12-06 RX ORDER — LEVETIRACETAM 500 MG/5ML
500 INJECTION, SOLUTION, CONCENTRATE INTRAVENOUS EVERY 12 HOURS
Status: DISCONTINUED | OUTPATIENT
Start: 2024-12-06 | End: 2024-12-10 | Stop reason: HOSPADM

## 2024-12-06 RX ORDER — MORPHINE SULFATE 4 MG/ML
4 INJECTION, SOLUTION INTRAMUSCULAR; INTRAVENOUS ONCE
Status: COMPLETED | OUTPATIENT
Start: 2024-12-06 | End: 2024-12-06

## 2024-12-06 RX ORDER — LORAZEPAM 1 MG/1
4 TABLET ORAL
Status: DISCONTINUED | OUTPATIENT
Start: 2024-12-06 | End: 2024-12-10 | Stop reason: HOSPADM

## 2024-12-06 RX ORDER — THIAMINE HYDROCHLORIDE 100 MG/ML
100 INJECTION, SOLUTION INTRAMUSCULAR; INTRAVENOUS DAILY
Status: DISCONTINUED | OUTPATIENT
Start: 2024-12-06 | End: 2024-12-09

## 2024-12-06 RX ORDER — LORAZEPAM 2 MG/ML
3 INJECTION INTRAMUSCULAR
Status: DISCONTINUED | OUTPATIENT
Start: 2024-12-06 | End: 2024-12-10 | Stop reason: HOSPADM

## 2024-12-06 RX ORDER — ACETAMINOPHEN 325 MG/1
650 TABLET ORAL EVERY 6 HOURS PRN
Status: DISCONTINUED | OUTPATIENT
Start: 2024-12-06 | End: 2024-12-10 | Stop reason: HOSPADM

## 2024-12-06 RX ORDER — ONDANSETRON 4 MG/1
4 TABLET, ORALLY DISINTEGRATING ORAL EVERY 8 HOURS PRN
Status: DISCONTINUED | OUTPATIENT
Start: 2024-12-06 | End: 2024-12-10 | Stop reason: HOSPADM

## 2024-12-06 RX ORDER — LORAZEPAM 1 MG/1
1 TABLET ORAL
Status: DISCONTINUED | OUTPATIENT
Start: 2024-12-06 | End: 2024-12-10 | Stop reason: HOSPADM

## 2024-12-06 RX ADMIN — THIAMINE HYDROCHLORIDE 100 MG: 100 INJECTION, SOLUTION INTRAMUSCULAR; INTRAVENOUS at 16:09

## 2024-12-06 RX ADMIN — PHYTONADIONE 10 MG: 10 INJECTION, EMULSION INTRAMUSCULAR; INTRAVENOUS; SUBCUTANEOUS at 15:28

## 2024-12-06 RX ADMIN — PANTOPRAZOLE SODIUM 80 MG: 40 INJECTION, POWDER, FOR SOLUTION INTRAVENOUS at 16:08

## 2024-12-06 RX ADMIN — SODIUM CHLORIDE 1000 ML: 9 INJECTION, SOLUTION INTRAVENOUS at 20:13

## 2024-12-06 RX ADMIN — LEVETIRACETAM 500 MG: 100 INJECTION INTRAVENOUS at 21:08

## 2024-12-06 RX ADMIN — POTASSIUM CHLORIDE 10 MEQ: 7.46 INJECTION, SOLUTION INTRAVENOUS at 19:56

## 2024-12-06 RX ADMIN — LORAZEPAM 2 MG: 1 TABLET ORAL at 16:04

## 2024-12-06 RX ADMIN — SODIUM CHLORIDE 8 MG/HR: 9 INJECTION, SOLUTION INTRAVENOUS at 16:09

## 2024-12-06 RX ADMIN — SODIUM CHLORIDE, PRESERVATIVE FREE 10 ML: 5 INJECTION INTRAVENOUS at 20:13

## 2024-12-06 RX ADMIN — OCTREOTIDE ACETATE 25 MCG/HR: 500 INJECTION, SOLUTION INTRAVENOUS; SUBCUTANEOUS at 18:08

## 2024-12-06 RX ADMIN — POTASSIUM CHLORIDE 10 MEQ: 7.46 INJECTION, SOLUTION INTRAVENOUS at 23:35

## 2024-12-06 RX ADMIN — IOPAMIDOL 80 ML: 755 INJECTION, SOLUTION INTRAVENOUS at 13:25

## 2024-12-06 RX ADMIN — POTASSIUM CHLORIDE 10 MEQ: 7.46 INJECTION, SOLUTION INTRAVENOUS at 22:19

## 2024-12-06 RX ADMIN — POTASSIUM CHLORIDE 10 MEQ: 7.46 INJECTION, SOLUTION INTRAVENOUS at 21:22

## 2024-12-06 RX ADMIN — MORPHINE SULFATE 4 MG: 4 INJECTION, SOLUTION INTRAMUSCULAR; INTRAVENOUS at 13:16

## 2024-12-06 ASSESSMENT — PAIN - FUNCTIONAL ASSESSMENT: PAIN_FUNCTIONAL_ASSESSMENT: 0-10

## 2024-12-06 ASSESSMENT — PAIN SCALES - GENERAL: PAINLEVEL_OUTOF10: 6

## 2024-12-06 ASSESSMENT — PAIN DESCRIPTION - LOCATION: LOCATION: ABDOMEN

## 2024-12-06 NOTE — CONSULTS
CONSULTATION NOTE :GI       Patient - Dashawn Rivera,  Age - 44 y.o.    - 1980      Room Number - 4K-16/016-A   MRN -  [id number]   [facility] # - [id number]  Date of Admission -  2024 12:13 PM  Patient's PCP: Lyn Damico APRN - CNP     Requesting Physician: Usman Pruett PA-C    REASON FOR CONSULTATION     Hematemesis, melena  HISTORY OF PRESENT ILLNESS     Dashawn Rivera is a 44 y.o. male with a history of DVT/PE on Coumadin, nicotine dependence, alcohol abuse, and hepatic steatosis who presented to [facility] with chief complaint of abdominal pain, nausea, and vomiting.  The patient reports yesterday he had multiple episodes of vomiting.  He reports the emesis was dark brown.  He was not able to tell if there was any bright red blood in the vomitus or not.  He also began having diarrhea and was having very dark stools.  The diarrhea persisted yesterday as well as today.  He reports associated lower abdominal pain with this.  He denies any lightheadedness, dizziness, fevers, chills, cough, shortness of breath, or chest pain. He did have some epistaxis which has since resolved. He is on Coumadin chronically and his INR was 15.57 upon arrival.  He reports significant alcohol use and has drank a bottle of liquor each day for the last 2 days.  He does report a history of alcohol withdrawal seizures.  He is on Keppra and reports he last had a seizure quite sometime ago.  He denies any drug use.  Currently smokes 1 pack of cigarettes per day.  He had a colonoscopy in the past and was found to have polyps.     CT A/P- Suggestion of mild diffuse colonic wall thickening with differential  considerations including colitis in the appropriate clinical setting. No bowel  obstruction, fluid collection, or free air is observed. The appendix is not  visualized. No secondary signs of acute appendicitis are observed.    At time of examination patient resting comfortably, states he was around

## 2024-12-06 NOTE — CONSENT
Informed Consent for Blood Component Transfusion Note    I have discussed with the patient the rationale for blood component transfusion; its benefits in treating or preventing fatigue, organ damage, or death; and its risk which includes mild transfusion reactions, rare risk of blood borne infection, or more serious but rare reactions. I have discussed the alternatives to transfusion, including the risk and consequences of not receiving transfusion. The patient had an opportunity to ask questions and had agreed to proceed with transfusion of blood components.    Electronically signed by Jorge Hernández MD on 12/6/24 at 2:56 PM EST

## 2024-12-06 NOTE — ED NOTES
Pt in bed, eyes open, respirations even and unlabored. Vital signs reassessed, pt up to bathroom and back to bed. No further needs voiced.

## 2024-12-06 NOTE — ED PROVIDER NOTES
[facility] EMERGENCY DEPARTMENT - VISIT NOTE    Patient Name: Dashawn Rivera  MRN: [id number]  YOB: 1980  Date of Evaluation: 12/6/2024  Treating Resident Physician: Jorge Hernández MD  Supervising Physician: Wojciech Pedro MD    CHIEF COMPLAINT       Chief Complaint   Patient presents with    Abdominal Pain       HISTORY OF PRESENT ILLNESS    HPI    History obtained from the patient.    Dashawn is a 44 y.o. old male who presents to the emergency department by Ambulance for evaluation of lower abdominal pain, hematochezia, hematemesis.  Patient describes episodes of this happening since yesterday.  Describes pain in the lower abdomen.  Describes having seen a gastroenterologist previously.  Currently is on Coumadin for history of pulmonary embolism, DVTs.  Patient denies headache, chest pain, dysuria, lightheadedness, generalized weakness.    Chart reviewed, relevant history summarized in HPI above.      REVIEW OF SYSTEMS   Review of Systems  Negative unless documented in HPI    PAST MEDICAL AND SURGICAL HISTORY   Dashawn  has a past medical history of Liver disease and Seizures (HCC).    Dashwan  has a past surgical history that includes Colonoscopy (N/A, 12/17/2019); IR GUIDED THROMB MECH VEIN (10/1/2024); and IR GUIDED IVC FILTER PLACEMENT (10/2/2024).    CURRENT MEDICATIONS   Dashawn has a current medication list which includes the following long-term medication(s): levetiracetam, omeprazole, and warfarin.    ALLERGIES   Dashawn has No Known Allergies.    FAMILY HISTORY   Dashawn family history is not on file.    SOCIAL HISTORY   Dashawn  reports that he has been smoking cigarettes and cigars. He has never used smokeless tobacco. He reports current alcohol use of about 70.0 standard drinks of alcohol per week. He reports that he does not use drugs.    PHYSICAL EXAM     ED Triage Vitals [12/06/24 1217]   BP Systolic BP Percentile Diastolic BP Percentile Temp Temp Source Pulse Respirations SpO2   (!) 145/93 -- -- 98.7  not detected in proofreading. Please refer to my supervising physician's documentation if my documentation differs.    Electronically Signed: Jorge Hernández MD, 12/06/24, 3:05 PM

## 2024-12-06 NOTE — ED TRIAGE NOTES
Pt presents to the ED from home via ambulance with complaints of abdominal pain that started yesterday. Pt states he has been having dark colored diarrhea. Pt also states he is nauseous and vomited all day yesterday.VSS. EKG completed.

## 2024-12-06 NOTE — ED PROVIDER NOTES
[facility]  EMERGENCY MEDICINE ATTENDING ATTESTATION      Evaluation of Dashawn Rivera.   Case discussed and care plan developed with resident physician.   I agree with the resident physician documentation and plan as documented by him, except if my documentation differs.   Patient seen, interviewed and examined by me.  I reviewed the medical, surgical, family and social history, medications and allergies.   I have reviewed and interpreted all available lab, radiology and ekg results available at the moment.  I have reviewed the nursing documentation.       Please see the resident physician completed note for final disposition except as documented on this attestation.   I have reviewed and interpreted all available lab, radiology and ekg results available at the moment.  Diagnosis, treatment and disposition plans were discussed and agreed upon by patient.   This transcription was electronically signed. It was dictated by use of voice recognition software and electronically transcribed. The transcription may contain errors not detected in proofreading.     I performed direct supervision and was present for the critical portion following procedures: None  Critical care time on this case: None    Electronically signed by Wojciech Pedro MD on 12/6/24 at 3:02 PM Wojciech Pearson MD  12/06/24 7620

## 2024-12-06 NOTE — ED NOTES
ED to inpatient nurses report      Chief Complaint:  Chief Complaint   Patient presents with    Abdominal Pain     Present to ED from: home    MOA:     LOC: alert and orientated to name, place, date  Mobility: Independent  Oxygen Baseline: room air    Current needs required: none     Code Status:   Prior    What abnormal results were found and what did you give/do to treat them? Abnormal INR + hematochezia - vitamin k given by this RN, plasma ordered - type and screen not resulted in ED, not started in ED.   Any procedures or intervention occur?     Mental Status:  Level of Consciousness: Alert (0)    Psych Assessment:        Vitals:  Patient Vitals for the past 24 hrs:   BP Temp Temp src Pulse Resp SpO2 Height Weight   12/06/24 1505 (!) 111/98 -- -- (!) 102 16 97 % -- --   12/06/24 1447 (!) 153/91 -- -- 95 18 97 % -- --   12/06/24 1352 90/74 -- -- 97 16 96 % -- --   12/06/24 1309 (!) 136/97 -- -- 96 16 97 % -- --   12/06/24 1217 (!) 145/93 98.7 °F (37.1 °C) Oral (!) 103 18 98 % 1.778 m (5' 10\") 63.5 kg (140 lb)        LDAs:   Peripheral IV 12/06/24 Left Antecubital (Active)   Site Assessment Clean, dry & intact 12/06/24 1506   Line Status Normal saline locked 12/06/24 1506   Phlebitis Assessment No symptoms 12/06/24 1506   Infiltration Assessment 0 12/06/24 1506   Alcohol Cap Used Yes 12/06/24 1506   Dressing Status Clean, dry & intact 12/06/24 1506   Dressing Type Transparent 12/06/24 1506       Ambulatory Status:  No data recorded    Diagnosis:  DISPOSITION Admitted 12/06/2024 03:04:19 PM   Final diagnoses:   Abnormal INR   Hematochezia        Consults:  IP CONSULT TO SOCIAL WORK  IP CONSULT TO ADDICTION SERVICES     Pain Score:  Pain Assessment  Pain Assessment: 0-10  Pain Level: 6  Patient's Stated Pain Goal: 3  Pain Location: Abdomen    C-SSRS:   Risk of Suicide: No Risk    Sepsis Screening:       [place] Fall Risk:       Swallow Screening        Preferred Language:   English      ALLERGIES     Patient has no

## 2024-12-07 LAB
ANION GAP SERPL CALC-SCNC: 11 MEQ/L (ref 8–16)
BASOPHILS ABSOLUTE: 0 THOU/MM3 (ref 0–0.1)
BASOPHILS NFR BLD AUTO: 0.4 %
BUN SERPL-MCNC: 4 MG/DL (ref 7–22)
CALCIUM SERPL-MCNC: 9 MG/DL (ref 8.5–10.5)
CHLORIDE SERPL-SCNC: 97 MEQ/L (ref 98–111)
CO2 SERPL-SCNC: 27 MEQ/L (ref 23–33)
CREAT SERPL-MCNC: 0.6 MG/DL (ref 0.4–1.2)
DEPRECATED RDW RBC AUTO: 52.5 FL (ref 35–45)
EOSINOPHIL NFR BLD AUTO: 2.9 %
EOSINOPHILS ABSOLUTE: 0.2 THOU/MM3 (ref 0–0.4)
ERYTHROCYTE [DISTWIDTH] IN BLOOD BY AUTOMATED COUNT: 15.1 % (ref 11.5–14.5)
GFR SERPL CREATININE-BSD FRML MDRD: > 90 ML/MIN/1.73M2
GLUCOSE SERPL-MCNC: 97 MG/DL (ref 70–108)
HCT VFR BLD AUTO: 37.8 % (ref 42–52)
HCT VFR BLD AUTO: 39.4 % (ref 42–52)
HCT VFR BLD AUTO: 39.5 % (ref 42–52)
HCT VFR BLD AUTO: 40.1 % (ref 42–52)
HGB BLD-MCNC: 12.4 GM/DL (ref 14–18)
HGB BLD-MCNC: 12.8 GM/DL (ref 14–18)
HGB BLD-MCNC: 13.1 GM/DL (ref 14–18)
HGB BLD-MCNC: 13.1 GM/DL (ref 14–18)
IMM GRANULOCYTES # BLD AUTO: 0.02 THOU/MM3 (ref 0–0.07)
IMM GRANULOCYTES NFR BLD AUTO: 0.4 %
INR PPP: 1.48 (ref 0.85–1.13)
LYMPHOCYTES ABSOLUTE: 1.5 THOU/MM3 (ref 1–4.8)
LYMPHOCYTES NFR BLD AUTO: 28.1 %
MAGNESIUM SERPL-MCNC: 1.6 MG/DL (ref 1.6–2.4)
MCH RBC QN AUTO: 31 PG (ref 26–33)
MCHC RBC AUTO-ENTMCNC: 32.7 GM/DL (ref 32.2–35.5)
MCV RBC AUTO: 95 FL (ref 80–94)
MONOCYTES ABSOLUTE: 0.4 THOU/MM3 (ref 0.4–1.3)
MONOCYTES NFR BLD AUTO: 7.2 %
NEUTROPHILS ABSOLUTE: 3.2 THOU/MM3 (ref 1.8–7.7)
NEUTROPHILS NFR BLD AUTO: 61 %
NRBC BLD AUTO-RTO: 0 /100 WBC
PHOSPHATE SERPL-MCNC: 1.8 MG/DL (ref 2.4–4.7)
PLATELET # BLD AUTO: 154 THOU/MM3 (ref 130–400)
PMV BLD AUTO: 10.6 FL (ref 9.4–12.4)
POTASSIUM SERPL-SCNC: 3.7 MEQ/L (ref 3.5–5.2)
RBC # BLD AUTO: 4.22 MILL/MM3 (ref 4.7–6.1)
SODIUM SERPL-SCNC: 135 MEQ/L (ref 135–145)
WBC # BLD AUTO: 5.3 THOU/MM3 (ref 4.8–10.8)

## 2024-12-07 PROCEDURE — 6360000002 HC RX W HCPCS: Performed by: PHYSICIAN ASSISTANT

## 2024-12-07 PROCEDURE — 6370000000 HC RX 637 (ALT 250 FOR IP): Performed by: INTERNAL MEDICINE

## 2024-12-07 PROCEDURE — 99233 SBSQ HOSP IP/OBS HIGH 50: CPT | Performed by: INTERNAL MEDICINE

## 2024-12-07 PROCEDURE — 36415 COLL VENOUS BLD VENIPUNCTURE: CPT

## 2024-12-07 PROCEDURE — 84100 ASSAY OF PHOSPHORUS: CPT

## 2024-12-07 PROCEDURE — 85025 COMPLETE CBC W/AUTO DIFF WBC: CPT

## 2024-12-07 PROCEDURE — 80048 BASIC METABOLIC PNL TOTAL CA: CPT

## 2024-12-07 PROCEDURE — 2580000003 HC RX 258: Performed by: PHYSICIAN ASSISTANT

## 2024-12-07 PROCEDURE — 6370000000 HC RX 637 (ALT 250 FOR IP)

## 2024-12-07 PROCEDURE — 85014 HEMATOCRIT: CPT

## 2024-12-07 PROCEDURE — 85018 HEMOGLOBIN: CPT

## 2024-12-07 PROCEDURE — 85610 PROTHROMBIN TIME: CPT

## 2024-12-07 PROCEDURE — 2060000000 HC ICU INTERMEDIATE R&B

## 2024-12-07 PROCEDURE — 83735 ASSAY OF MAGNESIUM: CPT

## 2024-12-07 RX ORDER — FOLIC ACID 1 MG/1
1 TABLET ORAL DAILY
Status: DISCONTINUED | OUTPATIENT
Start: 2024-12-07 | End: 2024-12-10 | Stop reason: HOSPADM

## 2024-12-07 RX ORDER — MULTIVITAMIN WITH IRON
1 TABLET ORAL DAILY
Status: DISCONTINUED | OUTPATIENT
Start: 2024-12-07 | End: 2024-12-10 | Stop reason: HOSPADM

## 2024-12-07 RX ADMIN — SODIUM CHLORIDE, PRESERVATIVE FREE 10 ML: 5 INJECTION INTRAVENOUS at 20:13

## 2024-12-07 RX ADMIN — FOLIC ACID 1 MG: 1 TABLET ORAL at 11:09

## 2024-12-07 RX ADMIN — Medication 1 TABLET: at 11:09

## 2024-12-07 RX ADMIN — SODIUM CHLORIDE 8 MG/HR: 9 INJECTION, SOLUTION INTRAVENOUS at 21:37

## 2024-12-07 RX ADMIN — LEVETIRACETAM 500 MG: 100 INJECTION INTRAVENOUS at 08:54

## 2024-12-07 RX ADMIN — SODIUM CHLORIDE 8 MG/HR: 9 INJECTION, SOLUTION INTRAVENOUS at 09:55

## 2024-12-07 RX ADMIN — THIAMINE HYDROCHLORIDE 100 MG: 100 INJECTION, SOLUTION INTRAMUSCULAR; INTRAVENOUS at 09:59

## 2024-12-07 RX ADMIN — SODIUM CHLORIDE, PRESERVATIVE FREE 10 ML: 5 INJECTION INTRAVENOUS at 08:54

## 2024-12-07 RX ADMIN — LEVETIRACETAM 500 MG: 100 INJECTION INTRAVENOUS at 20:12

## 2024-12-07 RX ADMIN — SODIUM CHLORIDE 8 MG/HR: 9 INJECTION, SOLUTION INTRAVENOUS at 01:27

## 2024-12-07 RX ADMIN — SODIUM CHLORIDE, PRESERVATIVE FREE 10 ML: 5 INJECTION INTRAVENOUS at 20:16

## 2024-12-07 ASSESSMENT — PATIENT HEALTH QUESTIONNAIRE - PHQ9
1. LITTLE INTEREST OR PLEASURE IN DOING THINGS: NOT AT ALL
2. FEELING DOWN, DEPRESSED OR HOPELESS: NOT AT ALL
SUM OF ALL RESPONSES TO PHQ QUESTIONS 1-9: 0
SUM OF ALL RESPONSES TO PHQ QUESTIONS 1-9: 0
SUM OF ALL RESPONSES TO PHQ9 QUESTIONS 1 & 2: 0
SUM OF ALL RESPONSES TO PHQ QUESTIONS 1-9: 0
SUM OF ALL RESPONSES TO PHQ QUESTIONS 1-9: 0

## 2024-12-07 ASSESSMENT — PAIN SCALES - GENERAL: PAINLEVEL_OUTOF10: 0

## 2024-12-07 ASSESSMENT — LIFESTYLE VARIABLES
HOW MANY STANDARD DRINKS CONTAINING ALCOHOL DO YOU HAVE ON A TYPICAL DAY: PATIENT UNABLE TO ANSWER
HOW OFTEN DO YOU HAVE A DRINK CONTAINING ALCOHOL: 4 OR MORE TIMES A WEEK

## 2024-12-07 NOTE — PLAN OF CARE
Problem: Gastrointestinal - Adult  Goal: Minimal or absence of nausea and vomiting  Outcome: Progressing  Flowsheets (Taken 12/7/2024 0913)  Minimal or absence of nausea and vomiting:   Administer IV fluids as ordered to ensure adequate hydration   Maintain NPO status until nausea and vomiting are resolved   Advance diet as tolerated, if ordered

## 2024-12-07 NOTE — PROGRESS NOTES
Hospitalist Progress Note  Internal Medicine Resident      Patient: Dashawn Rivera 44 y.o. male      Unit/Bed: -16/016-A    Admit Date: 12/6/2024      ASSESSMENT AND PLAN  Active Problems  Upper GI bleed: Patient reported multiple episode of dark brown emesis prior to admission.  Patient also reported black tarry stools, ongoing for past 2 days.  Complaint of epistaxis. hemoglobin 12.6 on admission.  Patient reported increased alcohol intake to 1 bottle per day, for the past week.  INR 15.57 OA. S/p, vitamin K 10mg 2x and FFPx1.  CT A/P 12/6/2024 reported mild diffuse colonic wall thickening.  H&H stable.  GI following, recommendation appreciated  Continue IV Protonix ggt, octreotide.  Can be transitioned to IV twice daily discontinue treatment once patient tolerating p.o. diet.   Monitor INR daily.  H&H Q6 trending.   Diet advancing as tolerated, avoid red dye.   Monitor all stool and emesis.   GI recommends no endoscopy at this time due to high bleeding risk with high INR.   Supratherapeutic INR secondary to alcohol abuse: INR 15.57 on admission.  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  Patient continuously following with PCP for INR monitoring.  Reports from last visit the dose was decreased, patient reports INR has been therapeutic.  Patient reported family/friend deaths, causing him to increase his alcohol intake to 1 bottle per day. S/p, vitamin K 10mg 2x and FFPx1.  INR 1.4, 12/7/2024.  Continue monitoring INR daily.   GI bleed management as noted above.   Acute on chronic alcohol abuse secondary to stressors: Patient reported family/friend deaths, causing him to increase his alcohol intake to 1 bottle per day for the past week.  Prior to this stressors patient used to drink 1 glass of drink every now and then.  Patient does have history of alcohol withdrawal seizures.  One dose of 2 mg Ativan at 4 PM on 12/6, for tremors.  12/6/2024: Albumin 4.2, alkaline phosphatase 118, ALT 22, AST 64, total

## 2024-12-07 NOTE — PLAN OF CARE
Problem: Discharge Planning  Goal: Discharge to home or other facility with appropriate resources  12/6/2024 2331 by Aster Albrecht RN  Outcome: Progressing  Flowsheets (Taken 12/6/2024 2331)  Discharge to home or other facility with appropriate resources:   Identify barriers to discharge with patient and caregiver   Arrange for needed discharge resources and transportation as appropriate   Identify discharge learning needs (meds, wound care, etc)   Arrange for interpreters to assist at discharge as needed   Refer to discharge planning if patient needs post-hospital services based on physician order or complex needs related to functional status, cognitive ability or social support system     Problem: Pain  Goal: Verbalizes/displays adequate comfort level or baseline comfort level  12/6/2024 2331 by Aster Albrecht RN  Outcome: Progressing  Flowsheets (Taken 12/6/2024 2331)  Verbalizes/displays adequate comfort level or baseline comfort level:   Encourage patient to monitor pain and request assistance   Assess pain using appropriate pain scale   Administer analgesics based on type and severity of pain and evaluate response   Implement non-pharmacological measures as appropriate and evaluate response   Consider cultural and social influences on pain and pain management   Notify Licensed Independent Practitioner if interventions unsuccessful or patient reports new pain   Care plan reviewed with patient.  Patient verbalizes understanding of the plan of care and contribute to goal setting.

## 2024-12-07 NOTE — PROGRESS NOTES
1857  AOD Consult attempted, pt sleeping, called pts name several times, pt continues to sleep. CUCA will re-attempt.

## 2024-12-07 NOTE — CONSULTS
Brief Intervention and Referral to Treatment Summary     Patient was provided PHQ-9, AUDIT-C and DAST Screening:      PHQ-9 Score:  0  AUDIT-C Score:  Unable to calculate due to pt unable to provide answer for one question due to his amount he drinks varying.   DAST Score:   0    Patient’s substance use is considered:    Unable to calculate due to pt unable to provide answer for one question due to his amount he drinks varying.     Patient’s depression is considered:    N/a    Brief Education Was Provided     Patient was receptive      Brief Intervention Is Provided (Only for AUDIT-C or DAST)     Patient reports readiness to decrease and/or stop use and a plan was discussed       Recommendations/Referrals for Brief and/or Specialized Treatment Provided to Patient       Pt took resource packet. Has no current services is familiar with [facility]. Informed pt of [facility].

## 2024-12-07 NOTE — CARE COORDINATION
12/07/24 1259   Service Assessment   Patient Orientation Alert and Oriented   Cognition Alert   History Provided By Patient   Primary Caregiver Self   Support Systems Parent   Patient's Healthcare Decision Maker is: Legal Next of Kin   PCP Verified by CM Yes   Prior Functional Level Independent in ADLs/IADLs   Current Functional Level Independent in ADLs/IADLs   Can patient return to prior living arrangement Yes   Ability to make needs known: Good   Family able to assist with home care needs: Yes   Would you like for me to discuss the discharge plan with any other family members/significant others, and if so, who? No   Financial Resources Medicaid   Community Resources None   Discharge Planning   Type of Residence Apartment   Living Arrangements Family Members  (stepmother)   Current Services Prior To Admission None   Potential Assistance Needed N/A   DME Ordered? No   Potential Assistance Purchasing Medications No   Type of Home Care Services None   Condition of Participation: Discharge Planning   The Plan for Transition of Care is related to the following treatment goals: feel better     Patient Goals/Plan/Treatment Preferences: Met with Dashawn, he plans to return home with his stepmom as PTA. He is independent, denies all discharge needs.     If patient is discharged prior to next notation, then this note serves as note for discharge by case management.

## 2024-12-08 PROBLEM — R79.1 ABNORMAL INR: Status: ACTIVE | Noted: 2024-12-08

## 2024-12-08 PROBLEM — K76.9 LIVER DISEASE: Status: ACTIVE | Noted: 2024-10-24

## 2024-12-08 PROBLEM — F10.90 ALCOHOL USE DISORDER: Status: ACTIVE | Noted: 2024-12-08

## 2024-12-08 LAB
ANION GAP SERPL CALC-SCNC: 10 MEQ/L (ref 8–16)
BUN SERPL-MCNC: 11 MG/DL (ref 7–22)
CALCIUM SERPL-MCNC: 9.2 MG/DL (ref 8.5–10.5)
CHLORIDE SERPL-SCNC: 101 MEQ/L (ref 98–111)
CO2 SERPL-SCNC: 29 MEQ/L (ref 23–33)
CREAT SERPL-MCNC: 0.6 MG/DL (ref 0.4–1.2)
DEPRECATED RDW RBC AUTO: 52.2 FL (ref 35–45)
ERYTHROCYTE [DISTWIDTH] IN BLOOD BY AUTOMATED COUNT: 15.2 % (ref 11.5–14.5)
FERRITIN SERPL IA-MCNC: 239 NG/ML (ref 22–322)
FOLATE SERPL-MCNC: 8 NG/ML (ref 4.8–24.2)
GFR SERPL CREATININE-BSD FRML MDRD: > 90 ML/MIN/1.73M2
GLUCOSE SERPL-MCNC: 94 MG/DL (ref 70–108)
HCT VFR BLD AUTO: 35.9 % (ref 42–52)
HGB BLD-MCNC: 11.7 GM/DL (ref 14–18)
INR PPP: 1.15 (ref 0.85–1.13)
IRON SATN MFR SERPL: 23 % (ref 20–50)
IRON SERPL-MCNC: 59 UG/DL (ref 65–195)
MCH RBC QN AUTO: 30.7 PG (ref 26–33)
MCHC RBC AUTO-ENTMCNC: 32.6 GM/DL (ref 32.2–35.5)
MCV RBC AUTO: 94.2 FL (ref 80–94)
PLATELET # BLD AUTO: 135 THOU/MM3 (ref 130–400)
PMV BLD AUTO: 10.5 FL (ref 9.4–12.4)
POTASSIUM SERPL-SCNC: 3.9 MEQ/L (ref 3.5–5.2)
RBC # BLD AUTO: 3.81 MILL/MM3 (ref 4.7–6.1)
SODIUM SERPL-SCNC: 140 MEQ/L (ref 135–145)
TIBC SERPL-MCNC: 254 UG/DL (ref 171–450)
VIT B12 SERPL-MCNC: 491 PG/ML (ref 211–911)
WBC # BLD AUTO: 5.8 THOU/MM3 (ref 4.8–10.8)

## 2024-12-08 PROCEDURE — 2060000000 HC ICU INTERMEDIATE R&B

## 2024-12-08 PROCEDURE — 82607 VITAMIN B-12: CPT

## 2024-12-08 PROCEDURE — 6360000002 HC RX W HCPCS: Performed by: STUDENT IN AN ORGANIZED HEALTH CARE EDUCATION/TRAINING PROGRAM

## 2024-12-08 PROCEDURE — 83550 IRON BINDING TEST: CPT

## 2024-12-08 PROCEDURE — 82728 ASSAY OF FERRITIN: CPT

## 2024-12-08 PROCEDURE — 99232 SBSQ HOSP IP/OBS MODERATE 35: CPT | Performed by: INTERNAL MEDICINE

## 2024-12-08 PROCEDURE — 82746 ASSAY OF FOLIC ACID SERUM: CPT

## 2024-12-08 PROCEDURE — 6370000000 HC RX 637 (ALT 250 FOR IP)

## 2024-12-08 PROCEDURE — 6360000002 HC RX W HCPCS: Performed by: PHYSICIAN ASSISTANT

## 2024-12-08 PROCEDURE — 80048 BASIC METABOLIC PNL TOTAL CA: CPT

## 2024-12-08 PROCEDURE — 6370000000 HC RX 637 (ALT 250 FOR IP): Performed by: INTERNAL MEDICINE

## 2024-12-08 PROCEDURE — 83540 ASSAY OF IRON: CPT

## 2024-12-08 PROCEDURE — 2580000003 HC RX 258: Performed by: PHYSICIAN ASSISTANT

## 2024-12-08 PROCEDURE — 36415 COLL VENOUS BLD VENIPUNCTURE: CPT

## 2024-12-08 PROCEDURE — 85610 PROTHROMBIN TIME: CPT

## 2024-12-08 PROCEDURE — 85027 COMPLETE CBC AUTOMATED: CPT

## 2024-12-08 RX ORDER — BENZONATATE 100 MG/1
100 CAPSULE ORAL 3 TIMES DAILY PRN
Status: DISCONTINUED | OUTPATIENT
Start: 2024-12-08 | End: 2024-12-10 | Stop reason: HOSPADM

## 2024-12-08 RX ORDER — PANTOPRAZOLE SODIUM 40 MG/10ML
40 INJECTION, POWDER, LYOPHILIZED, FOR SOLUTION INTRAVENOUS 2 TIMES DAILY
Status: DISCONTINUED | OUTPATIENT
Start: 2024-12-08 | End: 2024-12-10 | Stop reason: HOSPADM

## 2024-12-08 RX ADMIN — SODIUM CHLORIDE, PRESERVATIVE FREE 10 ML: 5 INJECTION INTRAVENOUS at 22:18

## 2024-12-08 RX ADMIN — LEVETIRACETAM 500 MG: 100 INJECTION INTRAVENOUS at 08:47

## 2024-12-08 RX ADMIN — THIAMINE HYDROCHLORIDE 100 MG: 100 INJECTION, SOLUTION INTRAMUSCULAR; INTRAVENOUS at 08:47

## 2024-12-08 RX ADMIN — Medication 1 TABLET: at 08:47

## 2024-12-08 RX ADMIN — LEVETIRACETAM 500 MG: 100 INJECTION INTRAVENOUS at 20:08

## 2024-12-08 RX ADMIN — PANTOPRAZOLE SODIUM 40 MG: 40 INJECTION, POWDER, FOR SOLUTION INTRAVENOUS at 16:35

## 2024-12-08 RX ADMIN — BENZONATATE 100 MG: 100 CAPSULE ORAL at 08:47

## 2024-12-08 RX ADMIN — FOLIC ACID 1 MG: 1 TABLET ORAL at 08:47

## 2024-12-08 RX ADMIN — SODIUM CHLORIDE, PRESERVATIVE FREE 10 ML: 5 INJECTION INTRAVENOUS at 08:47

## 2024-12-08 RX ADMIN — SODIUM CHLORIDE 8 MG/HR: 9 INJECTION, SOLUTION INTRAVENOUS at 07:51

## 2024-12-08 ASSESSMENT — PAIN SCALES - GENERAL: PAINLEVEL_OUTOF10: 0

## 2024-12-08 NOTE — PLAN OF CARE
Problem: Discharge Planning  Goal: Discharge to home or other facility with appropriate resources  Outcome: Progressing  Flowsheets  Taken 12/7/2024 2151 by Esther Russ, RN  Discharge to home or other facility with appropriate resources: Identify barriers to discharge with patient and caregiver  Taken 12/7/2024 2015 by Esther Russ RN  Discharge to home or other facility with appropriate resources: Identify barriers to discharge with patient and caregiver  Taken 12/7/2024 0849 by Amanda Iyer RN  Discharge to home or other facility with appropriate resources: Identify barriers to discharge with patient and caregiver     Problem: Pain  Goal: Verbalizes/displays adequate comfort level or baseline comfort level  Outcome: Progressing  Flowsheets  Taken 12/7/2024 2151 by Esther Russ, RN  Verbalizes/displays adequate comfort level or baseline comfort level: Encourage patient to monitor pain and request assistance  Taken 12/7/2024 1930 by Esther Russ, RN  Verbalizes/displays adequate comfort level or baseline comfort level: Encourage patient to monitor pain and request assistance  Taken 12/7/2024 0849 by Amanda Iyer RN  Verbalizes/displays adequate comfort level or baseline comfort level: Encourage patient to monitor pain and request assistance     Problem: Neurosensory - Adult  Goal: Achieves stable or improved neurological status  Outcome: Progressing  Flowsheets (Taken 12/7/2024 2151)  Achieves stable or improved neurological status: Assess for and report changes in neurological status     Problem: Skin/Tissue Integrity - Adult  Goal: Skin integrity remains intact  Outcome: Progressing  Flowsheets (Taken 12/7/2024 2151)  Skin Integrity Remains Intact: Monitor for areas of redness and/or skin breakdown     Problem: Gastrointestinal - Adult  Goal: Minimal or absence of nausea and vomiting  12/7/2024 2151 by Esther Russ, RN  Outcome: Progressing  Flowsheets  Taken 12/7/2024  2151  Minimal or absence of nausea and vomiting: Administer IV fluids as ordered to ensure adequate hydration  Taken 12/7/2024 2015  Minimal or absence of nausea and vomiting: Administer IV fluids as ordered to ensure adequate hydration  12/7/2024 0913 by Amanda Iyer RN  Outcome: Progressing  Flowsheets (Taken 12/7/2024 0913)  Minimal or absence of nausea and vomiting:   Administer IV fluids as ordered to ensure adequate hydration   Maintain NPO status until nausea and vomiting are resolved   Advance diet as tolerated, if ordered  Goal: Maintains or returns to baseline bowel function  Outcome: Progressing  Flowsheets  Taken 12/7/2024 2151 by Esther Russ RN  Maintains or returns to baseline bowel function: Assess bowel function  Taken 12/7/2024 2015 by Esther Russ RN  Maintains or returns to baseline bowel function: Assess bowel function  Taken 12/7/2024 0849 by Amanda Iyer RN  Maintains or returns to baseline bowel function: Assess bowel function  Goal: Maintains adequate nutritional intake  Outcome: Progressing  Flowsheets  Taken 12/7/2024 2151 by Esther Russ RN  Maintains adequate nutritional intake: Monitor percentage of each meal consumed  Taken 12/7/2024 2015 by Esther Russ RN  Maintains adequate nutritional intake: Monitor percentage of each meal consumed  Taken 12/7/2024 0849 by Amanda Iyer RN  Maintains adequate nutritional intake: Identify factors contributing to decreased intake, treat as appropriate     Problem: Respiratory - Adult  Goal: Achieves optimal ventilation and oxygenation  Outcome: Progressing  Flowsheets (Taken 12/7/2024 2151)  Achieves optimal ventilation and oxygenation: Assess for changes in respiratory status     Problem: Cardiovascular - Adult  Goal: Maintains optimal cardiac output and hemodynamic stability  Outcome: Progressing  Flowsheets (Taken 12/7/2024 2151)  Maintains optimal cardiac output and hemodynamic stability: Monitor blood pressure

## 2024-12-08 NOTE — PLAN OF CARE
Problem: Discharge Planning  Goal: Discharge to home or other facility with appropriate resources  12/8/2024 0721 by Amanda Iyer, RN  Outcome: Progressing  Flowsheets (Taken 12/7/2024 2151 by Esther Russ, RN)  Discharge to home or other facility with appropriate resources: Identify barriers to discharge with patient and caregiver  12/7/2024 2151 by Esther Russ, RN  Outcome: Progressing  Flowsheets  Taken 12/7/2024 2151 by Esther Russ, RN  Discharge to home or other facility with appropriate resources: Identify barriers to discharge with patient and caregiver  Taken 12/7/2024 2015 by Esther Russ, RN  Discharge to home or other facility with appropriate resources: Identify barriers to discharge with patient and caregiver  Taken 12/7/2024 0849 by Amanda Iyer, RN  Discharge to home or other facility with appropriate resources: Identify barriers to discharge with patient and caregiver

## 2024-12-08 NOTE — PROGRESS NOTES
Hospitalist Progress Note  Internal Medicine Resident      Patient: Dashawn Rivera 44 y.o. male      Unit/Bed: -16/016-A    Admit Date: 12/6/2024      ASSESSMENT AND PLAN  Active Problems  Upper GI bleed: Patient reported multiple episode of dark brown emesis prior to admission.  Patient also reported black tarry stools, ongoing for past 2 days.  Complaint of epistaxis. hemoglobin 12.6 on admission.  Patient reported increased alcohol intake to 1 bottle per day, for the past week.  INR 15.57 OA. S/p, vitamin K 10mg 2x and FFPx1.  CT A/P 12/6/2024 reported mild diffuse colonic wall thickening.  H&H stable.  GI following, recommendation appreciated  Octreotide was weaned off and Protonix gtt was switched to 40 mg BID.   Monitor INR daily. INR Trend: 15.57 > 14.68 > 2.13 > 1.48 > 1.15  H&H Q6 trending.   Diet advancing as tolerated, avoid red dye.   Monitor all stool and emesis.   GI recommends no endoscopy at this time due to high bleeding risk with high INR.   Supratherapeutic INR secondary to alcohol abuse: INR 15.57 on admission.  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  Patient continuously following with PCP for INR monitoring.  Reports from last visit the dose was decreased, patient reports INR has been therapeutic.  Patient reported family/friend deaths, causing him to increase his alcohol intake to 1 bottle per day. S/p, vitamin K 10mg 2x and FFPx1.  INR 1.4, 12/7/2024.  Continue monitoring INR daily. INR Trend: 15.57 > 14.68 > 2.13 > 1.48 > 1.15  GI bleed management as noted above.   Follow up GI recommendations regarding restarting anticoagulants and to see if patient can be switched to heparin and see his response to heparin/ Lovenox before sending him home on direct oral anticoagulant/ Coumadin.  Acute on chronic alcohol abuse secondary to stressors: Patient reported family/friend deaths, causing him to increase his alcohol intake to 1 bottle per day for the past week.  Prior to this  IntraVENous 2 times per day    sodium chloride flush  5-40 mL IntraVENous 2 times per day    thiamine  100 mg IntraVENous Daily    nicotine  1 patch TransDERmal Daily    levETIRAcetam  500 mg IntraVENous Q12H    PRN Meds: sodium chloride, sodium chloride flush, sodium chloride, potassium chloride **OR** potassium alternative oral replacement **OR** potassium chloride, magnesium sulfate, ondansetron **OR** ondansetron, polyethylene glycol, acetaminophen **OR** acetaminophen, sodium chloride flush, LORazepam **OR** LORazepam **OR** LORazepam **OR** LORazepam **OR** LORazepam **OR** LORazepam **OR** LORazepam **OR** LORazepam    Exam:  /76   Pulse 75   Temp 98.1 °F (36.7 °C) (Oral)   Resp 16   Ht 1.778 m (5' 10\")   Wt 63.5 kg (140 lb)   SpO2 99%   BMI 20.09 kg/m²   General: Ill-appearing male, appears stated age.  Eyes:  PERRL. Conjunctivae/corneas clear.  HENT: Head normal appearing. Nares normal. Oral mucosa moist.  Hearing intact.   Neck: Supple, with full range of motion. Trachea midline.  No gross JVD appreciated.  Respiratory:  Normal effort. Clear to auscultation, without rales or wheezes or rhonchi.  Cardiovascular: Normal rate, regular rhythm with normal S1/S2 without murmurs.    No lower extremity edema.   Abdomen: Soft, non-tender, non-distended with normal bowel sounds.  Musculoskeletal: No joint swelling or tenderness. Normal tone. No abnormal movements.   Skin: Warm and dry. No rashes or lesions.  Neurologic:  No focal sensory/motor deficits in the upper or lower extremities. Cranial nerves:  grossly non-focal 2-12.     Psychiatric: Alert and oriented, normal insight and thought content.   Capillary Refill: Brisk,< 3 seconds.  Peripheral Pulses: +2 palpable, equal bilaterally.       Labs/Radiology: See chart or assessment above.     Electronically signed by Dayami Longoria MD on 12/8/2024 at 7:07 AM  Case was discussed with Attending, Dr. Franck Morales DO.

## 2024-12-09 LAB
ANION GAP SERPL CALC-SCNC: 11 MEQ/L (ref 8–16)
APTT PPP: 30.5 SECONDS (ref 22–38)
BUN SERPL-MCNC: 9 MG/DL (ref 7–22)
CALCIUM SERPL-MCNC: 9.3 MG/DL (ref 8.5–10.5)
CHLORIDE SERPL-SCNC: 102 MEQ/L (ref 98–111)
CO2 SERPL-SCNC: 28 MEQ/L (ref 23–33)
CREAT SERPL-MCNC: 0.5 MG/DL (ref 0.4–1.2)
DEPRECATED RDW RBC AUTO: 53.5 FL (ref 35–45)
ERYTHROCYTE [DISTWIDTH] IN BLOOD BY AUTOMATED COUNT: 15.2 % (ref 11.5–14.5)
GFR SERPL CREATININE-BSD FRML MDRD: > 90 ML/MIN/1.73M2
GLUCOSE SERPL-MCNC: 88 MG/DL (ref 70–108)
HCT VFR BLD AUTO: 36 % (ref 42–52)
HEPARIN UNFRACTIONATED: 0.07 U/ML (ref 0.3–0.7)
HEPARIN UNFRACTIONATED: 0.14 U/ML (ref 0.3–0.7)
HGB BLD-MCNC: 11.5 GM/DL (ref 14–18)
INR PPP: 1.06 (ref 0.85–1.13)
MCH RBC QN AUTO: 30.6 PG (ref 26–33)
MCHC RBC AUTO-ENTMCNC: 31.9 GM/DL (ref 32.2–35.5)
MCV RBC AUTO: 95.7 FL (ref 80–94)
PLATELET # BLD AUTO: 142 THOU/MM3 (ref 130–400)
PMV BLD AUTO: 11 FL (ref 9.4–12.4)
POTASSIUM SERPL-SCNC: 4 MEQ/L (ref 3.5–5.2)
RBC # BLD AUTO: 3.76 MILL/MM3 (ref 4.7–6.1)
SODIUM SERPL-SCNC: 141 MEQ/L (ref 135–145)
WBC # BLD AUTO: 6.3 THOU/MM3 (ref 4.8–10.8)

## 2024-12-09 PROCEDURE — 6360000002 HC RX W HCPCS: Performed by: PHYSICIAN ASSISTANT

## 2024-12-09 PROCEDURE — 80048 BASIC METABOLIC PNL TOTAL CA: CPT

## 2024-12-09 PROCEDURE — 6370000000 HC RX 637 (ALT 250 FOR IP): Performed by: INTERNAL MEDICINE

## 2024-12-09 PROCEDURE — 85730 THROMBOPLASTIN TIME PARTIAL: CPT

## 2024-12-09 PROCEDURE — 2580000003 HC RX 258: Performed by: PHYSICIAN ASSISTANT

## 2024-12-09 PROCEDURE — 2060000000 HC ICU INTERMEDIATE R&B

## 2024-12-09 PROCEDURE — 36415 COLL VENOUS BLD VENIPUNCTURE: CPT

## 2024-12-09 PROCEDURE — 6370000000 HC RX 637 (ALT 250 FOR IP)

## 2024-12-09 PROCEDURE — 6360000002 HC RX W HCPCS: Performed by: INTERNAL MEDICINE

## 2024-12-09 PROCEDURE — 99233 SBSQ HOSP IP/OBS HIGH 50: CPT | Performed by: INTERNAL MEDICINE

## 2024-12-09 PROCEDURE — 85027 COMPLETE CBC AUTOMATED: CPT

## 2024-12-09 PROCEDURE — 85610 PROTHROMBIN TIME: CPT

## 2024-12-09 PROCEDURE — 85520 HEPARIN ASSAY: CPT

## 2024-12-09 PROCEDURE — 6360000002 HC RX W HCPCS: Performed by: STUDENT IN AN ORGANIZED HEALTH CARE EDUCATION/TRAINING PROGRAM

## 2024-12-09 PROCEDURE — 6370000000 HC RX 637 (ALT 250 FOR IP): Performed by: STUDENT IN AN ORGANIZED HEALTH CARE EDUCATION/TRAINING PROGRAM

## 2024-12-09 RX ORDER — HEPARIN SODIUM 1000 [USP'U]/ML
60 INJECTION, SOLUTION INTRAVENOUS; SUBCUTANEOUS PRN
Status: DISCONTINUED | OUTPATIENT
Start: 2024-12-09 | End: 2024-12-10

## 2024-12-09 RX ORDER — LANOLIN ALCOHOL/MO/W.PET/CERES
100 CREAM (GRAM) TOPICAL DAILY
Status: DISCONTINUED | OUTPATIENT
Start: 2024-12-10 | End: 2024-12-10 | Stop reason: HOSPADM

## 2024-12-09 RX ORDER — HEPARIN SODIUM 10000 [USP'U]/100ML
5-30 INJECTION, SOLUTION INTRAVENOUS CONTINUOUS
Status: DISCONTINUED | OUTPATIENT
Start: 2024-12-09 | End: 2024-12-10

## 2024-12-09 RX ORDER — HEPARIN SODIUM 1000 [USP'U]/ML
30 INJECTION, SOLUTION INTRAVENOUS; SUBCUTANEOUS PRN
Status: DISCONTINUED | OUTPATIENT
Start: 2024-12-09 | End: 2024-12-10

## 2024-12-09 RX ADMIN — PANTOPRAZOLE SODIUM 40 MG: 40 INJECTION, POWDER, FOR SOLUTION INTRAVENOUS at 16:30

## 2024-12-09 RX ADMIN — THIAMINE HYDROCHLORIDE 100 MG: 100 INJECTION, SOLUTION INTRAMUSCULAR; INTRAVENOUS at 08:02

## 2024-12-09 RX ADMIN — SODIUM CHLORIDE, PRESERVATIVE FREE 10 ML: 5 INJECTION INTRAVENOUS at 08:11

## 2024-12-09 RX ADMIN — Medication 1 TABLET: at 08:02

## 2024-12-09 RX ADMIN — PANTOPRAZOLE SODIUM 40 MG: 40 INJECTION, POWDER, FOR SOLUTION INTRAVENOUS at 05:08

## 2024-12-09 RX ADMIN — HEPARIN SODIUM 12 UNITS/KG/HR: 10000 INJECTION, SOLUTION INTRAVENOUS at 14:15

## 2024-12-09 RX ADMIN — SODIUM CHLORIDE, PRESERVATIVE FREE 10 ML: 5 INJECTION INTRAVENOUS at 21:01

## 2024-12-09 RX ADMIN — LEVETIRACETAM 500 MG: 100 INJECTION INTRAVENOUS at 21:01

## 2024-12-09 RX ADMIN — HEPARIN SODIUM 1900 UNITS: 1000 INJECTION INTRAVENOUS; SUBCUTANEOUS at 21:54

## 2024-12-09 RX ADMIN — LEVETIRACETAM 500 MG: 100 INJECTION INTRAVENOUS at 08:02

## 2024-12-09 RX ADMIN — FOLIC ACID 1 MG: 1 TABLET ORAL at 08:02

## 2024-12-09 NOTE — CARE COORDINATION
12/9/24, 3:41 PM EST    DISCHARGE ON GOING EVALUATION    Dashawn L [facility] day: 3  Location: [facility]16/016A Reason for admit: Hematemesis [K92.0]  Hematochezia [K92.1]  Abnormal INR [R79.1]     Barriers to Discharge:   GIB, elevated INR  History: DVT, Liver Disease, Pulmonary Emboli, Seizures, IVC Filter, Active Smoker  Heparin Gtt, IV Keppra  PCP: Lyn Damico, MARIA ALEJANDRA Acosta CNP  Readmission Risk Score: 24.8    Patient Goals/Plan/Treatment Preferences: plans home w anthony Story, dolores Butcher, current [facility]

## 2024-12-09 NOTE — CARE COORDINATION
12/9/24, 7:59 AM EST    DISCHARGE PLANNING EVALUATION    Received Social Work consult “For consideration of Rehab”.  Addiction/CUCA  consulted.   met with patient, following for needs.  Full Social Consult deferred.

## 2024-12-09 NOTE — PLAN OF CARE
Problem: Discharge Planning  Goal: Discharge to home or other facility with appropriate resources  Flowsheets (Taken 12/8/2024 2156)  Discharge to home or other facility with appropriate resources:   Identify barriers to discharge with patient and caregiver   Identify discharge learning needs (meds, wound care, etc)   Refer to discharge planning if patient needs post-hospital services based on physician order or complex needs related to functional status, cognitive ability or social support system     Problem: Pain  Goal: Verbalizes/displays adequate comfort level or baseline comfort level  Flowsheets (Taken 12/8/2024 2156)  Verbalizes/displays adequate comfort level or baseline comfort level:   Encourage patient to monitor pain and request assistance   Consider cultural and social influences on pain and pain management   Administer analgesics based on type and severity of pain and evaluate response   Assess pain using appropriate pain scale   Implement non-pharmacological measures as appropriate and evaluate response   Notify Licensed Independent Practitioner if interventions unsuccessful or patient reports new pain     Problem: Neurosensory - Adult  Goal: Achieves stable or improved neurological status  Flowsheets (Taken 12/8/2024 2156)  Achieves stable or improved neurological status:   Assess for and report changes in neurological status   Monitor temperature, glucose, and sodium. Initiate appropriate interventions as ordered     Problem: Skin/Tissue Integrity - Adult  Goal: Skin integrity remains intact  Flowsheets (Taken 12/8/2024 2156)  Skin Integrity Remains Intact: Monitor for areas of redness and/or skin breakdown     Problem: Respiratory - Adult  Goal: Achieves optimal ventilation and oxygenation  Flowsheets (Taken 12/8/2024 2156)  Achieves optimal ventilation and oxygenation:   Assess for changes in respiratory status   Assess for changes in mentation and behavior   Oxygen supplementation based on

## 2024-12-09 NOTE — PROGRESS NOTES
Spiritual Health History and Assessment/Progress Note  [facility]    Initial Encounter,  ,  ,      Name: Dashawn Rivera MRN: [id number]    Age: 44 y.o.     Sex: male   Language: English   Zoroastrianism: None   Hematemesis     Date: 12/9/2024            Total Time Calculated: (P) 9 min              Spiritual Assessment began in [facility] ICU STEPDOWN TELEMETRY 4K        Referral/Consult From: Rounding   Encounter Overview/Reason: Initial Encounter  Service Provided For: Patient    Purvi, Belief, Meaning:   Patient Other: Patient has no Yazidi belief.   Family/Friends No family/friends present      Importance and Influence:  Patient has no beliefs influential to healthcare decision-making identified during this visit  Family/Friends No family/friends present    Community:  Patient Other: Patient has no Yazidi connection   Family/Friends No family/friends present    Assessment and Plan of Care:     Patient Interventions include: Facilitated expression of thoughts and feelings  Family/Friends Interventions include: No family/friends present    Patient Plan of Care: Spiritual Care available upon further referral  Family/Friends Plan of Care: Spiritual Care available upon further referral    Electronically signed by LEIGHANN Kirk on 12/9/2024 at 5:43 PM

## 2024-12-10 VITALS
TEMPERATURE: 98.1 F | OXYGEN SATURATION: 99 % | DIASTOLIC BLOOD PRESSURE: 61 MMHG | HEART RATE: 80 BPM | BODY MASS INDEX: 20.04 KG/M2 | HEIGHT: 70 IN | WEIGHT: 140 LBS | SYSTOLIC BLOOD PRESSURE: 105 MMHG | RESPIRATION RATE: 16 BRPM

## 2024-12-10 LAB
ALBUMIN SERPL BCG-MCNC: 3.7 G/DL (ref 3.5–5.1)
ALP SERPL-CCNC: 87 U/L (ref 38–126)
ALT SERPL W/O P-5'-P-CCNC: 44 U/L (ref 11–66)
ANION GAP SERPL CALC-SCNC: 9 MEQ/L (ref 8–16)
AST SERPL-CCNC: 74 U/L (ref 5–40)
BILIRUB CONJ SERPL-MCNC: < 0.1 MG/DL (ref 0.1–13.8)
BILIRUB SERPL-MCNC: < 0.2 MG/DL (ref 0.3–1.2)
BUN SERPL-MCNC: 10 MG/DL (ref 7–22)
CALCIUM SERPL-MCNC: 9.8 MG/DL (ref 8.5–10.5)
CHLORIDE SERPL-SCNC: 100 MEQ/L (ref 98–111)
CO2 SERPL-SCNC: 28 MEQ/L (ref 23–33)
CREAT SERPL-MCNC: 0.7 MG/DL (ref 0.4–1.2)
DEPRECATED RDW RBC AUTO: 52.2 FL (ref 35–45)
ERYTHROCYTE [DISTWIDTH] IN BLOOD BY AUTOMATED COUNT: 15.3 % (ref 11.5–14.5)
GFR SERPL CREATININE-BSD FRML MDRD: > 90 ML/MIN/1.73M2
GLUCOSE SERPL-MCNC: 91 MG/DL (ref 70–108)
HCT VFR BLD AUTO: 35.9 % (ref 42–52)
HEPARIN UNFRACTIONATED: 0.23 U/ML (ref 0.3–0.7)
HEPARIN UNFRACTIONATED: 0.33 U/ML (ref 0.3–0.7)
HGB BLD-MCNC: 11.6 GM/DL (ref 14–18)
MCH RBC QN AUTO: 30.8 PG (ref 26–33)
MCHC RBC AUTO-ENTMCNC: 32.3 GM/DL (ref 32.2–35.5)
MCV RBC AUTO: 95.2 FL (ref 80–94)
PLATELET # BLD AUTO: 159 THOU/MM3 (ref 130–400)
PMV BLD AUTO: 10.6 FL (ref 9.4–12.4)
POTASSIUM SERPL-SCNC: 3.7 MEQ/L (ref 3.5–5.2)
PROT SERPL-MCNC: 6.5 G/DL (ref 6.1–8)
RBC # BLD AUTO: 3.77 MILL/MM3 (ref 4.7–6.1)
SODIUM SERPL-SCNC: 137 MEQ/L (ref 135–145)
WBC # BLD AUTO: 7.6 THOU/MM3 (ref 4.8–10.8)

## 2024-12-10 PROCEDURE — 82248 BILIRUBIN DIRECT: CPT

## 2024-12-10 PROCEDURE — 6360000002 HC RX W HCPCS: Performed by: PHYSICIAN ASSISTANT

## 2024-12-10 PROCEDURE — 6370000000 HC RX 637 (ALT 250 FOR IP)

## 2024-12-10 PROCEDURE — 85027 COMPLETE CBC AUTOMATED: CPT

## 2024-12-10 PROCEDURE — 2580000003 HC RX 258: Performed by: PHYSICIAN ASSISTANT

## 2024-12-10 PROCEDURE — 6370000000 HC RX 637 (ALT 250 FOR IP): Performed by: INTERNAL MEDICINE

## 2024-12-10 PROCEDURE — 36415 COLL VENOUS BLD VENIPUNCTURE: CPT

## 2024-12-10 PROCEDURE — 6360000002 HC RX W HCPCS

## 2024-12-10 PROCEDURE — 85520 HEPARIN ASSAY: CPT

## 2024-12-10 PROCEDURE — 6360000002 HC RX W HCPCS: Performed by: INTERNAL MEDICINE

## 2024-12-10 PROCEDURE — 80053 COMPREHEN METABOLIC PANEL: CPT

## 2024-12-10 PROCEDURE — 6360000002 HC RX W HCPCS: Performed by: STUDENT IN AN ORGANIZED HEALTH CARE EDUCATION/TRAINING PROGRAM

## 2024-12-10 RX ORDER — NICOTINE 21 MG/24HR
1 PATCH, TRANSDERMAL 24 HOURS TRANSDERMAL DAILY
Qty: 30 PATCH | Refills: 3 | Status: SHIPPED | OUTPATIENT
Start: 2024-12-11

## 2024-12-10 RX ORDER — NALTREXONE HYDROCHLORIDE 50 MG/1
50 TABLET, FILM COATED ORAL DAILY
Qty: 30 TABLET | Refills: 0 | Status: SHIPPED | OUTPATIENT
Start: 2024-12-10

## 2024-12-10 RX ORDER — ENOXAPARIN SODIUM 100 MG/ML
1.5 INJECTION SUBCUTANEOUS DAILY
Status: DISCONTINUED | OUTPATIENT
Start: 2024-12-10 | End: 2024-12-10

## 2024-12-10 RX ORDER — MULTIVITAMIN WITH IRON
1 TABLET ORAL DAILY
Qty: 30 TABLET | Refills: 3 | Status: SHIPPED | OUTPATIENT
Start: 2024-12-11 | End: 2025-04-10

## 2024-12-10 RX ORDER — PANTOPRAZOLE SODIUM 40 MG/1
40 TABLET, DELAYED RELEASE ORAL 2 TIMES DAILY
Qty: 60 TABLET | Refills: 1 | Status: SHIPPED | OUTPATIENT
Start: 2024-12-10

## 2024-12-10 RX ORDER — FOLIC ACID 1 MG/1
1 TABLET ORAL DAILY
Qty: 30 TABLET | Refills: 3 | Status: SHIPPED | OUTPATIENT
Start: 2024-12-11

## 2024-12-10 RX ORDER — ENOXAPARIN SODIUM 100 MG/ML
1.5 INJECTION SUBCUTANEOUS ONCE
Status: COMPLETED | OUTPATIENT
Start: 2024-12-10 | End: 2024-12-10

## 2024-12-10 RX ORDER — ENOXAPARIN SODIUM 100 MG/ML
1.5 INJECTION SUBCUTANEOUS DAILY
Qty: 8 ML | Refills: 0 | Status: SHIPPED | OUTPATIENT
Start: 2024-12-11 | End: 2024-12-19

## 2024-12-10 RX ORDER — LANOLIN ALCOHOL/MO/W.PET/CERES
100 CREAM (GRAM) TOPICAL DAILY
Qty: 30 TABLET | Refills: 3 | Status: SHIPPED | OUTPATIENT
Start: 2024-12-10

## 2024-12-10 RX ADMIN — LEVETIRACETAM 500 MG: 100 INJECTION INTRAVENOUS at 07:39

## 2024-12-10 RX ADMIN — SODIUM CHLORIDE, PRESERVATIVE FREE 10 ML: 5 INJECTION INTRAVENOUS at 07:40

## 2024-12-10 RX ADMIN — HEPARIN SODIUM 1900 UNITS: 1000 INJECTION INTRAVENOUS; SUBCUTANEOUS at 04:47

## 2024-12-10 RX ADMIN — Medication 1 TABLET: at 07:39

## 2024-12-10 RX ADMIN — PANTOPRAZOLE SODIUM 40 MG: 40 INJECTION, POWDER, FOR SOLUTION INTRAVENOUS at 04:08

## 2024-12-10 RX ADMIN — FOLIC ACID 1 MG: 1 TABLET ORAL at 07:39

## 2024-12-10 RX ADMIN — ENOXAPARIN SODIUM 100 MG: 100 INJECTION SUBCUTANEOUS at 15:35

## 2024-12-10 RX ADMIN — SODIUM CHLORIDE, PRESERVATIVE FREE 10 ML: 5 INJECTION INTRAVENOUS at 07:39

## 2024-12-10 ASSESSMENT — PAIN SCALES - GENERAL: PAINLEVEL_OUTOF10: 0

## 2024-12-10 NOTE — DISCHARGE INSTR - MEDS
Warfarin Discharge Instructions:   Date Warfarin Dose   12/10/24   (Today) 15 mg (3 tablets)   12/11/24 15 mg (3 tablets)   12/12/24 10 mg (2 tablets)   12/13/24 INR      Provider dosing warfarin: [facility]  Next INR date: 12/13/24 @ 1100

## 2024-12-10 NOTE — PROGRESS NOTES
CLINICAL PHARMACY: DISCHARGE MED RECONCILIATION/REVIEW    [facility] Select Patient?: No  Total # of Interventions Recommended: 2 - New Order #: 1  - Updated Order #: 1   -   Total # Interventions Accepted: 2  Intervention Severity:   - Level 1 Intervention Present?: No   - Level 2 #: 0   - Level 3 #: 2   Time Spent (min): 15    Additional Documentation:      Lizette Gregory PharmD, BCPS   12/10/2024  3:42 PM

## 2024-12-10 NOTE — PROGRESS NOTES
Clinical Pharmacy Note                                               Warfarin Discharge Recommendations    Patient discharged from [facility] today on warfarin.    Warfarin indication: Hx of DVT & PE and hypercoagulable state  INR goal: 2.0-3.0  Previous home warfarin regimen: Coumadin 10 mg MWF, 15 mg TThSaSu   Interacting medications at discharge: enoxaparin 1.5 mg/kg (100 mg) daily   Coumadin 5 mg tabs    Hospital Warfarin Doses & INR Results  - warfarin held during patient stay    Recent INRs:  Recent Labs     12/09/24  0419   INR 1.06     Warfarin Discharge Instructions:   Date Warfarin Dose   12/10/24   (Today) 15 mg (3 tablets)   12/11/24 15 mg (3 tablets)   12/12/24 10 mg (2 tablets)   12/13/24 INR     Provider dosing warfarin: [facility]  Next INR date: 12/13/24 @ 1100    Nick LehmanD, BCPS   12/10/2024  3:14 PM

## 2024-12-10 NOTE — PLAN OF CARE
Problem: Discharge Planning  Goal: Discharge to home or other facility with appropriate resources  Flowsheets (Taken 12/10/2024 0007)  Discharge to home or other facility with appropriate resources:   Identify barriers to discharge with patient and caregiver   Refer to discharge planning if patient needs post-hospital services based on physician order or complex needs related to functional status, cognitive ability or social support system   Identify discharge learning needs (meds, wound care, etc)     Problem: Pain  Goal: Verbalizes/displays adequate comfort level or baseline comfort level  Flowsheets (Taken 12/10/2024 0007)  Verbalizes/displays adequate comfort level or baseline comfort level:   Encourage patient to monitor pain and request assistance   Administer analgesics based on type and severity of pain and evaluate response   Consider cultural and social influences on pain and pain management   Implement non-pharmacological measures as appropriate and evaluate response   Notify Licensed Independent Practitioner if interventions unsuccessful or patient reports new pain   Assess pain using appropriate pain scale     Problem: Neurosensory - Adult  Goal: Achieves stable or improved neurological status  Flowsheets (Taken 12/10/2024 0007)  Achieves stable or improved neurological status:   Assess for and report changes in neurological status   Monitor temperature, glucose, and sodium. Initiate appropriate interventions as ordered     Problem: Skin/Tissue Integrity - Adult  Goal: Skin integrity remains intact  Flowsheets (Taken 12/10/2024 0007)  Skin Integrity Remains Intact: Monitor for areas of redness and/or skin breakdown     Problem: Respiratory - Adult  Goal: Achieves optimal ventilation and oxygenation  Flowsheets (Taken 12/10/2024 0007)  Achieves optimal ventilation and oxygenation:   Assess for changes in respiratory status   Assess for changes in mentation and behavior   Oxygen supplementation based on

## 2024-12-10 NOTE — PROGRESS NOTES
Discharge teaching and instructions for diagnosis/procedure of hematemesis completed with patient using teachback method. AVS reviewed. Printed prescriptions given to patient. Patient voiced understanding regarding prescriptions, follow up appointments, and care of self at home. Discharged ambulatory to  home with support.

## 2024-12-10 NOTE — PROGRESS NOTES
upper GI bleed: Hemoglobin 11.5, stable. MCV 95.0.  Platelets 154, INR 1.48 from 15.57 OA.  Patient reported multiple dark brown emesis, day prior to admission.  GI bleed management as noted above. Continue monitoring H&H with daily CBCs.  Patient started on heparin GGT for bridging to Coumadin/Lovenox.    Resolved Conditions:   Supratherapeutic INR secondary to alcohol abuse, resolved: INR 15.57 on admission.  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  Patient continuously following with PCP for INR monitoring.  Reports from last visit the dose was decreased, patient reports INR has been therapeutic.  Patient reported family/friend deaths, causing him to increase his alcohol intake to 1 bottle per day. S/p, vitamin K 10mg 2x and FFPx1. INR Trend: 15.57 > 14.68 > 2.13 > 1.48 > 1.15  GI bleed management as noted above.   Currently started on heparin GGT, GI not concerned with any acute bleeding.  Will discuss with oncology for Lovenox versus Coumadin bridging.    Chronic Conditions (reviewed and stable unless otherwise stated)  Hypercoagulable state complicated with B/L DVTs and PE: s/p IVC filter placement due to supratherapeutic INR, increased risk of GI bleeding.  IVC filter failure with PE.  Chronically on Coumadin.  Currently continue with heparin GTT.  Seizure disorder: Continuing home Keppra.       LDA: []CVC / []PICC / []Midline / []Raygoza / []Drains / []Mediport / [x]None  Antibiotics: None  Steroids: None  Labs (still needed?): [x]Yes / []No  IVF (still needed?): []Yes / [x]No    Level of care: [x]Step Down / []Med-Surg  Bed Status: [x]Inpatient / []Observation  Telemetry: [x]Yes / []No  PT/OT: [x]Yes / []No    DVT Prophylaxis: [] Lovenox / [] Heparin / [x] SCDs / [] Already on Systemic Anticoagulation / [] None     Expected discharge date: TBD  Disposition: TBD     Code status: Full Code     ===================================================================    Chief Complaint:  Hematemesis  Subjective (past 24 hours):   Patient seen at bedside.  AOx3.  Patient denies any abdominal pain, chest pain, chest palpitation, hematemesis, urinary symptoms.  Patient reports improvement in color of stools.  Patient started on heparin GTT.  Monitor hemoglobin with daily CBC, if hemoglobin drops acutely will stop heparin.  Will follow-up with oncology to bridge to Coumadin versus Lovenox.     HPI / Hospital Course:  Patient is a 44-year-old male with past medical history of DVT/PE on Coumadin, current smoker, alcohol abuse seizure disorder presented to [facility] ED on 12/6/2024 for abdominal pain, nausea and vomiting.  Patient reported yesterday had multiple episode of vomiting, dark brown in nature denies any blood on the napkin/hand wiping the face.  Additionally patient reported having dark tarry stools past 2 days, associated with abdominal cramping.  Denies any lightheadedness, dizziness, fever, chills, cough, shortness of breath.  Patient also reported increased alcohol use over the past week, drank 1 bottle of alcohol every day.  ED labs significant for INR 15.57.  Patient was given 2 x 10 mg doses of vitamin K and 1xFFP.  Patient was admitted to the stepdown unit for further monitoring.  Patient was started on Protonix GGT and octreotide.  GI was consulted, recommended no acute endoscopy due to elevated INR.  Patient was monitored with the repeat H&H every 6hr. patient remained stable no other evidence of hematemesis.  Dark tarry stool improvement with color.  INR subtherapeutic.     Medications:    Infusion Medications    heparin (PORCINE) Infusion 12 Units/kg/hr (12/09/24 1415)    sodium chloride      sodium chloride Stopped (12/07/24 0047)    Scheduled Medications    pantoprazole  40 mg IntraVENous BID    carbamide peroxide  5 drop Both Ears BID    multivitamin  1 tablet Oral Daily    folic acid  1 mg Oral Daily    sodium chloride flush  5-40 mL IntraVENous 2 times per day    sodium chloride

## 2024-12-11 ENCOUNTER — TELEPHONE (OUTPATIENT)
Dept: FAMILY MEDICINE CLINIC | Age: 44
End: 2024-12-11

## 2024-12-11 NOTE — TELEPHONE ENCOUNTER
Care Transitions Initial Follow Up Call    Outreach made within 2 business days of discharge: Yes    Patient: Dashawn Rivera Patient : 1980   MRN: [id number]  Reason for Admission: rectal bleeding, abnormal labs   Discharge Date: 12/10/24       Spoke with: patient    Discharge department/facility: [facility] Interactive Patient Contact:  Was patient able to fill all prescriptions: Yes  Was patient instructed to bring all medications to the follow-up visit: Yes  Is patient taking all medications as directed in the discharge summary? Yes  Does patient understand their discharge instructions: Yes  Does patient have questions or concerns that need addressed prior to 7-14 day follow up office visit: no    Additional needs identified to be addressed with provider  No needs identified             Scheduled appointment with PCP within 7-14 days    Follow Up  Future Appointments   Date Time Provider Department Center   2024 11:30 AM Lyn Damico APRN - CNP [facility] Medicine [facility]   2024 11:00 AM Benita Willis RPH [facility] MED MGMT [facility] - [place]   2025  3:00 PM Tonie Stanford APRN - CNP  Oncology P - [place]       Taylor Kocher, CMA ([facility])

## 2024-12-11 NOTE — DISCHARGE SUMMARY
Resident Discharge Summary (Hospitalist)      Patient: Dashawn Rivera 44 y.o. male  : 1980  MRN: [id number]   Account: [id number]   Patient's PCP: Lyn Damico APRN - CNP    Admit Date: 2024   Discharge Date: 12/10/2024    Admitting Physician: No admitting provider for patient encounter.  Discharge Physician: Aaron Cleveland,        Discharge Diagnoses:  Upper GI bleed, resolved  Acute on chronic alcohol abuse secondary to stressors   Acute on chronic borderline macrocytic anemia secondary to chronic alcohol use versus acute upper GI bleed   Supratherapeutic INR secondary to alcohol abuse, resolved       Hospital Course:   Dashawn Rivera is a 44 y.o. male with PMHx DVT/PE on Coumadin, current smoker, alcohol abuse seizure disorder  admitted to [facility] on 2024 for abdominal pain, nausea and vomiting.  Patient reported yesterday had multiple episode of vomiting, dark brown in nature denies any blood on the napkin/hand wiping the face.  Additionally patient reported having dark tarry stools past 2 days, associated with abdominal cramping.  Denies any lightheadedness, dizziness, fever, chills, cough, shortness of breath.  Patient also reported increased alcohol use over the past week, drank 1 bottle of alcohol every day.  ED labs significant for INR 15.57.  Patient was given 2 x 10 mg doses of vitamin K and 1xFFP.  Patient was admitted to the stepdown unit for further monitoring.  Patient was started on Protonix GGT and octreotide.  GI was consulted, recommended no acute endoscopy due to elevated INR.  Patient was monitored with the repeat H&H every 6hr. patient remained stable no other evidence of hematemesis.  Dark tarry stool improvement with color.  INR subtherapeutic.  Patient was transitioned from heparin to Lovenox, with INR goal between 2 and 3.  Patient to follow-up with Coumadin clinic to monitor his INR.  Patient was given 7-day supply of 100 mg of Lovenox/daily.

## 2024-12-12 ENCOUNTER — OFFICE VISIT (OUTPATIENT)
Dept: FAMILY MEDICINE CLINIC | Age: 44
End: 2024-12-12

## 2024-12-12 VITALS
SYSTOLIC BLOOD PRESSURE: 96 MMHG | DIASTOLIC BLOOD PRESSURE: 60 MMHG | TEMPERATURE: 98.1 F | WEIGHT: 147 LBS | BODY MASS INDEX: 21.09 KG/M2 | HEART RATE: 100 BPM | RESPIRATION RATE: 16 BRPM

## 2024-12-12 DIAGNOSIS — F10.10 ALCOHOL ABUSE: ICD-10-CM

## 2024-12-12 DIAGNOSIS — Z09 HOSPITAL DISCHARGE FOLLOW-UP: Primary | ICD-10-CM

## 2024-12-12 DIAGNOSIS — Z86.718 HISTORY OF DEEP VEIN THROMBOSIS (DVT) OF LOWER EXTREMITY: ICD-10-CM

## 2024-12-12 DIAGNOSIS — G40.909 SEIZURE DISORDER (HCC): ICD-10-CM

## 2024-12-12 DIAGNOSIS — Z72.0 TOBACCO ABUSE: ICD-10-CM

## 2024-12-12 DIAGNOSIS — R16.0 HEPATOMEGALY: ICD-10-CM

## 2024-12-12 DIAGNOSIS — Z79.01 ANTICOAGULATED ON COUMADIN: ICD-10-CM

## 2024-12-12 ASSESSMENT — ENCOUNTER SYMPTOMS
ABDOMINAL PAIN: 0
SHORTNESS OF BREATH: 0
BLOOD IN STOOL: 0
EYES NEGATIVE: 1
CHEST TIGHTNESS: 0

## 2024-12-12 NOTE — PROGRESS NOTES
note reviewed.   Constitutional:       Appearance: He is well-developed.   HENT:      Head: Normocephalic and atraumatic.      Right Ear: External ear normal.      Left Ear: External ear normal.      Nose: Nose normal.   Eyes:      Conjunctiva/sclera: Conjunctivae normal.      Pupils: Pupils are equal, round, and reactive to light.   Cardiovascular:      Rate and Rhythm: Normal rate and regular rhythm.   Pulmonary:      Effort: Pulmonary effort is normal.      Breath sounds: Normal breath sounds.   Abdominal:      General: Bowel sounds are normal.      Palpations: Abdomen is soft.   Musculoskeletal:         General: Normal range of motion.      Cervical back: Normal range of motion and neck supple.   Skin:     General: Skin is warm and dry.   Neurological:      Mental Status: He is alert and oriented to person, place, and time.      Deep Tendon Reflexes: Reflexes are normal and symmetric.   Psychiatric:         Behavior: Behavior normal.         Thought Content: Thought content normal.         Judgment: Judgment normal.         Future Appointments   Date Time Provider Department Center   12/13/2024 11:00 AM Benita Willis RPH [facility] MED MGMT [facility]   1/28/2025  3:00 PM Tonie Stanford APRN - CNP N Oncology [facility]   3/12/2025  1:00 PM Lyn Damico APRN - CNP [facility] Medicine [facility] ECC DEP           ASSESSMENT/PLAN         Dashawn was seen today for follow-up from hospital.    Diagnoses and all orders for this visit:    Hospital discharge follow-up    Alcohol abuse  -     Elzbieta Dickinson CNP, IM & Med [facility], [place]    Seizure disorder (HCC)  -     Milka Thompson MD, Neurology, [place]    History of deep vein thrombosis (DVT) of lower extremity    Tobacco abuse    Anticoagulated on Coumadin    Hepatomegaly        Referral to neurology for history of seizures and keppra use  Referral to internal management for alcohol abuse  Nutrition encouraged  Bleeding and blood thinners discussed  Follow up in

## 2024-12-12 NOTE — PROGRESS NOTES
Physician Progress Note      PATIENT:               CHADD VIVAR  CSN #:                  [id number]  :                       1980  ADMIT DATE:       2024 12:13 PM  DISCH DATE:        12/10/2024 4:40 PM  RESPONDING  PROVIDER #:        Franck Morales DO          QUERY TEXT:    Pt admitted with Melena , Hematemesis, Hematuria . Pt noted to have INR 15.57   . If possible, please document in progress notes and discharge summary the   relationship, if any, between Melena , Hematemesis, Hematuria  and Circulating   Anticoagulant - Coumadin .    The medical record reflects the following:  Risk Factors:   Acute on chronic alcohol abuse, history of DVT/PE  Clinical Indicators: per H&P Hematemesis, melena, hematuria --   INR elevated   at 15.57.  Receiving vitamin K and plasma. No known history of cirrhosis, but   has chronic alcohol abuse  Treatment: admission , imaging , labs, Vitamin K, Plasma infusion , GI consult   with  Protonix infusion, octreotide infusion. Addiction services for alcohol   abuse disorder. Medication review and management - Coumadin    Thank You,  Juan Carlos AYOUB, RN, CRCR  Clinical   P: [phone]  Options provided:  -- Melena , Hematemesis, Hematuria due to Coumadin and INR 15.57  -- Melena , Hematemesis, Hematuria unrelated to Coumadin and INR 15.57  -- Other - I will add my own diagnosis  -- Disagree - Not applicable / Not valid  -- Disagree - Clinically unable to determine / Unknown  -- Refer to Clinical Documentation Reviewer    PROVIDER RESPONSE TEXT:    Melena , Hematemesis, Hematuria due to Coumadin and INR 15.57    Query created by: Juan Carlos Mcfarland on 2024 1:30 PM      Electronically signed by:  Franck Morales DO 2024 8:22 AM

## 2024-12-13 ENCOUNTER — ANTI-COAG VISIT (OUTPATIENT)
Age: 44
End: 2024-12-13
Payer: MEDICAID

## 2024-12-13 ENCOUNTER — HOSPITAL ENCOUNTER (OUTPATIENT)
Age: 44
Discharge: HOME | End: 2024-12-13
Payer: MEDICAID

## 2024-12-13 DIAGNOSIS — K92.1 HEMATOCHEZIA: ICD-10-CM

## 2024-12-13 DIAGNOSIS — I82.4Y3 DEEP VEIN THROMBOSIS (DVT) OF PROXIMAL VEIN OF BOTH LOWER EXTREMITIES, UNSPECIFIED CHRONICITY (HCC): ICD-10-CM

## 2024-12-13 DIAGNOSIS — Z79.01 ANTICOAGULATED ON COUMADIN: ICD-10-CM

## 2024-12-13 DIAGNOSIS — Z51.81 ENCOUNTER FOR THERAPEUTIC DRUG MONITORING: ICD-10-CM

## 2024-12-13 DIAGNOSIS — I26.99 ACUTE PULMONARY EMBOLISM WITHOUT ACUTE COR PULMONALE, UNSPECIFIED PULMONARY EMBOLISM TYPE (HCC): Primary | ICD-10-CM

## 2024-12-13 LAB
DEPRECATED RDW RBC AUTO: 54.4 FL (ref 35–45)
ERYTHROCYTE [DISTWIDTH] IN BLOOD BY AUTOMATED COUNT: 15.9 % (ref 11.5–14.5)
HCT VFR BLD AUTO: 35.1 % (ref 42–52)
HGB BLD-MCNC: 11.7 GM/DL (ref 14–18)
MCH RBC QN AUTO: 31.4 PG (ref 26–33)
MCHC RBC AUTO-ENTMCNC: 33.3 GM/DL (ref 32.2–35.5)
MCV RBC AUTO: 94.1 FL (ref 80–94)
PLATELET # BLD AUTO: 297 THOU/MM3 (ref 130–400)
PMV BLD AUTO: 9 FL (ref 9.4–12.4)
POC INR: 1 (ref 0.8–1.2)
RBC # BLD AUTO: 3.73 MILL/MM3 (ref 4.7–6.1)
WBC # BLD AUTO: 7.8 THOU/MM3 (ref 4.8–10.8)

## 2024-12-13 PROCEDURE — 99212 OFFICE O/P EST SF 10 MIN: CPT | Performed by: PHARMACIST

## 2024-12-13 PROCEDURE — 85610 PROTHROMBIN TIME: CPT | Performed by: PHARMACIST

## 2024-12-13 PROCEDURE — 36415 COLL VENOUS BLD VENIPUNCTURE: CPT

## 2024-12-13 PROCEDURE — 85027 COMPLETE CBC AUTOMATED: CPT

## 2024-12-13 RX ORDER — WARFARIN SODIUM 5 MG/1
TABLET ORAL
Qty: 90 TABLET | Refills: 1 | Status: SHIPPED | OUTPATIENT
Start: 2024-12-13

## 2024-12-13 NOTE — PROGRESS NOTES
Medication Management Clinic  [facility]  Anticoagulation Clinic  [phone] (phone)  [phone] (fax)    Mr. Dashawn Rivera is a 44 y.o.  male with history of DVT, PE who presents today for anticoagulation monitoring and adjustment.    Patient verifies current dosing regimen and tablet strength.  Coumadin held during hospitalization 12/6-12/9 due to GI bleed. INR was 15.57 on admission, likely due to increased alcohol intake  Patient did not take Coumadin 12/10-12/12 as instructed on discharge AVS, he thought he was not supposed to take it.    Patient denies s/s bleeding/bruising/swelling/SOB  No blood in urine or stool.  No dietary changes.  No changes in medication/OTC agents/Herbals.  No change in tobacco use. Pt confirms that he plans to continue abstaining from alcohol.  Is currently taking Naltrexone as prescribed on discharge.  Is considering Vivitrol.  No change in activity level.  Patient denies falls.  No vomiting/diarrhea or acute illness.   No Procedures scheduled in the future at this time.  Currently bridging with Lovenox, last dose given yesterday at 4pm.     Assessment:   INR goal 2-3  Lab Results   Component Value Date    INR 1.00 12/13/2024    INR 1.06 12/09/2024    INR 1.15 (H) 12/08/2024     INR subtherapeutic   Recent Labs     12/13/24  1104   INR 1.00   Patient interview completed and discussed with pharmacist by JORDAN GoldD Candidate 2025.    Due to recent GI bleed with INR on admission of 15.57, will resume Coumadin with conservative initial dosing for safety.     Plan:  Continue Lovenox 100mg SQ 24hr, patient states he has approx 5 syringes left at home.  Coumadin 15mg today, 10mg Sat and Sun.  Recheck INR in 3 day(s) for close monitoring given recent GI bleed.  Patient reminded to call the Anticoagulation Clinic with any signs or symptoms of bleeding or with any medication changes.  Patient given instructions utilizing the teach back method.        After visit

## 2024-12-16 ENCOUNTER — ANTI-COAG VISIT (OUTPATIENT)
Age: 44
End: 2024-12-16
Payer: MEDICAID

## 2024-12-16 DIAGNOSIS — Z79.01 ANTICOAGULATED ON COUMADIN: ICD-10-CM

## 2024-12-16 DIAGNOSIS — I26.99 ACUTE PULMONARY EMBOLISM WITHOUT ACUTE COR PULMONALE, UNSPECIFIED PULMONARY EMBOLISM TYPE (HCC): Primary | ICD-10-CM

## 2024-12-16 DIAGNOSIS — I82.4Y3 DEEP VEIN THROMBOSIS (DVT) OF PROXIMAL VEIN OF BOTH LOWER EXTREMITIES, UNSPECIFIED CHRONICITY (HCC): ICD-10-CM

## 2024-12-16 DIAGNOSIS — Z51.81 ENCOUNTER FOR THERAPEUTIC DRUG MONITORING: ICD-10-CM

## 2024-12-16 NOTE — PROGRESS NOTES
Medication Management Clinic  [facility]  Anticoagulation Clinic  [phone] (phone)  [phone] (fax)    Mr. Dashawn Rivera is a 44 y.o.  male with history of DVT, PE who presents today for anticoagulation monitoring and adjustment.    Patient verifies current dosing regimen and tablet strength.  Did not fill warfarin on Friday, so he hasn't had any doses.  Patient reports pharmacy would not let him pick it up.  Patient denies s/s bleeding/bruising/swelling/SOB  No blood in urine or stool.  No dietary changes.  No changes in medication/OTC agents/Herbals.  No change in alcohol use or tobacco use.  No change in activity level.  Patient denies falls.  No vomiting/diarrhea or acute illness.   No Procedures scheduled in the future at this time.    Assessment:   INR not checked because patient did not take any warfarin.     Patient interview completed and discussed with pharmacist by JORDAN Gold PharmD Candidate 2025.    Plan:  Called [facility] and they reported that warfarin was ready for .  Unsure what the issue was with filling the prescription on 12/13.        Coumadin 15 mg x 1, Coumadin 10 mg x 2.  Continue Lovenox.  Patient reports that he has 10 syringes remaining. Recheck INR in 3 days.  Patient reminded to call the Anticoagulation Clinic with any signs or symptoms of bleeding or with any medication changes.  Patient given instructions utilizing the teach back method.    After visit summary printed and reviewed with patient.      Discharged ambulatory in no apparent distress.     For Pharmacy Admin Tracking Only    Intervention Detail: Dose Adjustment: 1, reason: Therapy Optimization  Total # of Interventions Recommended: 1  Total # of Interventions Accepted: 1  Time Spent (min): 10    Nick MoranD, [facility]  12/16/2024  9:11 AM

## 2024-12-19 ENCOUNTER — ANTI-COAG VISIT (OUTPATIENT)
Age: 44
End: 2024-12-19
Payer: MEDICAID

## 2024-12-19 DIAGNOSIS — I82.4Y3 DEEP VEIN THROMBOSIS (DVT) OF PROXIMAL VEIN OF BOTH LOWER EXTREMITIES, UNSPECIFIED CHRONICITY (HCC): ICD-10-CM

## 2024-12-19 DIAGNOSIS — Z79.01 ANTICOAGULATED ON COUMADIN: ICD-10-CM

## 2024-12-19 DIAGNOSIS — I26.99 ACUTE PULMONARY EMBOLISM WITHOUT ACUTE COR PULMONALE, UNSPECIFIED PULMONARY EMBOLISM TYPE (HCC): Primary | ICD-10-CM

## 2024-12-19 DIAGNOSIS — Z51.81 ENCOUNTER FOR THERAPEUTIC DRUG MONITORING: ICD-10-CM

## 2024-12-19 LAB — POC INR: 1.9 (ref 0.8–1.2)

## 2024-12-19 PROCEDURE — 85610 PROTHROMBIN TIME: CPT

## 2024-12-19 PROCEDURE — 99211 OFF/OP EST MAY X REQ PHY/QHP: CPT

## 2024-12-19 NOTE — PROGRESS NOTES
Medication Management Clinic  [facility]  Anticoagulation Clinic  [phone] (phone)  [phone] (fax)    Mr. Dashawn Rivera is a 44 y.o.  male with history of DVT, PE who presents today for anticoagulation monitoring and adjustment.    Upper GI bleed 12/6/24  Patient verifies current dosing regimen and tablet strength.  No missed or extra doses.  Patient denies s/s bleeding/bruising/swelling/SOB  No blood in urine or stool.  No dietary changes.  No changes in medication/OTC agents/Herbals.  No change in alcohol use or tobacco use.  No change in activity level.  Patient denies falls.  States vomited several times yesterday. Has not vomited today, but still feels nauseous. Denies any blood in the vomit- states just the liquids he was drinking were coming back up.  Discussed if would start vomiting again and see any blood to be evaluated in the ER.  States no bowel movements yesterday or today.  No Procedures scheduled in the future at this time.    Assessment:   INR goal 2-3  Lab Results   Component Value Date    INR 1.90 (H) 12/19/2024    INR 1.00 12/13/2024    INR 1.06 12/09/2024     INR subtherapeutic   Recent Labs     12/19/24  1012   INR 1.90*      Patient interview completed and discussed with pharmacist by JORDAN Gold PharmD Candidate 2025.    Plan:  STOP Lovenox.  Continue Coumadin 10 mg MoWeFr and 15 mg SuTuThSa.  Recheck INR in 4 days.  Patient reminded to call the Anticoagulation Clinic with any signs or symptoms of bleeding or with any medication changes.  Patient given instructions utilizing the teach back method.    After visit summary printed and reviewed with patient.      Discharged ambulatory in no apparent distress.    For Pharmacy Admin Tracking Only  Time Spent (min): 15

## 2024-12-23 ENCOUNTER — ANTI-COAG VISIT (OUTPATIENT)
Age: 44
End: 2024-12-23
Payer: MEDICAID

## 2024-12-23 DIAGNOSIS — Z79.01 ANTICOAGULATED ON COUMADIN: ICD-10-CM

## 2024-12-23 DIAGNOSIS — I26.99 ACUTE PULMONARY EMBOLISM WITHOUT ACUTE COR PULMONALE, UNSPECIFIED PULMONARY EMBOLISM TYPE (HCC): Primary | ICD-10-CM

## 2024-12-23 DIAGNOSIS — I82.4Y3 DEEP VEIN THROMBOSIS (DVT) OF PROXIMAL VEIN OF BOTH LOWER EXTREMITIES, UNSPECIFIED CHRONICITY (HCC): ICD-10-CM

## 2024-12-23 DIAGNOSIS — Z51.81 ENCOUNTER FOR THERAPEUTIC DRUG MONITORING: ICD-10-CM

## 2024-12-23 LAB — POC INR: 1.7 (ref 0.8–1.2)

## 2024-12-23 PROCEDURE — 85610 PROTHROMBIN TIME: CPT

## 2024-12-23 PROCEDURE — 99212 OFFICE O/P EST SF 10 MIN: CPT

## 2024-12-23 NOTE — PROGRESS NOTES
Medication Management Clinic  [facility]  Anticoagulation Clinic  [phone] (phone)  [phone] (fax)    Mr. Dashawn Rivera is a 44 y.o.  male with history of DVT, PE who presents today for anticoagulation monitoring and adjustment.    Patient verifies current dosing regimen and tablet strength.  No missed or extra doses.  Patient denies s/s bleeding/bruising/swelling/SOB  No blood in urine or stool.  No dietary changes.  No changes in medication/OTC agents/Herbals.  No change in alcohol use or tobacco use.- less alcohol then use to  No change in activity level.  Patient denies falls.  Vomiting/diarrhea or acute illness. - sick last week; vomited last Wednesday  No Procedures scheduled in the future at this time.    Assessment:   Lab Results   Component Value Date    INR 1.70 (H) 12/23/2024    INR 1.90 (H) 12/19/2024    INR 1.00 12/13/2024     INR subtherapeutic   Recent Labs     12/23/24  1445   INR 1.70*       Plan:  15 mg today then increase coumadin to 10 mg MF and 15 mg  TWThSS for a 5.6% increase (15 mg every day until next INR check) .    Recommended to restart Lovenox injections at this time (100 mg daily- 1.5 mg/kg daily). Patient states that he has plenty left at home.   Recheck INR in 3 days.  Patient reminded to call the Anticoagulation Clinic with any signs or symptoms of bleeding or with any medication changes.  Patient given instructions utilizing the teach back method.       After visit summary printed and reviewed with patient.      Discharged ambulatory in no apparent distress.    For Pharmacy Admin Tracking Only    Intervention Detail: Dose Adjustment: 2, reason: Therapy Optimization  Total # of Interventions Recommended: 2  Total # of Interventions Accepted: 2  Time Spent (min): 20  Elizabeth Segura, PharmD 12/23/2024 3:00 PM

## 2024-12-27 ENCOUNTER — TELEPHONE (OUTPATIENT)
Dept: FAMILY MEDICINE CLINIC | Age: 44
End: 2024-12-27

## 2024-12-27 DIAGNOSIS — G40.909 SEIZURE DISORDER (HCC): Primary | ICD-10-CM

## 2024-12-27 NOTE — TELEPHONE ENCOUNTER
Most recent OV note, insurance card, imaging and labs along with referral form placed on TS desk for signature. Will fax once signed. Pt notified they will contact him to schedule an appt.

## 2024-12-27 NOTE — TELEPHONE ENCOUNTER
Spoke with pt and notified him of local neurology referral pt would prefer to go to [facility] for neurology instead since it is closer. Ok for new referral?

## 2024-12-27 NOTE — TELEPHONE ENCOUNTER
Copied from Neurology referral:    Lyn Damico, APRN - CNP  [facility] [place] [facility]  Clinical Staff  Local neurology declines referral. Please let patient know they recommend [facility] or [facility]. Please see if he is agreeable. TS

## 2024-12-30 ENCOUNTER — ANTI-COAG VISIT (OUTPATIENT)
Age: 44
End: 2024-12-30
Payer: MEDICAID

## 2024-12-30 DIAGNOSIS — Z79.01 ANTICOAGULATED ON COUMADIN: ICD-10-CM

## 2024-12-30 DIAGNOSIS — I82.4Y3 DEEP VEIN THROMBOSIS (DVT) OF PROXIMAL VEIN OF BOTH LOWER EXTREMITIES, UNSPECIFIED CHRONICITY (HCC): ICD-10-CM

## 2024-12-30 DIAGNOSIS — Z51.81 ENCOUNTER FOR THERAPEUTIC DRUG MONITORING: ICD-10-CM

## 2024-12-30 DIAGNOSIS — I26.99 ACUTE PULMONARY EMBOLISM WITHOUT ACUTE COR PULMONALE, UNSPECIFIED PULMONARY EMBOLISM TYPE (HCC): Primary | ICD-10-CM

## 2024-12-30 LAB — POC INR: 3.8 (ref 0.8–1.2)

## 2024-12-30 PROCEDURE — 85610 PROTHROMBIN TIME: CPT | Performed by: PHARMACIST

## 2024-12-30 PROCEDURE — 99211 OFF/OP EST MAY X REQ PHY/QHP: CPT | Performed by: PHARMACIST

## 2024-12-30 NOTE — PROGRESS NOTES
Medication Management Clinic  [facility]  Anticoagulation Clinic  [phone] (phone)  [phone] (fax)    Mr. Dashawn Rivera is a 44 y.o.  male with history of DVT, PE who presents today for anticoagulation monitoring and adjustment.    Patient verifies current dosing regimen and tablet strength.  No missed or extra doses.  Patient denies s/s bleeding/bruising/swelling/SOB  No blood in urine or stool.  No dietary changes.  No changes in medication/OTC agents/Herbals.  Had drinks over the weekend.  No change in activity level.  Patient denies falls.  No vomiting/diarrhea or acute illness.   No Procedures scheduled in the future at this time.    Assessment:   INR goal 2-3  Lab Results   Component Value Date    INR 3.80 (H) 12/30/2024    INR 1.70 (H) 12/23/2024    INR 1.90 (H) 12/19/2024     INR supratherapeutic   Recent Labs     12/30/24  1429   INR 3.80*   Patient interview completed and discussed with pharmacist by JORDAN Gold PharmD Candidate 2025.    Plan:  Hold Coumadin today, then continue Coumadin 10 mg MF, 15 mg all other days.  Recheck INR in 1 week(s).  Patient reminded to call the Anticoagulation Clinic with any signs or symptoms of bleeding or with any medication changes.  Patient given instructions utilizing the teach back method.    After visit summary printed and reviewed with patient.      Discharged ambulatory in no apparent distress.    Fabienne Mas PharmD 12/30/2024 3:17 PM    For Pharmacy Admin Tracking Only    Intervention Detail: Dose Adjustment: 1, reason: Therapy Optimization  Total # of Interventions Recommended: 1  Total # of Interventions Accepted: 1  Time Spent (min): 20

## 2025-01-06 ENCOUNTER — ANTI-COAG VISIT (OUTPATIENT)
Age: 45
End: 2025-01-06
Payer: MEDICAID

## 2025-01-06 DIAGNOSIS — Z51.81 ENCOUNTER FOR THERAPEUTIC DRUG MONITORING: ICD-10-CM

## 2025-01-06 DIAGNOSIS — I26.99 ACUTE PULMONARY EMBOLISM WITHOUT ACUTE COR PULMONALE, UNSPECIFIED PULMONARY EMBOLISM TYPE (HCC): Primary | ICD-10-CM

## 2025-01-06 DIAGNOSIS — Z79.01 ANTICOAGULATED ON COUMADIN: ICD-10-CM

## 2025-01-06 DIAGNOSIS — I82.4Y3 DEEP VEIN THROMBOSIS (DVT) OF PROXIMAL VEIN OF BOTH LOWER EXTREMITIES, UNSPECIFIED CHRONICITY (HCC): ICD-10-CM

## 2025-01-06 LAB — POC INR: 4.4 (ref 0.8–1.2)

## 2025-01-06 PROCEDURE — 85610 PROTHROMBIN TIME: CPT

## 2025-01-06 PROCEDURE — 99212 OFFICE O/P EST SF 10 MIN: CPT

## 2025-01-06 NOTE — PROGRESS NOTES
Medication Management Clinic  [facility]  Anticoagulation Clinic  [phone] (phone)  [phone] (fax)    Mr. Dashawn Rivera is a 44 y.o.  male with history of DVT, PE who presents today for anticoagulation monitoring and adjustment.    Patient verifies current dosing regimen and tablet strength.  No missed or extra doses.  Patient denies s/s bleeding/bruising/swelling/SOB  No blood in urine or stool.  No dietary changes.  No changes in medication/OTC agents/Herbals.  No change in tobacco use. He smoked marijuana on 1/1/25 and mentioned this is not a regular occurrence and had less alcohol since the last visit.   Has been more active since the last visit.   Patient denies falls.  No vomiting/diarrhea or acute illness.   No Procedures scheduled in the future at this time.    Assessment:   Lab Results   Component Value Date    INR 4.40 (H) 01/06/2025    INR 3.80 (H) 12/30/2024    INR 1.70 (H) 12/23/2024     INR supratherapeutic   Recent Labs     01/06/25  0822   INR 4.40*       Plan:  HOLD today and take 7.5 mg tomorrow then continue Coumadin 15 mg on Wednesday and Thursday, patient to return in 4 days. The patient was reminded to call the Anticoagulation Clinic with signs or symptoms of bleeding or with any medication changes.  Patient given instructions utilizing the teach back method.      After visit summary printed and reviewed with patient.      Discharged ambulatory in no apparent distress.      For Pharmacy Admin Tracking Only    Intervention Detail: Dose Adjustment: 1, reason: Therapy De-escalation  Total # of Interventions Recommended: 1  Total # of Interventions Accepted: 1  Time Spent (min): 20    Nick SalcidoD  PGY2 Resident   1/6/2025 8:16 AM

## 2025-01-10 ENCOUNTER — ANTI-COAG VISIT (OUTPATIENT)
Age: 45
End: 2025-01-10
Payer: MEDICAID

## 2025-01-10 DIAGNOSIS — Z79.01 ANTICOAGULATED ON COUMADIN: ICD-10-CM

## 2025-01-10 DIAGNOSIS — I26.99 ACUTE PULMONARY EMBOLISM WITHOUT ACUTE COR PULMONALE, UNSPECIFIED PULMONARY EMBOLISM TYPE (HCC): Primary | ICD-10-CM

## 2025-01-10 DIAGNOSIS — Z51.81 ENCOUNTER FOR THERAPEUTIC DRUG MONITORING: ICD-10-CM

## 2025-01-10 DIAGNOSIS — I82.4Y3 DEEP VEIN THROMBOSIS (DVT) OF PROXIMAL VEIN OF BOTH LOWER EXTREMITIES, UNSPECIFIED CHRONICITY (HCC): ICD-10-CM

## 2025-01-10 LAB — POC INR: 2.1 (ref 0.8–1.2)

## 2025-01-10 PROCEDURE — 85610 PROTHROMBIN TIME: CPT | Performed by: PHARMACIST

## 2025-01-10 PROCEDURE — 99211 OFF/OP EST MAY X REQ PHY/QHP: CPT | Performed by: PHARMACIST

## 2025-01-10 NOTE — PROGRESS NOTES
Medication Management Clinic  [facility]  Anticoagulation Clinic  [phone] (phone)  [phone] (fax)    Mr. Dashawn Rivera is a 44 y.o.  male with history of DVT, PE, also has history of chronic alcohol use and GI bleed, who presents today for anticoagulation monitoring and adjustment.    Patient verifies current dosing regimen and tablet strength.  No missed or extra doses.  Patient denies s/s bleeding/bruising/swelling/SOB  No blood in urine or stool.  No dietary changes.  No changes in medication/OTC agents/Herbals.  No change in alcohol use or tobacco use. States weekend drinker - 2-3 shots, smokes 0.5PPD, also smokes marijuana.  Educated patient that alcohol is not recommended with warfarin and can raise INR and increase bleeding risk, marijuana can also interact with warfarin to raise INR and increase bleeding risk.  Pt voiced understanding.  No change in activity level.  Patient denies falls.  No vomiting/diarrhea or acute illness.   No Procedures scheduled in the future at this time.    Assessment:   Lab Results   Component Value Date    INR 2.10 (H) 01/10/2025    INR 4.40 (H) 01/06/2025    INR 3.80 (H) 12/30/2024     INR therapeutic   Recent Labs     01/10/25  1031   INR 2.10*         Plan:  Decrease Coumadin 15mg W and 10mg all other days.  Recheck INR in 6 day(s).  Patient reminded to call the Anticoagulation Clinic with signs or symptoms of bleeding or with any medication changes.  Patient given instructions utilizing the teach back method.        After visit summary printed and reviewed with patient.      Discharged ambulatory in no apparent distress.    Benita Willis, PharmD, BCPS, CACP, CTTS     For Pharmacy Admin Tracking Only    Intervention Detail: Dose Adjustment: 1, reason: Therapy De-escalation  Total # of Interventions Recommended: 1  Total # of Interventions Accepted: 1  Time Spent (min): 20

## 2025-01-16 ENCOUNTER — ANTI-COAG VISIT (OUTPATIENT)
Age: 45
End: 2025-01-16
Payer: MEDICAID

## 2025-01-16 DIAGNOSIS — Z79.01 ANTICOAGULATED ON COUMADIN: ICD-10-CM

## 2025-01-16 DIAGNOSIS — I26.99 ACUTE PULMONARY EMBOLISM WITHOUT ACUTE COR PULMONALE, UNSPECIFIED PULMONARY EMBOLISM TYPE (HCC): Primary | ICD-10-CM

## 2025-01-16 DIAGNOSIS — Z51.81 ENCOUNTER FOR THERAPEUTIC DRUG MONITORING: ICD-10-CM

## 2025-01-16 DIAGNOSIS — I82.4Y3 DEEP VEIN THROMBOSIS (DVT) OF PROXIMAL VEIN OF BOTH LOWER EXTREMITIES, UNSPECIFIED CHRONICITY (HCC): ICD-10-CM

## 2025-01-16 LAB — POC INR: 3.3 (ref 0.8–1.2)

## 2025-01-16 PROCEDURE — 85610 PROTHROMBIN TIME: CPT | Performed by: PHARMACIST

## 2025-01-16 PROCEDURE — 99212 OFFICE O/P EST SF 10 MIN: CPT | Performed by: PHARMACIST

## 2025-01-16 NOTE — PROGRESS NOTES
Medication Management Clinic  [facility]  Anticoagulation Clinic  [phone] (phone)  [phone] (fax)    Mr. Dashawn Rivera is a 44 y.o.  male with history of DVT, PE who presents today for anticoagulation monitoring and adjustment.    Patient verifies current tablet strength. Patient states he follows the calendar provided at each visit. However, he does state he accidentally flip flopped doses and took 15 mg on 1/14/25 (instructed to take 10 mg) and took 10 mg on 1/15/25 (instructed to take 15 mg).   No missed or extra doses.   Patient denies s/s bleeding/bruising/swelling/SOB  No blood in urine or stool.  No dietary changes.  No changes in medication/OTC agents/Herbals.  No change in alcohol use or tobacco use.  Patient has been walking more.   Patient denies falls.  No vomiting/diarrhea or acute illness.   No Procedures scheduled in the future at this time.    Assessment:   Lab Results   Component Value Date    INR 3.30 (H) 01/16/2025    INR 2.10 (H) 01/10/2025    INR 4.40 (H) 01/06/2025     INR supratherapeutic   Recent Labs     01/16/25  1049   INR 3.30*     Patient has been supratherapeutic at three of the past four INR checks despite a recent dose adjustment.     Plan:  Coumadin 5 mg x 1 dose today 1/16/24 then decrease Coumadin from 15 mg W and 10 mg [id number] to Coumadin 10 mg daily (6.7% decrease).  Recheck INR in 1 week(s).  Patient reminded to call the Anticoagulation Clinic with signs or symptoms of bleeding or with any medication changes.  Patient given instructions utilizing the teach back method.    After visit summary printed and reviewed with patient.      Discharged ambulatory in no apparent distress.    For Pharmacy Admin Tracking Only    Intervention Detail: Dose Adjustment: 1, reason: Therapy Optimization  Total # of Interventions Recommended: 1  Total # of Interventions Accepted: 1  Time Spent (min): 20    Miguel Merritt, NickD, BCPS  1/16/2025  11:13 AM

## 2025-01-22 ENCOUNTER — ANTI-COAG VISIT (OUTPATIENT)
Age: 45
End: 2025-01-22
Payer: MEDICAID

## 2025-01-22 DIAGNOSIS — Z79.01 ANTICOAGULATED ON COUMADIN: ICD-10-CM

## 2025-01-22 DIAGNOSIS — I26.99 ACUTE PULMONARY EMBOLISM WITHOUT ACUTE COR PULMONALE, UNSPECIFIED PULMONARY EMBOLISM TYPE (HCC): Primary | ICD-10-CM

## 2025-01-22 DIAGNOSIS — Z51.81 ENCOUNTER FOR THERAPEUTIC DRUG MONITORING: ICD-10-CM

## 2025-01-22 DIAGNOSIS — I82.4Y3 DEEP VEIN THROMBOSIS (DVT) OF PROXIMAL VEIN OF BOTH LOWER EXTREMITIES, UNSPECIFIED CHRONICITY (HCC): ICD-10-CM

## 2025-01-22 LAB — POC INR: 2.5 (ref 0.8–1.2)

## 2025-01-22 PROCEDURE — 99211 OFF/OP EST MAY X REQ PHY/QHP: CPT | Performed by: PHARMACIST

## 2025-01-22 PROCEDURE — 85610 PROTHROMBIN TIME: CPT | Performed by: PHARMACIST

## 2025-01-22 NOTE — PROGRESS NOTES
Medication Management Clinic  [facility]  Anticoagulation Clinic  [phone] (phone)  [phone] (fax)    Mr. Dashawn Rivera is a 44 y.o.  male with history of DVT, PE who presents today for anticoagulation monitoring and adjustment.    Patient verifies current dosing regimen and tablet strength.  No missed or extra doses.  Patient denies s/s bleeding/bruising/swelling/SOB  No blood in urine or stool.  No dietary changes.  No changes in medication/OTC agents/Herbals.  No change in alcohol use or tobacco use.  Starting new job today  Patient denies falls.  No vomiting/diarrhea or acute illness.   No Procedures scheduled in the future at this time.    Assessment:   Lab Results   Component Value Date    INR 2.50 (H) 2025    INR 3.30 (H) 2025    INR 2.10 (H) 01/10/2025     INR therapeutic   Recent Labs     25  1036   INR 2.50*     Plan:  Continue Coumadin 10 mg daily.  Recheck INR in 2 week(s).  Patient reminded to call the Anticoagulation Clinic with any signs or symptoms of bleeding or with any medication changes.  Patient given instructions utilizing the teach back method.    After visit summary printed and reviewed with patient.      Discharged ambulatory in no apparent distress.    Fabienne Mas PharmD 2025 11:19 AM    For Pharmacy Admin Tracking Only    Intervention Detail: Adherence Monitorin  Total # of Interventions Recommended: 1  Total # of Interventions Accepted: 1  Time Spent (min): 20

## 2025-01-31 ENCOUNTER — HOSPITAL ENCOUNTER (INPATIENT)
Age: 45
LOS: 2 days | Discharge: HOME | DRG: 139 | End: 2025-02-02
Attending: EMERGENCY MEDICINE
Payer: MEDICAID

## 2025-01-31 ENCOUNTER — ANESTHESIA (OUTPATIENT)
Dept: ENDOSCOPY | Age: 45
End: 2025-01-31
Payer: MEDICAID

## 2025-01-31 ENCOUNTER — APPOINTMENT (OUTPATIENT)
Dept: CT IMAGING | Age: 45
DRG: 139 | End: 2025-01-31
Payer: MEDICAID

## 2025-01-31 ENCOUNTER — ANESTHESIA EVENT (OUTPATIENT)
Dept: ENDOSCOPY | Age: 45
End: 2025-01-31
Payer: MEDICAID

## 2025-01-31 ENCOUNTER — APPOINTMENT (OUTPATIENT)
Dept: ENDOSCOPY | Age: 45
DRG: 139 | End: 2025-01-31
Attending: INTERNAL MEDICINE
Payer: MEDICAID

## 2025-01-31 DIAGNOSIS — D64.9 ANEMIA, UNSPECIFIED TYPE: ICD-10-CM

## 2025-01-31 DIAGNOSIS — J18.9 PNEUMONIA DUE TO INFECTIOUS ORGANISM, UNSPECIFIED LATERALITY, UNSPECIFIED PART OF LUNG: Primary | ICD-10-CM

## 2025-01-31 DIAGNOSIS — E87.6 HYPOKALEMIA: ICD-10-CM

## 2025-01-31 DIAGNOSIS — K21.00 GASTROESOPHAGEAL REFLUX DISEASE WITH ESOPHAGITIS WITHOUT HEMORRHAGE: ICD-10-CM

## 2025-01-31 DIAGNOSIS — K20.90 ESOPHAGITIS: ICD-10-CM

## 2025-01-31 DIAGNOSIS — K20.90 ESOPHAGITIS DETERMINED BY BIOPSY: ICD-10-CM

## 2025-01-31 PROBLEM — K92.2 GI BLEED: Status: ACTIVE | Noted: 2025-01-31

## 2025-01-31 LAB
ABO GROUP BLD: NORMAL
ANION GAP SERPL CALC-SCNC: 21 MEQ/L (ref 8–16)
BASOPHILS ABSOLUTE: 0 THOU/MM3 (ref 0–0.1)
BASOPHILS NFR BLD AUTO: 0.3 %
BUN SERPL-MCNC: 9 MG/DL (ref 7–22)
CALCIUM SERPL-MCNC: 9.1 MG/DL (ref 8.5–10.5)
CHLORIDE SERPL-SCNC: 97 MEQ/L (ref 98–111)
CO2 SERPL-SCNC: 23 MEQ/L (ref 23–33)
CREAT SERPL-MCNC: 0.7 MG/DL (ref 0.4–1.2)
DEPRECATED RDW RBC AUTO: 58.4 FL (ref 35–45)
EKG ATRIAL RATE: 110 BPM
EKG P AXIS: 84 DEGREES
EKG P-R INTERVAL: 128 MS
EKG Q-T INTERVAL: 340 MS
EKG QRS DURATION: 90 MS
EKG QTC CALCULATION (BAZETT): 460 MS
EKG R AXIS: 86 DEGREES
EKG T AXIS: 79 DEGREES
EKG VENTRICULAR RATE: 110 BPM
EOSINOPHIL NFR BLD AUTO: 0.4 %
EOSINOPHILS ABSOLUTE: 0 THOU/MM3 (ref 0–0.4)
ERYTHROCYTE [DISTWIDTH] IN BLOOD BY AUTOMATED COUNT: 18.1 % (ref 11.5–14.5)
FLUAV RNA RESP QL NAA+PROBE: NOT DETECTED
FLUBV RNA RESP QL NAA+PROBE: NOT DETECTED
GFR SERPL CREATININE-BSD FRML MDRD: > 90 ML/MIN/1.73M2
GLUCOSE SERPL-MCNC: 101 MG/DL (ref 70–108)
HCT VFR BLD AUTO: 35.8 % (ref 42–52)
HCT VFR BLD AUTO: 37.1 % (ref 42–52)
HCT VFR BLD AUTO: 39.1 % (ref 42–52)
HGB BLD-MCNC: 11.8 GM/DL (ref 14–18)
HGB BLD-MCNC: 12.3 GM/DL (ref 14–18)
HGB BLD-MCNC: 12.7 GM/DL (ref 14–18)
IAT IGG-SP REAG SERPL QL: NORMAL
IMM GRANULOCYTES # BLD AUTO: 0.04 THOU/MM3 (ref 0–0.07)
IMM GRANULOCYTES NFR BLD AUTO: 0.3 %
INR PPP: 1.21 (ref 0.85–1.13)
LACTATE SERPL-SCNC: 1.2 MMOL/L (ref 0.5–2)
LACTATE SERPL-SCNC: 1.5 MMOL/L (ref 0.5–2)
LACTIC ACID, SEPSIS: 2.5 MMOL/L (ref 0.5–1.9)
LACTIC ACID, SEPSIS: 3.2 MMOL/L (ref 0.5–1.9)
LYMPHOCYTES ABSOLUTE: 1.9 THOU/MM3 (ref 1–4.8)
LYMPHOCYTES NFR BLD AUTO: 16.9 %
MAGNESIUM SERPL-MCNC: 1.6 MG/DL (ref 1.6–2.4)
MCH RBC QN AUTO: 28.9 PG (ref 26–33)
MCHC RBC AUTO-ENTMCNC: 32.5 GM/DL (ref 32.2–35.5)
MCV RBC AUTO: 89.1 FL (ref 80–94)
MONOCYTES ABSOLUTE: 0.8 THOU/MM3 (ref 0.4–1.3)
MONOCYTES NFR BLD AUTO: 7.3 %
NEUTROPHILS ABSOLUTE: 8.6 THOU/MM3 (ref 1.8–7.7)
NEUTROPHILS NFR BLD AUTO: 74.8 %
NRBC BLD AUTO-RTO: 0 /100 WBC
NT-PROBNP SERPL IA-MCNC: 63.5 PG/ML (ref 0–124)
OSMOLALITY SERPL CALC.SUM OF ELEC: 280.1 MOSMOL/KG (ref 275–300)
PLATELET # BLD AUTO: 237 THOU/MM3 (ref 130–400)
PMV BLD AUTO: 10.3 FL (ref 9.4–12.4)
POTASSIUM SERPL-SCNC: 2.8 MEQ/L (ref 3.5–5.2)
PROCALCITONIN SERPL IA-MCNC: 0.26 NG/ML (ref 0.01–0.09)
RBC # BLD AUTO: 4.39 MILL/MM3 (ref 4.7–6.1)
RH BLD: NORMAL
SARS-COV-2 RNA RESP QL NAA+PROBE: NOT DETECTED
SODIUM SERPL-SCNC: 141 MEQ/L (ref 135–145)
TROPONIN, HIGH SENSITIVITY: < 6 NG/L (ref 0–12)
WBC # BLD AUTO: 11.5 THOU/MM3 (ref 4.8–10.8)

## 2025-01-31 PROCEDURE — 3700000001 HC ADD 15 MINUTES (ANESTHESIA): Performed by: INTERNAL MEDICINE

## 2025-01-31 PROCEDURE — 6370000000 HC RX 637 (ALT 250 FOR IP)

## 2025-01-31 PROCEDURE — 99223 1ST HOSP IP/OBS HIGH 75: CPT

## 2025-01-31 PROCEDURE — 86900 BLOOD TYPING SEROLOGIC ABO: CPT

## 2025-01-31 PROCEDURE — 96361 HYDRATE IV INFUSION ADD-ON: CPT

## 2025-01-31 PROCEDURE — 6360000002 HC RX W HCPCS: Performed by: INTERNAL MEDICINE

## 2025-01-31 PROCEDURE — 0DJ08ZZ INSPECTION OF UPPER INTESTINAL TRACT, VIA NATURAL OR ARTIFICIAL OPENING ENDOSCOPIC: ICD-10-PCS | Performed by: INTERNAL MEDICINE

## 2025-01-31 PROCEDURE — 96374 THER/PROPH/DIAG INJ IV PUSH: CPT

## 2025-01-31 PROCEDURE — 96375 TX/PRO/DX INJ NEW DRUG ADDON: CPT

## 2025-01-31 PROCEDURE — 83880 ASSAY OF NATRIURETIC PEPTIDE: CPT

## 2025-01-31 PROCEDURE — 6360000004 HC RX CONTRAST MEDICATION

## 2025-01-31 PROCEDURE — 2580000003 HC RX 258

## 2025-01-31 PROCEDURE — 96365 THER/PROPH/DIAG IV INF INIT: CPT

## 2025-01-31 PROCEDURE — 85610 PROTHROMBIN TIME: CPT

## 2025-01-31 PROCEDURE — 85014 HEMATOCRIT: CPT

## 2025-01-31 PROCEDURE — 86885 COOMBS TEST INDIRECT QUAL: CPT

## 2025-01-31 PROCEDURE — 99285 EMERGENCY DEPT VISIT HI MDM: CPT

## 2025-01-31 PROCEDURE — 80048 BASIC METABOLIC PNL TOTAL CA: CPT

## 2025-01-31 PROCEDURE — 6370000000 HC RX 637 (ALT 250 FOR IP): Performed by: INTERNAL MEDICINE

## 2025-01-31 PROCEDURE — 7100000010 HC PHASE II RECOVERY - FIRST 15 MIN: Performed by: INTERNAL MEDICINE

## 2025-01-31 PROCEDURE — 96376 TX/PRO/DX INJ SAME DRUG ADON: CPT

## 2025-01-31 PROCEDURE — 87636 SARSCOV2 & INF A&B AMP PRB: CPT

## 2025-01-31 PROCEDURE — 86901 BLOOD TYPING SEROLOGIC RH(D): CPT

## 2025-01-31 PROCEDURE — 6360000002 HC RX W HCPCS: Performed by: NURSE ANESTHETIST, CERTIFIED REGISTERED

## 2025-01-31 PROCEDURE — 1200000000 HC SEMI PRIVATE

## 2025-01-31 PROCEDURE — 83605 ASSAY OF LACTIC ACID: CPT

## 2025-01-31 PROCEDURE — 36415 COLL VENOUS BLD VENIPUNCTURE: CPT

## 2025-01-31 PROCEDURE — G0378 HOSPITAL OBSERVATION PER HR: HCPCS

## 2025-01-31 PROCEDURE — 83735 ASSAY OF MAGNESIUM: CPT

## 2025-01-31 PROCEDURE — 3700000000 HC ANESTHESIA ATTENDED CARE: Performed by: INTERNAL MEDICINE

## 2025-01-31 PROCEDURE — 71275 CT ANGIOGRAPHY CHEST: CPT

## 2025-01-31 PROCEDURE — 85018 HEMOGLOBIN: CPT

## 2025-01-31 PROCEDURE — 3609017100 HC EGD: Performed by: INTERNAL MEDICINE

## 2025-01-31 PROCEDURE — 84145 PROCALCITONIN (PCT): CPT

## 2025-01-31 PROCEDURE — 85025 COMPLETE CBC W/AUTO DIFF WBC: CPT

## 2025-01-31 PROCEDURE — 84484 ASSAY OF TROPONIN QUANT: CPT

## 2025-01-31 PROCEDURE — 2500000003 HC RX 250 WO HCPCS

## 2025-01-31 PROCEDURE — 93005 ELECTROCARDIOGRAM TRACING: CPT | Performed by: EMERGENCY MEDICINE

## 2025-01-31 PROCEDURE — 94640 AIRWAY INHALATION TREATMENT: CPT

## 2025-01-31 PROCEDURE — 2580000003 HC RX 258: Performed by: INTERNAL MEDICINE

## 2025-01-31 PROCEDURE — 6360000002 HC RX W HCPCS

## 2025-01-31 RX ORDER — 0.9 % SODIUM CHLORIDE 0.9 %
1000 INTRAVENOUS SOLUTION INTRAVENOUS ONCE
Status: COMPLETED | OUTPATIENT
Start: 2025-01-31 | End: 2025-01-31

## 2025-01-31 RX ORDER — SODIUM CHLORIDE 0.9 % (FLUSH) 0.9 %
5-40 SYRINGE (ML) INJECTION EVERY 12 HOURS SCHEDULED
Status: DISCONTINUED | OUTPATIENT
Start: 2025-01-31 | End: 2025-02-02 | Stop reason: HOSPADM

## 2025-01-31 RX ORDER — ACETAMINOPHEN 325 MG/1
650 TABLET ORAL EVERY 6 HOURS PRN
Status: DISCONTINUED | OUTPATIENT
Start: 2025-01-31 | End: 2025-02-02 | Stop reason: HOSPADM

## 2025-01-31 RX ORDER — MAGNESIUM SULFATE IN WATER 40 MG/ML
2000 INJECTION, SOLUTION INTRAVENOUS PRN
Status: DISCONTINUED | OUTPATIENT
Start: 2025-01-31 | End: 2025-02-02 | Stop reason: HOSPADM

## 2025-01-31 RX ORDER — IPRATROPIUM BROMIDE AND ALBUTEROL SULFATE 2.5; .5 MG/3ML; MG/3ML
1 SOLUTION RESPIRATORY (INHALATION) ONCE
Status: COMPLETED | OUTPATIENT
Start: 2025-01-31 | End: 2025-01-31

## 2025-01-31 RX ORDER — SODIUM CHLORIDE 0.9 % (FLUSH) 0.9 %
5-40 SYRINGE (ML) INJECTION PRN
Status: DISCONTINUED | OUTPATIENT
Start: 2025-01-31 | End: 2025-02-02 | Stop reason: SDUPTHER

## 2025-01-31 RX ORDER — LORAZEPAM 1 MG/1
3 TABLET ORAL
Status: DISCONTINUED | OUTPATIENT
Start: 2025-01-31 | End: 2025-02-01

## 2025-01-31 RX ORDER — SODIUM CHLORIDE 0.9 % (FLUSH) 0.9 %
5-40 SYRINGE (ML) INJECTION EVERY 12 HOURS SCHEDULED
Status: DISCONTINUED | OUTPATIENT
Start: 2025-01-31 | End: 2025-02-02 | Stop reason: SDUPTHER

## 2025-01-31 RX ORDER — ONDANSETRON 2 MG/ML
4 INJECTION INTRAMUSCULAR; INTRAVENOUS EVERY 6 HOURS PRN
Status: DISCONTINUED | OUTPATIENT
Start: 2025-01-31 | End: 2025-02-02 | Stop reason: HOSPADM

## 2025-01-31 RX ORDER — ONDANSETRON 4 MG/1
4 TABLET, ORALLY DISINTEGRATING ORAL EVERY 8 HOURS PRN
Status: DISCONTINUED | OUTPATIENT
Start: 2025-01-31 | End: 2025-02-02 | Stop reason: HOSPADM

## 2025-01-31 RX ORDER — IOPAMIDOL 755 MG/ML
80 INJECTION, SOLUTION INTRAVASCULAR
Status: COMPLETED | OUTPATIENT
Start: 2025-01-31 | End: 2025-01-31

## 2025-01-31 RX ORDER — SODIUM CHLORIDE 9 MG/ML
INJECTION, SOLUTION INTRAVENOUS CONTINUOUS
Status: ACTIVE | OUTPATIENT
Start: 2025-01-31 | End: 2025-01-31

## 2025-01-31 RX ORDER — SODIUM CHLORIDE 9 MG/ML
INJECTION, SOLUTION INTRAVENOUS PRN
Status: DISCONTINUED | OUTPATIENT
Start: 2025-01-31 | End: 2025-02-02 | Stop reason: HOSPADM

## 2025-01-31 RX ORDER — SODIUM CHLORIDE 9 MG/ML
INJECTION, SOLUTION INTRAVENOUS PRN
Status: DISCONTINUED | OUTPATIENT
Start: 2025-01-31 | End: 2025-02-02 | Stop reason: SDUPTHER

## 2025-01-31 RX ORDER — LANOLIN ALCOHOL/MO/W.PET/CERES
100 CREAM (GRAM) TOPICAL DAILY
Status: DISCONTINUED | OUTPATIENT
Start: 2025-01-31 | End: 2025-02-02 | Stop reason: HOSPADM

## 2025-01-31 RX ORDER — LORAZEPAM 2 MG/ML
2 INJECTION INTRAMUSCULAR
Status: DISCONTINUED | OUTPATIENT
Start: 2025-01-31 | End: 2025-02-01

## 2025-01-31 RX ORDER — POTASSIUM CHLORIDE 7.45 MG/ML
10 INJECTION INTRAVENOUS PRN
Status: DISCONTINUED | OUTPATIENT
Start: 2025-01-31 | End: 2025-02-02 | Stop reason: HOSPADM

## 2025-01-31 RX ORDER — LIDOCAINE HYDROCHLORIDE 20 MG/ML
INJECTION, SOLUTION INFILTRATION; PERINEURAL
Status: DISCONTINUED | OUTPATIENT
Start: 2025-01-31 | End: 2025-01-31 | Stop reason: SDUPTHER

## 2025-01-31 RX ORDER — POLYETHYLENE GLYCOL 3350 17 G/17G
17 POWDER, FOR SOLUTION ORAL DAILY PRN
Status: DISCONTINUED | OUTPATIENT
Start: 2025-01-31 | End: 2025-02-02 | Stop reason: HOSPADM

## 2025-01-31 RX ORDER — LORAZEPAM 2 MG/ML
4 INJECTION INTRAMUSCULAR
Status: DISCONTINUED | OUTPATIENT
Start: 2025-01-31 | End: 2025-02-01

## 2025-01-31 RX ORDER — SODIUM CHLORIDE 9 MG/ML
25 INJECTION, SOLUTION INTRAVENOUS PRN
Status: DISCONTINUED | OUTPATIENT
Start: 2025-01-31 | End: 2025-02-02 | Stop reason: HOSPADM

## 2025-01-31 RX ORDER — SODIUM CHLORIDE 0.9 % (FLUSH) 0.9 %
5-40 SYRINGE (ML) INJECTION PRN
Status: DISCONTINUED | OUTPATIENT
Start: 2025-01-31 | End: 2025-02-02 | Stop reason: HOSPADM

## 2025-01-31 RX ORDER — PROPOFOL 10 MG/ML
INJECTION, EMULSION INTRAVENOUS
Status: DISCONTINUED | OUTPATIENT
Start: 2025-01-31 | End: 2025-01-31 | Stop reason: SDUPTHER

## 2025-01-31 RX ORDER — MULTIVITAMIN WITH IRON
1 TABLET ORAL DAILY
Status: DISCONTINUED | OUTPATIENT
Start: 2025-01-31 | End: 2025-02-02 | Stop reason: HOSPADM

## 2025-01-31 RX ORDER — LORAZEPAM 1 MG/1
1 TABLET ORAL
Status: DISCONTINUED | OUTPATIENT
Start: 2025-01-31 | End: 2025-02-01

## 2025-01-31 RX ORDER — ACETAMINOPHEN 650 MG/1
650 SUPPOSITORY RECTAL EVERY 6 HOURS PRN
Status: DISCONTINUED | OUTPATIENT
Start: 2025-01-31 | End: 2025-02-02 | Stop reason: HOSPADM

## 2025-01-31 RX ORDER — LORAZEPAM 2 MG/ML
3 INJECTION INTRAMUSCULAR
Status: DISCONTINUED | OUTPATIENT
Start: 2025-01-31 | End: 2025-02-01

## 2025-01-31 RX ORDER — POTASSIUM CHLORIDE 7.45 MG/ML
10 INJECTION INTRAVENOUS
Status: COMPLETED | OUTPATIENT
Start: 2025-01-31 | End: 2025-01-31

## 2025-01-31 RX ORDER — LORAZEPAM 1 MG/1
4 TABLET ORAL
Status: DISCONTINUED | OUTPATIENT
Start: 2025-01-31 | End: 2025-02-01

## 2025-01-31 RX ORDER — LORAZEPAM 2 MG/ML
1 INJECTION INTRAMUSCULAR
Status: DISCONTINUED | OUTPATIENT
Start: 2025-01-31 | End: 2025-02-01

## 2025-01-31 RX ORDER — LEVETIRACETAM 500 MG/1
500 TABLET ORAL 2 TIMES DAILY
Status: DISCONTINUED | OUTPATIENT
Start: 2025-01-31 | End: 2025-02-02 | Stop reason: HOSPADM

## 2025-01-31 RX ORDER — POTASSIUM CHLORIDE 1500 MG/1
40 TABLET, EXTENDED RELEASE ORAL PRN
Status: DISCONTINUED | OUTPATIENT
Start: 2025-01-31 | End: 2025-02-02 | Stop reason: HOSPADM

## 2025-01-31 RX ORDER — LORAZEPAM 1 MG/1
2 TABLET ORAL
Status: DISCONTINUED | OUTPATIENT
Start: 2025-01-31 | End: 2025-02-01

## 2025-01-31 RX ADMIN — IPRATROPIUM BROMIDE AND ALBUTEROL SULFATE 1 DOSE: .5; 3 SOLUTION RESPIRATORY (INHALATION) at 08:39

## 2025-01-31 RX ADMIN — IOPAMIDOL 80 ML: 755 INJECTION, SOLUTION INTRAVENOUS at 04:27

## 2025-01-31 RX ADMIN — PROPOFOL 200 MG: 10 INJECTION, EMULSION INTRAVENOUS at 11:19

## 2025-01-31 RX ADMIN — LIDOCAINE HYDROCHLORIDE 200 MG: 20 INJECTION, SOLUTION INFILTRATION; PERINEURAL at 11:19

## 2025-01-31 RX ADMIN — AZITHROMYCIN MONOHYDRATE 500 MG: 500 INJECTION, POWDER, LYOPHILIZED, FOR SOLUTION INTRAVENOUS at 16:16

## 2025-01-31 RX ADMIN — POTASSIUM CHLORIDE 10 MEQ: 7.46 INJECTION, SOLUTION INTRAVENOUS at 06:08

## 2025-01-31 RX ADMIN — LEVETIRACETAM 500 MG: 500 TABLET, FILM COATED ORAL at 21:03

## 2025-01-31 RX ADMIN — SODIUM CHLORIDE: 9 INJECTION, SOLUTION INTRAVENOUS at 11:13

## 2025-01-31 RX ADMIN — CEFTRIAXONE SODIUM 2000 MG: 2 INJECTION, POWDER, FOR SOLUTION INTRAMUSCULAR; INTRAVENOUS at 06:01

## 2025-01-31 RX ADMIN — SODIUM CHLORIDE 1000 ML: 9 INJECTION, SOLUTION INTRAVENOUS at 03:51

## 2025-01-31 RX ADMIN — POTASSIUM CHLORIDE 10 MEQ: 7.46 INJECTION, SOLUTION INTRAVENOUS at 07:09

## 2025-01-31 RX ADMIN — POTASSIUM BICARBONATE 40 MEQ: 782 TABLET, EFFERVESCENT ORAL at 05:54

## 2025-01-31 RX ADMIN — SODIUM CHLORIDE: 9 INJECTION, SOLUTION INTRAVENOUS at 16:12

## 2025-01-31 ASSESSMENT — PAIN DESCRIPTION - LOCATION: LOCATION: ABDOMEN

## 2025-01-31 ASSESSMENT — PAIN - FUNCTIONAL ASSESSMENT
PAIN_FUNCTIONAL_ASSESSMENT: NONE - DENIES PAIN
PAIN_FUNCTIONAL_ASSESSMENT: NONE - DENIES PAIN
PAIN_FUNCTIONAL_ASSESSMENT: 0-10

## 2025-01-31 ASSESSMENT — PAIN SCALES - GENERAL
PAINLEVEL_OUTOF10: 5
PAINLEVEL_OUTOF10: 0

## 2025-01-31 ASSESSMENT — PAIN DESCRIPTION - FREQUENCY: FREQUENCY: INTERMITTENT

## 2025-01-31 NOTE — ANESTHESIA PRE PROCEDURE
Department of Anesthesiology  Preprocedure Note       Name:  Dashawn Rivera   Age:  45 y.o.  :  1980                                          MRN:  [id number]         Date:  2025      Surgeon: Surgeon(s):  Jovanni Perry MD    Procedure: Procedure(s):  EGD    Medications prior to admission:   Prior to Admission medications    Medication Sig Start Date End Date Taking? Authorizing Provider   warfarin (COUMADIN) 5 MG tablet Take as directed by [email or web address]s [facility] 90 tabs = 30 days 24   Eusebio Cooper MD   folic acid (FOLVITE) 1 MG tablet Take 1 tablet by mouth daily 24   Aaron Cleveland,    thiamine 100 MG tablet Take 1 tablet by mouth daily 12/10/24   Aaron Cleveland, DO   pantoprazole (PROTONIX) 40 MG tablet Take 1 tablet by mouth in the morning and at bedtime 12/10/24   Aaron Cleveland,    naltrexone (DEPADE) 50 MG tablet Take 1 tablet by mouth daily 12/10/24   Aaron Cleveland, DO   Multiple Vitamin (MULTIVITAMIN) TABS tablet Take 1 tablet by mouth daily 12/11/24 4/10/25  Franck Morales DO   levETIRAcetam (KEPPRA) 500 MG tablet Take 1 tablet by mouth 2 times daily 10/30/24   Nani Flores, MARIA ALEJANDRA - CNP       Current medications:    Current Facility-Administered Medications   Medication Dose Route Frequency Provider Last Rate Last Admin    sodium chloride flush 0.9 % injection 5-40 mL  5-40 mL IntraVENous 2 times per day Maurice Galeano DO        sodium chloride flush 0.9 % injection 5-40 mL  5-40 mL IntraVENous PRN Maurice Galeano, DO        0.9 % sodium chloride infusion   IntraVENous PRN Maurice Galeano DO        potassium chloride (KLOR-CON M) extended release tablet 40 mEq  40 mEq Oral PRN Maurice Galeano,         Or    potassium bicarb-citric acid (EFFER-K) effervescent tablet 40 mEq  40 mEq Oral PRN Maurice Galeano, DO        Or    potassium chloride 10 mEq/100 mL IVPB (Peripheral Line)  10 mEq IntraVENous PRN Maurice Galeano,         magnesium sulfate 2000 mg in

## 2025-01-31 NOTE — OP NOTE
[address] [address]                            OPERATIVE REPORT      PATIENT NAME: CHADD VIVAR                 : 1980  MED REC NO: [id number]                       ROOM: [facility]  ACCOUNT NO: [id number]                       ADMIT DATE: 2025  PROVIDER: Jovanni Perry MD      DATE OF PROCEDURE:  2025    SURGEON:  Jovanni Perry MD    PROCEDURE:  Esophagogastroduodenoscopy.    INDICATION:  The patient is a 45-year-old pleasant male, who has history of heavy alcohol use, history of PVCs and DVTs, and the patient has an IVC filter placed.  The patient had colonoscopy in 2019.  It was normal, and the patient again presented to the hospital with black melenic stools.  Hemoglobin is 12.7 and hematocrit 39.1, platelet count 237,000.  INR 1.21.  He is undergoing further evaluation.    PHYSICAL EXAMINATION:  VITAL SIGNS:  Afebrile.  Vital signs are stable.  Please see electronic medical records for details.  HEART:  Regular.  Normal S1 and S2.  No S3.  LUNGS:  Equal to expansion on inspection.  Fair air entry bilaterally.  ABDOMEN:  Soft.  Normoactive bowel sounds.    ASA CLASSIFICATION:  As per Anesthesia.  Please see Anesthesia note for details.    INSTRUMENT:  GIF- gastroscope.    PHOTOGRAPHS:  Yes.    BIOPSIES:  No.    ESTIMATED BLOOD LOSS:  None.    Procedure indications and complications including, but not limited to, perforation, bleeding, infection, adverse reaction to medicine, very slight chance of missing significant lesions discussed with the patient, and the patient expressed his understanding, and a written consent was obtained.    DESCRIPTION OF PROCEDURE:  The patient was placed in the left lateral decubitus position in the Endo room #11.  The patient was placed on appropriate monitoring of vitals including blood pressure, heart rate, and pulse ox.  Oropharynx was sprayed with Cetacaine spray, and bite block

## 2025-01-31 NOTE — H&P
Internal Medicine Resident H&P Note      Patient:  Dashawn Rivera    Unit/Bed:/012A  YOB: 1980  MRN: [id number]   Acct: [id number]   PCP: Lyn Damico APRN - CNP  Date of Admission: 1/31/2025  Date of Service: Pt seen/examined on 01/31/25      Assessment/Plan:  Suspect nonvariceal upper GI bleed: Has previous history of hematemesis and melena secondary to supratherapeutic INR and Coumadin use.  On admission reports that he had not used his warfarin in the last week however over the last 3 days he has had black bowel movements and shortness of breath.  Follows with Dr. Perry.  Presented with a hemoglobin of 12.7 on a baseline of 13-14.  Lactic acid of 2.5.  INR on admission 1.21.  GI consulted for upper GI bleed  Start IV Protonix 80 mg bolus followed by 40 mg IV twice daily  Hold warfarin  H&H every 6 hours  Type and screen  Transfuse if hemoglobin less than 7 or signs and symptoms of acute anemia  High anion gap metabolic acidosis: Suspect in the setting of lactic acidosis.   Will start normal saline 100 cc/h for 20 hours.  Trend lactic acid every 6 hours until less than 2  Community-acquired pneumonia: Presented with a WBC of 11.5.  COVID/flu negative on admission.  Presented to the hospital with shortness of breath, sputum production.  He is an active smoker.  Does not use oxygen at baseline.  Start ceftriaxone 1 g every 24 hours for 7 days  Start azithromycin 500 mg for 3 days.  Pneumonia panel ordered  Respiratory cultures ordered  History of PEs and DVTs: Status post IVC filter.  Patient has had a PE despite having IVC filter.  Noted.  Hold warfarin for now  Alcohol use disorder without intoxication or withdrawal at this time: In the past patient reported drinking 1 bottle per day.   Start CIWA alcohol withdrawal protocol  Continue with thiamine and folic acid  Neurochecks every shift  Fall precautions  Seizure precautions  Seizure disorder: Continue with home  Keppra  GERD: Continue Protonix          Expected discharge date:  TBD    Disposition: TBD  [] Home  [] Inpatient Rehab  [] Psychiatric Unit  [] SNF  [] Long Term Care Facility  [] Other-    ===================================================================      Chief Complaint: Shortness of breath and dark stools    HPI: Patient is a 45-year-old male past medical history of PE/DVTs on warfarin status post IVC filter, alcohol abuse, seizures who presented to [facility] with shortness of breath and dark stools.  Patient reports that he is still taking his warfarin at home however he has not taken any of his meds within the week.  He reports last night he noticed black stools.  Which prompted him to come to the ED.  Found to be short of breath while in the ED with a cough.  He reported that he has some nausea and vomiting about 3 days ago. He reports he is producing clear sputum however he describes this as normal with normal quantity.         ROS: reviewed complete ROS unchanged unless otherwise stated in hospital course/subjective portion.       Medications:  Reviewed         No intake or output data in the 24 hours ending 01/31/25 0728    Exam:  /80   Pulse (!) 109   Temp 97.5 °F (36.4 °C) (Oral)   Resp 25   Ht 1.778 m (5' 10\")   Wt 54.4 kg (120 lb)   SpO2 95%   BMI 17.22 kg/m²     General: No distress, appears stated age.  Eyes:  PERRL. Conjunctivae/corneas clear.  HENT: Head normal appearing. Nares normal. Oral mucosa moist.  Hearing intact.   Neck: Supple, with full range of motion. Trachea midline.  No gross JVD appreciated.  Respiratory:  Normal effort. Mild wheezing.  Cardiovascular: Normal rate, regular rhythm with normal S1/S2 without murmurs.    No lower extremity edema.   Abdomen: Soft, non-tender, non-distended with normal bowel sounds.  Musculoskeletal: No joint swelling or tenderness. Normal tone. No abnormal movements.   Skin: Warm and dry. No rashes or lesions.  Neurologic:  No focal

## 2025-01-31 NOTE — ED TRIAGE NOTES
Pt arrives to ED via private for evaluation of dark stools. Pt reports taking coumadin for a history of blood clots but has not taken it in 6 days due to needing a medication refill. Pt reports feeling ill over the past several days and has been feeling short of breath with exertion. EKG completed and telemetry applied.

## 2025-01-31 NOTE — ANESTHESIA PRE PROCEDURE
Department of Anesthesiology  Preprocedure Note       Name:  Dashawn Rivera   Age:  45 y.o.  :  1980                                          MRN:  [id number]         Date:  2025      Surgeon: Surgeon(s):  Jovanni Perry MD    Procedure: Procedure(s):  EGD    Medications prior to admission:   Prior to Admission medications    Medication Sig Start Date End Date Taking? Authorizing Provider   warfarin (COUMADIN) 5 MG tablet Take as directed by [facility] 90 tabs = 30 days 24   Eusebio Cooper MD   folic acid (FOLVITE) 1 MG tablet Take 1 tablet by mouth daily 24   Aaron Cleveland,    thiamine 100 MG tablet Take 1 tablet by mouth daily 12/10/24   Aaron Cleveland, DO   pantoprazole (PROTONIX) 40 MG tablet Take 1 tablet by mouth in the morning and at bedtime 12/10/24   Aaron Cleveland,    naltrexone (DEPADE) 50 MG tablet Take 1 tablet by mouth daily 12/10/24   Aaron Cleveland, DO   Multiple Vitamin (MULTIVITAMIN) TABS tablet Take 1 tablet by mouth daily 12/11/24 4/10/25  Franck Morales DO   levETIRAcetam (KEPPRA) 500 MG tablet Take 1 tablet by mouth 2 times daily 10/30/24   Nani Flores, MARIA ALEJANDRA - CNP       Current medications:    Current Facility-Administered Medications   Medication Dose Route Frequency Provider Last Rate Last Admin    sodium chloride flush 0.9 % injection 5-40 mL  5-40 mL IntraVENous 2 times per day Maurice Galeano DO        sodium chloride flush 0.9 % injection 5-40 mL  5-40 mL IntraVENous PRN Maurice Galeano, DO        0.9 % sodium chloride infusion   IntraVENous PRN Maurice Galeano DO        potassium chloride (KLOR-CON M) extended release tablet 40 mEq  40 mEq Oral PRN Maurice Galeano,         Or    potassium bicarb-citric acid (EFFER-K) effervescent tablet 40 mEq  40 mEq Oral PRN Maurice Galeano, DO        Or    potassium chloride 10 mEq/100 mL IVPB (Peripheral Line)  10 mEq IntraVENous PRN Maurice Galeano,         magnesium sulfate 2000 mg in
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
## 2025-01-31 NOTE — ED NOTES
ED to inpatient nurses report      Chief Complaint:  Chief Complaint   Patient presents with    Rectal Bleeding     Present to ED from: Home    MOA:     LOC: alert and orientated to name, place, date  Mobility: Independent  Oxygen Baseline: Room Air    Current needs required: 1L nasal cannula intermittently      Code Status:   Prior    What abnormal results were found and what did you give/do to treat them? Pneumonia, Hypokalemia - See MAR  Any procedures or intervention occur? Chest CTA, blood work - See results    Mental Status:  Level of Consciousness: Alert (0)    Psych Assessment:        Vitals:  Patient Vitals for the past 24 hrs:   BP Temp Temp src Pulse Resp SpO2 Height Weight   01/31/25 0612 115/80 -- -- (!) 109 25 95 % -- --   01/31/25 0532 124/72 -- -- 95 20 94 % -- --   01/31/25 0343 108/64 -- -- (!) 113 14 94 % -- --   01/31/25 0253 (!) 141/76 -- -- (!) 116 17 95 % -- --   01/31/25 0246 116/77 97.5 °F (36.4 °C) Oral (!) 122 18 95 % 1.778 m (5' 10\") 54.4 kg (120 lb)        LDAs:   Peripheral IV 01/31/25 Posterior;Right Forearm (Active)       Ambulatory Status:  No data recorded    Diagnosis:  DISPOSITION Decision To Admit 01/31/2025 05:39:18 AM   Final diagnoses:   Pneumonia due to infectious organism, unspecified laterality, unspecified part of lung   Hypokalemia        Consults:  None     Pain Score:       C-SSRS:   Risk of Suicide: No Risk    Sepsis Screening:       Garber Fall Risk:       Swallow Screening        Preferred Language:   English      ALLERGIES     Patient has no known allergies.    SURGICAL HISTORY       Past Surgical History:   Procedure Laterality Date    COLONOSCOPY N/A 12/17/2019    COLONOSCOPY POLYPECTOMY HOT BIOPSY performed by Jovanni Perry MD at [facility]    IR GUIDED IVC FILTER PLACEMENT  10/2/2024    IR IVC FILTER PLACEMENT W IMAGING 10/2/2024 [facility]    IR GUIDED THROMB MECH VEIN  10/1/2024    IR THROMB MECH VEIN 10/1/2024 [facility]

## 2025-01-31 NOTE — ANESTHESIA POSTPROCEDURE EVALUATION
Department of Anesthesiology  Postprocedure Note    Patient: Dashawn Rivera  MRN: [id number]  YOB: 1980  Date of evaluation: 1/31/2025    Procedure Summary       Date: 01/31/25 Room / Location: [place] [address] / [facility]    Anesthesia Start: 1115 Anesthesia Stop: 1125    Procedure: EGD Diagnosis:       Gastrointestinal hemorrhage, unspecified gastrointestinal hemorrhage type      (Gastrointestinal hemorrhage, unspecified gastrointestinal hemorrhage type [K92.2])    Surgeons: Jovanni Perry MD Responsible Provider: Thierno العلي MD    Anesthesia Type: MAC ASA Status: 3            Anesthesia Type: No value filed.    Rebeca Phase I:      Rebeca Phase II:      Anesthesia Post Evaluation    Patient location during evaluation: bedside  Patient participation: complete - patient participated  Level of consciousness: awake and alert  Pain score: 0  Airway patency: patent  Nausea & Vomiting: no nausea and no vomiting  Cardiovascular status: hemodynamically stable  Respiratory status: acceptable and room air  Hydration status: euvolemic  Pain management: adequate and satisfactory to patient        No notable events documented.

## 2025-01-31 NOTE — BRIEF OP NOTE
Brief Postoperative Note      Patient: Dashawn Rivera  YOB: 1980  MRN: [id number]    Date of Procedure: 1/31/2025    Pre-Op Diagnosis Codes:      * Gastrointestinal hemorrhage, unspecified gastrointestinal hemorrhage type [K92.2]    Post-Op Diagnosis: Post-Op Diagnosis Codes:     * Gastrointestinal hemorrhage, unspecified gastrointestinal hemorrhage type [K92.2]       Procedure(s):  EGD    Surgeon(s):  Jovanni Perry MD    Assistant:  * No surgical staff found *    Anesthesia: Monitor Anesthesia Care    Estimated Blood Loss (mL): Minimal    Complications: None    Specimens:   * No specimens in log *    Implants:  * No implants in log *      Drains: * No LDAs found *    Findings:  Infection Present At Time Of Surgery (PATOS) (choose all levels that have infection present):  No infection present  Other Findings: G I ESOPHAGITIS, NO BLEEDING, NO STIGMATA FOR BLEEDING.    Electronically signed by Jovanni Perry MD on 1/31/2025 at 11:29 AM

## 2025-01-31 NOTE — PLAN OF CARE
Problem: Discharge Planning  Goal: Discharge to home or other facility with appropriate resources  Outcome: Progressing     Problem: Safety - Adult  Goal: Free from fall injury  Outcome: Progressing     Problem: Discharge Planning  Goal: Discharge to home or other facility with appropriate resources  Outcome: Progressing

## 2025-01-31 NOTE — PROGRESS NOTES
Recovery mode, arouses easily, denies discomfort. Dr. Perry discussed findings and plan of care with pt and , understandings verbalized. Report called to primary nurse in the ED, Ambar

## 2025-01-31 NOTE — ED NOTES
ED to inpatient nurses report      Chief Complaint:  Chief Complaint   Patient presents with    Rectal Bleeding     Present to ED from: home    MOA:     LOC: alert and orientated to name, place, date  Mobility: Independent  Oxygen Baseline: 96    Current needs required: room air     Code Status:   Full Code      Mental Status:  Level of Consciousness: Alert (0)    Psych Assessment:        Vitals:  Patient Vitals for the past 24 hrs:   BP Temp Temp src Pulse Resp SpO2 Height Weight   01/31/25 1133 128/80 -- -- (!) 107 -- 96 % -- --   01/31/25 1125 119/78 -- -- (!) 119 -- 95 % -- --   01/31/25 1013 132/80 -- -- (!) 111 20 94 % -- --   01/31/25 0859 (!) 140/88 -- -- (!) 103 (!) 35 94 % -- --   01/31/25 0841 -- -- -- 98 24 95 % -- --   01/31/25 0747 (!) 140/90 -- -- (!) 101 14 94 % -- --   01/31/25 0612 115/80 -- -- (!) 109 25 95 % -- --   01/31/25 0532 124/72 -- -- 95 20 94 % -- --   01/31/25 0343 108/64 -- -- (!) 113 14 94 % -- --   01/31/25 0253 (!) 141/76 -- -- (!) 116 17 95 % -- --   01/31/25 0246 116/77 97.5 °F (36.4 °C) Oral (!) 122 18 95 % 1.778 m (5' 10\") 54.4 kg (120 lb)        LDAs:   Peripheral IV 01/31/25 Posterior;Right Forearm (Active)       Ambulatory Status:  No data recorded    Diagnosis:  DISPOSITION Admitted 01/31/2025 07:36:13 AM   Final diagnoses:   Pneumonia due to infectious organism, unspecified laterality, unspecified part of lung   Hypokalemia        Consults:  IP CONSULT TO GI  IP CONSULT TO SOCIAL WORK  IP CONSULT TO ADDICTION SERVICES     Pain Score:  Pain Assessment  Pain Assessment: None - Denies Pain  Pain Level: 5 (pain intermittently)  Pain Location: Abdomen  Pain Frequency: Intermittent    C-SSRS:   Risk of Suicide: No Risk    Sepsis Screening:       McCormick Fall Risk:       Swallow Screening        Preferred Language:   English      ALLERGIES     Patient has no known allergies.    SURGICAL HISTORY       Past Surgical History:   Procedure Laterality Date    COLONOSCOPY N/A 12/17/2019

## 2025-01-31 NOTE — CONSULTS
01/31/25  0320   CALCIUM 9.1     Ionized Calcium:Invalid input(s): \"IONCA\"  Magnesium:  Recent Labs     01/31/25  0320   MG 1.6     Phosphorus:No results for input(s): \"PHOS\" in the last 72 hours.  BNP:No results for input(s): \"BNP\" in the last 72 hours.  Glucose:No results for input(s): \"POCGLU\" in the last 72 hours.  HgbA1C: No results for input(s): \"LABA1C\" in the last 72 hours.  INR:   Recent Labs     01/31/25  0320   INR 1.21*     Hepatic: No results for input(s): \"ALKPHOS\", \"ALT\", \"AST\", \"BILITOT\", \"BILIDIR\", \"LABALBU\" in the last 72 hours.    Invalid input(s): \"PROT\"  Amylase and Lipase:No results for input(s): \"LACTA\", \"AMYLASE\" in the last 72 hours.  Lactic Acid: No results for input(s): \"LACTA\" in the last 72 hours.  Troponin: No results for input(s): \"CKTOTAL\", \"CKMB\", \"TROPONINI\" in the last 72 hours.  BNP: No results for input(s): \"BNP\" in the last 72 hours.  Lipids: No results for input(s): \"CHOL\", \"TRIG\", \"HDL\", \"LDL\" in the last 72 hours.    Invalid input(s): \"LDLCALC\"  ABGs: No results found for: \"PHART\", \"PO2ART\", \"IMQ7AQU\", \"KMP9PCR\", \"BEART\"    Cultures:      CXR:       UA: No results for input(s): \"SPECGRAV\", \"PHUR\", \"COLORU\", \"CLARITYU\", \"MUCUS\", \"PROTEINU\", \"BLOODU\", \"RBCUA\", \"WBCUA\", \"BACTERIA\", \"NITRU\", \"GLUCOSEU\", \"BILIRUBINUR\", \"UROBILINOGEN\", \"KETUA\", \"LABCAST\", \"LABCASTTY\", \"AMORPHOS\" in the last 72 hours.    Invalid input(s): \"CRYSTALS\"      IMAGING:    Micro:   Lab Results   Component Value Date/Time    BC  04/14/2023 05:20 PM     No growth 24 hours. No growth 48 hours. No growth at 5 days       Problem list of patient      Patient Active Problem List   Diagnosis    Hypokalemia    Alcohol withdrawal seizure without complication (HCC)    Alcoholic liver disease (HCC)    Alcohol abuse    Thrombocytopenia concurrent with and due to alcoholism (HCC)    Elevated liver transaminase level    Moderate malnutrition (HCC)    Pulmonary embolus (HCC)    Deep vein thrombosis (DVT) of both lower  extremities (HCC)    Hypercoagulable state (HCC)    Leukocytosis    Centrilobular emphysema (HCC)    Seizure disorder (HCC)    Chronic anemia    History of alcohol abuse    Hepatomegaly    Liver disease    Tobacco abuse    Anticoagulated on Coumadin    Encounter for therapeutic drug monitoring    Hematemesis    Abnormal INR    Alcohol use disorder    GI bleed           ASSESSMENT AND PLAN   EGD today at 1130, risks and benefits explained, further recommendations to follow.  Keep NPO  Continue to hold blood thinners.     Assessment and plan of care discussed with supervising physician, Dr Perry.      Thank you Riley Schmidt MD for allowing me to participate in this patient's care.    MARIA ALEJANDRA Almeida CNP, MD,FACP 1/31/2025 9:17 AM

## 2025-02-01 LAB
ANION GAP SERPL CALC-SCNC: 9 MEQ/L (ref 8–16)
BASOPHILS ABSOLUTE: 0 THOU/MM3 (ref 0–0.1)
BASOPHILS NFR BLD AUTO: 0.4 %
BUN SERPL-MCNC: 4 MG/DL (ref 7–22)
CALCIUM SERPL-MCNC: 8.6 MG/DL (ref 8.5–10.5)
CHLORIDE SERPL-SCNC: 103 MEQ/L (ref 98–111)
CO2 SERPL-SCNC: 27 MEQ/L (ref 23–33)
CREAT SERPL-MCNC: 0.5 MG/DL (ref 0.4–1.2)
DEPRECATED RDW RBC AUTO: 58.1 FL (ref 35–45)
EOSINOPHIL NFR BLD AUTO: 0.9 %
EOSINOPHILS ABSOLUTE: 0.1 THOU/MM3 (ref 0–0.4)
ERYTHROCYTE [DISTWIDTH] IN BLOOD BY AUTOMATED COUNT: 18.3 % (ref 11.5–14.5)
GFR SERPL CREATININE-BSD FRML MDRD: > 90 ML/MIN/1.73M2
GLUCOSE SERPL-MCNC: 104 MG/DL (ref 70–108)
HCT VFR BLD AUTO: 34.6 % (ref 42–52)
HCT VFR BLD AUTO: 35 % (ref 42–52)
HCT VFR BLD AUTO: 35.6 % (ref 42–52)
HCT VFR BLD AUTO: 36.5 % (ref 42–52)
HGB BLD-MCNC: 11.3 GM/DL (ref 14–18)
HGB BLD-MCNC: 11.4 GM/DL (ref 14–18)
HGB BLD-MCNC: 11.5 GM/DL (ref 14–18)
HGB BLD-MCNC: 11.6 GM/DL (ref 14–18)
IMM GRANULOCYTES # BLD AUTO: 0.04 THOU/MM3 (ref 0–0.07)
IMM GRANULOCYTES NFR BLD AUTO: 0.4 %
LACTATE SERPL-SCNC: 1.3 MMOL/L (ref 0.5–2)
LYMPHOCYTES ABSOLUTE: 2 THOU/MM3 (ref 1–4.8)
LYMPHOCYTES NFR BLD AUTO: 18 %
MAGNESIUM SERPL-MCNC: 1.5 MG/DL (ref 1.6–2.4)
MCH RBC QN AUTO: 28.8 PG (ref 26–33)
MCHC RBC AUTO-ENTMCNC: 32.6 GM/DL (ref 32.2–35.5)
MCV RBC AUTO: 88.3 FL (ref 80–94)
MONOCYTES ABSOLUTE: 1.1 THOU/MM3 (ref 0.4–1.3)
MONOCYTES NFR BLD AUTO: 9.9 %
NEUTROPHILS ABSOLUTE: 7.7 THOU/MM3 (ref 1.8–7.7)
NEUTROPHILS NFR BLD AUTO: 70.4 %
NRBC BLD AUTO-RTO: 0 /100 WBC
PLATELET # BLD AUTO: 228 THOU/MM3 (ref 130–400)
PMV BLD AUTO: 10 FL (ref 9.4–12.4)
POTASSIUM SERPL-SCNC: 3.4 MEQ/L (ref 3.5–5.2)
RBC # BLD AUTO: 4.03 MILL/MM3 (ref 4.7–6.1)
SODIUM SERPL-SCNC: 139 MEQ/L (ref 135–145)
WBC # BLD AUTO: 10.9 THOU/MM3 (ref 4.8–10.8)

## 2025-02-01 PROCEDURE — 85018 HEMOGLOBIN: CPT

## 2025-02-01 PROCEDURE — 85025 COMPLETE CBC W/AUTO DIFF WBC: CPT

## 2025-02-01 PROCEDURE — 96376 TX/PRO/DX INJ SAME DRUG ADON: CPT

## 2025-02-01 PROCEDURE — 6370000000 HC RX 637 (ALT 250 FOR IP): Performed by: PHYSICIAN ASSISTANT

## 2025-02-01 PROCEDURE — 2500000003 HC RX 250 WO HCPCS: Performed by: INTERNAL MEDICINE

## 2025-02-01 PROCEDURE — 96365 THER/PROPH/DIAG IV INF INIT: CPT

## 2025-02-01 PROCEDURE — 96367 TX/PROPH/DG ADDL SEQ IV INF: CPT

## 2025-02-01 PROCEDURE — 99233 SBSQ HOSP IP/OBS HIGH 50: CPT | Performed by: PHYSICIAN ASSISTANT

## 2025-02-01 PROCEDURE — 80048 BASIC METABOLIC PNL TOTAL CA: CPT

## 2025-02-01 PROCEDURE — 83605 ASSAY OF LACTIC ACID: CPT

## 2025-02-01 PROCEDURE — 6360000002 HC RX W HCPCS: Performed by: INTERNAL MEDICINE

## 2025-02-01 PROCEDURE — 6370000000 HC RX 637 (ALT 250 FOR IP): Performed by: INTERNAL MEDICINE

## 2025-02-01 PROCEDURE — 36415 COLL VENOUS BLD VENIPUNCTURE: CPT

## 2025-02-01 PROCEDURE — 1200000000 HC SEMI PRIVATE

## 2025-02-01 PROCEDURE — 96366 THER/PROPH/DIAG IV INF ADDON: CPT

## 2025-02-01 PROCEDURE — G0378 HOSPITAL OBSERVATION PER HR: HCPCS

## 2025-02-01 PROCEDURE — 85014 HEMATOCRIT: CPT

## 2025-02-01 PROCEDURE — 2580000003 HC RX 258: Performed by: INTERNAL MEDICINE

## 2025-02-01 PROCEDURE — 83735 ASSAY OF MAGNESIUM: CPT

## 2025-02-01 RX ORDER — AZITHROMYCIN 250 MG/1
500 TABLET, FILM COATED ORAL DAILY
Status: COMPLETED | OUTPATIENT
Start: 2025-02-02 | End: 2025-02-02

## 2025-02-01 RX ORDER — PANTOPRAZOLE SODIUM 40 MG/1
40 TABLET, DELAYED RELEASE ORAL
Status: DISCONTINUED | OUTPATIENT
Start: 2025-02-02 | End: 2025-02-01

## 2025-02-01 RX ORDER — WARFARIN SODIUM 10 MG/1
10 TABLET ORAL
Status: COMPLETED | OUTPATIENT
Start: 2025-02-01 | End: 2025-02-01

## 2025-02-01 RX ORDER — PHENOBARBITAL SODIUM 65 MG/ML
130 INJECTION, SOLUTION INTRAMUSCULAR; INTRAVENOUS
Status: DISCONTINUED | OUTPATIENT
Start: 2025-02-01 | End: 2025-02-02 | Stop reason: HOSPADM

## 2025-02-01 RX ORDER — PANTOPRAZOLE SODIUM 40 MG/1
40 TABLET, DELAYED RELEASE ORAL
Status: DISCONTINUED | OUTPATIENT
Start: 2025-02-01 | End: 2025-02-02 | Stop reason: HOSPADM

## 2025-02-01 RX ORDER — PHENOBARBITAL SODIUM 65 MG/ML
260 INJECTION, SOLUTION INTRAMUSCULAR; INTRAVENOUS
Status: DISCONTINUED | OUTPATIENT
Start: 2025-02-01 | End: 2025-02-02 | Stop reason: HOSPADM

## 2025-02-01 RX ADMIN — MAGNESIUM SULFATE HEPTAHYDRATE 2000 MG: 40 INJECTION, SOLUTION INTRAVENOUS at 12:07

## 2025-02-01 RX ADMIN — CEFTRIAXONE SODIUM 1000 MG: 1 INJECTION, POWDER, FOR SOLUTION INTRAMUSCULAR; INTRAVENOUS at 05:56

## 2025-02-01 RX ADMIN — LEVETIRACETAM 500 MG: 500 TABLET, FILM COATED ORAL at 09:25

## 2025-02-01 RX ADMIN — SODIUM CHLORIDE, PRESERVATIVE FREE 10 ML: 5 INJECTION INTRAVENOUS at 09:25

## 2025-02-01 RX ADMIN — Medication 1 TABLET: at 09:25

## 2025-02-01 RX ADMIN — Medication 100 MG: at 09:25

## 2025-02-01 RX ADMIN — POTASSIUM CHLORIDE 40 MEQ: 1500 TABLET, EXTENDED RELEASE ORAL at 11:20

## 2025-02-01 RX ADMIN — LEVETIRACETAM 500 MG: 500 TABLET, FILM COATED ORAL at 20:57

## 2025-02-01 RX ADMIN — WARFARIN SODIUM 10 MG: 10 TABLET ORAL at 17:57

## 2025-02-01 RX ADMIN — PANTOPRAZOLE SODIUM 40 MG: 40 TABLET, DELAYED RELEASE ORAL at 17:59

## 2025-02-01 RX ADMIN — AZITHROMYCIN MONOHYDRATE 500 MG: 500 INJECTION, POWDER, LYOPHILIZED, FOR SOLUTION INTRAVENOUS at 09:21

## 2025-02-01 RX ADMIN — SODIUM CHLORIDE, PRESERVATIVE FREE 10 ML: 5 INJECTION INTRAVENOUS at 20:57

## 2025-02-01 NOTE — PROGRESS NOTES
Hospitalist Progress Note      Patient:  Dashawn Rivera 45 y.o. male     Unit/Bed:-10/010-A    Date of Admission: 1/31/2025      ASSESSMENT AND PLAN    Active Problems  Suspect upper GI bleed, ruled out  Esophagitis  Has previous history of hematemesis and melena secondary to supratherapeutic INR and Coumadin use.  On admission reports that he had not used his warfarin in the last week however over the last 3 days he has had black bowel movements and shortness of breath.  Follows with Dr. Perry.  Presented with a hemoglobin of 12.7 on a baseline of 13-14.  Lactic acid of 2.5.  INR on admission 1.21.  GI consulted - s/p EGD with evidence of esophagitis with instruction for GERD education, PPI daily   Protonix 40 mg daily  Okay to resume warfarin  Type and screen  Transfuse if hemoglobin less than 7 or signs and symptoms of acute anemia  Hgb  remaining stable in the 11 range    Hypokalemia / HypoMag  Replete per protcol and repeat labs in am    High anion gap metabolic acidosis: Suspect in the setting of lactic acidosis.   Will start normal saline 100 cc/h for 20 hours.  Trend lactic acid every 6 hours until less than 2    Community-acquired pneumonia: Presented with a WBC of 11.5.  COVID/flu negative on admission.  Presented to the hospital with shortness of breath, sputum production.  He is an active smoker.  Does not use oxygen at baseline.  Rocephin 1 g every 24 hours for 5 days  Start azithromycin 500 mg for 3 days.  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  Patient has had a PE despite having IVC filter.  Noted.  Discussed with GI 2/1 - okay to resume Warfarin  Pharm consult placed  Daily INR  Alcohol use disorder without intoxication or withdrawal at this time: In the past patient reported drinking 1 bottle per day.            Start [facility] alcohol withdrawal protocol  Phenobarb PRN  Continue with thiamine and folic acid  Neurochecks every

## 2025-02-01 NOTE — PLAN OF CARE
Problem: Discharge Planning  Goal: Discharge to home or other facility with appropriate resources  1/31/2025 2226 by Evelio Bautista, RN  Outcome: Progressing     Problem: Safety - Adult  Goal: Free from fall injury  1/31/2025 2226 by Evelio Bautista, RN  Outcome: Progressing     Problem: ABCDS Injury Assessment  Goal: Absence of physical injury  Outcome: Progressing     Problem: Pain  Goal: Verbalizes/displays adequate comfort level or baseline comfort level  Outcome: Progressing

## 2025-02-01 NOTE — PROGRESS NOTES
SW went to patients room to complete SBIRT. Patient asked SW to come back. [facility] SW will re-attempt.

## 2025-02-01 NOTE — PROGRESS NOTES
Warfarin Pharmacy Consult Note    Dashawn Rivera is a 45 y.o. male for whom pharmacy has been asked to manage warfarin therapy.     Consulting Provider: Griselda Napoles  Indication:  hypercoaguable state, hx PE/DVT  Target INR: 2.0-3.0   Warfarin dose prior to admission: 10 mg daily  Outpatient warfarin provider: [facility]    Recent Labs     01/31/25  0320 01/31/25  1412 02/01/25  0145 02/01/25  0551 02/01/25  0807   HGB 12.7*   < > 11.4* 11.6* 11.5*     --   --  228  --     < > = values in this interval not displayed.     Recent Labs     01/31/25  0320   INR 1.21*     Concurrent anticoagulants/antiplatelets: no  Significant warfarin drug-drug interactions: no    Date INR Warfarin Dose   1/31/2-25 1.21 -   2/1/2025 - 10 mg                                   INR will be monitored routinely until therapeutic INR is achieved.    Pharmacy will continue to follow. Thank you for the consult,     Melissa Jang, PharmD, BCPS  2/1/2025  2:04 PM

## 2025-02-02 VITALS
OXYGEN SATURATION: 98 % | HEART RATE: 72 BPM | HEIGHT: 70 IN | WEIGHT: 120 LBS | DIASTOLIC BLOOD PRESSURE: 79 MMHG | SYSTOLIC BLOOD PRESSURE: 112 MMHG | RESPIRATION RATE: 20 BRPM | BODY MASS INDEX: 17.18 KG/M2 | TEMPERATURE: 98.6 F

## 2025-02-02 LAB
ANION GAP SERPL CALC-SCNC: 11 MEQ/L (ref 8–16)
BUN SERPL-MCNC: 6 MG/DL (ref 7–22)
CALCIUM SERPL-MCNC: 8.6 MG/DL (ref 8.5–10.5)
CHLORIDE SERPL-SCNC: 103 MEQ/L (ref 98–111)
CO2 SERPL-SCNC: 26 MEQ/L (ref 23–33)
CREAT SERPL-MCNC: 0.5 MG/DL (ref 0.4–1.2)
DEPRECATED RDW RBC AUTO: 57.1 FL (ref 35–45)
ERYTHROCYTE [DISTWIDTH] IN BLOOD BY AUTOMATED COUNT: 18.2 % (ref 11.5–14.5)
GFR SERPL CREATININE-BSD FRML MDRD: > 90 ML/MIN/1.73M2
GLUCOSE SERPL-MCNC: 96 MG/DL (ref 70–108)
HCT VFR BLD AUTO: 34.4 % (ref 42–52)
HGB BLD-MCNC: 11.7 GM/DL (ref 14–18)
INR PPP: 0.99 (ref 0.85–1.13)
MCH RBC QN AUTO: 29.5 PG (ref 26–33)
MCHC RBC AUTO-ENTMCNC: 34 GM/DL (ref 32.2–35.5)
MCV RBC AUTO: 86.6 FL (ref 80–94)
PLATELET # BLD AUTO: 233 THOU/MM3 (ref 130–400)
PMV BLD AUTO: 9.7 FL (ref 9.4–12.4)
POTASSIUM SERPL-SCNC: 3.7 MEQ/L (ref 3.5–5.2)
RBC # BLD AUTO: 3.97 MILL/MM3 (ref 4.7–6.1)
SODIUM SERPL-SCNC: 140 MEQ/L (ref 135–145)
WBC # BLD AUTO: 7.6 THOU/MM3 (ref 4.8–10.8)

## 2025-02-02 PROCEDURE — 2500000003 HC RX 250 WO HCPCS: Performed by: INTERNAL MEDICINE

## 2025-02-02 PROCEDURE — 6360000002 HC RX W HCPCS: Performed by: PHYSICIAN ASSISTANT

## 2025-02-02 PROCEDURE — 85027 COMPLETE CBC AUTOMATED: CPT

## 2025-02-02 PROCEDURE — 96376 TX/PRO/DX INJ SAME DRUG ADON: CPT

## 2025-02-02 PROCEDURE — 6370000000 HC RX 637 (ALT 250 FOR IP): Performed by: INTERNAL MEDICINE

## 2025-02-02 PROCEDURE — 99239 HOSP IP/OBS DSCHRG MGMT >30: CPT | Performed by: PHYSICIAN ASSISTANT

## 2025-02-02 PROCEDURE — 6370000000 HC RX 637 (ALT 250 FOR IP): Performed by: PHYSICIAN ASSISTANT

## 2025-02-02 PROCEDURE — 36415 COLL VENOUS BLD VENIPUNCTURE: CPT

## 2025-02-02 PROCEDURE — G0378 HOSPITAL OBSERVATION PER HR: HCPCS

## 2025-02-02 PROCEDURE — 6360000002 HC RX W HCPCS: Performed by: INTERNAL MEDICINE

## 2025-02-02 PROCEDURE — 96372 THER/PROPH/DIAG INJ SC/IM: CPT

## 2025-02-02 PROCEDURE — 80048 BASIC METABOLIC PNL TOTAL CA: CPT

## 2025-02-02 PROCEDURE — 85610 PROTHROMBIN TIME: CPT

## 2025-02-02 RX ORDER — BENZONATATE 100 MG/1
100 CAPSULE ORAL 3 TIMES DAILY PRN
Qty: 21 CAPSULE | Refills: 0 | Status: SHIPPED | OUTPATIENT
Start: 2025-02-02 | End: 2025-02-09

## 2025-02-02 RX ORDER — WARFARIN SODIUM 7.5 MG/1
15 TABLET ORAL
Status: DISCONTINUED | OUTPATIENT
Start: 2025-02-02 | End: 2025-02-02

## 2025-02-02 RX ORDER — BENZONATATE 100 MG/1
100 CAPSULE ORAL 3 TIMES DAILY PRN
Status: DISCONTINUED | OUTPATIENT
Start: 2025-02-02 | End: 2025-02-02 | Stop reason: HOSPADM

## 2025-02-02 RX ORDER — ENOXAPARIN SODIUM 100 MG/ML
1 INJECTION SUBCUTANEOUS 2 TIMES DAILY
Qty: 3.6 ML | Refills: 0 | Status: SHIPPED | OUTPATIENT
Start: 2025-02-02 | End: 2025-02-02

## 2025-02-02 RX ORDER — WARFARIN SODIUM 7.5 MG/1
15 TABLET ORAL
Status: DISCONTINUED | OUTPATIENT
Start: 2025-02-02 | End: 2025-02-02 | Stop reason: HOSPADM

## 2025-02-02 RX ORDER — CEFDINIR 300 MG/1
300 CAPSULE ORAL 2 TIMES DAILY
Qty: 4 CAPSULE | Refills: 0 | Status: SHIPPED | OUTPATIENT
Start: 2025-02-03 | End: 2025-02-05

## 2025-02-02 RX ORDER — PANTOPRAZOLE SODIUM 40 MG/1
40 TABLET, DELAYED RELEASE ORAL DAILY
Qty: 30 TABLET | Refills: 2 | Status: SHIPPED | OUTPATIENT
Start: 2025-02-02

## 2025-02-02 RX ORDER — ENOXAPARIN SODIUM 100 MG/ML
1 INJECTION SUBCUTANEOUS 2 TIMES DAILY
Qty: 3.6 ML | Refills: 0 | Status: SHIPPED | OUTPATIENT
Start: 2025-02-02 | End: 2025-02-05 | Stop reason: SDUPTHER

## 2025-02-02 RX ORDER — ENOXAPARIN SODIUM 100 MG/ML
1 INJECTION SUBCUTANEOUS 2 TIMES DAILY
Status: DISCONTINUED | OUTPATIENT
Start: 2025-02-02 | End: 2025-02-02 | Stop reason: HOSPADM

## 2025-02-02 RX ADMIN — SODIUM CHLORIDE, PRESERVATIVE FREE 10 ML: 5 INJECTION INTRAVENOUS at 09:05

## 2025-02-02 RX ADMIN — LEVETIRACETAM 500 MG: 500 TABLET, FILM COATED ORAL at 09:03

## 2025-02-02 RX ADMIN — Medication 100 MG: at 09:03

## 2025-02-02 RX ADMIN — SODIUM CHLORIDE, PRESERVATIVE FREE 10 ML: 5 INJECTION INTRAVENOUS at 09:08

## 2025-02-02 RX ADMIN — Medication 1 TABLET: at 09:03

## 2025-02-02 RX ADMIN — PANTOPRAZOLE SODIUM 40 MG: 40 TABLET, DELAYED RELEASE ORAL at 05:45

## 2025-02-02 RX ADMIN — CEFTRIAXONE SODIUM 1000 MG: 1 INJECTION, POWDER, FOR SOLUTION INTRAMUSCULAR; INTRAVENOUS at 05:45

## 2025-02-02 RX ADMIN — ENOXAPARIN SODIUM 50 MG: 100 INJECTION SUBCUTANEOUS at 09:08

## 2025-02-02 RX ADMIN — AZITHROMYCIN DIHYDRATE 500 MG: 250 TABLET, FILM COATED ORAL at 09:03

## 2025-02-02 NOTE — PROGRESS NOTES
Utilize [facility] alcohol withdrawal scale (Based on Lisette Modified Alcohol Withdrawal Scale).  Tabulate score based on classifications including Tremor, Sweating, Hallucination, Orientation, and Agitation.    Tremor: 0  Sweatin  Hallucinations: 0  Orientation: 0  Agitation: 0  Total Score: 0  Action perform as described below     Tremor:  No tremor is 0 points.  Tremor on movement is 1 point.  Tremor at rest is 2 points.  Sweating: No Sweat 0 points. Moist is 1 point.  Drenching sweats is 2 points.  Hallucinations: No present 0 points. Dissuadable is 1 point. Not dissuadable is 2 points.  Orientation: Oriented 0 points. Vague/detached 1 point. Disoriented/no contact 2 points.  Agitation: Calm 0 points.  Anxious 1 point. Panicky 2 points.    Check scale every 2 hours.  Discontinue scoring with 4 consecutive scorings of 0.  Scale 0: No phenobarbital given.  Re-assess every 60 minutes as needed.   Scale 1-3: Phenobarbital 130 mg IV over 3 minutes. Re-assess every 60 minutes as needed.  May administer every 60 minutes to a maximum dose of phenobarbital 1040 mg in 24 hours!  Scale 4-8: Phenobarbital 260 mg IV over 5 minutes.  Re-assess every 60 minutes as needed. May administer every 60 minutes to a maximum dose of phenobarbital 1040mg in 24 hours!  Scale 9-10: Transfer to ICU (if not already in ICU).  Administer 10mg/kg phenobarbital IV over 60 minutes.  Maximum dose phenobarbital is 1040mg in 24 hours!

## 2025-02-02 NOTE — CARE COORDINATION
02/02/25 0825   Service Assessment   Patient Orientation Alert and Oriented   Cognition Alert   History Provided By Patient   Primary Caregiver Self   Accompanied By/Relationship n/a   Support Systems Parent;Friends/Neighbors   Patient's Healthcare Decision Maker is: Patient Declined (Legal Next of Kin Remains as Decision Maker)   PCP Verified by CM Yes   Last Visit to PCP Within last 3 months   Prior Functional Level Independent in ADLs/IADLs   Current Functional Level Independent in ADLs/IADLs   Can patient return to prior living arrangement Yes   Ability to make needs known: Good   Family able to assist with home care needs: No   Would you like for me to discuss the discharge plan with any other family members/significant others, and if so, who? No   Financial Resources Medicaid   Community Resources None   CM/SW Referral ADLs/IADLs   Social/Functional History   Lives With Friend(s)   Type of Home House   Active  No   Patient's  Info Insurance or friends   Discharge Planning   Type of Residence House   Living Arrangements Friends   Current Services Prior To Admission Other (Comment)  ([facility])   Potential Assistance Needed N/A   DME Ordered? No   Potential Assistance Purchasing Medications No   Type of Home Care Services None   Patient expects to be discharged to: House   History of falls? 1   Services At/After Discharge   Transition of Care Consult (CM Consult) Discharge Planning   Mode of Transport at Discharge Other (see comment)  (Friends)   Confirm Follow Up Transport Friends   Condition of Participation: Discharge Planning   The Plan for Transition of Care is related to the following treatment goals: \"Just make sure everything is taken care of\"     Patient Goals/Plan/Treatment Preferences: Met w/ Dashawn. Verified insurance and PCP. He is independent. Gets transportation from his friends, insurance or walks. Denies needs for DME, HH or OP services. Current w/ [facility]. Was

## 2025-02-02 NOTE — PROGRESS NOTES
Warfarin Pharmacy Consult Note    Warfarin Indication:  hypercoagulable state, hx PE/DVT  Target INR: 2.0-3.0  Dose prior to admission: 10 mg daily    Recent Labs     01/31/25  0320 01/31/25  1412 02/01/25  0551 02/01/25  0807 02/01/25  1408 02/02/25  0558   HGB 12.7*   < > 11.6* 11.5* 11.3* 11.7*     --  228  --   --  233    < > = values in this interval not displayed.     Recent Labs     01/31/25  0320 02/02/25  0558   INR 1.21* 0.99     Concurrent anticoagulants/antiplatelets: Lovenox  Significant warfarin drug-drug interactions: no     Date INR Warfarin Dose   1/31/2-25 1.21 -   2/1/2025 - 10 mg   2/2/2025  0.99   15 mg                                               Monitoring:                   INR will be monitored daily.    **Please contact pharmacy for discharge instructions when indicated**    Melissa Jang, PharmD, BCPS  2/2/2025  9:36 AM

## 2025-02-02 NOTE — PROGRESS NOTES
Utilize [facility] alcohol withdrawal scale (Based on Lisette Modified Alcohol Withdrawal Scale).  Tabulate score based on classifications including Tremor, Sweating, Hallucination, Orientation, and Agitation.    Tremor: 0  Sweatin  Hallucinations: 0  Orientation: 0  Agitation: 0  Total Score: 0  Action perform as described below     Tremor:  No tremor is 0 points.  Tremor on movement is 1 point.  Tremor at rest is 2 points.  Sweating: No Sweat 0 points. Moist is 1 point.  Drenching sweats is 2 points.  Hallucinations: No present 0 points. Dissuadable is 1 point. Not dissuadable is 2 points.  Orientation: Oriented 0 points. Vague/detached 1 point. Disoriented/no contact 2 points.  Agitation: Calm 0 points.  Anxious 1 point. Panicky 2 points.    Check scale every 2 hours.  Discontinue scoring with 4 consecutive scorings of 0.  Scale 0: No phenobarbital given.  Re-assess every 60 minutes as needed.   Scale 1-3: Phenobarbital 130 mg IV over 3 minutes. Re-assess every 60 minutes as needed.  May administer every 60 minutes to a maximum dose of phenobarbital 1040 mg in 24 hours!  Scale 4-8: Phenobarbital 260 mg IV over 5 minutes.  Re-assess every 60 minutes as needed. May administer every 60 minutes to a maximum dose of phenobarbital 1040mg in 24 hours!  Scale 9-10: Transfer to ICU (if not already in ICU).  Administer 10mg/kg phenobarbital IV over 60 minutes.  Maximum dose phenobarbital is 1040mg in 24 hours!

## 2025-02-02 NOTE — PROGRESS NOTES
Utilize [facility] alcohol withdrawal scale (Based on Lisette Modified Alcohol Withdrawal Scale).  Tabulate score based on classifications including Tremor, Sweating, Hallucination, Orientation, and Agitation.    Tremor: 0  Sweatin  Hallucinations: 0  Orientation: 0  Agitation: 0  Total Score: 0  Action perform as described below     Tremor:  No tremor is 0 points.  Tremor on movement is 1 point.  Tremor at rest is 2 points.  Sweating: No Sweat 0 points. Moist is 1 point.  Drenching sweats is 2 points.  Hallucinations: No present 0 points. Dissuadable is 1 point. Not dissuadable is 2 points.  Orientation: Oriented 0 points. Vague/detached 1 point. Disoriented/no contact 2 points.  Agitation: Calm 0 points.  Anxious 1 point. Panicky 2 points.    Check scale every 2 hours.  Discontinue scoring with 4 consecutive scorings of 0.  Scale 0: No phenobarbital given.  Re-assess every 60 minutes as needed.   Scale 1-3: Phenobarbital 130 mg IV over 3 minutes. Re-assess every 60 minutes as needed.  May administer every 60 minutes to a maximum dose of phenobarbital 1040 mg in 24 hours!  Scale 4-8: Phenobarbital 260 mg IV over 5 minutes.  Re-assess every 60 minutes as needed. May administer every 60 minutes to a maximum dose of phenobarbital 1040mg in 24 hours!  Scale 9-10: Transfer to ICU (if not already in ICU).  Administer 10mg/kg phenobarbital IV over 60 minutes.  Maximum dose phenobarbital is 1040mg in 24 hours!

## 2025-02-02 NOTE — PLAN OF CARE
Problem: Discharge Planning  Goal: Discharge to home or other facility with appropriate resources  2/1/2025 2131 by Evelio Bautista, RN  Outcome: Progressing     Problem: Safety - Adult  Goal: Free from fall injury  2/1/2025 2131 by Evelio Bautista, RN  Outcome: Progressing     Problem: ABCDS Injury Assessment  Goal: Absence of physical injury  2/1/2025 2131 by Evelio Bautista, RN  Outcome: Progressing     Problem: Pain  Goal: Verbalizes/displays adequate comfort level or baseline comfort level  2/1/2025 2131 by Evelio Bautista, RN  Outcome: Progressing

## 2025-02-02 NOTE — PROGRESS NOTES
Clinical Pharmacy Note                                               Warfarin Discharge Recommendations    Patient discharged from [facility] today on warfarin.    Warfarin indication:  hypercoagulable state; hx PE/DVT  INR goal during admission: 2.0-3.0  Previous home warfarin regimen: 10 mg daily  Interacting medications at discharge: Lovenox  Coumadin 5 mg tabs    Hospital Warfarin Doses & INR Results  Date INR Warfarin Dose   1/31/2-25 1.21 -   2/1/2025 - 10 mg   2/2/2025  0.99   15 mg (in hospital)       Recent INRs:  Recent Labs     02/02/25  0558   INR 0.99     Warfarin Discharge Instructions:   Date Warfarin Dose   02/03/25 (tomorrow) 15 mg (3 tablets)   02/04/25 15 mg (3 tablets)         Next INR date: 02/05/25 @ 9:20 at [facility]     Melissa Jang, PharmD, BCPS  2/2/2025  10:30 AM

## 2025-02-02 NOTE — DISCHARGE INSTRUCTIONS
Warfarin Discharge Instructions:   Date Warfarin Dose   02/03/25 (tomorrow) 15 mg (3 tablets)   02/04/25 15 mg (3 tablets)          Next INR date: 02/05/25 @ 9:20 at [facility]

## 2025-02-02 NOTE — DISCHARGE INSTR - MEDS
Clinical Pharmacy Note                                               Warfarin Discharge Recommendations    Patient discharged from [facility] today on warfarin.    Warfarin indication: hypercoagulable state; hx PE/DVT  INR goal during admission: 2.0-3.0  Previous home warfarin regimen: 10 mg daily  Interacting medications at discharge: Lovenox  Coumadin 5 mg tabs    Hospital Warfarin Doses & INR Results  Date INR Warfarin Dose   1/31/2-25 1.21 -   2/1/2025 - 10 mg   2/2/2025  0.99   15 mg (in hospital)       Recent INRs:  Recent Labs     02/02/25  0558   INR 0.99     Warfarin Discharge Instructions:   Date Warfarin Dose   02/03/25 (tomorrow) 15 mg (3 tablets)   02/04/25 15 mg (3 tablets)         Next INR date: 02/05/25 @ 9:20 at  [facility]

## 2025-02-03 ENCOUNTER — TELEPHONE (OUTPATIENT)
Dept: FAMILY MEDICINE CLINIC | Age: 45
End: 2025-02-03

## 2025-02-03 NOTE — TELEPHONE ENCOUNTER
Care Transitions Initial Follow Up Call    Outreach made within 2 business days of discharge: Yes    Patient: Dashawn Rivera   Patient : 1980   MRN: [id number]    Reason for Admission: Pneumonia  Discharge Date: 25       Spoke with: Dashawn    Discharge department/facility: [facility] Interactive Patient Contact:  Was patient able to fill all prescriptions: Yes  Was patient instructed to bring all medications to the follow-up visit: Yes  Is patient taking all medications as directed in the discharge summary? Yes  Does patient understand their discharge instructions: Yes  Does patient have questions or concerns that need addressed prior to 7-14 day follow up office visit: No    Additional needs identified to be addressed with provider  No needs identified             Scheduled appointment with PCP within 7-14 days    Follow Up  Future Appointments   Date Time Provider Department Center   2025  9:20 AM [facility] [place]   2025  9:00 AM Lyn Damico APRN - CNP [facility]   3/12/2025  1:00 PM Lyn Damico APRN - CNP [facility] DEP       KENNY DYER CMA ([facility])

## 2025-02-04 NOTE — DISCHARGE SUMMARY
Hospitalist Discharge Note      Patient:  Dashawn Rivera    Unit/Bed:8A-10/010-A  YOB: 1980  MRN: [id number]   Acct: [id number]     PCP: Lyn Damico APRN - CNP  Date of Admission: 1/31/2025      Discharge date: 2/2/2025 12:47 PM    Chief Complaint on presentation :-  Shortness of breath and dark stools     Discharge Assessment and Plan:-   Suspect upper GI bleed, ruled out  Esophagitis  Has previous history of hematemesis and melena secondary to supratherapeutic INR and Coumadin use.  On admission reports that he had not used his warfarin in the last week however over the last 3 days he has had black bowel movements and shortness of breath.  Follows with Dr. Perry.  Presented with a hemoglobin of 12.7 on a baseline of 13-14.  Lactic acid of 2.5.  INR on admission 1.21.  GI consulted - s/p EGD with evidence of esophagitis with instruction for GERD education, PPI daily   Protonix 40 mg daily per GI  Okay to resume warfarin  Type and screen  Transfuse if hemoglobin less than 7 or signs and symptoms of acute anemia  Hgb  remaining stable in the 11 range     Hypokalemia / HypoMag  Replete per protcol and repeat labs in am     High anion gap metabolic acidosis: Suspect in the setting of lactic acidosis.   Will start normal saline 100 cc/h for 20 hours.  Trend lactic acid every 6 hours until less than 2     Community-acquired pneumonia: Presented with a WBC of 11.5.  COVID/flu negative on admission.  Presented to the hospital with shortness of breath, sputum production.  He is an active smoker.  Does not use oxygen at baseline.  Rocephin 1 g every 24 hours for 5 days  Cefdinir rx sent to pharm   Completed azithromycin 500 mg for 3 days.  Pneumonia panel ordered  Respiratory cultures ordered  Hx of PEs / DVTs  Subtherapuetic INR  Status post IVC filter.  Patient has had a PE despite having IVC filter.  Noted.  Discussed with GI 2/1 - okay to resume Warfarin  Pharm consult  unsure how to take this medication, talk to your nurse or doctor.  Original instructions: Take as directed by [facility] 90 tabs = 30 days  What changed:   how much to take  how to take this  when to take this  Notes to patient: Warfarin Discharge Instructions:   Date Warfarin Dose  02/03/25 (tomorrow) 15 mg (3 tablets)  02/04/25 15 mg (3 tablets)         Next INR date: 02/05/25 @ 9:20 at [facility]             CONTINUE taking these medications      folic acid 1 MG tablet  Commonly known as: FOLVITE  Take 1 tablet by mouth daily     levETIRAcetam 500 MG tablet  Commonly known as: KEPPRA  Take 1 tablet by mouth 2 times daily     multivitamin Tabs tablet  Take 1 tablet by mouth daily     naltrexone 50 MG tablet  Commonly known as: DEPADE  Take 1 tablet by mouth daily     thiamine 100 MG tablet  Take 1 tablet by mouth daily          Medication Instructions:                                                              Clinical Pharmacy Note                                               Warfarin Discharge Recommendations    Patient discharged from [facility] today on warfarin.    Warfarin indication: hypercoagulable state; hx PE/DVT  INR goal during admission: 2.0-3.0  Previous home warfarin regimen: 10 mg daily  Interacting medications at discharge: Lovenox  Coumadin 5 mg tabs    Hospital Warfarin Doses & INR Results  Date INR Warfarin Dose   1/31/2-25 1.21 -   2/1/2025 - 10 mg   2/2/2025  0.99   15 mg (in hospital)       Recent INRs:  Recent Labs     02/02/25  0558   INR 0.99     Warfarin Discharge Instructions:   Date Warfarin Dose   02/03/25 (tomorrow) 15 mg (3 tablets)   02/04/25 15 mg (3 tablets)         Next INR date: 02/05/25 @ 9:20 at [facility]             Where to Get Your Medications        These medications were sent to [id number] - [place], [place] - [address] - P [phone] - F [phone]  [address] [place] [address]

## 2025-02-04 NOTE — PROGRESS NOTES
Physician Progress Note      PATIENT:               CHADD VIVAR  CSN #:                  [id number]  :                       1980  ADMIT DATE:       2025 2:44 AM  DISCH DATE:        2025 12:47 PM  RESPONDING  PROVIDER #:        Griselda Napoles PA-C          QUERY TEXT:    Patient admitted with pneumonia. Noted to have a BMI of 17.22 kg/m? in H&P on   . If possible, please document in progress notes and discharge summary if   you are evaluating and /or treating any of the following:    The medical record reflects the following:  Risk Factors: History of alcohol use disorder, pneumonia, GERD    Clinical Indicators:  17.22 kg/m?  Height: 5' 10\" Last weight as of 25- 54.4 kg    H&P (): General: No distress, appears stated age.  Musculoskeletal: No joint swelling or tenderness. Normal tone. No abnormal   movements.    Treatment: Regular diet ordered, Multivitamin ordered, Consult to SW and   addiction services, lab monitoring    ASPEN Criteria:    https://aspenjournals.onlinelibrary.bacon.com/doi/full/10.1177/307118300983991  5    Thanks,  Jolene Cortes RN, CDI  Options provided:  -- Underweight with BMI of 17.22 kg/m?  -- Other - I will add my own diagnosis  -- Disagree - Not applicable / Not valid  -- Disagree - Clinically unable to determine / Unknown  -- Refer to Clinical Documentation Reviewer    PROVIDER RESPONSE TEXT:    This patient is underweight with a BMI of 17.22 kg/m?.    Query created by: Jolene Cortes on 2025 4:24 PM      Electronically signed by:  Griselda Napoles PA-C 2/3/2025 8:24 PM

## 2025-02-05 ENCOUNTER — ANTI-COAG VISIT (OUTPATIENT)
Age: 45
End: 2025-02-05
Payer: MEDICAID

## 2025-02-05 DIAGNOSIS — I26.99 ACUTE PULMONARY EMBOLISM WITHOUT ACUTE COR PULMONALE, UNSPECIFIED PULMONARY EMBOLISM TYPE (HCC): Primary | ICD-10-CM

## 2025-02-05 DIAGNOSIS — Z79.01 ANTICOAGULATED ON COUMADIN: ICD-10-CM

## 2025-02-05 DIAGNOSIS — Z51.81 ENCOUNTER FOR THERAPEUTIC DRUG MONITORING: ICD-10-CM

## 2025-02-05 DIAGNOSIS — I82.4Y3 DEEP VEIN THROMBOSIS (DVT) OF PROXIMAL VEIN OF BOTH LOWER EXTREMITIES, UNSPECIFIED CHRONICITY (HCC): ICD-10-CM

## 2025-02-05 LAB — POC INR: 1.2 (ref 0.8–1.2)

## 2025-02-05 PROCEDURE — 99212 OFFICE O/P EST SF 10 MIN: CPT | Performed by: PHARMACIST

## 2025-02-05 PROCEDURE — 85610 PROTHROMBIN TIME: CPT | Performed by: PHARMACIST

## 2025-02-05 PROCEDURE — 99213 OFFICE O/P EST LOW 20 MIN: CPT | Performed by: PHARMACIST

## 2025-02-05 RX ORDER — ENOXAPARIN SODIUM 100 MG/ML
1 INJECTION SUBCUTANEOUS 2 TIMES DAILY
Qty: 6 ML | Refills: 0 | Status: SHIPPED | OUTPATIENT
Start: 2025-02-05 | End: 2025-02-10

## 2025-02-05 NOTE — PROGRESS NOTES
Medication Management Clinic  [facility]  Anticoagulation Clinic  [phone] (phone)  [phone] (fax)    Mr. Dashawn Rivera is a 45 y.o.  male with history of DVT, PE who presents today for anticoagulation monitoring and adjustment.    Patient verifies tablet strength. Patient states he follows the calendar provided at each visit.   No extra doses. Patient states he has missed many doses. He did not take Coumadin 1/27/25-1/31/25 when he was ill. He also states he has not taken any Coumadin since hospital discharge because he thought he was only supposed to do the Lovenox injections.   Patient denies s/s bleeding/bruising. Patient reports some swelling and SOB when he was ill, but this has improved.     No blood in urine or stool.  No dietary changes.  No changes in OTC agents/Herbals. Patient states he is currently taking Lovenox 50 mg BID, cefdinir 300 mg BID, benzonatate 100 mg TID PRN, and pantoprazole 40 mg daily.   No change in alcohol use or tobacco use.  No change in activity level.  Patient denies falls.  Patient had pneumonia, nausea, and vomiting which are improved.   No Procedures scheduled in the future at this time.    Assessment:   Lab Results   Component Value Date    INR 1.20 02/05/2025    INR 0.99 02/02/2025    INR 1.21 (H) 01/31/2025     INR subtherapeutic   Recent Labs     02/05/25  0938   INR 1.20     Patient is subtherapeutic today due to multitude of missed doses recently. He will require further Lovenox bridging.    Plan:  Continue Lovenox 50 mg (1 mg/kg) subcutaneously twice daily. E-scribed Lovenox 60 mg syringe #10 syringes with 0 refills to [facility] per patient request. Take Coumadin 15 mg x 2 doses (2/5/25-2/6/25), then resume home dose of Coumadin 10 mg daily.  Recheck INR in 5 day(s).  Patient reminded to call the Anticoagulation Clinic with any signs or symptoms of bleeding or with any medication changes.  Patient given instructions utilizing the teach

## 2025-02-10 ENCOUNTER — HOSPITAL ENCOUNTER (EMERGENCY)
Age: 45
Discharge: HOME | End: 2025-02-10
Attending: STUDENT IN AN ORGANIZED HEALTH CARE EDUCATION/TRAINING PROGRAM
Payer: MEDICAID

## 2025-02-10 VITALS
WEIGHT: 120 LBS | HEIGHT: 70 IN | BODY MASS INDEX: 17.18 KG/M2 | SYSTOLIC BLOOD PRESSURE: 100 MMHG | HEART RATE: 89 BPM | DIASTOLIC BLOOD PRESSURE: 69 MMHG | OXYGEN SATURATION: 95 % | RESPIRATION RATE: 21 BRPM | TEMPERATURE: 98.2 F

## 2025-02-10 DIAGNOSIS — R31.9 HEMATURIA, UNSPECIFIED TYPE: Primary | ICD-10-CM

## 2025-02-10 LAB
ALBUMIN SERPL BCG-MCNC: 4.2 G/DL (ref 3.5–5.1)
ALP SERPL-CCNC: 80 U/L (ref 38–126)
ALT SERPL W/O P-5'-P-CCNC: 19 U/L (ref 11–66)
ANION GAP SERPL CALC-SCNC: 17 MEQ/L (ref 8–16)
APTT PPP: 37.3 SECONDS (ref 22–38)
AST SERPL-CCNC: 41 U/L (ref 5–40)
BACTERIA: ABNORMAL
BASOPHILS ABSOLUTE: 0.1 THOU/MM3 (ref 0–0.1)
BASOPHILS NFR BLD AUTO: 1.7 %
BILIRUB SERPL-MCNC: 0.4 MG/DL (ref 0.3–1.2)
BILIRUB UR QL STRIP: NEGATIVE
BUN SERPL-MCNC: 9 MG/DL (ref 7–22)
CALCIUM SERPL-MCNC: 8.7 MG/DL (ref 8.5–10.5)
CASTS #/AREA URNS LPF: ABNORMAL /LPF
CASTS #/AREA URNS LPF: ABNORMAL /LPF
CHARACTER UR: CLEAR
CHARCOAL URNS QL MICRO: ABNORMAL
CHLORIDE SERPL-SCNC: 100 MEQ/L (ref 98–111)
CO2 SERPL-SCNC: 25 MEQ/L (ref 23–33)
COLOR UR: YELLOW
CREAT SERPL-MCNC: 0.7 MG/DL (ref 0.4–1.2)
CRYSTALS URNS QL MICRO: ABNORMAL
DEPRECATED RDW RBC AUTO: 62.4 FL (ref 35–45)
EOSINOPHIL NFR BLD AUTO: 1.3 %
EOSINOPHILS ABSOLUTE: 0.1 THOU/MM3 (ref 0–0.4)
EPITHELIAL CELLS, UA: ABNORMAL /HPF
ERYTHROCYTE [DISTWIDTH] IN BLOOD BY AUTOMATED COUNT: 19.6 % (ref 11.5–14.5)
GFR SERPL CREATININE-BSD FRML MDRD: > 90 ML/MIN/1.73M2
GLUCOSE SERPL-MCNC: 95 MG/DL (ref 70–108)
GLUCOSE UR QL STRIP.AUTO: NEGATIVE MG/DL
HCT VFR BLD AUTO: 40.6 % (ref 42–52)
HGB BLD-MCNC: 13.2 GM/DL (ref 14–18)
HGB UR QL STRIP.AUTO: NEGATIVE
IMM GRANULOCYTES # BLD AUTO: 0.01 THOU/MM3 (ref 0–0.07)
IMM GRANULOCYTES NFR BLD AUTO: 0.2 %
INR PPP: 1.21 (ref 0.85–1.13)
KETONES UR QL STRIP.AUTO: NEGATIVE
LEUKOCYTE ESTERASE UR QL STRIP.AUTO: NEGATIVE
LYMPHOCYTES ABSOLUTE: 2.5 THOU/MM3 (ref 1–4.8)
LYMPHOCYTES NFR BLD AUTO: 54.2 %
MCH RBC QN AUTO: 29.1 PG (ref 26–33)
MCHC RBC AUTO-ENTMCNC: 32.5 GM/DL (ref 32.2–35.5)
MCV RBC AUTO: 89.6 FL (ref 80–94)
MONOCYTES ABSOLUTE: 0.5 THOU/MM3 (ref 0.4–1.3)
MONOCYTES NFR BLD AUTO: 10 %
NEUTROPHILS ABSOLUTE: 1.5 THOU/MM3 (ref 1.8–7.7)
NEUTROPHILS NFR BLD AUTO: 32.6 %
NITRITE UR QL STRIP.AUTO: NEGATIVE
NRBC BLD AUTO-RTO: 0 /100 WBC
OSMOLALITY SERPL CALC.SUM OF ELEC: 281.6 MOSMOL/KG (ref 275–300)
PH UR STRIP.AUTO: 7.5 [PH] (ref 5–9)
PLATELET # BLD AUTO: 416 THOU/MM3 (ref 130–400)
PMV BLD AUTO: 9.5 FL (ref 9.4–12.4)
POTASSIUM SERPL-SCNC: 4.4 MEQ/L (ref 3.5–5.2)
PROT SERPL-MCNC: 7.6 G/DL (ref 6.1–8)
PROT UR STRIP.AUTO-MCNC: ABNORMAL MG/DL
RBC # BLD AUTO: 4.53 MILL/MM3 (ref 4.7–6.1)
RBC #/AREA URNS HPF: ABNORMAL /HPF
RENAL EPI CELLS #/AREA URNS HPF: ABNORMAL /[HPF]
SODIUM SERPL-SCNC: 142 MEQ/L (ref 135–145)
SPECIFIC GRAVITY UA: 1.02 (ref 1–1.03)
UROBILINOGEN, URINE: 0.2 EU/DL (ref 0–1)
WBC # BLD AUTO: 4.6 THOU/MM3 (ref 4.8–10.8)
WBC #/AREA URNS HPF: ABNORMAL /HPF
YEAST LIKE FUNGI URNS QL MICRO: ABNORMAL

## 2025-02-10 PROCEDURE — 80053 COMPREHEN METABOLIC PANEL: CPT

## 2025-02-10 PROCEDURE — 36415 COLL VENOUS BLD VENIPUNCTURE: CPT

## 2025-02-10 PROCEDURE — 85025 COMPLETE CBC W/AUTO DIFF WBC: CPT

## 2025-02-10 PROCEDURE — 85610 PROTHROMBIN TIME: CPT

## 2025-02-10 PROCEDURE — 85730 THROMBOPLASTIN TIME PARTIAL: CPT

## 2025-02-10 PROCEDURE — 93005 ELECTROCARDIOGRAM TRACING: CPT | Performed by: STUDENT IN AN ORGANIZED HEALTH CARE EDUCATION/TRAINING PROGRAM

## 2025-02-10 PROCEDURE — 99284 EMERGENCY DEPT VISIT MOD MDM: CPT

## 2025-02-10 PROCEDURE — 81001 URINALYSIS AUTO W/SCOPE: CPT

## 2025-02-10 ASSESSMENT — PAIN - FUNCTIONAL ASSESSMENT: PAIN_FUNCTIONAL_ASSESSMENT: 0-10

## 2025-02-10 ASSESSMENT — PAIN DESCRIPTION - ORIENTATION: ORIENTATION: RIGHT

## 2025-02-10 ASSESSMENT — PAIN DESCRIPTION - LOCATION: LOCATION: LEG

## 2025-02-10 ASSESSMENT — PAIN SCALES - GENERAL
PAINLEVEL_OUTOF10: 4
PAINLEVEL_OUTOF10: 4

## 2025-02-10 NOTE — DISCHARGE INSTRUCTIONS
Return to the emergency department immediately for any change or worsening of symptoms including but not limited to inability to urinate, passing of blood clots, worsening pain in your bladder, passing of gross blood.  Please follow-up at your urology appointment this morning.

## 2025-02-10 NOTE — DISCHARGE INSTR - COC
Continuity of Care Form    Patient Name: Dashawn Rivera   :  1980  MRN:  [id number]    Admit date:  2/10/2025  Discharge date:  ***    Code Status Order: Prior   Advance Directives:   Advance Care Flowsheet Documentation             Admitting Physician:  No admitting provider for patient encounter.  PCP: Lyn Damico, APRN - CNP    Discharging Nurse: ***  Discharging Hospital Unit/Room#: 17017A  Discharging Unit Phone Number: ***    Emergency Contact:   Extended Emergency Contact Information  Primary Emergency Contact: [facility]  Home Phone: [phone]  Mobile Phone: [phone]  Relation: Domestic Partner  Secondary Emergency Contact: [facility]  Home Phone: [phone]  Mobile Phone: [phone]  Relation: Parent    Past Surgical History:  Past Surgical History:   Procedure Laterality Date    COLONOSCOPY N/A 2019    COLONOSCOPY POLYPECTOMY HOT BIOPSY performed by Jovanni Perry MD at [facility] Endoscopy    IR GUIDED IVC FILTER PLACEMENT  10/2/2024    IR IVC FILTER PLACEMENT W IMAGING 10/2/2024 [facility]    IR GUIDED THROMB MECH VEIN  10/1/2024    IR THROMB MECH VEIN 10/1/2024 [facility]    UPPER GASTROINTESTINAL ENDOSCOPY N/A 2025    EGD performed by Jovanni Perry MD at [facility] ENDOSCOPY       Immunization History:   Immunization History   Administered Date(s) Administered    TDaP, ADACEL (age 10y-64y), BOOSTRIX (age 10y+), IM, 0.5mL 2015, 2021       Active Problems:  Patient Active Problem List   Diagnosis Code    Hypokalemia E87.6    Alcohol withdrawal seizure without complication (HCC) F10.930, R56.9    Alcoholic liver disease (HCC) K70.9    Alcohol abuse F10.10    Thrombocytopenia concurrent with and due to alcoholism (HCC) D69.59, F10.20    Elevated liver transaminase level R74.01    Moderate malnutrition (HCC) E44.0    Pulmonary embolus (HCC) I26.99    Deep vein thrombosis (DVT) of both

## 2025-02-10 NOTE — ED TRIAGE NOTES
Pt arrives to ED from home for c/o hematuria. Pt states he takes Warfarin and was just started on Lovenox. Pt states he urinated apprx 1 hour ago and noticed blood in his urine.

## 2025-02-10 NOTE — ED PROVIDER NOTES
[facility] EMERGENCY DEPARTMENT  EMERGENCY DEPARTMENT ENCOUNTER          Pt Name: Dashawn Rivera  MRN: [id number]  Birthdate 1980  Date of evaluation: 2/10/2025  Physician: Pete Reed MD  Supervising Attending Physician: Jorge Gifford DO       CHIEF COMPLAINT       Chief Complaint   Patient presents with    Hematuria         HISTORY OF PRESENT ILLNESS    HPI  Dashawn Rivera is a 45 y.o. male who presents to the emergency department from home, as a walk in to the ED lobby for evaluation of blood in his urine.  Patient reports about an hour ago he urinated and noticed some blood in his urine.  He stated that he is on Coumadin for blood clots.  He stated however that his INR had been largely started him on Lovenox a few days ago.  Denies any pain with urination.  Denies any history of this.  Denies any chest pain shortness of breath.  Denies any fever or chills.  The patient has no other acute complaints at this time.            PAST MEDICAL AND SURGICAL HISTORY     Past Medical History:   Diagnosis Date    DVT (deep venous thrombosis) (HCC)     with PE - On Coumadin    Hematemesis 12/2024    Liver disease     Seizures (HCC)     Subtherapeutic international normalized ratio (INR)     Labile     Past Surgical History:   Procedure Laterality Date    COLONOSCOPY N/A 12/17/2019    COLONOSCOPY POLYPECTOMY HOT BIOPSY performed by Jovanni Perry MD at [facility]    IR GUIDED IVC FILTER PLACEMENT  10/2/2024    IR IVC FILTER PLACEMENT W IMAGING 10/2/2024 [facility] PROC[facility]    IR GUIDED THROMB MECH VEIN  10/1/2024    IR THROMB MECH VEIN 10/1/2024 [facility] PROC[facility]    UPPER GASTROINTESTINAL ENDOSCOPY N/A 1/31/2025    EGD performed by Jovanni Perry MD at [facility] ENDOSCOPY         MEDICATIONS   No current facility-administered medications for this encounter.    Current Outpatient Medications:     enoxaparin (LOVENOX) 60 MG/0.6ML, Inject 0.5 mLs into the skin 2 times daily for 10

## 2025-02-10 NOTE — ED NOTES
Pt states he is unable to provide urine specimen at this time. He will notify staff when he can. Call light in reach.

## 2025-02-11 ENCOUNTER — PATIENT MESSAGE (OUTPATIENT)
Dept: FAMILY MEDICINE CLINIC | Age: 45
End: 2025-02-11

## 2025-02-11 ENCOUNTER — APPOINTMENT (OUTPATIENT)
Dept: CT IMAGING | Age: 45
End: 2025-02-11
Payer: MEDICAID

## 2025-02-11 ENCOUNTER — HOSPITAL ENCOUNTER (EMERGENCY)
Age: 45
Discharge: HOME | End: 2025-02-11
Attending: EMERGENCY MEDICINE
Payer: MEDICAID

## 2025-02-11 VITALS
WEIGHT: 135 LBS | OXYGEN SATURATION: 94 % | SYSTOLIC BLOOD PRESSURE: 124 MMHG | HEART RATE: 101 BPM | DIASTOLIC BLOOD PRESSURE: 76 MMHG | BODY MASS INDEX: 18.9 KG/M2 | RESPIRATION RATE: 20 BRPM | TEMPERATURE: 98.1 F | HEIGHT: 71 IN

## 2025-02-11 DIAGNOSIS — J44.1 COPD EXACERBATION (HCC): Primary | ICD-10-CM

## 2025-02-11 LAB
ALBUMIN SERPL BCG-MCNC: 4.2 G/DL (ref 3.5–5.1)
ALP SERPL-CCNC: 81 U/L (ref 38–126)
ALT SERPL W/O P-5'-P-CCNC: 22 U/L (ref 11–66)
ANION GAP SERPL CALC-SCNC: 23 MEQ/L (ref 8–16)
APTT PPP: 55.9 SECONDS (ref 22–38)
AST SERPL-CCNC: 61 U/L (ref 5–40)
BASOPHILS ABSOLUTE: 0.1 THOU/MM3 (ref 0–0.1)
BASOPHILS NFR BLD AUTO: 1.5 %
BILIRUB CONJ SERPL-MCNC: 0.2 MG/DL (ref 0–0.3)
BILIRUB SERPL-MCNC: 0.4 MG/DL (ref 0.3–1.2)
BUN SERPL-MCNC: 9 MG/DL (ref 7–22)
CALCIUM SERPL-MCNC: 8.6 MG/DL (ref 8.5–10.5)
CHLORIDE SERPL-SCNC: 98 MEQ/L (ref 98–111)
CO2 SERPL-SCNC: 19 MEQ/L (ref 23–33)
CREAT SERPL-MCNC: 0.7 MG/DL (ref 0.4–1.2)
DEPRECATED RDW RBC AUTO: 62 FL (ref 35–45)
EKG ATRIAL RATE: 88 BPM
EKG P AXIS: 81 DEGREES
EKG P-R INTERVAL: 136 MS
EKG Q-T INTERVAL: 372 MS
EKG QRS DURATION: 80 MS
EKG QTC CALCULATION (BAZETT): 450 MS
EKG R AXIS: 84 DEGREES
EKG T AXIS: 78 DEGREES
EKG VENTRICULAR RATE: 88 BPM
EOSINOPHIL NFR BLD AUTO: 0.4 %
EOSINOPHILS ABSOLUTE: 0 THOU/MM3 (ref 0–0.4)
ERYTHROCYTE [DISTWIDTH] IN BLOOD BY AUTOMATED COUNT: 19.6 % (ref 11.5–14.5)
FLUAV RNA RESP QL NAA+PROBE: NOT DETECTED
FLUBV RNA RESP QL NAA+PROBE: NOT DETECTED
GFR SERPL CREATININE-BSD FRML MDRD: > 90 ML/MIN/1.73M2
GLUCOSE SERPL-MCNC: 70 MG/DL (ref 70–108)
HCT VFR BLD AUTO: 38.6 % (ref 42–52)
HGB BLD-MCNC: 12.6 GM/DL (ref 14–18)
IMM GRANULOCYTES # BLD AUTO: 0.01 THOU/MM3 (ref 0–0.07)
IMM GRANULOCYTES NFR BLD AUTO: 0.2 %
INR PPP: 2.85 (ref 0.85–1.13)
LIPASE SERPL-CCNC: 14.9 U/L (ref 5.6–51.3)
LYMPHOCYTES ABSOLUTE: 1.8 THOU/MM3 (ref 1–4.8)
LYMPHOCYTES NFR BLD AUTO: 37.6 %
MAGNESIUM SERPL-MCNC: 1.9 MG/DL (ref 1.6–2.4)
MCH RBC QN AUTO: 28.8 PG (ref 26–33)
MCHC RBC AUTO-ENTMCNC: 32.6 GM/DL (ref 32.2–35.5)
MCV RBC AUTO: 88.1 FL (ref 80–94)
MONOCYTES ABSOLUTE: 0.4 THOU/MM3 (ref 0.4–1.3)
MONOCYTES NFR BLD AUTO: 9 %
NEUTROPHILS ABSOLUTE: 2.4 THOU/MM3 (ref 1.8–7.7)
NEUTROPHILS NFR BLD AUTO: 51.3 %
NRBC BLD AUTO-RTO: 0 /100 WBC
NT-PROBNP SERPL IA-MCNC: < 36 PG/ML (ref 0–124)
OSMOLALITY SERPL CALC.SUM OF ELEC: 276.5 MOSMOL/KG (ref 275–300)
PLATELET # BLD AUTO: 340 THOU/MM3 (ref 130–400)
PMV BLD AUTO: 9 FL (ref 9.4–12.4)
POTASSIUM SERPL-SCNC: 4.3 MEQ/L (ref 3.5–5.2)
PROT SERPL-MCNC: 7.3 G/DL (ref 6.1–8)
RBC # BLD AUTO: 4.38 MILL/MM3 (ref 4.7–6.1)
SARS-COV-2 RNA RESP QL NAA+PROBE: NOT DETECTED
SODIUM SERPL-SCNC: 140 MEQ/L (ref 135–145)
TROPONIN, HIGH SENSITIVITY: < 6 NG/L (ref 0–12)
WBC # BLD AUTO: 4.7 THOU/MM3 (ref 4.8–10.8)

## 2025-02-11 PROCEDURE — 36415 COLL VENOUS BLD VENIPUNCTURE: CPT

## 2025-02-11 PROCEDURE — 83735 ASSAY OF MAGNESIUM: CPT

## 2025-02-11 PROCEDURE — 85025 COMPLETE CBC W/AUTO DIFF WBC: CPT

## 2025-02-11 PROCEDURE — 93010 ELECTROCARDIOGRAM REPORT: CPT | Performed by: INTERNAL MEDICINE

## 2025-02-11 PROCEDURE — 83690 ASSAY OF LIPASE: CPT

## 2025-02-11 PROCEDURE — 84484 ASSAY OF TROPONIN QUANT: CPT

## 2025-02-11 PROCEDURE — 83880 ASSAY OF NATRIURETIC PEPTIDE: CPT

## 2025-02-11 PROCEDURE — 2500000003 HC RX 250 WO HCPCS: Performed by: EMERGENCY MEDICINE

## 2025-02-11 PROCEDURE — 71275 CT ANGIOGRAPHY CHEST: CPT

## 2025-02-11 PROCEDURE — 82248 BILIRUBIN DIRECT: CPT

## 2025-02-11 PROCEDURE — 6370000000 HC RX 637 (ALT 250 FOR IP): Performed by: EMERGENCY MEDICINE

## 2025-02-11 PROCEDURE — 6360000004 HC RX CONTRAST MEDICATION: Performed by: EMERGENCY MEDICINE

## 2025-02-11 PROCEDURE — 87636 SARSCOV2 & INF A&B AMP PRB: CPT

## 2025-02-11 PROCEDURE — 99285 EMERGENCY DEPT VISIT HI MDM: CPT

## 2025-02-11 PROCEDURE — 94640 AIRWAY INHALATION TREATMENT: CPT

## 2025-02-11 PROCEDURE — 96374 THER/PROPH/DIAG INJ IV PUSH: CPT

## 2025-02-11 PROCEDURE — 85730 THROMBOPLASTIN TIME PARTIAL: CPT

## 2025-02-11 PROCEDURE — 85610 PROTHROMBIN TIME: CPT

## 2025-02-11 PROCEDURE — 6360000002 HC RX W HCPCS: Performed by: EMERGENCY MEDICINE

## 2025-02-11 PROCEDURE — 80053 COMPREHEN METABOLIC PANEL: CPT

## 2025-02-11 RX ORDER — IOPAMIDOL 755 MG/ML
80 INJECTION, SOLUTION INTRAVASCULAR
Status: COMPLETED | OUTPATIENT
Start: 2025-02-11 | End: 2025-02-11

## 2025-02-11 RX ORDER — IPRATROPIUM BROMIDE AND ALBUTEROL SULFATE 2.5; .5 MG/3ML; MG/3ML
1 SOLUTION RESPIRATORY (INHALATION) ONCE
Status: COMPLETED | OUTPATIENT
Start: 2025-02-11 | End: 2025-02-11

## 2025-02-11 RX ORDER — ALBUTEROL SULFATE 90 UG/1
2 INHALANT RESPIRATORY (INHALATION) EVERY 6 HOURS PRN
Status: DISCONTINUED | OUTPATIENT
Start: 2025-02-11 | End: 2025-02-11 | Stop reason: HOSPADM

## 2025-02-11 RX ORDER — PREDNISONE 20 MG/1
20 TABLET ORAL DAILY
Qty: 10 TABLET | Refills: 0 | Status: SHIPPED | OUTPATIENT
Start: 2025-02-11 | End: 2025-02-21

## 2025-02-11 RX ADMIN — IOPAMIDOL 80 ML: 755 INJECTION, SOLUTION INTRAVENOUS at 17:56

## 2025-02-11 RX ADMIN — WATER 125 MG: 1 INJECTION INTRAMUSCULAR; INTRAVENOUS; SUBCUTANEOUS at 18:26

## 2025-02-11 RX ADMIN — IPRATROPIUM BROMIDE AND ALBUTEROL SULFATE 1 DOSE: .5; 3 SOLUTION RESPIRATORY (INHALATION) at 17:01

## 2025-02-11 ASSESSMENT — ENCOUNTER SYMPTOMS
ABDOMINAL DISTENTION: 0
NAUSEA: 0
SHORTNESS OF BREATH: 1
EYE REDNESS: 0
EYE PAIN: 0
CONSTIPATION: 0
TROUBLE SWALLOWING: 0
VOICE CHANGE: 0
SINUS PRESSURE: 0
BLOOD IN STOOL: 0
CHOKING: 0
STRIDOR: 0
DIARRHEA: 0
ABDOMINAL PAIN: 0
WHEEZING: 0
COUGH: 1
SORE THROAT: 0
RHINORRHEA: 0
VOMITING: 0
EYE ITCHING: 0
BACK PAIN: 0
CHEST TIGHTNESS: 0
EYE DISCHARGE: 0
PHOTOPHOBIA: 0

## 2025-02-11 NOTE — ED PROVIDER NOTES
[facility]  eMERGENCY dEPARTMENT eNCOUnter          CHIEF COMPLAINT       Chief Complaint   Patient presents with    Shortness of Breath       Nurses Notes reviewed and I agree except as noted in the HPI.      HISTORY OF PRESENT ILLNESS    Dashawn Rivera is a 45 y.o. male who presents for shortness of breath.  Patient does have a recent history of PE.  Patient states he has been taking medications as prescribed.  Patient states he has been having a cough.  He says it is nonproductive.  Patient states he is a smoker.  Patient states that he does not think he has had any fevers at home.  Patient states that he has been taking all his medications as prescribed.  Patient has had no syncopal or presyncopal episodes.  Patient denies any pleuritic type chest pain.  Patient is otherwise resting comfortably on the cot no apparent distress no other physical complaints at this time.  Patient is full code    REVIEW OF SYSTEMS     Review of Systems   Constitutional:  Negative for activity change, appetite change, diaphoresis, fatigue and unexpected weight change.   HENT:  Negative for congestion, ear discharge, ear pain, hearing loss, rhinorrhea, sinus pressure, sore throat, trouble swallowing and voice change.    Eyes:  Negative for photophobia, pain, discharge, redness and itching.   Respiratory:  Positive for cough and shortness of breath. Negative for choking, chest tightness, wheezing and stridor.    Cardiovascular:  Negative for chest pain, palpitations and leg swelling.   Gastrointestinal:  Negative for abdominal distention, abdominal pain, blood in stool, constipation, diarrhea, nausea and vomiting.   Endocrine: Negative for polydipsia, polyphagia and polyuria.   Genitourinary:  Negative for decreased urine volume, difficulty urinating, dysuria, enuresis, frequency, hematuria, penile pain, penile swelling and urgency.   Musculoskeletal:  Negative for arthralgias, back pain, gait problem, myalgias, neck

## 2025-02-11 NOTE — ED NOTES
Patient to ED for SOB. Patient states he was waking home when he began to feel sob. Patient reports smoking cigars and cigarettes today. On arrival VSS

## 2025-02-12 NOTE — DISCHARGE INSTRUCTIONS
Patient has what appears to be COPD exacerbation.  Patient is instructed to use the albuterol as prescribed.  He has been given a prescription for steroids he is instructed to take that as prescribed.  He is instructed to follow-up with a primary care physician and do so within the next 1 to 2 days.  He is instructed return to the nearest emergency room immediately for any new or worsening complaints

## 2025-02-18 ENCOUNTER — TELEPHONE (OUTPATIENT)
Age: 45
End: 2025-02-18

## 2025-02-18 RX ORDER — WARFARIN SODIUM 5 MG/1
TABLET ORAL
Qty: 65 TABLET | Refills: 1 | Status: SHIPPED | OUTPATIENT
Start: 2025-02-18

## 2025-02-18 NOTE — TELEPHONE ENCOUNTER
Called and spoke to patient. He states he has not been taking the prednisone. He also mentioned that he is out of his Coumadin. He states he is still taking his lovenox injections. He states he has not been taking his coumadin for a day or 2 or 3.   Script sent to [facility] for Coumadin 5mg tablets under the referring provider. Patient is not sure if he can  today but he will be at appointment tomorrow and can  then.    Will keep appointment for tomorrow and check INR then with further instructions.     Kat Arias, PharmD 2025 12:23 PM    For Pharmacy Admin Tracking Only    Intervention Detail: Adherence Monitorin, Dose Adjustment: 1, reason: Therapy Optimization, and Refill(s) Provided  Total # of Interventions Recommended: 2  Total # of Interventions Accepted: 2  Time Spent (min): 10

## 2025-02-18 NOTE — TELEPHONE ENCOUNTER
Noted patient went to ER 2/10 for hematuria (no show for clinic that day).  INR 1.21. Went again next day for COPD exacerbation - ordered 10 days of Prednisone 20 mg daily.  INR 2.85.  Currently on clinic schedule tomorrow.  Message forwarded to clinic pharmacist.

## 2025-02-19 ENCOUNTER — ANTI-COAG VISIT (OUTPATIENT)
Age: 45
End: 2025-02-19
Payer: MEDICAID

## 2025-02-19 DIAGNOSIS — I26.99 ACUTE PULMONARY EMBOLISM WITHOUT ACUTE COR PULMONALE, UNSPECIFIED PULMONARY EMBOLISM TYPE (HCC): Primary | ICD-10-CM

## 2025-02-19 DIAGNOSIS — G40.909 SEIZURE DISORDER (HCC): ICD-10-CM

## 2025-02-19 DIAGNOSIS — Z51.81 ENCOUNTER FOR THERAPEUTIC DRUG MONITORING: ICD-10-CM

## 2025-02-19 DIAGNOSIS — Z79.01 ANTICOAGULATED ON COUMADIN: ICD-10-CM

## 2025-02-19 DIAGNOSIS — I82.4Y3 DEEP VEIN THROMBOSIS (DVT) OF PROXIMAL VEIN OF BOTH LOWER EXTREMITIES, UNSPECIFIED CHRONICITY (HCC): ICD-10-CM

## 2025-02-19 LAB — POC INR: 1 (ref 0.8–1.2)

## 2025-02-19 PROCEDURE — 85610 PROTHROMBIN TIME: CPT | Performed by: PHARMACIST

## 2025-02-19 PROCEDURE — 99212 OFFICE O/P EST SF 10 MIN: CPT | Performed by: PHARMACIST

## 2025-02-19 RX ORDER — ENOXAPARIN SODIUM 100 MG/ML
60 INJECTION SUBCUTANEOUS 2 TIMES DAILY
Qty: 8 EACH | Refills: 0 | Status: SHIPPED | OUTPATIENT
Start: 2025-02-19

## 2025-02-19 RX ORDER — LEVETIRACETAM 500 MG/1
500 TABLET ORAL 2 TIMES DAILY
Qty: 60 TABLET | Refills: 1 | Status: SHIPPED | OUTPATIENT
Start: 2025-02-19

## 2025-02-19 NOTE — TELEPHONE ENCOUNTER
This medication refill is regarding a telephone request. Refill requested by patient.    Requested Prescriptions     Pending Prescriptions Disp Refills    levETIRAcetam (KEPPRA) 500 MG tablet 180 tablet 1     Sig: Take 1 tablet by mouth 2 times daily     Date of last visit: 12/12/2024   Date of next visit: 3/12/2025  Date of last refill: 10/30/24 #180/1  Pharmacy Name: [facility]    Rx verified, ordered and set to EP.

## 2025-02-19 NOTE — TELEPHONE ENCOUNTER
Will send in, but he needs referral to neurology to manage. I believe this was started due to alcohol withdrawal. Please let him know and we can try to refer to Dr Gonzalez's office. TS

## 2025-02-19 NOTE — PROGRESS NOTES
2  Total # of Interventions Accepted: 2  Time Spent (min): 30    Melissa Jang, PharmD, BCPS  2/19/2025  8:59 AM

## 2025-02-24 ENCOUNTER — ANTI-COAG VISIT (OUTPATIENT)
Age: 45
End: 2025-02-24
Payer: MEDICAID

## 2025-02-24 ENCOUNTER — APPOINTMENT (OUTPATIENT)
Age: 45
End: 2025-02-24
Payer: MEDICAID

## 2025-02-24 DIAGNOSIS — Z79.01 ANTICOAGULATED ON COUMADIN: Primary | ICD-10-CM

## 2025-02-24 DIAGNOSIS — Z51.81 ENCOUNTER FOR THERAPEUTIC DRUG MONITORING: ICD-10-CM

## 2025-02-24 LAB — POC INR: 1.7 (ref 0.8–1.2)

## 2025-02-24 PROCEDURE — 85610 PROTHROMBIN TIME: CPT | Performed by: PHARMACIST

## 2025-02-24 PROCEDURE — 99212 OFFICE O/P EST SF 10 MIN: CPT | Performed by: PHARMACIST

## 2025-02-24 NOTE — PROGRESS NOTES
Medication Management Clinic  [facility]  Anticoagulation Clinic  [phone] (phone)  [phone] (fax)    Mr. Dashawn Rivera is a 45 y.o.  male with history of  hypercoagulable state  who presents today for anticoagulation monitoring and adjustment.    Patient verifies current dosing regimen and tablet strength.  No missed or extra doses. Patient has been taking his lovenox shots one in the AM and PM.   Patient denies s/s bleeding/swelling/SOB Bruises on stomach from lovenox shots.   No blood in urine or stool.  No dietary changes.  No changes in medication/OTC agents/Herbals.  No change in tobacco use. A little more alcohol last night than normal.   No change in activity level.  Patient denies falls.  No vomiting/diarrhea or acute illness.   No Procedures scheduled in the future at this time.      Assessment:   Lab Results   Component Value Date    INR 1.70 (H) 02/24/2025    INR 1.00 02/19/2025    INR 2.85 (H) 02/11/2025     INR subtherapeutic   Recent Labs     02/24/25  1347   INR 1.70*     Plan:  Patient states he still has Lovenox syringes left at home. Will continue Lovenox BID. Coumadin 15mg x1 today then continue Coumadin 10mg daily. Recheck INR in 3 days.  Patient reminded to call the Anticoagulation Clinic with any signs or symptoms of bleeding or with any medication changes.  Patient given instructions utilizing the teach back method.    After visit summary printed and reviewed with patient.      Discharged ambulatory in no apparent distress.    For Pharmacy Admin Tracking Only    Intervention Detail: Dose Adjustment: 1, reason: Therapy Optimization  Total # of Interventions Recommended: 2  Total # of Interventions Accepted: 2  Time Spent (min): 20

## 2025-02-27 ENCOUNTER — APPOINTMENT (OUTPATIENT)
Dept: CT IMAGING | Age: 45
DRG: 139 | End: 2025-02-27
Payer: MEDICAID

## 2025-02-27 ENCOUNTER — APPOINTMENT (OUTPATIENT)
Dept: GENERAL RADIOLOGY | Age: 45
DRG: 139 | End: 2025-02-27
Payer: MEDICAID

## 2025-02-27 ENCOUNTER — APPOINTMENT (OUTPATIENT)
Age: 45
End: 2025-02-27
Payer: MEDICAID

## 2025-02-27 ENCOUNTER — HOSPITAL ENCOUNTER (EMERGENCY)
Age: 45
Discharge: HOME | DRG: 139 | End: 2025-02-27
Attending: EMERGENCY MEDICINE
Payer: MEDICAID

## 2025-02-27 VITALS
DIASTOLIC BLOOD PRESSURE: 80 MMHG | RESPIRATION RATE: 20 BRPM | OXYGEN SATURATION: 95 % | SYSTOLIC BLOOD PRESSURE: 127 MMHG | TEMPERATURE: 98.1 F | HEART RATE: 99 BPM

## 2025-02-27 DIAGNOSIS — F10.90 ALCOHOL USE DISORDER: ICD-10-CM

## 2025-02-27 DIAGNOSIS — J18.9 PNEUMONIA DUE TO INFECTIOUS ORGANISM, UNSPECIFIED LATERALITY, UNSPECIFIED PART OF LUNG: Primary | ICD-10-CM

## 2025-02-27 LAB
ANION GAP SERPL CALC-SCNC: 17 MEQ/L (ref 8–16)
BASOPHILS ABSOLUTE: 0.1 THOU/MM3 (ref 0–0.1)
BASOPHILS NFR BLD AUTO: 0.3 %
BUN SERPL-MCNC: 6 MG/DL (ref 8–23)
CALCIUM SERPL-MCNC: 8.3 MG/DL (ref 8.2–9.6)
CHLORIDE SERPL-SCNC: 100 MEQ/L (ref 98–111)
CO2 SERPL-SCNC: 26 MEQ/L (ref 22–29)
CREAT SERPL-MCNC: 0.8 MG/DL (ref 0.7–1.2)
DEPRECATED RDW RBC AUTO: 60.1 FL (ref 35–45)
EKG ATRIAL RATE: 101 BPM
EKG P AXIS: 84 DEGREES
EKG P-R INTERVAL: 134 MS
EKG Q-T INTERVAL: 350 MS
EKG QRS DURATION: 78 MS
EKG QTC CALCULATION (BAZETT): 453 MS
EKG R AXIS: 89 DEGREES
EKG T AXIS: 86 DEGREES
EKG VENTRICULAR RATE: 101 BPM
EOSINOPHIL NFR BLD AUTO: 0.1 %
EOSINOPHILS ABSOLUTE: 0 THOU/MM3 (ref 0–0.4)
ERYTHROCYTE [DISTWIDTH] IN BLOOD BY AUTOMATED COUNT: 19.3 % (ref 11.5–14.5)
FLUAV RNA RESP QL NAA+PROBE: NOT DETECTED
FLUBV RNA RESP QL NAA+PROBE: NOT DETECTED
GFR SERPL CREATININE-BSD FRML MDRD: > 90 ML/MIN/1.73M2
GLUCOSE SERPL-MCNC: 146 MG/DL (ref 74–109)
HCT VFR BLD AUTO: 37.6 % (ref 42–52)
HGB BLD-MCNC: 12.6 GM/DL (ref 14–18)
IMM GRANULOCYTES # BLD AUTO: 0.06 THOU/MM3 (ref 0–0.07)
IMM GRANULOCYTES NFR BLD AUTO: 0.3 %
INR PPP: 3.38 (ref 0.85–1.13)
LYMPHOCYTES ABSOLUTE: 2.9 THOU/MM3 (ref 1–4.8)
LYMPHOCYTES NFR BLD AUTO: 17 %
MCH RBC QN AUTO: 29.2 PG (ref 26–33)
MCHC RBC AUTO-ENTMCNC: 33.5 GM/DL (ref 32.2–35.5)
MCV RBC AUTO: 87 FL (ref 80–94)
MONOCYTES ABSOLUTE: 1.8 THOU/MM3 (ref 0.4–1.3)
MONOCYTES NFR BLD AUTO: 10.5 %
NEUTROPHILS ABSOLUTE: 12.4 THOU/MM3 (ref 1.8–7.7)
NEUTROPHILS NFR BLD AUTO: 71.8 %
NRBC BLD AUTO-RTO: 0 /100 WBC
OSMOLALITY SERPL CALC.SUM OF ELEC: 285.2 MOSMOL/KG (ref 275–300)
PLATELET # BLD AUTO: 287 THOU/MM3 (ref 130–400)
PMV BLD AUTO: 8.8 FL (ref 9.4–12.4)
POTASSIUM SERPL-SCNC: 3.6 MEQ/L (ref 3.5–5.2)
PROCALCITONIN SERPL IA-MCNC: 0.06 NG/ML (ref 0.01–0.09)
RBC # BLD AUTO: 4.32 MILL/MM3 (ref 4.7–6.1)
SARS-COV-2 RNA RESP QL NAA+PROBE: NOT DETECTED
SODIUM SERPL-SCNC: 143 MEQ/L (ref 135–145)
WBC # BLD AUTO: 17.3 THOU/MM3 (ref 4.8–10.8)

## 2025-02-27 PROCEDURE — 84145 PROCALCITONIN (PCT): CPT

## 2025-02-27 PROCEDURE — 94640 AIRWAY INHALATION TREATMENT: CPT

## 2025-02-27 PROCEDURE — 6370000000 HC RX 637 (ALT 250 FOR IP): Performed by: EMERGENCY MEDICINE

## 2025-02-27 PROCEDURE — 71045 X-RAY EXAM CHEST 1 VIEW: CPT

## 2025-02-27 PROCEDURE — 99285 EMERGENCY DEPT VISIT HI MDM: CPT

## 2025-02-27 PROCEDURE — 80048 BASIC METABOLIC PNL TOTAL CA: CPT

## 2025-02-27 PROCEDURE — 6360000004 HC RX CONTRAST MEDICATION: Performed by: EMERGENCY MEDICINE

## 2025-02-27 PROCEDURE — 93005 ELECTROCARDIOGRAM TRACING: CPT | Performed by: EMERGENCY MEDICINE

## 2025-02-27 PROCEDURE — 71275 CT ANGIOGRAPHY CHEST: CPT

## 2025-02-27 PROCEDURE — 85025 COMPLETE CBC W/AUTO DIFF WBC: CPT

## 2025-02-27 PROCEDURE — 36415 COLL VENOUS BLD VENIPUNCTURE: CPT

## 2025-02-27 PROCEDURE — 94761 N-INVAS EAR/PLS OXIMETRY MLT: CPT

## 2025-02-27 PROCEDURE — 87636 SARSCOV2 & INF A&B AMP PRB: CPT

## 2025-02-27 PROCEDURE — 93010 ELECTROCARDIOGRAM REPORT: CPT | Performed by: NUCLEAR MEDICINE

## 2025-02-27 PROCEDURE — 85610 PROTHROMBIN TIME: CPT

## 2025-02-27 RX ORDER — AZITHROMYCIN 250 MG/1
500 TABLET, FILM COATED ORAL ONCE
Status: COMPLETED | OUTPATIENT
Start: 2025-02-27 | End: 2025-02-27

## 2025-02-27 RX ORDER — AMOXICILLIN AND CLAVULANATE POTASSIUM 500; 125 MG/1; MG/1
1 TABLET, FILM COATED ORAL 3 TIMES DAILY
Qty: 30 TABLET | Refills: 0 | Status: ON HOLD | OUTPATIENT
Start: 2025-02-27 | End: 2025-03-09

## 2025-02-27 RX ORDER — AZITHROMYCIN 250 MG/1
TABLET, FILM COATED ORAL
Qty: 5 TABLET | Refills: 0 | Status: ON HOLD | OUTPATIENT
Start: 2025-02-27 | End: 2025-03-09

## 2025-02-27 RX ORDER — IOPAMIDOL 755 MG/ML
80 INJECTION, SOLUTION INTRAVASCULAR
Status: COMPLETED | OUTPATIENT
Start: 2025-02-27 | End: 2025-02-27

## 2025-02-27 RX ORDER — IPRATROPIUM BROMIDE AND ALBUTEROL SULFATE 2.5; .5 MG/3ML; MG/3ML
1 SOLUTION RESPIRATORY (INHALATION) ONCE
Status: COMPLETED | OUTPATIENT
Start: 2025-02-27 | End: 2025-02-27

## 2025-02-27 RX ADMIN — AMOXICILLIN AND CLAVULANATE POTASSIUM 1 TABLET: 875; 125 TABLET, FILM COATED ORAL at 09:35

## 2025-02-27 RX ADMIN — AZITHROMYCIN DIHYDRATE 500 MG: 250 TABLET ORAL at 09:35

## 2025-02-27 RX ADMIN — IOPAMIDOL 80 ML: 755 INJECTION, SOLUTION INTRAVENOUS at 08:50

## 2025-02-27 RX ADMIN — IPRATROPIUM BROMIDE AND ALBUTEROL SULFATE 1 DOSE: .5; 3 SOLUTION RESPIRATORY (INHALATION) at 01:35

## 2025-02-27 ASSESSMENT — PAIN - FUNCTIONAL ASSESSMENT: PAIN_FUNCTIONAL_ASSESSMENT: NONE - DENIES PAIN

## 2025-02-27 NOTE — ED NOTES
Patient resting quietly on cot with eyes closed. Patient awakes to voice. Patient resting in bed. Respirations easy and unlabored. No distress noted. Call light within reach.

## 2025-02-27 NOTE — ED PROVIDER NOTES
[facility] EMERGENCY DEPARTMENT  Emergency Department     Care of Dashawn Rivera was assumed from previous ED provider. The patient's initial evaluation and plan have been discussed with the ED prior provider who initially cared for the patient .  All pertinent data has been reviewed.  Time of signout is 0700.    This patient is a 45 y.o. Male with 0700    On my examination, well appearing, nontoxic, afebrile.  He looks well.  He ambulates around the emergency department without any tachypnea or tachycardia.  His initial tachycardia has resolved.  He states he is not undergoing withdrawals currently.    HEENT: WNL  Lungs: Clear and equal bilaterally. No increased work of breathing or respiratory distress.  Heart: Rate and rhythm regular, no murmurs clicks or gallops  Abdomen: Soft, nondistended, nontender   Lower extremities: no edema, negative Homans bilaterally  Neuro: Awake and alert, no lateralizing deficits, cranial nerves II through XII grossly intact bilaterally    EMERGENCY DEPARTMENT COURSE / MEDICAL DECISION MAKING:       MEDICATIONS GIVEN:  Orders Placed This Encounter   Medications    ipratropium 0.5 mg-albuterol 2.5 mg (DUONEB) nebulizer solution 1 Dose     Order Specific Question:   Initiate RT Bronchodilator Protocol     Answer:   No    iopamidol (ISOVUE-370) 76 % injection 80 mL    amoxicillin-clavulanate (AUGMENTIN) 875-125 MG per tablet 1 tablet     Order Specific Question:   Antimicrobial Indications     Answer:   Pneumonia (CAP)    azithromycin (ZITHROMAX) tablet 500 mg     Order Specific Question:   Antimicrobial Indications     Answer:   Pneumonia (CAP)    azithromycin (ZITHROMAX) 250 MG tablet     Simg on days 2 - 5.  Do not give the initial 500 mg, the patient was already administered this in the hospital.     Dispense:  5 tablet     Refill:  0    amoxicillin-clavulanate (AUGMENTIN) 500-125 MG per tablet     Sig: Take 1 tablet by mouth 3 times daily for 10 days     Dispense:   Index  [AB] Tio Sanchez,      Monitored in the ED for several hours.  During that time, he has remained hemodynamically stable.  CT appears to show a pneumonia, no evidence of PE.  Will start him on Augmentin and azithromycin to cover for atypicals.  He does not appear to be clinically withdrawing at this time.  He is awake and alert, does not appear to be under the influence of EtOH.  He is comfortable with discharge home.  Have given him strict instructions for a follow-up with primary care physician and with our substance use disorder clinic as he is expressing interest in stopping alcohol use.    CLINICAL IMPRESSION      1. Pneumonia due to infectious organism, unspecified laterality, unspecified part of lung    2. Alcohol use disorder          DISPOSITION/PLAN   DISPOSITION Decision To Discharge 02/27/2025 06:19:43 AM   DISPOSITION CONDITION Stable           PATIENT REFERRED TO:  Lyn Damico, APRN - CNP  [address]  [facility] [address]  [phone]    Call in 1 day      Elzbieta Silverio, APRN - CNP  [address]  [address]  [facility] [address]  [phone]    Call in 1 day        DISCHARGE MEDICATIONS:  New Prescriptions    AMOXICILLIN-CLAVULANATE (AUGMENTIN) 500-125 MG PER TABLET    Take 1 tablet by mouth 3 times daily for 10 days    AZITHROMYCIN (ZITHROMAX) 250 MG TABLET    250mg on days 2 - 5.  Do not give the initial 500 mg, the patient was already administered this in the hospital.              (Please note that portions of this note were completed with a voice recognition program.  Efforts were made to edit the dictations but occasionally words are mis-transcribed.)      TIO SANCHEZ DO (electronically signed)  Attending Emergency Physician          Tio Sanchez DO  02/27/25 1037       Tio Sanchez DO  02/27/25 1041

## 2025-02-27 NOTE — ED NOTES
Patient resting in bed with eyes closed. Patient remains on monitor. Respirations easy and unlabored. No distress noted. Call light within reach.

## 2025-02-27 NOTE — ED TRIAGE NOTES
Patient to ED via intake due to complaints of shortness of breath. Patient states \"I can't breathe.\" Patient states this just started tonight and he has a history of COPD but endorses he doesn't know what that means. Patient states taking his coumadin as prescribed. Patient is resting comfortably on cot, respirations are even and unlabored. Patient on cardiac monitor. EKG complete. Patient states \"I'm under the influence.\" Pink Linda shot bottles noted within patient belongings.

## 2025-02-27 NOTE — DISCHARGE INSTRUCTIONS
Return to the emergency department if develop any worsening shortness of breath, any chest pain, fevers, vomiting, confusion or any other concerns.  Drink plenty of fluids.  Please take the antibiotics as prescribed.  Have your INR monitor closely, as this will likely increase on antibiotics.

## 2025-02-27 NOTE — ED PROVIDER NOTES
[facility] Emergency Department      CHIEF COMPLAINT       Chief Complaint   Patient presents with    Shortness of Breath       Nurses Notes reviewed and I agree except as noted in the HPI.      HISTORY OF PRESENT ILLNESS    Dashawn Rivera is a 45 y.o. male who presents with complaint of shortness of breath, reports history of COPD, stated that he has exertional dyspnea.  States that he was recently admitted to the hospital COPD, discharged home without any medications.  Patient states that he does not smoke.  Onset: Acute on chronic  Duration: Less than 24 hours  Timing: Persistent  Location of Pain: No pain  Intesity/severity:   Modifying Factors: None  Relieved by;  Previous Episodes;  Tx Before arrival: None    PAST MEDICAL HISTORY    has a past medical history of DVT (deep venous thrombosis) (HCC), Hematemesis, Liver disease, Seizures (HCC), and Subtherapeutic international normalized ratio (INR).    SURGICAL HISTORY      has a past surgical history that includes Colonoscopy (N/A, 12/17/2019); IR GUIDED THROMB MECH VEIN (10/1/2024); IR GUIDED IVC FILTER PLACEMENT (10/2/2024); and Upper gastrointestinal endoscopy (N/A, 1/31/2025).    CURRENT MEDICATIONS       Discharge Medication List as of 2/27/2025 10:47 AM        CONTINUE these medications which have NOT CHANGED    Details   warfarin (COUMADIN) 5 MG tablet Take as directed by [facility] Qty 65 tabs = 30 days, Disp-65 tablet, R-1Normal      enoxaparin (LOVENOX) 60 MG/0.6ML Inject 0.6 mLs into the skin 2 times daily, Disp-8 each, R-0Normal      levETIRAcetam (KEPPRA) 500 MG tablet Take 1 tablet by mouth 2 times daily, Disp-60 tablet, R-1Normal      pantoprazole (PROTONIX) 40 MG tablet Take 1 tablet by mouth daily, Disp-30 tablet, R-2Normal      folic acid (FOLVITE) 1 MG tablet Take 1 tablet by mouth daily, Disp-30 tablet, R-3Normal      thiamine 100 MG tablet Take 1 tablet by mouth daily, Disp-30 tablet, R-3Normal      naltrexone (DEPADE) 50

## 2025-02-28 ENCOUNTER — APPOINTMENT (OUTPATIENT)
Dept: GENERAL RADIOLOGY | Age: 45
DRG: 139 | End: 2025-02-28
Payer: MEDICAID

## 2025-02-28 ENCOUNTER — HOSPITAL ENCOUNTER (INPATIENT)
Age: 45
LOS: 3 days | Discharge: HOME | DRG: 139 | End: 2025-03-03
Attending: EMERGENCY MEDICINE | Admitting: INTERNAL MEDICINE
Payer: MEDICAID

## 2025-02-28 ENCOUNTER — APPOINTMENT (OUTPATIENT)
Dept: CT IMAGING | Age: 45
DRG: 139 | End: 2025-02-28
Payer: MEDICAID

## 2025-02-28 DIAGNOSIS — E87.20 LACTIC ACIDOSIS: Primary | ICD-10-CM

## 2025-02-28 DIAGNOSIS — R06.02 SHORTNESS OF BREATH: ICD-10-CM

## 2025-02-28 DIAGNOSIS — I47.10 SVT (SUPRAVENTRICULAR TACHYCARDIA): ICD-10-CM

## 2025-02-28 DIAGNOSIS — I82.4Y3 DEEP VEIN THROMBOSIS (DVT) OF PROXIMAL VEIN OF BOTH LOWER EXTREMITIES, UNSPECIFIED CHRONICITY (HCC): ICD-10-CM

## 2025-02-28 LAB
ALBUMIN SERPL BCG-MCNC: 3.9 G/DL (ref 3.4–4.9)
ALP SERPL-CCNC: 127 U/L (ref 40–129)
ALT SERPL W/O P-5'-P-CCNC: 63 U/L (ref 10–50)
AMPHETAMINES UR QL SCN: NEGATIVE
ANION GAP SERPL CALC-SCNC: 16 MEQ/L (ref 8–16)
ANION GAP SERPL CALC-SCNC: 21 MEQ/L (ref 8–16)
APAP SERPL-MCNC: < 5 UG/ML (ref 10–30)
AST SERPL-CCNC: 151 U/L (ref 10–50)
B-OH-BUTYR SERPL-MSCNC: 4.86 MG/DL (ref 0.2–2.81)
BARBITURATES UR QL SCN: NEGATIVE
BASE EXCESS BLDA CALC-SCNC: -6.5 MMOL/L (ref -2–3)
BASOPHILS ABSOLUTE: 0 THOU/MM3 (ref 0–0.1)
BASOPHILS ABSOLUTE: 0.1 THOU/MM3 (ref 0–0.1)
BASOPHILS NFR BLD AUTO: 0 %
BASOPHILS NFR BLD AUTO: 0.7 %
BENZODIAZ UR QL SCN: NEGATIVE
BILIRUB SERPL-MCNC: 0.3 MG/DL (ref 0.3–1.2)
BUN SERPL-MCNC: 4 MG/DL (ref 8–23)
BUN SERPL-MCNC: 4 MG/DL (ref 8–23)
BZE UR QL SCN: NEGATIVE
CA-I BLD ISE-SCNC: 1.16 MMOL/L (ref 1.12–1.32)
CALCIUM SERPL-MCNC: 7.4 MG/DL (ref 8.6–10)
CALCIUM SERPL-MCNC: 8.5 MG/DL (ref 8.6–10)
CANNABINOIDS UR QL SCN: NEGATIVE
CHLORIDE SERPL-SCNC: 102 MEQ/L (ref 98–111)
CHLORIDE SERPL-SCNC: 98 MEQ/L (ref 98–111)
CO2 SERPL-SCNC: 16 MEQ/L (ref 22–29)
CO2 SERPL-SCNC: 24 MEQ/L (ref 22–29)
COLLECTED BY:: ABNORMAL
CREAT SERPL-MCNC: 0.6 MG/DL (ref 0.7–1.2)
CREAT SERPL-MCNC: 0.7 MG/DL (ref 0.7–1.2)
DEPRECATED RDW RBC AUTO: 59.5 FL (ref 35–45)
DEPRECATED RDW RBC AUTO: 62.7 FL (ref 35–45)
DEVICE: ABNORMAL
EKG ATRIAL RATE: 104 BPM
EKG P AXIS: 86 DEGREES
EKG P-R INTERVAL: 130 MS
EKG Q-T INTERVAL: 344 MS
EKG QRS DURATION: 84 MS
EKG QTC CALCULATION (BAZETT): 452 MS
EKG R AXIS: 88 DEGREES
EKG T AXIS: 84 DEGREES
EKG VENTRICULAR RATE: 104 BPM
EOSINOPHIL NFR BLD AUTO: 0 %
EOSINOPHIL NFR BLD AUTO: 0.8 %
EOSINOPHILS ABSOLUTE: 0 THOU/MM3 (ref 0–0.4)
EOSINOPHILS ABSOLUTE: 0.1 THOU/MM3 (ref 0–0.4)
ERYTHROCYTE [DISTWIDTH] IN BLOOD BY AUTOMATED COUNT: 19.2 % (ref 11.5–14.5)
ERYTHROCYTE [DISTWIDTH] IN BLOOD BY AUTOMATED COUNT: 19.9 % (ref 11.5–14.5)
ETHANOL SERPL-MCNC: 0.09 % (ref 0–0.08)
ETHANOL SERPL-MCNC: 0.3 % (ref 0–0.08)
FENTANYL: NEGATIVE
FERRITIN SERPL IA-MCNC: 342 NG/ML (ref 30–400)
FLUAV RNA RESP QL NAA+PROBE: NOT DETECTED
FLUBV RNA RESP QL NAA+PROBE: NOT DETECTED
GFR SERPL CREATININE-BSD FRML MDRD: > 90 ML/MIN/1.73M2
GFR SERPL CREATININE-BSD FRML MDRD: > 90 ML/MIN/1.73M2
GLUCOSE SERPL-MCNC: 117 MG/DL (ref 74–109)
GLUCOSE SERPL-MCNC: 135 MG/DL (ref 74–109)
HCO3 BLDA-SCNC: 17 MMOL/L (ref 23–28)
HCT VFR BLD AUTO: 34 % (ref 42–52)
HCT VFR BLD AUTO: 35.3 % (ref 42–52)
HGB BLD-MCNC: 11.4 GM/DL (ref 14–18)
HGB BLD-MCNC: 12 GM/DL (ref 14–18)
IMM GRANULOCYTES # BLD AUTO: 0.02 THOU/MM3 (ref 0–0.07)
IMM GRANULOCYTES # BLD AUTO: 0.03 THOU/MM3 (ref 0–0.07)
IMM GRANULOCYTES NFR BLD AUTO: 0.2 %
IMM GRANULOCYTES NFR BLD AUTO: 0.7 %
INR PPP: 5.92 (ref 0.85–1.13)
INR PPP: 7.42 (ref 0.85–1.13)
INR PPP: 7.91 (ref 0.85–1.13)
LACTATE SERPL-SCNC: 3.9 MMOL/L (ref 0.5–2)
LACTATE SERPL-SCNC: 4.3 MMOL/L (ref 0.5–2)
LACTIC ACID, SEPSIS: 5.6 MMOL/L (ref 0.5–1.9)
LACTIC ACID, SEPSIS: 6.5 MMOL/L (ref 0.5–1.9)
LACTIC ACID, SEPSIS: 8.4 MMOL/L (ref 0.5–1.9)
LYMPHOCYTES ABSOLUTE: 0.7 THOU/MM3 (ref 1–4.8)
LYMPHOCYTES ABSOLUTE: 2.6 THOU/MM3 (ref 1–4.8)
LYMPHOCYTES NFR BLD AUTO: 16.5 %
LYMPHOCYTES NFR BLD AUTO: 29.5 %
MCH RBC QN AUTO: 29.3 PG (ref 26–33)
MCH RBC QN AUTO: 29.5 PG (ref 26–33)
MCHC RBC AUTO-ENTMCNC: 33.5 GM/DL (ref 32.2–35.5)
MCHC RBC AUTO-ENTMCNC: 34 GM/DL (ref 32.2–35.5)
MCV RBC AUTO: 86.3 FL (ref 80–94)
MCV RBC AUTO: 88.1 FL (ref 80–94)
MONOCYTES ABSOLUTE: 0.2 THOU/MM3 (ref 0.4–1.3)
MONOCYTES ABSOLUTE: 0.6 THOU/MM3 (ref 0.4–1.3)
MONOCYTES NFR BLD AUTO: 4.8 %
MONOCYTES NFR BLD AUTO: 6.6 %
NEUTROPHILS ABSOLUTE: 3.5 THOU/MM3 (ref 1.8–7.7)
NEUTROPHILS ABSOLUTE: 5.5 THOU/MM3 (ref 1.8–7.7)
NEUTROPHILS NFR BLD AUTO: 62.2 %
NEUTROPHILS NFR BLD AUTO: 78 %
NRBC BLD AUTO-RTO: 0 /100 WBC
NRBC BLD AUTO-RTO: 0 /100 WBC
NT-PROBNP SERPL IA-MCNC: < 36 PG/ML (ref 0–124)
OPIATES UR QL SCN: NORMAL
OSMOLALITY SERPL CALC.SUM OF ELEC: 273.6 MOSMOL/KG (ref 275–300)
OSMOLALITY SERPL CALC.SUM OF ELEC: 276.5 MOSMOL/KG (ref 275–300)
OXYCODONE: NEGATIVE
PCO2 TEMP ADJ BLDMV: 29 MMHG (ref 41–51)
PCP UR QL SCN: NEGATIVE
PH BLDMV: 7.39 [PH] (ref 7.31–7.41)
PH BLDV: 7.42 [PH] (ref 7.31–7.41)
PHOSPHATE SERPL-MCNC: 2 MG/DL (ref 2.5–4.5)
PLATELET # BLD AUTO: 205 THOU/MM3 (ref 130–400)
PLATELET # BLD AUTO: 237 THOU/MM3 (ref 130–400)
PMV BLD AUTO: 9.1 FL (ref 9.4–12.4)
PMV BLD AUTO: 9.3 FL (ref 9.4–12.4)
PO2 BLDMV: 81 MMHG (ref 25–40)
POTASSIUM SERPL-SCNC: 3.7 MEQ/L (ref 3.5–5.2)
POTASSIUM SERPL-SCNC: 4.6 MEQ/L (ref 3.5–5.2)
PROT SERPL-MCNC: 6.9 G/DL (ref 6.4–8.3)
RBC # BLD AUTO: 3.86 MILL/MM3 (ref 4.7–6.1)
RBC # BLD AUTO: 4.09 MILL/MM3 (ref 4.7–6.1)
SALICYLATES SERPL-MCNC: < 0.5 MG/DL (ref 2–10)
SAO2 % BLDMV: 96 %
SARS-COV-2 RNA RESP QL NAA+PROBE: NOT DETECTED
SITE: ABNORMAL
SODIUM SERPL-SCNC: 138 MEQ/L (ref 135–145)
SODIUM SERPL-SCNC: 139 MEQ/L (ref 135–145)
TROPONIN, HIGH SENSITIVITY: < 6 NG/L (ref 0–12)
VENTILATION MODE VENT: ABNORMAL
WBC # BLD AUTO: 4.5 THOU/MM3 (ref 4.8–10.8)
WBC # BLD AUTO: 8.8 THOU/MM3 (ref 4.8–10.8)

## 2025-02-28 PROCEDURE — 74174 CTA ABD&PLVS W/CONTRAST: CPT

## 2025-02-28 PROCEDURE — 80179 DRUG ASSAY SALICYLATE: CPT

## 2025-02-28 PROCEDURE — 80143 DRUG ASSAY ACETAMINOPHEN: CPT

## 2025-02-28 PROCEDURE — 93005 ELECTROCARDIOGRAM TRACING: CPT | Performed by: EMERGENCY MEDICINE

## 2025-02-28 PROCEDURE — 2580000003 HC RX 258: Performed by: EMERGENCY MEDICINE

## 2025-02-28 PROCEDURE — 80307 DRUG TEST PRSMV CHEM ANLYZR: CPT

## 2025-02-28 PROCEDURE — 96374 THER/PROPH/DIAG INJ IV PUSH: CPT

## 2025-02-28 PROCEDURE — 83880 ASSAY OF NATRIURETIC PEPTIDE: CPT

## 2025-02-28 PROCEDURE — 6360000002 HC RX W HCPCS

## 2025-02-28 PROCEDURE — 71045 X-RAY EXAM CHEST 1 VIEW: CPT

## 2025-02-28 PROCEDURE — 82693 ASSAY OF ETHYLENE GLYCOL: CPT

## 2025-02-28 PROCEDURE — 99223 1ST HOSP IP/OBS HIGH 75: CPT | Performed by: INTERNAL MEDICINE

## 2025-02-28 PROCEDURE — 83605 ASSAY OF LACTIC ACID: CPT

## 2025-02-28 PROCEDURE — 82803 BLOOD GASES ANY COMBINATION: CPT

## 2025-02-28 PROCEDURE — 82800 BLOOD PH: CPT

## 2025-02-28 PROCEDURE — 2060000000 HC ICU INTERMEDIATE R&B

## 2025-02-28 PROCEDURE — 84100 ASSAY OF PHOSPHORUS: CPT

## 2025-02-28 PROCEDURE — 94640 AIRWAY INHALATION TREATMENT: CPT

## 2025-02-28 PROCEDURE — 85025 COMPLETE CBC W/AUTO DIFF WBC: CPT

## 2025-02-28 PROCEDURE — 6370000000 HC RX 637 (ALT 250 FOR IP)

## 2025-02-28 PROCEDURE — 82330 ASSAY OF CALCIUM: CPT

## 2025-02-28 PROCEDURE — 2500000003 HC RX 250 WO HCPCS

## 2025-02-28 PROCEDURE — 82010 KETONE BODYS QUAN: CPT

## 2025-02-28 PROCEDURE — 84484 ASSAY OF TROPONIN QUANT: CPT

## 2025-02-28 PROCEDURE — 94761 N-INVAS EAR/PLS OXIMETRY MLT: CPT

## 2025-02-28 PROCEDURE — 6360000002 HC RX W HCPCS: Performed by: EMERGENCY MEDICINE

## 2025-02-28 PROCEDURE — 93010 ELECTROCARDIOGRAM REPORT: CPT | Performed by: NUCLEAR MEDICINE

## 2025-02-28 PROCEDURE — 36415 COLL VENOUS BLD VENIPUNCTURE: CPT

## 2025-02-28 PROCEDURE — 99285 EMERGENCY DEPT VISIT HI MDM: CPT

## 2025-02-28 PROCEDURE — 82077 ASSAY SPEC XCP UR&BREATH IA: CPT

## 2025-02-28 PROCEDURE — 6370000000 HC RX 637 (ALT 250 FOR IP): Performed by: INTERNAL MEDICINE

## 2025-02-28 PROCEDURE — 80053 COMPREHEN METABOLIC PANEL: CPT

## 2025-02-28 PROCEDURE — 85610 PROTHROMBIN TIME: CPT

## 2025-02-28 PROCEDURE — 6360000004 HC RX CONTRAST MEDICATION: Performed by: EMERGENCY MEDICINE

## 2025-02-28 PROCEDURE — 87641 MR-STAPH DNA AMP PROBE: CPT

## 2025-02-28 PROCEDURE — 82728 ASSAY OF FERRITIN: CPT

## 2025-02-28 PROCEDURE — 96375 TX/PRO/DX INJ NEW DRUG ADDON: CPT

## 2025-02-28 PROCEDURE — 87636 SARSCOV2 & INF A&B AMP PRB: CPT

## 2025-02-28 RX ORDER — LORAZEPAM 1 MG/1
1 TABLET ORAL
Status: DISCONTINUED | OUTPATIENT
Start: 2025-02-28 | End: 2025-03-03 | Stop reason: HOSPADM

## 2025-02-28 RX ORDER — POTASSIUM CHLORIDE 1500 MG/1
40 TABLET, EXTENDED RELEASE ORAL PRN
Status: DISCONTINUED | OUTPATIENT
Start: 2025-02-28 | End: 2025-03-03 | Stop reason: HOSPADM

## 2025-02-28 RX ORDER — AZITHROMYCIN 250 MG/1
250 TABLET, FILM COATED ORAL DAILY
Status: DISCONTINUED | OUTPATIENT
Start: 2025-03-01 | End: 2025-03-03 | Stop reason: HOSPADM

## 2025-02-28 RX ORDER — LORAZEPAM 2 MG/ML
1 INJECTION INTRAMUSCULAR
Status: DISCONTINUED | OUTPATIENT
Start: 2025-02-28 | End: 2025-03-03 | Stop reason: HOSPADM

## 2025-02-28 RX ORDER — ONDANSETRON 4 MG/1
4 TABLET, ORALLY DISINTEGRATING ORAL EVERY 8 HOURS PRN
Status: DISCONTINUED | OUTPATIENT
Start: 2025-02-28 | End: 2025-03-03 | Stop reason: HOSPADM

## 2025-02-28 RX ORDER — LORAZEPAM 1 MG/1
4 TABLET ORAL
Status: DISCONTINUED | OUTPATIENT
Start: 2025-02-28 | End: 2025-03-03 | Stop reason: HOSPADM

## 2025-02-28 RX ORDER — MORPHINE SULFATE 4 MG/ML
4 INJECTION, SOLUTION INTRAMUSCULAR; INTRAVENOUS
Status: COMPLETED | OUTPATIENT
Start: 2025-02-28 | End: 2025-02-28

## 2025-02-28 RX ORDER — SODIUM CHLORIDE 0.9 % (FLUSH) 0.9 %
5-40 SYRINGE (ML) INJECTION EVERY 12 HOURS SCHEDULED
Status: DISCONTINUED | OUTPATIENT
Start: 2025-02-28 | End: 2025-03-03 | Stop reason: HOSPADM

## 2025-02-28 RX ORDER — AZITHROMYCIN 250 MG/1
500 TABLET, FILM COATED ORAL ONCE
Status: COMPLETED | OUTPATIENT
Start: 2025-02-28 | End: 2025-02-28

## 2025-02-28 RX ORDER — 0.9 % SODIUM CHLORIDE 0.9 %
1000 INTRAVENOUS SOLUTION INTRAVENOUS ONCE
Status: COMPLETED | OUTPATIENT
Start: 2025-02-28 | End: 2025-02-28

## 2025-02-28 RX ORDER — ACETAMINOPHEN 650 MG/1
650 SUPPOSITORY RECTAL EVERY 6 HOURS PRN
Status: DISCONTINUED | OUTPATIENT
Start: 2025-02-28 | End: 2025-03-03 | Stop reason: HOSPADM

## 2025-02-28 RX ORDER — LORAZEPAM 2 MG/ML
4 INJECTION INTRAMUSCULAR
Status: DISCONTINUED | OUTPATIENT
Start: 2025-02-28 | End: 2025-03-03 | Stop reason: HOSPADM

## 2025-02-28 RX ORDER — ONDANSETRON 2 MG/ML
4 INJECTION INTRAMUSCULAR; INTRAVENOUS EVERY 6 HOURS PRN
Status: DISCONTINUED | OUTPATIENT
Start: 2025-02-28 | End: 2025-03-03 | Stop reason: HOSPADM

## 2025-02-28 RX ORDER — LEVETIRACETAM 500 MG/1
500 TABLET ORAL 2 TIMES DAILY
Status: DISCONTINUED | OUTPATIENT
Start: 2025-02-28 | End: 2025-03-03 | Stop reason: HOSPADM

## 2025-02-28 RX ORDER — POLYETHYLENE GLYCOL 3350 17 G/17G
17 POWDER, FOR SOLUTION ORAL DAILY PRN
Status: DISCONTINUED | OUTPATIENT
Start: 2025-02-28 | End: 2025-03-03 | Stop reason: HOSPADM

## 2025-02-28 RX ORDER — MAGNESIUM SULFATE IN WATER 40 MG/ML
2000 INJECTION, SOLUTION INTRAVENOUS PRN
Status: DISCONTINUED | OUTPATIENT
Start: 2025-02-28 | End: 2025-03-03 | Stop reason: HOSPADM

## 2025-02-28 RX ORDER — LANOLIN ALCOHOL/MO/W.PET/CERES
100 CREAM (GRAM) TOPICAL DAILY
Status: DISCONTINUED | OUTPATIENT
Start: 2025-02-28 | End: 2025-03-03 | Stop reason: HOSPADM

## 2025-02-28 RX ORDER — LORAZEPAM 1 MG/1
3 TABLET ORAL
Status: DISCONTINUED | OUTPATIENT
Start: 2025-02-28 | End: 2025-03-03 | Stop reason: HOSPADM

## 2025-02-28 RX ORDER — ACETAMINOPHEN 325 MG/1
650 TABLET ORAL EVERY 6 HOURS PRN
Status: DISCONTINUED | OUTPATIENT
Start: 2025-02-28 | End: 2025-03-03 | Stop reason: HOSPADM

## 2025-02-28 RX ORDER — LORAZEPAM 2 MG/ML
3 INJECTION INTRAMUSCULAR
Status: DISCONTINUED | OUTPATIENT
Start: 2025-02-28 | End: 2025-03-03 | Stop reason: HOSPADM

## 2025-02-28 RX ORDER — LORAZEPAM 2 MG/ML
2 INJECTION INTRAMUSCULAR
Status: DISCONTINUED | OUTPATIENT
Start: 2025-02-28 | End: 2025-03-03 | Stop reason: HOSPADM

## 2025-02-28 RX ORDER — POTASSIUM CHLORIDE 7.45 MG/ML
10 INJECTION INTRAVENOUS PRN
Status: DISCONTINUED | OUTPATIENT
Start: 2025-02-28 | End: 2025-03-03 | Stop reason: HOSPADM

## 2025-02-28 RX ORDER — LORAZEPAM 1 MG/1
2 TABLET ORAL
Status: DISCONTINUED | OUTPATIENT
Start: 2025-02-28 | End: 2025-03-03 | Stop reason: HOSPADM

## 2025-02-28 RX ORDER — IOPAMIDOL 755 MG/ML
80 INJECTION, SOLUTION INTRAVASCULAR
Status: COMPLETED | OUTPATIENT
Start: 2025-02-28 | End: 2025-02-28

## 2025-02-28 RX ORDER — SODIUM CHLORIDE 9 MG/ML
INJECTION, SOLUTION INTRAVENOUS PRN
Status: DISCONTINUED | OUTPATIENT
Start: 2025-02-28 | End: 2025-03-03 | Stop reason: HOSPADM

## 2025-02-28 RX ORDER — SODIUM CHLORIDE 0.9 % (FLUSH) 0.9 %
5-40 SYRINGE (ML) INJECTION PRN
Status: DISCONTINUED | OUTPATIENT
Start: 2025-02-28 | End: 2025-03-03 | Stop reason: HOSPADM

## 2025-02-28 RX ORDER — IPRATROPIUM BROMIDE AND ALBUTEROL SULFATE 2.5; .5 MG/3ML; MG/3ML
3 SOLUTION RESPIRATORY (INHALATION) ONCE
Status: DISCONTINUED | OUTPATIENT
Start: 2025-02-28 | End: 2025-02-28

## 2025-02-28 RX ORDER — IPRATROPIUM BROMIDE AND ALBUTEROL SULFATE 2.5; .5 MG/3ML; MG/3ML
3 SOLUTION RESPIRATORY (INHALATION) ONCE
Status: COMPLETED | OUTPATIENT
Start: 2025-02-28 | End: 2025-02-28

## 2025-02-28 RX ORDER — ONDANSETRON 2 MG/ML
4 INJECTION INTRAMUSCULAR; INTRAVENOUS ONCE
Status: COMPLETED | OUTPATIENT
Start: 2025-02-28 | End: 2025-02-28

## 2025-02-28 RX ADMIN — IPRATROPIUM BROMIDE AND ALBUTEROL SULFATE 3 DOSE: .5; 3 SOLUTION RESPIRATORY (INHALATION) at 04:04

## 2025-02-28 RX ADMIN — ONDANSETRON 4 MG: 2 INJECTION, SOLUTION INTRAMUSCULAR; INTRAVENOUS at 08:04

## 2025-02-28 RX ADMIN — WATER 125 MG: 1 INJECTION INTRAMUSCULAR; INTRAVENOUS; SUBCUTANEOUS at 04:11

## 2025-02-28 RX ADMIN — MORPHINE SULFATE 4 MG: 4 INJECTION, SOLUTION INTRAMUSCULAR; INTRAVENOUS at 10:32

## 2025-02-28 RX ADMIN — Medication 100 MG: at 23:59

## 2025-02-28 RX ADMIN — SODIUM CHLORIDE 1000 ML: 9 INJECTION, SOLUTION INTRAVENOUS at 05:09

## 2025-02-28 RX ADMIN — IOPAMIDOL 80 ML: 755 INJECTION, SOLUTION INTRAVENOUS at 10:24

## 2025-02-28 RX ADMIN — AZITHROMYCIN DIHYDRATE 500 MG: 250 TABLET ORAL at 04:11

## 2025-02-28 RX ADMIN — SODIUM CHLORIDE 1000 ML: 0.9 INJECTION, SOLUTION INTRAVENOUS at 08:04

## 2025-02-28 RX ADMIN — LEVETIRACETAM 500 MG: 500 TABLET, FILM COATED ORAL at 22:47

## 2025-02-28 RX ADMIN — AMOXICILLIN AND CLAVULANATE POTASSIUM 1 TABLET: 875; 125 TABLET, FILM COATED ORAL at 08:39

## 2025-02-28 ASSESSMENT — PAIN SCALES - GENERAL
PAINLEVEL_OUTOF10: 4
PAINLEVEL_OUTOF10: 6
PAINLEVEL_OUTOF10: 2

## 2025-02-28 ASSESSMENT — PAIN - FUNCTIONAL ASSESSMENT: PAIN_FUNCTIONAL_ASSESSMENT: 0-10

## 2025-02-28 NOTE — ED TRIAGE NOTES
Pt to ED from home via intake with c/o SOB. Pt states this has been going on past 2 days. Pt seen yesterday and admitted for \"pneumonia but then they told me COPD\". Pt states 6/10 abdominal pain. Pt states he has been fatigued, chills, and short of breath. No chest pain. EKG completed. Telemetry in place. Call light in reach.

## 2025-02-28 NOTE — ED PROVIDER NOTES
[facility] EMERGENCY DEPARTMENT - VISIT NOTE    Patient Name: Dashawn Rivera  MRN: [id number]  YOB: 1980  Date of Evaluation: 2/28/2025  Treating Resident Physician: Jorge Hernández MD  Supervising Physician: Elizabeth Hensley MD    CHIEF COMPLAINT       Chief Complaint   Patient presents with    Shortness of Breath       HISTORY OF PRESENT ILLNESS    HPI    History obtained from chart review and the patient.    Dashawn is a 45 y.o. old male with history of alcoholic liver disease, centrilobular emphysema, who presents to the emergency department by Walk In for evaluation of shortness of breath.  Patient was seen for similar complaint in the emergency department approximately 24 hours ago where laboratory workup and CTA chest obtained demonstrating patchy multifocal reticular nodular basal opacities bilateral lower lobes and lingula consistent with pneumonia and prescribed Augmentin and azithromycin.  Patient describes that he has not picked up these prescriptions today, been borrowing his cousins albuterol inhaler without significant relief.  During initial history, patient somnolent, minimally interactive, short brief answers though not limited by breathing.    Chart reviewed, relevant history summarized in HPI above.      REVIEW OF SYSTEMS   Review of Systems  Negative unless documented in HPI    PAST MEDICAL AND SURGICAL HISTORY   Dashawn  has a past medical history of DVT (deep venous thrombosis) (HCC), Hematemesis (12/2024), Liver disease, Seizures (HCC), and Subtherapeutic international normalized ratio (INR).    Dashawn  has a past surgical history that includes Colonoscopy (N/A, 12/17/2019); IR GUIDED THROMB MECH VEIN (10/1/2024); IR GUIDED IVC FILTER PLACEMENT (10/2/2024); and Upper gastrointestinal endoscopy (N/A, 1/31/2025).    CURRENT MEDICATIONS   Dashawn has a current medication list which includes the following long-term medication(s): levetiracetam, warfarin, pantoprazole, folic acid, thiamine, 
  Transfer of Care Note:   Physician Signing out: Jorge abraham  Receiving Physician: David Choudhary DO  Sign out time: 6:00       Brief history:  See ED course    Items pending that need to be checked:  Pt becoming sober      Tentative Impression of patient:  PNA in the setting of alcohol intoxication    Expected disposition of patient:  Pending results, discharged.        Additional Assessment and results:   I have personally performed a face to face diagnostic evaluation on this patient. The patient's initial evaluation and plan have been discussed with the prior physician who initially evaluated the patient. Nursing Notes, Past Medical Hx, Past Surgical Hx, Social Hx, Allergies, vital signs and Family Hx were all reviewed.      Vitals:    02/28/25 0947   BP: (!) 145/91   Pulse:    Resp: 16   Temp:    SpO2: 97%     Physical Exam  Vitals and nursing note reviewed.   Constitutional:       General: He is not in acute distress.     Appearance: He is ill-appearing. He is not toxic-appearing.   HENT:      Head: Normocephalic and atraumatic.      Right Ear: External ear normal.      Left Ear: External ear normal.      Nose: Nose normal. No congestion.      Mouth/Throat:      Mouth: Mucous membranes are moist.      Pharynx: Oropharynx is clear.   Eyes:      Conjunctiva/sclera: Conjunctivae normal.   Cardiovascular:      Rate and Rhythm: Normal rate and regular rhythm.      Pulses: Normal pulses.      Heart sounds: Normal heart sounds.   Pulmonary:      Effort: Pulmonary effort is normal. No respiratory distress.      Breath sounds: Normal breath sounds. No wheezing.   Abdominal:      General: There is no distension.      Palpations: Abdomen is soft.      Tenderness: There is abdominal tenderness (mild, diffuse). There is no guarding or rebound.   Musculoskeletal:         General: Normal range of motion.      Cervical back: Normal range of motion and neck supple. No tenderness.   Lymphadenopathy:      Cervical: No 
ATTENDING NOTE:    I supervised and discussed the history, physical exam and the management of this patient with the resident. I reviewed the resident's note and agree with the documented findings and plan of care.  Please see my additional note.    I personally saw and examined the patient.  I have reviewed and agree with the resident's findings, including all diagnostic interpretations and treatment plans as written.  I was present for the key portion of any procedures performed and the inclusive time noted in any critical care statement.    Electronically verified by Elizabeth Khan MD  02/28/25 0657    
was electronically signed. It was dictated by use of voice recognition software and electronically transcribed. The transcription may contain errors not detected in proofreading.         Wojciech Pedro MD  02/28/25 0731

## 2025-02-28 NOTE — ED NOTES
ED to inpatient nurses report      Chief Complaint:  Chief Complaint   Patient presents with    Shortness of Breath     Present to ED from: Home    MOA:     LOC: alert and orientated to name, place, date  Mobility: Independent  Oxygen Baseline: Room Air    Current needs required: Room Air    Code Status:   Prior    What abnormal results were found and what did you give/do to treat them? Lactic Acid, SEE labs  Any procedures or intervention occur? Fluid Bolus, Antibiotics    Mental Status:  Level of Consciousness: Alert (0)    Psych Assessment:        Vitals:  Patient Vitals for the past 24 hrs:   BP Temp Temp src Pulse Resp SpO2 Weight   02/28/25 1250 137/89 -- -- (!) 107 -- 97 % --   02/28/25 1235 (!) 148/91 -- -- -- 16 96 % --   02/28/25 1135 (!) 146/85 -- -- -- 16 97 % --   02/28/25 1050 (!) 148/83 -- -- -- 16 97 % --   02/28/25 0947 (!) 145/91 -- -- -- 16 97 % --   02/28/25 0847 139/81 -- -- -- 16 98 % --   02/28/25 0732 (!) 143/92 -- -- -- -- 96 % --   02/28/25 0648 -- -- -- 97 17 96 % --   02/28/25 0647 115/73 -- -- 96 17 -- --   02/28/25 0549 -- -- -- (!) 103 18 93 % --   02/28/25 0547 110/63 -- -- (!) 103 17 90 % --   02/28/25 0509 -- 97.9 °F (36.6 °C) Oral -- -- -- --   02/28/25 0402 114/74 -- -- 93 19 92 % --   02/28/25 0332 101/70 -- -- 97 18 92 % --   02/28/25 0306 -- -- -- (!) 101 19 93 % --   02/28/25 0303 -- -- -- 100 19 93 % --   02/28/25 0302 103/68 -- -- 100 18 91 % --   02/28/25 0148 -- -- -- -- -- -- 59 kg (130 lb)   02/28/25 0146 116/83 -- -- (!) 104 22 98 % --        LDAs:   Peripheral IV 02/28/25 Left Antecubital (Active)       Ambulatory Status:  No data recorded    Diagnosis:  DISPOSITION Admitted 02/28/2025 11:29:15 AM   Final diagnoses:   Lactic acidosis        Consults:  None     Pain Score:  Pain Assessment  Pain Assessment: 0-10  Pain Level: 2    C-SSRS:   Risk of Suicide: No Risk    Sepsis Screening:   Positive    Oriska Fall Risk:   No    Swallow Screening      Pass  Preferred

## 2025-02-28 NOTE — ED NOTES
Pt resting in bed at this time. NO acute distress noted at this time. Call light in reach. Pt informed of pending room placement

## 2025-02-28 NOTE — ED NOTES
This RN to the bedside for rounding at this time. Patient updated on current plan of care and provider with update patient with labs and scans once completed. Patient verbalizes SOB when sitting up. Patient denies further needs at this time. Patient respirations are regular and unlabored at this time. Patient does not appear to be in acute distress at this time. Call light within reach. Patient handoff received from OSIEL Galindo at this time.

## 2025-03-01 LAB
ALBUMIN SERPL BCG-MCNC: 3.5 G/DL (ref 3.4–4.9)
ALP SERPL-CCNC: 135 U/L (ref 40–129)
ALT SERPL W/O P-5'-P-CCNC: 97 U/L (ref 10–50)
ANION GAP SERPL CALC-SCNC: 10 MEQ/L (ref 8–16)
AST SERPL-CCNC: 294 U/L (ref 10–50)
B-OH-BUTYR SERPL-MSCNC: 0.84 MG/DL (ref 0.2–2.81)
BASOPHILS ABSOLUTE: 0 THOU/MM3 (ref 0–0.1)
BASOPHILS NFR BLD AUTO: 0.2 %
BILIRUB SERPL-MCNC: 0.3 MG/DL (ref 0.3–1.2)
BUN SERPL-MCNC: 4 MG/DL (ref 8–23)
CALCIUM SERPL-MCNC: 8.5 MG/DL (ref 8.6–10)
CHLORIDE SERPL-SCNC: 100 MEQ/L (ref 98–111)
CO2 SERPL-SCNC: 27 MEQ/L (ref 22–29)
CREAT SERPL-MCNC: 0.6 MG/DL (ref 0.7–1.2)
DEPRECATED RDW RBC AUTO: 64.2 FL (ref 35–45)
EOSINOPHIL NFR BLD AUTO: 0 %
EOSINOPHILS ABSOLUTE: 0 THOU/MM3 (ref 0–0.4)
ERYTHROCYTE [DISTWIDTH] IN BLOOD BY AUTOMATED COUNT: 19.6 % (ref 11.5–14.5)
GFR SERPL CREATININE-BSD FRML MDRD: > 90 ML/MIN/1.73M2
GLUCOSE SERPL-MCNC: 119 MG/DL (ref 74–109)
HCT VFR BLD AUTO: 33.3 % (ref 42–52)
HGB BLD-MCNC: 11 GM/DL (ref 14–18)
IMM GRANULOCYTES # BLD AUTO: 0.03 THOU/MM3 (ref 0–0.07)
IMM GRANULOCYTES NFR BLD AUTO: 0.5 %
INR PPP: 7.35 (ref 0.85–1.13)
LACTATE SERPL-SCNC: 0.9 MMOL/L (ref 0.5–2)
LACTATE SERPL-SCNC: 1.2 MMOL/L (ref 0.5–2)
LACTATE SERPL-SCNC: 1.4 MMOL/L (ref 0.5–2)
LYMPHOCYTES ABSOLUTE: 1 THOU/MM3 (ref 1–4.8)
LYMPHOCYTES NFR BLD AUTO: 16.4 %
MCH RBC QN AUTO: 29.7 PG (ref 26–33)
MCHC RBC AUTO-ENTMCNC: 33 GM/DL (ref 32.2–35.5)
MCV RBC AUTO: 90 FL (ref 80–94)
MONOCYTES ABSOLUTE: 0.5 THOU/MM3 (ref 0.4–1.3)
MONOCYTES NFR BLD AUTO: 7.9 %
MRSA DNA SPEC QL NAA+PROBE: NEGATIVE
NEUTROPHILS ABSOLUTE: 4.7 THOU/MM3 (ref 1.8–7.7)
NEUTROPHILS NFR BLD AUTO: 75 %
NRBC BLD AUTO-RTO: 0 /100 WBC
PHOSPHATE SERPL-MCNC: 2.7 MG/DL (ref 2.5–4.5)
PLATELET # BLD AUTO: 189 THOU/MM3 (ref 130–400)
PMV BLD AUTO: 9.7 FL (ref 9.4–12.4)
POTASSIUM SERPL-SCNC: 4.3 MEQ/L (ref 3.5–5.2)
PROT SERPL-MCNC: 6 G/DL (ref 6.4–8.3)
RBC # BLD AUTO: 3.7 MILL/MM3 (ref 4.7–6.1)
SODIUM SERPL-SCNC: 137 MEQ/L (ref 135–145)
WBC # BLD AUTO: 6.2 THOU/MM3 (ref 4.8–10.8)

## 2025-03-01 PROCEDURE — 6360000002 HC RX W HCPCS: Performed by: INTERNAL MEDICINE

## 2025-03-01 PROCEDURE — 84100 ASSAY OF PHOSPHORUS: CPT

## 2025-03-01 PROCEDURE — 94640 AIRWAY INHALATION TREATMENT: CPT

## 2025-03-01 PROCEDURE — 94761 N-INVAS EAR/PLS OXIMETRY MLT: CPT

## 2025-03-01 PROCEDURE — 2500000003 HC RX 250 WO HCPCS: Performed by: NURSE PRACTITIONER

## 2025-03-01 PROCEDURE — 99232 SBSQ HOSP IP/OBS MODERATE 35: CPT | Performed by: STUDENT IN AN ORGANIZED HEALTH CARE EDUCATION/TRAINING PROGRAM

## 2025-03-01 PROCEDURE — 2580000003 HC RX 258: Performed by: NURSE PRACTITIONER

## 2025-03-01 PROCEDURE — 85025 COMPLETE CBC W/AUTO DIFF WBC: CPT

## 2025-03-01 PROCEDURE — 36415 COLL VENOUS BLD VENIPUNCTURE: CPT

## 2025-03-01 PROCEDURE — 83605 ASSAY OF LACTIC ACID: CPT

## 2025-03-01 PROCEDURE — 2060000000 HC ICU INTERMEDIATE R&B

## 2025-03-01 PROCEDURE — 6370000000 HC RX 637 (ALT 250 FOR IP)

## 2025-03-01 PROCEDURE — 80053 COMPREHEN METABOLIC PANEL: CPT

## 2025-03-01 PROCEDURE — 85610 PROTHROMBIN TIME: CPT

## 2025-03-01 PROCEDURE — 6370000000 HC RX 637 (ALT 250 FOR IP): Performed by: INTERNAL MEDICINE

## 2025-03-01 PROCEDURE — 2500000003 HC RX 250 WO HCPCS: Performed by: INTERNAL MEDICINE

## 2025-03-01 RX ORDER — IPRATROPIUM BROMIDE AND ALBUTEROL SULFATE 2.5; .5 MG/3ML; MG/3ML
1 SOLUTION RESPIRATORY (INHALATION) EVERY 4 HOURS PRN
Status: DISCONTINUED | OUTPATIENT
Start: 2025-03-01 | End: 2025-03-03 | Stop reason: HOSPADM

## 2025-03-01 RX ORDER — IPRATROPIUM BROMIDE AND ALBUTEROL SULFATE 2.5; .5 MG/3ML; MG/3ML
1 SOLUTION RESPIRATORY (INHALATION)
Status: DISCONTINUED | OUTPATIENT
Start: 2025-03-01 | End: 2025-03-02

## 2025-03-01 RX ORDER — IPRATROPIUM BROMIDE AND ALBUTEROL SULFATE 2.5; .5 MG/3ML; MG/3ML
1 SOLUTION RESPIRATORY (INHALATION)
Status: DISCONTINUED | OUTPATIENT
Start: 2025-03-01 | End: 2025-03-01

## 2025-03-01 RX ADMIN — AZITHROMYCIN DIHYDRATE 250 MG: 250 TABLET ORAL at 08:13

## 2025-03-01 RX ADMIN — SODIUM PHOSPHATE, MONOBASIC, MONOHYDRATE AND SODIUM PHOSPHATE, DIBASIC ANHYDROUS 15 MMOL: 142; 276 INJECTION, SOLUTION INTRAVENOUS at 01:42

## 2025-03-01 RX ADMIN — LEVETIRACETAM 500 MG: 500 TABLET, FILM COATED ORAL at 08:13

## 2025-03-01 RX ADMIN — WATER 1000 MG: 1 INJECTION INTRAMUSCULAR; INTRAVENOUS; SUBCUTANEOUS at 17:50

## 2025-03-01 RX ADMIN — Medication 100 MG: at 08:13

## 2025-03-01 RX ADMIN — SODIUM CHLORIDE, PRESERVATIVE FREE 10 ML: 5 INJECTION INTRAVENOUS at 21:14

## 2025-03-01 RX ADMIN — SODIUM CHLORIDE, PRESERVATIVE FREE 10 ML: 5 INJECTION INTRAVENOUS at 00:00

## 2025-03-01 RX ADMIN — LEVETIRACETAM 500 MG: 500 TABLET, FILM COATED ORAL at 22:07

## 2025-03-01 RX ADMIN — WATER 1000 MG: 1 INJECTION INTRAMUSCULAR; INTRAVENOUS; SUBCUTANEOUS at 00:00

## 2025-03-01 RX ADMIN — IPRATROPIUM BROMIDE AND ALBUTEROL SULFATE 1 DOSE: .5; 3 SOLUTION RESPIRATORY (INHALATION) at 15:58

## 2025-03-01 RX ADMIN — IPRATROPIUM BROMIDE AND ALBUTEROL SULFATE 1 DOSE: .5; 3 SOLUTION RESPIRATORY (INHALATION) at 20:46

## 2025-03-01 RX ADMIN — SODIUM CHLORIDE, PRESERVATIVE FREE 10 ML: 5 INJECTION INTRAVENOUS at 08:13

## 2025-03-01 ASSESSMENT — PATIENT HEALTH QUESTIONNAIRE - PHQ9
SUM OF ALL RESPONSES TO PHQ QUESTIONS 1-9: 0
SUM OF ALL RESPONSES TO PHQ QUESTIONS 1-9: 0
1. LITTLE INTEREST OR PLEASURE IN DOING THINGS: NOT AT ALL
2. FEELING DOWN, DEPRESSED OR HOPELESS: NOT AT ALL
SUM OF ALL RESPONSES TO PHQ QUESTIONS 1-9: 0
SUM OF ALL RESPONSES TO PHQ QUESTIONS 1-9: 0

## 2025-03-01 ASSESSMENT — LIFESTYLE VARIABLES
HOW OFTEN DO YOU HAVE A DRINK CONTAINING ALCOHOL: 4 OR MORE TIMES A WEEK
HOW MANY STANDARD DRINKS CONTAINING ALCOHOL DO YOU HAVE ON A TYPICAL DAY: 3 OR 4

## 2025-03-01 ASSESSMENT — PAIN SCALES - GENERAL
PAINLEVEL_OUTOF10: 0

## 2025-03-01 NOTE — H&P
the Last Year: Never true     Ran Out of Food in the Last Year: Never true   Recent Concern: Food Insecurity - Food Insecurity Present (12/6/2024)    Hunger Vital Sign     Worried About Running Out of Food in the Last Year: Sometimes true     Ran Out of Food in the Last Year: Sometimes true   Transportation Needs: No Transportation Needs (1/31/2025)    PRAPARE - Transportation     Lack of Transportation (Medical): No     Lack of Transportation (Non-Medical): No   Recent Concern: Transportation Needs - Unmet Transportation Needs (12/6/2024)    PRAPARE - Transportation     Lack of Transportation (Medical): Yes     Lack of Transportation (Non-Medical): Yes   Physical Activity: Unknown (10/11/2024)    Exercise Vital Sign     Days of Exercise per Week: 2 days   Housing Stability: High Risk (1/31/2025)    Housing Stability Vital Sign     Unable to Pay for Housing in the Last Year: Yes     Number of Times Moved in the Last Year: 0     Homeless in the Last Year: Yes        Code status: Prior     Thank you Lyn Damico, APRN - CNP for the opportunity to be involved in this patient's care.    Electronically signed by Efrain Escoto MD on 2/28/2025 at 7:30 PM.

## 2025-03-01 NOTE — CONSULTS
Brief Intervention and Referral to Treatment Summary     Patient was provided PHQ-9, AUDIT-C and DAST Screening:      PHQ-9 Score:  0  AUDIT-C Score:  7  DAST Score:   0    Patient’s substance use is considered:    Harmful    Patient’s depression is considered:    N/a    Brief Education Was Provided     Patient was receptive    Brief Intervention Is Provided (Only for AUDIT-C or DAST)     Patient reports readiness to decrease and/or stop use and a plan was discussed     Recommendations/Referrals for Brief and/or Specialized Treatment Provided to Patient       Patient does not currently follow with anyone, did take a resource packet.

## 2025-03-01 NOTE — ED NOTES
Pt resting in room at this time. No acute distress noted at this time. No complaints per patient. Call light in reach.

## 2025-03-01 NOTE — CARE COORDINATION
03/01/25 1442   Service Assessment   Patient Orientation Alert and Oriented   Cognition Alert   History Provided By Patient   Primary Caregiver Self   Accompanied By/Relationship n/a   Support Systems Family Members;Friends/Neighbors   Patient's Healthcare Decision Maker is: Patient Declined (Legal Next of Kin Remains as Decision Maker)   PCP Verified by CM Yes   Prior Functional Level Independent in ADLs/IADLs   Current Functional Level Independent in ADLs/IADLs   Can patient return to prior living arrangement Yes   Ability to make needs known: Good   Family able to assist with home care needs: No   Would you like for me to discuss the discharge plan with any other family members/significant others, and if so, who? No   Financial Resources Medicaid   Community Resources Other (Comment)  ([facility])   Social/Functional History   Active  No   Patient's  Info Insurance, friends   Mode of Transportation Walk   Discharge Planning   Type of Residence House   Living Arrangements Alone   Current Services Prior To Admission Other (Comment)  ([facility])   Potential Assistance Needed Other (Comment)  (Alcohol rehab)   DME Ordered? No   Potential Assistance Purchasing Medications No   Type of Home Care Services None   Patient expects to be discharged to: House   Services At/After Discharge   Confirm Follow Up Transport Friends   Condition of Participation: Discharge Planning   The Plan for Transition of Care is related to the following treatment goals: \"Coumadin level to go down, find out why all this shortness of breath, and where the lactic acid came from .\"     Patient Goals/Plan/Treatment Preferences: Home alone - had been staying with friends, but currently alone. Follows with  Coumadin Clinic. States he may be interested in ETOH rehab at discharge; [facility] did see and give him a list. CM reported that if he decides to go with an alcohol rehab facility, he

## 2025-03-01 NOTE — CARE COORDINATION
03/01/25 1441   Readmission Assessment   Number of Days since last admission? 8-30 days   Previous Disposition Other (comment)  (Home w/friends)   Who is being Interviewed Patient   What was the patient's/caregiver's perception as to why they think they needed to return back to the hospital? Other (Comment)  (Shortness of breath)   Did you visit your Primary Care Physician after you left the hospital, before you returned this time? No   Why weren't you able to visit your PCP? Did not have an appointment   Did you see a specialist, such as Cardiac, Pulmonary, Orthopedic Physician, etc. after you left the hospital? No   Who advised the patient to return to the hospital? Self-referral   Does the patient report anything that got in the way of taking their medications? No   In our efforts to provide the best possible care to you and others like you, can you think of anything that we could have done to help you after you left the hospital the first time, so that you might not have needed to return so soon? Other (Comment)  (\"A little more better check up.\")

## 2025-03-01 NOTE — CARE COORDINATION
3/1/25, 7:26 AM EST    DISCHARGE PLANNING EVALUATION    Consult for consideration of rehab deferred to Addiction Services sw who is also consulted.

## 2025-03-02 LAB
ALBUMIN SERPL BCG-MCNC: 3.4 G/DL (ref 3.4–4.9)
ALP SERPL-CCNC: 150 U/L (ref 40–129)
ALT SERPL W/O P-5'-P-CCNC: 98 U/L (ref 10–50)
ANION GAP SERPL CALC-SCNC: 10 MEQ/L (ref 8–16)
ANION GAP SERPL CALC-SCNC: 11 MEQ/L (ref 8–16)
ANISOCYTOSIS BLD QL SMEAR: PRESENT
AST SERPL-CCNC: 168 U/L (ref 10–50)
BASOPHILS ABSOLUTE: 0 THOU/MM3 (ref 0–0.1)
BASOPHILS NFR BLD AUTO: 0.6 %
BILIRUB SERPL-MCNC: < 0.2 MG/DL (ref 0.3–1.2)
BUN SERPL-MCNC: 10 MG/DL (ref 8–23)
BUN SERPL-MCNC: 9 MG/DL (ref 8–23)
CA-I BLD ISE-SCNC: 1.24 MMOL/L (ref 1.12–1.32)
CALCIUM SERPL-MCNC: 8.5 MG/DL (ref 8.6–10)
CALCIUM SERPL-MCNC: 8.6 MG/DL (ref 8.6–10)
CHLORIDE SERPL-SCNC: 103 MEQ/L (ref 98–111)
CHLORIDE SERPL-SCNC: 103 MEQ/L (ref 98–111)
CO2 SERPL-SCNC: 25 MEQ/L (ref 22–29)
CO2 SERPL-SCNC: 26 MEQ/L (ref 22–29)
CREAT SERPL-MCNC: 0.7 MG/DL (ref 0.7–1.2)
CREAT SERPL-MCNC: 0.8 MG/DL (ref 0.7–1.2)
DEPRECATED RDW RBC AUTO: 66.3 FL (ref 35–45)
EOSINOPHIL NFR BLD AUTO: 0.6 %
EOSINOPHILS ABSOLUTE: 0 THOU/MM3 (ref 0–0.4)
ERYTHROCYTE [DISTWIDTH] IN BLOOD BY AUTOMATED COUNT: 19.9 % (ref 11.5–14.5)
ETHYLENE GLYCOL SERPLBLD-MCNC: NORMAL UG/ML
GFR SERPL CREATININE-BSD FRML MDRD: > 90 ML/MIN/1.73M2
GFR SERPL CREATININE-BSD FRML MDRD: > 90 ML/MIN/1.73M2
GLUCOSE SERPL-MCNC: 83 MG/DL (ref 74–109)
GLUCOSE SERPL-MCNC: 92 MG/DL (ref 74–109)
HCT VFR BLD AUTO: 35.3 % (ref 42–52)
HGB BLD-MCNC: 11.3 GM/DL (ref 14–18)
IMM GRANULOCYTES # BLD AUTO: 0.04 THOU/MM3 (ref 0–0.07)
IMM GRANULOCYTES NFR BLD AUTO: 0.5 %
INR PPP: 3.6 (ref 0.85–1.13)
LYMPHOCYTES ABSOLUTE: 2.4 THOU/MM3 (ref 1–4.8)
LYMPHOCYTES NFR BLD AUTO: 30.6 %
MAGNESIUM SERPL-MCNC: 1.4 MG/DL (ref 1.6–2.6)
MCH RBC QN AUTO: 29.1 PG (ref 26–33)
MCHC RBC AUTO-ENTMCNC: 32 GM/DL (ref 32.2–35.5)
MCV RBC AUTO: 91 FL (ref 80–94)
MONOCYTES ABSOLUTE: 0.4 THOU/MM3 (ref 0.4–1.3)
MONOCYTES NFR BLD AUTO: 4.8 %
NEUTROPHILS ABSOLUTE: 5 THOU/MM3 (ref 1.8–7.7)
NEUTROPHILS NFR BLD AUTO: 62.9 %
NRBC BLD AUTO-RTO: 0 /100 WBC
PHOSPHATE SERPL-MCNC: 2.5 MG/DL (ref 2.5–4.5)
PLATELET # BLD AUTO: 178 THOU/MM3 (ref 130–400)
PLATELET BLD QL SMEAR: ADEQUATE
PMV BLD AUTO: 9.5 FL (ref 9.4–12.4)
POTASSIUM SERPL-SCNC: 3.8 MEQ/L (ref 3.5–5.2)
POTASSIUM SERPL-SCNC: 3.9 MEQ/L (ref 3.5–5.2)
PROT SERPL-MCNC: 5.9 G/DL (ref 6.4–8.3)
RBC # BLD AUTO: 3.88 MILL/MM3 (ref 4.7–6.1)
SCAN OF BLOOD SMEAR: NORMAL
SODIUM SERPL-SCNC: 139 MEQ/L (ref 135–145)
SODIUM SERPL-SCNC: 139 MEQ/L (ref 135–145)
TARGETS BLD QL SMEAR: ABNORMAL
TROPONIN, HIGH SENSITIVITY: < 6 NG/L (ref 0–12)
WBC # BLD AUTO: 8 THOU/MM3 (ref 4.8–10.8)

## 2025-03-02 PROCEDURE — 84484 ASSAY OF TROPONIN QUANT: CPT

## 2025-03-02 PROCEDURE — 82330 ASSAY OF CALCIUM: CPT

## 2025-03-02 PROCEDURE — 93005 ELECTROCARDIOGRAM TRACING: CPT

## 2025-03-02 PROCEDURE — 36415 COLL VENOUS BLD VENIPUNCTURE: CPT

## 2025-03-02 PROCEDURE — 84100 ASSAY OF PHOSPHORUS: CPT

## 2025-03-02 PROCEDURE — 83735 ASSAY OF MAGNESIUM: CPT

## 2025-03-02 PROCEDURE — 94640 AIRWAY INHALATION TREATMENT: CPT

## 2025-03-02 PROCEDURE — 6370000000 HC RX 637 (ALT 250 FOR IP)

## 2025-03-02 PROCEDURE — 85610 PROTHROMBIN TIME: CPT

## 2025-03-02 PROCEDURE — 2500000003 HC RX 250 WO HCPCS: Performed by: INTERNAL MEDICINE

## 2025-03-02 PROCEDURE — 94761 N-INVAS EAR/PLS OXIMETRY MLT: CPT

## 2025-03-02 PROCEDURE — 99233 SBSQ HOSP IP/OBS HIGH 50: CPT | Performed by: STUDENT IN AN ORGANIZED HEALTH CARE EDUCATION/TRAINING PROGRAM

## 2025-03-02 PROCEDURE — 6360000002 HC RX W HCPCS: Performed by: INTERNAL MEDICINE

## 2025-03-02 PROCEDURE — 85025 COMPLETE CBC W/AUTO DIFF WBC: CPT

## 2025-03-02 PROCEDURE — 2060000000 HC ICU INTERMEDIATE R&B

## 2025-03-02 PROCEDURE — 6370000000 HC RX 637 (ALT 250 FOR IP): Performed by: INTERNAL MEDICINE

## 2025-03-02 PROCEDURE — 80053 COMPREHEN METABOLIC PANEL: CPT

## 2025-03-02 PROCEDURE — 6370000000 HC RX 637 (ALT 250 FOR IP): Performed by: STUDENT IN AN ORGANIZED HEALTH CARE EDUCATION/TRAINING PROGRAM

## 2025-03-02 RX ORDER — IPRATROPIUM BROMIDE AND ALBUTEROL SULFATE 2.5; .5 MG/3ML; MG/3ML
1 SOLUTION RESPIRATORY (INHALATION)
Status: DISCONTINUED | OUTPATIENT
Start: 2025-03-02 | End: 2025-03-03

## 2025-03-02 RX ADMIN — LEVETIRACETAM 500 MG: 500 TABLET, FILM COATED ORAL at 08:26

## 2025-03-02 RX ADMIN — SODIUM CHLORIDE, PRESERVATIVE FREE 10 ML: 5 INJECTION INTRAVENOUS at 19:48

## 2025-03-02 RX ADMIN — Medication 100 MG: at 08:27

## 2025-03-02 RX ADMIN — MAGNESIUM SULFATE HEPTAHYDRATE 2000 MG: 40 INJECTION, SOLUTION INTRAVENOUS at 17:49

## 2025-03-02 RX ADMIN — AZITHROMYCIN DIHYDRATE 250 MG: 250 TABLET ORAL at 08:26

## 2025-03-02 RX ADMIN — IPRATROPIUM BROMIDE AND ALBUTEROL SULFATE 1 DOSE: .5; 3 SOLUTION RESPIRATORY (INHALATION) at 10:25

## 2025-03-02 RX ADMIN — LEVETIRACETAM 500 MG: 500 TABLET, FILM COATED ORAL at 19:48

## 2025-03-02 RX ADMIN — WATER 1000 MG: 1 INJECTION INTRAMUSCULAR; INTRAVENOUS; SUBCUTANEOUS at 17:42

## 2025-03-02 RX ADMIN — SODIUM CHLORIDE, PRESERVATIVE FREE 10 ML: 5 INJECTION INTRAVENOUS at 08:27

## 2025-03-02 RX ADMIN — IPRATROPIUM BROMIDE AND ALBUTEROL SULFATE 1 DOSE: .5; 3 SOLUTION RESPIRATORY (INHALATION) at 23:01

## 2025-03-02 ASSESSMENT — PAIN SCALES - GENERAL
PAINLEVEL_OUTOF10: 0

## 2025-03-02 NOTE — PROCEDURES
PROCEDURE NOTE  Date: 3/2/2025   Name: Dashawn Rivera  YOB: 1980    Procedures  EKG completed

## 2025-03-03 ENCOUNTER — APPOINTMENT (OUTPATIENT)
Age: 45
DRG: 139 | End: 2025-03-03
Payer: MEDICAID

## 2025-03-03 VITALS
BODY MASS INDEX: 18.36 KG/M2 | HEART RATE: 82 BPM | WEIGHT: 131.17 LBS | OXYGEN SATURATION: 99 % | DIASTOLIC BLOOD PRESSURE: 79 MMHG | HEIGHT: 71 IN | TEMPERATURE: 98.3 F | RESPIRATION RATE: 14 BRPM | SYSTOLIC BLOOD PRESSURE: 128 MMHG

## 2025-03-03 LAB
ALBUMIN SERPL BCG-MCNC: 3.2 G/DL (ref 3.4–4.9)
ALP SERPL-CCNC: 133 U/L (ref 40–129)
ALT SERPL W/O P-5'-P-CCNC: 94 U/L (ref 10–50)
ANION GAP SERPL CALC-SCNC: 8 MEQ/L (ref 8–16)
AST SERPL-CCNC: 123 U/L (ref 10–50)
BILIRUB SERPL-MCNC: < 0.2 MG/DL (ref 0.3–1.2)
BUN SERPL-MCNC: 8 MG/DL (ref 8–23)
CALCIUM SERPL-MCNC: 8.5 MG/DL (ref 8.6–10)
CHLORIDE SERPL-SCNC: 106 MEQ/L (ref 98–111)
CO2 SERPL-SCNC: 26 MEQ/L (ref 22–29)
CREAT SERPL-MCNC: 0.6 MG/DL (ref 0.7–1.2)
DEPRECATED RDW RBC AUTO: 65.6 FL (ref 35–45)
ECHO AO ASC DIAM: 2.6 CM
ECHO AO ASCENDING AORTA INDEX: 1.48 CM/M2
ECHO AV CUSP MM: 1.8 CM
ECHO AV PEAK GRADIENT: 7 MMHG
ECHO AV PEAK VELOCITY: 1.3 M/S
ECHO AV VELOCITY RATIO: 0.85
ECHO BSA: 1.66 M2
ECHO BSA: 1.66 M2
ECHO LA AREA 2C: 11.9 CM2
ECHO LA AREA 4C: 13.3 CM2
ECHO LA DIAMETER INDEX: 1.65 CM/M2
ECHO LA DIAMETER: 2.9 CM
ECHO LA MAJOR AXIS: 4.3 CM
ECHO LA MINOR AXIS: 4 CM
ECHO LA VOL BP: 31 ML (ref 18–58)
ECHO LA VOL MOD A2C: 28 ML (ref 18–58)
ECHO LA VOL MOD A4C: 31 ML (ref 18–58)
ECHO LA VOL/BSA BIPLANE: 18 ML/M2 (ref 16–34)
ECHO LA VOLUME INDEX MOD A2C: 16 ML/M2 (ref 16–34)
ECHO LA VOLUME INDEX MOD A4C: 18 ML/M2 (ref 16–34)
ECHO LV E' LATERAL VELOCITY: 16 CM/S
ECHO LV E' SEPTAL VELOCITY: 12.4 CM/S
ECHO LV EF PHYSICIAN: 55 %
ECHO LV FRACTIONAL SHORTENING: 33 % (ref 28–44)
ECHO LV INTERNAL DIMENSION DIASTOLE INDEX: 2.61 CM/M2
ECHO LV INTERNAL DIMENSION DIASTOLIC: 4.6 CM (ref 4.2–5.9)
ECHO LV INTERNAL DIMENSION SYSTOLIC INDEX: 1.76 CM/M2
ECHO LV INTERNAL DIMENSION SYSTOLIC: 3.1 CM
ECHO LV ISOVOLUMETRIC RELAXATION TIME (IVRT): 60 MS
ECHO LV IVSD: 0.8 CM (ref 0.6–1)
ECHO LV MASS 2D: 127.7 G (ref 88–224)
ECHO LV MASS INDEX 2D: 72.5 G/M2 (ref 49–115)
ECHO LV POSTERIOR WALL DIASTOLIC: 0.9 CM (ref 0.6–1)
ECHO LV RELATIVE WALL THICKNESS RATIO: 0.39
ECHO LVOT PEAK GRADIENT: 5 MMHG
ECHO LVOT PEAK VELOCITY: 1.1 M/S
ECHO MV A VELOCITY: 0.62 M/S
ECHO MV E DECELERATION TIME (DT): 223 MS
ECHO MV E VELOCITY: 0.8 M/S
ECHO MV E/A RATIO: 1.29
ECHO MV E/E' LATERAL: 5
ECHO MV E/E' RATIO (AVERAGED): 5.73
ECHO MV E/E' SEPTAL: 6.45
ECHO PULMONARY ARTERY END DIASTOLIC PRESSURE: 7 MMHG
ECHO PV MAX VELOCITY: 0.8 M/S
ECHO PV PEAK GRADIENT: 2 MMHG
ECHO PV REGURGITANT MAX VELOCITY: 1.3 M/S
ECHO RV INTERNAL DIMENSION: 2.2 CM
ECHO RV TAPSE: 2.3 CM (ref 1.7–?)
ECHO TV E WAVE: 0.9 M/S
ECHO TV REGURGITANT MAX VELOCITY: 2.91 M/S
ECHO TV REGURGITANT PEAK GRADIENT: 34 MMHG
EKG ATRIAL RATE: 68 BPM
EKG P AXIS: 69 DEGREES
EKG P-R INTERVAL: 128 MS
EKG Q-T INTERVAL: 400 MS
EKG QRS DURATION: 78 MS
EKG QTC CALCULATION (BAZETT): 425 MS
EKG R AXIS: 83 DEGREES
EKG T AXIS: 82 DEGREES
EKG VENTRICULAR RATE: 68 BPM
ERYTHROCYTE [DISTWIDTH] IN BLOOD BY AUTOMATED COUNT: 20 % (ref 11.5–14.5)
GFR SERPL CREATININE-BSD FRML MDRD: > 90 ML/MIN/1.73M2
GLUCOSE SERPL-MCNC: 83 MG/DL (ref 74–109)
HCT VFR BLD AUTO: 34.2 % (ref 42–52)
HGB BLD-MCNC: 11.3 GM/DL (ref 14–18)
INR PPP: 1.43 (ref 0.85–1.13)
MCH RBC QN AUTO: 30 PG (ref 26–33)
MCHC RBC AUTO-ENTMCNC: 33 GM/DL (ref 32.2–35.5)
MCV RBC AUTO: 90.7 FL (ref 80–94)
NT-PROBNP SERPL IA-MCNC: 424 PG/ML (ref 0–124)
PLATELET # BLD AUTO: 180 THOU/MM3 (ref 130–400)
PMV BLD AUTO: 9.9 FL (ref 9.4–12.4)
POTASSIUM SERPL-SCNC: 4.2 MEQ/L (ref 3.5–5.2)
PROT SERPL-MCNC: 5.5 G/DL (ref 6.4–8.3)
RBC # BLD AUTO: 3.77 MILL/MM3 (ref 4.7–6.1)
SODIUM SERPL-SCNC: 140 MEQ/L (ref 135–145)
WBC # BLD AUTO: 6.8 THOU/MM3 (ref 4.8–10.8)

## 2025-03-03 PROCEDURE — 94640 AIRWAY INHALATION TREATMENT: CPT

## 2025-03-03 PROCEDURE — 85610 PROTHROMBIN TIME: CPT

## 2025-03-03 PROCEDURE — 99239 HOSP IP/OBS DSCHRG MGMT >30: CPT | Performed by: STUDENT IN AN ORGANIZED HEALTH CARE EDUCATION/TRAINING PROGRAM

## 2025-03-03 PROCEDURE — 36415 COLL VENOUS BLD VENIPUNCTURE: CPT

## 2025-03-03 PROCEDURE — 93246 EXT ECG>7D<15D RECORDING: CPT

## 2025-03-03 PROCEDURE — 6370000000 HC RX 637 (ALT 250 FOR IP): Performed by: INTERNAL MEDICINE

## 2025-03-03 PROCEDURE — 93010 ELECTROCARDIOGRAM REPORT: CPT | Performed by: NUCLEAR MEDICINE

## 2025-03-03 PROCEDURE — 6370000000 HC RX 637 (ALT 250 FOR IP)

## 2025-03-03 PROCEDURE — 6370000000 HC RX 637 (ALT 250 FOR IP): Performed by: STUDENT IN AN ORGANIZED HEALTH CARE EDUCATION/TRAINING PROGRAM

## 2025-03-03 PROCEDURE — 83880 ASSAY OF NATRIURETIC PEPTIDE: CPT

## 2025-03-03 PROCEDURE — 93306 TTE W/DOPPLER COMPLETE: CPT

## 2025-03-03 PROCEDURE — 80053 COMPREHEN METABOLIC PANEL: CPT

## 2025-03-03 PROCEDURE — 6360000002 HC RX W HCPCS: Performed by: INTERNAL MEDICINE

## 2025-03-03 PROCEDURE — 2500000003 HC RX 250 WO HCPCS: Performed by: INTERNAL MEDICINE

## 2025-03-03 PROCEDURE — 85027 COMPLETE CBC AUTOMATED: CPT

## 2025-03-03 PROCEDURE — 93306 TTE W/DOPPLER COMPLETE: CPT | Performed by: INTERNAL MEDICINE

## 2025-03-03 RX ORDER — ALBUTEROL SULFATE 90 UG/1
2 INHALANT RESPIRATORY (INHALATION) EVERY 6 HOURS PRN
Qty: 18 G | Refills: 3 | Status: SHIPPED | OUTPATIENT
Start: 2025-03-03

## 2025-03-03 RX ORDER — IPRATROPIUM BROMIDE AND ALBUTEROL SULFATE 2.5; .5 MG/3ML; MG/3ML
1 SOLUTION RESPIRATORY (INHALATION) 3 TIMES DAILY
Status: DISCONTINUED | OUTPATIENT
Start: 2025-03-03 | End: 2025-03-03 | Stop reason: HOSPADM

## 2025-03-03 RX ORDER — WARFARIN SODIUM 10 MG/1
10 TABLET ORAL
Status: COMPLETED | OUTPATIENT
Start: 2025-03-03 | End: 2025-03-03

## 2025-03-03 RX ORDER — ENOXAPARIN SODIUM 100 MG/ML
60 INJECTION SUBCUTANEOUS 2 TIMES DAILY
Qty: 6 EACH | Refills: 0 | Status: SHIPPED | OUTPATIENT
Start: 2025-03-03

## 2025-03-03 RX ORDER — AMOXICILLIN AND CLAVULANATE POTASSIUM 500; 125 MG/1; MG/1
1 TABLET, FILM COATED ORAL 3 TIMES DAILY
Qty: 6 TABLET | Refills: 0 | Status: SHIPPED | OUTPATIENT
Start: 2025-03-03 | End: 2025-03-05

## 2025-03-03 RX ADMIN — AZITHROMYCIN DIHYDRATE 250 MG: 250 TABLET ORAL at 08:45

## 2025-03-03 RX ADMIN — IPRATROPIUM BROMIDE AND ALBUTEROL SULFATE 1 DOSE: .5; 3 SOLUTION RESPIRATORY (INHALATION) at 13:28

## 2025-03-03 RX ADMIN — WARFARIN SODIUM 10 MG: 10 TABLET ORAL at 16:05

## 2025-03-03 RX ADMIN — LEVETIRACETAM 500 MG: 500 TABLET, FILM COATED ORAL at 08:45

## 2025-03-03 RX ADMIN — WATER 1000 MG: 1 INJECTION INTRAMUSCULAR; INTRAVENOUS; SUBCUTANEOUS at 16:56

## 2025-03-03 RX ADMIN — SODIUM CHLORIDE, PRESERVATIVE FREE 10 ML: 5 INJECTION INTRAVENOUS at 08:45

## 2025-03-03 RX ADMIN — Medication 100 MG: at 08:45

## 2025-03-03 RX ADMIN — IPRATROPIUM BROMIDE AND ALBUTEROL SULFATE 1 DOSE: .5; 3 SOLUTION RESPIRATORY (INHALATION) at 09:53

## 2025-03-03 ASSESSMENT — PAIN SCALES - GENERAL
PAINLEVEL_OUTOF10: 0

## 2025-03-03 NOTE — DISCHARGE SUMMARY
tablets)       Next INR date: 03/06/24 @ 2:20 PM with results to [facility]    Melissa Jang, PharmD, BCPS  3/3/2025  2:37 PM            Where to Get Your Medications        These medications were sent to [facility] - [place], [place] - [address] - P [phone] - F [phone]  [address], [place] [address]      Phone: [phone]   enoxaparin 60 MG/0.6ML       These medications were sent to [id number] - [place], [place] - [address] - P [phone] - F [phone]  [address], [place] [address]      Hours: 24-hours Phone: [phone]   albuterol sulfate  (90 Base) MCG/ACT inhaler  amoxicillin-clavulanate 500-125 MG per tablet          Time Spent on discharge is 30 minutes in the examination, evaluation, counseling and review of medications and discharge plan.    Thank you Lyn Damico, APRN - CNP for the opportunity to be involved in this patient's care.      Signed:    Electronically signed by Lorena Franco DO on 3/3/25 at 5:06 PM EST     Case was discussed with Attending, Dr. Mcnally

## 2025-03-03 NOTE — DISCHARGE INSTRUCTIONS
Follow up with PCP in 1 week    Discharge with 14-day Holter monitor    Instructions for follow up with Coumadin clinic on 3/6/2025; will be discharged on Lovenox to bridge

## 2025-03-03 NOTE — RT PROTOCOL NOTE
RT Inhaler-Nebulizer Bronchodilator Protocol Note    There is a bronchodilator order in the chart from a provider indicating to follow the RT Bronchodilator Protocol and there is an “Initiate RT Inhaler-Nebulizer Bronchodilator Protocol” order as well (see protocol at bottom of note).    CXR Findings:  No results found.    The findings from the last RT Protocol Assessment were as follows:   History Pulmonary Disease: Chronic pulmonary disease  Respiratory Pattern: Dyspnea on exertion or RR 21-25 bpm  Breath Sounds: Slightly diminished and/or crackles  Cough: Strong, spontaneous, non-productive  Indication for Bronchodilator Therapy: Decreased or absent breath sounds  Bronchodilator Assessment Score: 6    Aerosolized bronchodilator medication orders have been revised according to the RT Inhaler-Nebulizer Bronchodilator Protocol below.    Respiratory Therapist to perform RT Therapy Protocol Assessment initially then follow the protocol.  Repeat RT Therapy Protocol Assessment PRN for score 0-3 or on second treatment, BID, and PRN for scores above 3.    No Indications - adjust the frequency to every 6 hours PRN wheezing or bronchospasm, if no treatments needed after 48 hours then discontinue using Per Protocol order mode.     If indication present, adjust the RT bronchodilator orders based on the Bronchodilator Assessment Score as indicated below.  Use Inhaler orders unless patient has one or more of the following: on home nebulizer, not able to hold breath for 10 seconds, is not alert and oriented, cannot activate and use MDI correctly, or respiratory rate 25 breaths per minute or more, then use the equivalent nebulizer order(s) with same Frequency and PRN reasons based on the score.  If a patient is on this medication at home then do not decrease Frequency below that used at home.    0-3 - enter or revise RT bronchodilator order(s) to equivalent RT Bronchodilator order with Frequency of every 4 hours PRN for wheezing 
of breathing using Per Protocol order mode.        4-6 - enter or revise RT Bronchodilator order(s) to two equivalent RT bronchodilator orders with one order with BID Frequency and one order with Frequency of every 4 hours PRN wheezing or increased work of breathing using Per Protocol order mode.        7-10 - enter or revise RT Bronchodilator order(s) to two equivalent RT bronchodilator orders with one order with TID Frequency and one order with Frequency of every 4 hours PRN wheezing or increased work of breathing using Per Protocol order mode.       11-13 - enter or revise RT Bronchodilator order(s) to one equivalent RT bronchodilator order with QID Frequency and an Albuterol order with Frequency of every 4 hours PRN wheezing or increased work of breathing using Per Protocol order mode.      Greater than 13 - enter or revise RT Bronchodilator order(s) to one equivalent RT bronchodilator order with every 4 hours Frequency and an Albuterol order with Frequency of every 2 hours PRN wheezing or increased work of breathing using Per Protocol order mode.     RT to enter RT Home Evaluation for COPD & MDI Assessment order using Per Protocol order mode.    Electronically signed by Chata Joseph RCP on 3/3/2025 at 10:00 AM  
this medication at home then do not decrease Frequency below that used at home.    0-3 - enter or revise RT bronchodilator order(s) to equivalent RT Bronchodilator order with Frequency of every 4 hours PRN for wheezing or increased work of breathing using Per Protocol order mode.        4-6 - enter or revise RT Bronchodilator order(s) to two equivalent RT bronchodilator orders with one order with BID Frequency and one order with Frequency of every 4 hours PRN wheezing or increased work of breathing using Per Protocol order mode.        7-10 - enter or revise RT Bronchodilator order(s) to two equivalent RT bronchodilator orders with one order with TID Frequency and one order with Frequency of every 4 hours PRN wheezing or increased work of breathing using Per Protocol order mode.       11-13 - enter or revise RT Bronchodilator order(s) to one equivalent RT bronchodilator order with QID Frequency and an Albuterol order with Frequency of every 4 hours PRN wheezing or increased work of breathing using Per Protocol order mode.      Greater than 13 - enter or revise RT Bronchodilator order(s) to one equivalent RT bronchodilator order with every 4 hours Frequency and an Albuterol order with Frequency of every 2 hours PRN wheezing or increased work of breathing using Per Protocol order mode.     RT to enter RT Home Evaluation for COPD & MDI Assessment order using Per Protocol order mode.    Electronically signed by Maged Sullivan RCP on 3/1/2025 at 8:47 PM  
this medication at home then do not decrease Frequency below that used at home.    0-3 - enter or revise RT bronchodilator order(s) to equivalent RT Bronchodilator order with Frequency of every 4 hours PRN for wheezing or increased work of breathing using Per Protocol order mode.        4-6 - enter or revise RT Bronchodilator order(s) to two equivalent RT bronchodilator orders with one order with BID Frequency and one order with Frequency of every 4 hours PRN wheezing or increased work of breathing using Per Protocol order mode.        7-10 - enter or revise RT Bronchodilator order(s) to two equivalent RT bronchodilator orders with one order with TID Frequency and one order with Frequency of every 4 hours PRN wheezing or increased work of breathing using Per Protocol order mode.       11-13 - enter or revise RT Bronchodilator order(s) to one equivalent RT bronchodilator order with QID Frequency and an Albuterol order with Frequency of every 4 hours PRN wheezing or increased work of breathing using Per Protocol order mode.      Greater than 13 - enter or revise RT Bronchodilator order(s) to one equivalent RT bronchodilator order with every 4 hours Frequency and an Albuterol order with Frequency of every 2 hours PRN wheezing or increased work of breathing using Per Protocol order mode.     RT to enter RT Home Evaluation for COPD & MDI Assessment order using Per Protocol order mode.    Electronically signed by Minnie Lemon RCP on 3/1/2025 at 3:57 PM

## 2025-03-03 NOTE — PLAN OF CARE
Problem: Discharge Planning  Goal: Discharge to home or other facility with appropriate resources  3/1/2025 1504 by Sintia Camilo RN  Outcome: Progressing  Flowsheets (Taken 3/1/2025 1504)  Discharge to home or other facility with appropriate resources:   Identify barriers to discharge with patient and caregiver   Arrange for needed discharge resources and transportation as appropriate     Problem: Safety - Adult  Goal: Free from fall injury  3/1/2025 1504 by Sintia Camilo RN  Outcome: Progressing  Flowsheets (Taken 3/1/2025 1504)  Free From Fall Injury:   Instruct family/caregiver on patient safety   Based on caregiver fall risk screen, instruct family/caregiver to ask for assistance with transferring infant if caregiver noted to have fall risk factors     Problem: Cardiovascular - Adult  Goal: Maintains optimal cardiac output and hemodynamic stability  3/1/2025 1504 by Sintia Camilo RN  Outcome: Progressing  Flowsheets (Taken 3/1/2025 1504)  Maintains optimal cardiac output and hemodynamic stability:   Monitor blood pressure and heart rate   Monitor urine output and notify Licensed Independent Practitioner for values outside of normal range     Problem: Cardiovascular - Adult  Goal: Absence of cardiac dysrhythmias or at baseline  3/1/2025 1504 by Sintia Camilo RN  Outcome: Progressing  Flowsheets (Taken 3/1/2025 1504)  Absence of cardiac dysrhythmias or at baseline:   Monitor cardiac rate and rhythm   Assess for signs of decreased cardiac output     Problem: Metabolic/Fluid and Electrolytes - Adult  Goal: Electrolytes maintained within normal limits  3/1/2025 1504 by Sintia Camilo RN  Outcome: Progressing  Flowsheets (Taken 3/1/2025 1504)  Electrolytes maintained within normal limits:   Monitor labs and assess patient for signs and symptoms of electrolyte imbalances   Administer electrolyte replacement as ordered   Fluid restriction as ordered   Monitor response to electrolyte 
  Problem: Discharge Planning  Goal: Discharge to home or other facility with appropriate resources  Outcome: Progressing  Flowsheets  Taken 3/1/2025 0319 by Lien Nettles, RN  Discharge to home or other facility with appropriate resources:   Identify barriers to discharge with patient and caregiver   Arrange for needed discharge resources and transportation as appropriate   Identify discharge learning needs (meds, wound care, etc)    Problem: Safety - Adult  Goal: Free from fall injury  Outcome: Progressing  Flowsheets (Taken 3/1/2025 0319)  Free From Fall Injury: Instruct family/caregiver on patient safety     Problem: Cardiovascular - Adult  Goal: Maintains optimal cardiac output and hemodynamic stability  Outcome: Progressing  Flowsheets (Taken 3/1/2025 0319)  Maintains optimal cardiac output and hemodynamic stability:   Monitor blood pressure and heart rate   Assess for signs of decreased cardiac output     Problem: Cardiovascular - Adult  Goal: Absence of cardiac dysrhythmias or at baseline  Outcome: Progressing  Flowsheets (Taken 3/1/2025 0319)  Absence of cardiac dysrhythmias or at baseline:   Monitor cardiac rate and rhythm   Assess for signs of decreased cardiac output     Problem: Metabolic/Fluid and Electrolytes - Adult  Goal: Electrolytes maintained within normal limits  Outcome: Progressing  Flowsheets (Taken 3/1/2025 0319)  Electrolytes maintained within normal limits:   Monitor labs and assess patient for signs and symptoms of electrolyte imbalances   Administer electrolyte replacement as ordered   Monitor response to electrolyte replacements, including repeat lab results as appropriate     
  Problem: Discharge Planning  Goal: Discharge to home or other facility with appropriate resources  Outcome: Progressing  Flowsheets (Taken 3/2/2025 1800)  Discharge to home or other facility with appropriate resources:   Identify barriers to discharge with patient and caregiver   Refer to discharge planning if patient needs post-hospital services based on physician order or complex needs related to functional status, cognitive ability or social support system     Problem: Safety - Adult  Goal: Free from fall injury  Outcome: Progressing  Flowsheets (Taken 3/2/2025 1800)  Free From Fall Injury:   Instruct family/caregiver on patient safety   Based on caregiver fall risk screen, instruct family/caregiver to ask for assistance with transferring infant if caregiver noted to have fall risk factors     Problem: Cardiovascular - Adult  Goal: Maintains optimal cardiac output and hemodynamic stability  Outcome: Progressing  Flowsheets (Taken 3/2/2025 1800)  Maintains optimal cardiac output and hemodynamic stability:   Monitor blood pressure and heart rate   Monitor urine output and notify Licensed Independent Practitioner for values outside of normal range     Problem: Cardiovascular - Adult  Goal: Absence of cardiac dysrhythmias or at baseline  Outcome: Progressing  Flowsheets (Taken 3/2/2025 1800)  Absence of cardiac dysrhythmias or at baseline:   Assess for signs of decreased cardiac output   Monitor cardiac rate and rhythm   Administer antiarrhythmia medication and electrolyte replacement as ordered     Problem: Metabolic/Fluid and Electrolytes - Adult  Goal: Electrolytes maintained within normal limits  Outcome: Progressing  Flowsheets (Taken 3/2/2025 1800)  Electrolytes maintained within normal limits:   Monitor labs and assess patient for signs and symptoms of electrolyte imbalances   Administer electrolyte replacement as ordered   Fluid restriction as ordered   Monitor response to electrolyte replacements, 
  Problem: Respiratory - Adult  Goal: Achieves optimal ventilation and oxygenation  Outcome: Progressing  Flowsheets (Taken 3/2/2025 4564)  Achieves optimal ventilation and oxygenation:   Assess for changes in respiratory status   Position to facilitate oxygenation and minimize respiratory effort   Initiate smoking cessation protocol as indicated   Assess the need for suctioning and aspirate as needed   Respiratory therapy support as indicated   Assess for changes in mentation and behavior   Oxygen supplementation based on oxygen saturation or arterial blood gases   Encourage broncho-pulmonary hygiene including cough, deep breathe, incentive spirometry   Assess and instruct to report shortness of breath or any respiratory difficulty     
Electrolytes maintained within normal limits  3/3/2025 0043 by Jaziel Dyson RN  Outcome: Progressing  3/2/2025 1800 by Sintia Camilo RN  Outcome: Progressing  Flowsheets (Taken 3/2/2025 1800)  Electrolytes maintained within normal limits:   Monitor labs and assess patient for signs and symptoms of electrolyte imbalances   Administer electrolyte replacement as ordered   Fluid restriction as ordered   Monitor response to electrolyte replacements, including repeat lab results as appropriate     Problem: Pain  Goal: Verbalizes/displays adequate comfort level or baseline comfort level  3/3/2025 0043 by Jaziel Dyson RN  Outcome: Progressing  3/2/2025 1800 by Sintia Camilo RN  Outcome: Progressing  Flowsheets (Taken 3/2/2025 1800)  Verbalizes/displays adequate comfort level or baseline comfort level:   Assess pain using appropriate pain scale   Encourage patient to monitor pain and request assistance   Administer analgesics based on type and severity of pain and evaluate response     Problem: Respiratory - Adult  Goal: Achieves optimal ventilation and oxygenation  3/3/2025 0043 by Jaziel Dyson RN  Outcome: Progressing  3/2/2025 2304 by Alix Alonzo RCP  Outcome: Progressing  Flowsheets (Taken 3/2/2025 2304)  Achieves optimal ventilation and oxygenation:   Assess for changes in respiratory status   Position to facilitate oxygenation and minimize respiratory effort   Initiate smoking cessation protocol as indicated   Assess the need for suctioning and aspirate as needed   Respiratory therapy support as indicated   Assess for changes in mentation and behavior   Oxygen supplementation based on oxygen saturation or arterial blood gases   Encourage broncho-pulmonary hygiene including cough, deep breathe, incentive spirometry   Assess and instruct to report shortness of breath or any respiratory difficulty     
and request assistance   Assess pain using appropriate pain scale   Implement non-pharmacological measures as appropriate and evaluate response     
Progressing  Flowsheets (Taken 3/3/2025 1123)  Skin Integrity Remains Intact:   Monitor for areas of redness and/or skin breakdown   Every 4-6 hours minimum: Change oxygen saturation probe site   Assess vascular access sites hourly     Problem: Gastrointestinal - Adult  Goal: Maintains adequate nutritional intake  Outcome: Progressing  Flowsheets (Taken 3/3/2025 1123)  Maintains adequate nutritional intake:   Monitor percentage of each meal consumed   Monitor intake and output, weight and lab values   Obtain nutritional consult as needed   Identify factors contributing to decreased intake, treat as appropriate   Assist with meals as needed     Care plan reviewed with patient. Patient verbalizes understanding of the plan of care and contribute to goal setting.

## 2025-03-03 NOTE — PROGRESS NOTES
Clinical Pharmacy Note                                               Warfarin Discharge Recommendations    Patient discharged from [facility] today on warfarin.    Warfarin indication: Hx of DVT & PE and hypercoaguable  INR goal during admission: 2.0-3.0  Previous home warfarin regimen: 10 mg daily  Interacting medications at discharge: Lovenox  Coumadin 5 mg tabs    Hospital Warfarin Doses & INR Results  Date Warfarin Dose   3/3/2025 10 mg (2 tablets)       Recent INRs:  Recent Labs     03/03/25  0536   INR 1.43*     Warfarin Discharge Instructions:   Date Lovenox AM Lovenox PM  Warfarin Dose   03/04/25 (tomorrow) 60 mg 60 mg 10 mg (2 tablets)   03/05/25 60 mg 60 mg 10 mg (2 tablets)       Next INR date: 03/06/24 @ 2:20 PM with results to  [facility]    Melissa Jang, PharmD, BCPS  3/3/2025  2:37 PM        
    Hospitalist Progress Note  Internal Medicine Resident      Patient: Dashawn Rivera 45 y.o. male      Unit/Bed: -23/023-A    Admit Date: 2/28/2025      ASSESSMENT AND PLAN  Active Problems  EtOH Withdrawal: Has a history of etOH withdrawal seizures. Drinks half a bottle of vodka per day. Currently on CIWA protocol. 3/1 patient received no doses of Ativan overnight. Patient currently endorsing no agitation, tremors or diaphoresis. Currently on Keppra and thiamine.   Continue CIWA protocol  Continue thiamine 100 Mg p.o. daily  Continue Keppra 5 Mg p.o. twice daily; patient has history of withdrawal seizures  Continue fall/seizure precautions  Community-acquired pneumonia: Patient initially presented with shortness of breath. Was afebrile. WBC on arrival 17.1; currently 6.2. 2/28 chest x-ray showed no acute cardiopulmonary process. CTA chest indicative of pneumonia.  Patient endorsing no sputum production. Currently on Rocephin and azithromycin.  3/1 patient still endorsing shortness of breath; given DuoNeb treatment.   Continue Rocephin 1000 Mg IV daily (2/28 - 3/4)  Continue azithromycin 250 Mg p.o. daily (3/1 - 3/4)  Continue DuoNeb 2.5 Mg inhalation 3 times daily respiration for continued SOB  Trend CBC, vitals  Monitor for S/S of worsening infection  Colitis: Patient reports diarrhea. WBC 6.2. Patient currently afebrile. Reports no abdominal pain. 2/28 CT abdomen pelvis showed moderate diffuse colonic wall thickening suggestive of colitis. No bowel obstruction, fluid collection or free air observed. Patient currently on antibiotics for CAP. Monitor stool output. Trend CBC, vitals.   Supratherapeutic INR: INR 5. No sign of bleeding. Will hold coumadin and monitor. Pharmacy consult to follow coumadin dosing. No indication fro vitamin K at this time. Coumadin is for history of DVT.     Resolved Problems  Lactic acidosis/ketoacidosis: Likely type B due to EtOH. No evidence of ischemia or hypoperfusin. Not on any 
Discharge teaching and instructions for diagnosis/procedure of lactic acidosis completed with patient using teachback method. AVS reviewed. Printed prescriptions given to patient. Patient voiced understanding regarding prescriptions, follow up appointments, and care of self at home. Discharged ambulatory to independent living per family.   
Echocardiogram complete at bedside.  
Pharmacy Medication History Note    List of current medications patient is taking is complete.    Source of information: patient and surescripts     Changes made to medication list:  Medications removed (include reason, ex. therapy complete or physician discontinued):  None    Medications added/doses adjusted:  None     Other notes (ex. Recent course of antibiotics, Coumadin dosing):  The patient has not picked up Augmentin and Zithromax from the pharmacy.   Denies use of other OTC or herbal medications.    Allergies reviewed    Electronically signed by Georgia Johnson RPH on 2/28/2025 at 3:37 PM     
Pt admitted to  [facility] in a wheelchair.     Complaints: lactic acidosis, supratherapeutic INR.      IV none saline locked at left AC. IV site free of s/s of infection or infiltration. Vital signs obtained. Assessment and data collection initiated.     Two nurse skin assessment performed by Lien CARTAGENA and Kandace CARTAGENA. Oriented to room.     Policies and procedures for [facility] explained. All questions answered with no further questions at this time. Fall prevention and safety brochure discussed with patient.  Bed alarm on. Call light in reach.       
normal S1/S2 without murmurs.    No lower extremity edema.   Abdomen: Soft, non-tender, non-distended with normal bowel sounds.  Musculoskeletal: No joint swelling or tenderness. Normal tone. No abnormal movements.   Skin: Warm and dry. No rashes or lesions.  Neurologic:  No focal sensory/motor deficits in the upper or lower extremities. Cranial nerves:  grossly non-focal 2-12.     Psychiatric: Alert and oriented, normal insight and thought content.   Capillary Refill: Brisk,< 3 seconds.  Peripheral Pulses: +2 palpable, equal bilaterally.       Labs/Radiology: See chart or assessment above.     Electronically signed by Efrain Briones DO on 3/2/2025 at 6:01 PM  Case was discussed with Attending, Dr. Mcnally.

## 2025-03-03 NOTE — DISCHARGE INSTR - MEDS
Clinical Pharmacy Note                                               Warfarin Discharge Recommendations    Patient discharged from [facility] today on warfarin.    Warfarin indication: Hx of DVT & PE and hypercoaguable  INR goal during admission: 2.0-3.0  Previous home warfarin regimen: 10 mg daily  Interacting medications at discharge: Lovenox  Coumadin 5 mg tabs    Hospital Warfarin Doses & INR Results  Date Warfarin Dose   3/3/2025 10 mg (2 tablets)       Recent INRs:  Recent Labs     03/03/25  0536   INR 1.43*     Warfarin Discharge Instructions:   Date Lovenox AM Lovenox PM  Warfarin Dose   03/04/25 (tomorrow) 60 mg 60 mg 10 mg (2 tablets)   03/05/25 60 mg 60 mg 10 mg (2 tablets)       Next INR date: 03/06/24 @ 2:20 PM with results to  [facility]    Melissa Jang, PharmD, BCPS  3/3/2025  2:37 PM

## 2025-03-06 ENCOUNTER — SOCIAL WORK (OUTPATIENT)
Dept: INTERNAL MEDICINE CLINIC | Age: 45
End: 2025-03-06

## 2025-03-06 ENCOUNTER — APPOINTMENT (OUTPATIENT)
Age: 45
End: 2025-03-06
Payer: MEDICAID

## 2025-03-06 NOTE — PROGRESS NOTES
Sw received a referral,for patient.  Patient did not answer and does not have a voicemail set up.

## 2025-03-08 ENCOUNTER — HOSPITAL ENCOUNTER (EMERGENCY)
Age: 45
Discharge: HOME | End: 2025-03-08
Attending: STUDENT IN AN ORGANIZED HEALTH CARE EDUCATION/TRAINING PROGRAM
Payer: MEDICAID

## 2025-03-08 ENCOUNTER — HOSPITAL ENCOUNTER (EMERGENCY)
Age: 45
Discharge: HOME | End: 2025-03-09
Payer: MEDICAID

## 2025-03-08 ENCOUNTER — APPOINTMENT (OUTPATIENT)
Dept: GENERAL RADIOLOGY | Age: 45
End: 2025-03-08
Payer: MEDICAID

## 2025-03-08 ENCOUNTER — APPOINTMENT (OUTPATIENT)
Dept: CT IMAGING | Age: 45
End: 2025-03-08
Payer: MEDICAID

## 2025-03-08 ENCOUNTER — HOSPITAL ENCOUNTER (EMERGENCY)
Age: 45
Discharge: LEFT BEFORE DISCHARGE | End: 2025-03-08
Payer: MEDICAID

## 2025-03-08 VITALS
RESPIRATION RATE: 16 BRPM | SYSTOLIC BLOOD PRESSURE: 109 MMHG | TEMPERATURE: 97.4 F | DIASTOLIC BLOOD PRESSURE: 60 MMHG | OXYGEN SATURATION: 96 % | HEART RATE: 87 BPM

## 2025-03-08 VITALS
OXYGEN SATURATION: 96 % | HEART RATE: 105 BPM | DIASTOLIC BLOOD PRESSURE: 75 MMHG | WEIGHT: 130 LBS | HEIGHT: 71 IN | TEMPERATURE: 97.9 F | SYSTOLIC BLOOD PRESSURE: 114 MMHG | BODY MASS INDEX: 18.2 KG/M2 | RESPIRATION RATE: 20 BRPM

## 2025-03-08 DIAGNOSIS — R06.89 DYSPNEA AND RESPIRATORY ABNORMALITIES: Primary | ICD-10-CM

## 2025-03-08 DIAGNOSIS — F10.920 ACUTE ALCOHOLIC INTOXICATION WITHOUT COMPLICATION: ICD-10-CM

## 2025-03-08 DIAGNOSIS — J44.1 COPD EXACERBATION (HCC): Primary | ICD-10-CM

## 2025-03-08 DIAGNOSIS — Z76.5 MALINGERING: ICD-10-CM

## 2025-03-08 DIAGNOSIS — R06.00 DYSPNEA AND RESPIRATORY ABNORMALITIES: Primary | ICD-10-CM

## 2025-03-08 DIAGNOSIS — R79.1 SUBTHERAPEUTIC INTERNATIONAL NORMALIZED RATIO (INR): ICD-10-CM

## 2025-03-08 LAB
ALBUMIN SERPL BCG-MCNC: 4.3 G/DL (ref 3.4–4.9)
ALP SERPL-CCNC: 116 U/L (ref 40–129)
ALT SERPL W/O P-5'-P-CCNC: 100 U/L (ref 10–50)
ANION GAP SERPL CALC-SCNC: 13 MEQ/L (ref 8–16)
ANION GAP SERPL CALC-SCNC: 15 MEQ/L (ref 8–16)
APTT PPP: 37.5 SECONDS (ref 22–38)
AST SERPL-CCNC: 84 U/L (ref 10–50)
AUTO DIFF PNL BLD: ABNORMAL
BASOPHILS ABSOLUTE: 0 THOU/MM3 (ref 0–0.1)
BASOPHILS ABSOLUTE: 0 THOU/MM3 (ref 0–0.1)
BASOPHILS NFR BLD AUTO: 0.6 %
BASOPHILS NFR BLD AUTO: 0.9 %
BILIRUB CONJ SERPL-MCNC: 0.4 MG/DL (ref 0–0.2)
BILIRUB SERPL-MCNC: 0.8 MG/DL (ref 0.3–1.2)
BUN SERPL-MCNC: 7 MG/DL (ref 8–23)
BUN SERPL-MCNC: 7 MG/DL (ref 8–23)
CALCIUM SERPL-MCNC: 8.2 MG/DL (ref 8.6–10)
CALCIUM SERPL-MCNC: 8.4 MG/DL (ref 8.6–10)
CHLORIDE SERPL-SCNC: 105 MEQ/L (ref 98–111)
CHLORIDE SERPL-SCNC: 107 MEQ/L (ref 98–111)
CO2 SERPL-SCNC: 26 MEQ/L (ref 22–29)
CO2 SERPL-SCNC: 26 MEQ/L (ref 22–29)
CREAT SERPL-MCNC: 0.7 MG/DL (ref 0.7–1.2)
CREAT SERPL-MCNC: 0.8 MG/DL (ref 0.7–1.2)
DACROCYTES: ABNORMAL
DEPRECATED RDW RBC AUTO: 63.3 FL (ref 35–45)
DEPRECATED RDW RBC AUTO: 63.7 FL (ref 35–45)
EKG ATRIAL RATE: 104 BPM
EKG ATRIAL RATE: 93 BPM
EKG P AXIS: 84 DEGREES
EKG P AXIS: 88 DEGREES
EKG P-R INTERVAL: 132 MS
EKG P-R INTERVAL: 136 MS
EKG Q-T INTERVAL: 340 MS
EKG Q-T INTERVAL: 364 MS
EKG QRS DURATION: 70 MS
EKG QRS DURATION: 78 MS
EKG QTC CALCULATION (BAZETT): 447 MS
EKG QTC CALCULATION (BAZETT): 452 MS
EKG R AXIS: 83 DEGREES
EKG R AXIS: 86 DEGREES
EKG T AXIS: 78 DEGREES
EKG T AXIS: 86 DEGREES
EKG VENTRICULAR RATE: 104 BPM
EKG VENTRICULAR RATE: 93 BPM
EOSINOPHIL NFR BLD AUTO: 0.6 %
EOSINOPHIL NFR BLD AUTO: 0.7 %
EOSINOPHILS ABSOLUTE: 0 THOU/MM3 (ref 0–0.4)
EOSINOPHILS ABSOLUTE: 0 THOU/MM3 (ref 0–0.4)
ERYTHROCYTE [DISTWIDTH] IN BLOOD BY AUTOMATED COUNT: 19.8 % (ref 11.5–14.5)
ERYTHROCYTE [DISTWIDTH] IN BLOOD BY AUTOMATED COUNT: 19.9 % (ref 11.5–14.5)
ETHANOL SERPL-MCNC: 0.39 % (ref 0–0.08)
ETHANOL SERPL-MCNC: 0.39 % (ref 0–0.08)
GFR SERPL CREATININE-BSD FRML MDRD: > 90 ML/MIN/1.73M2
GFR SERPL CREATININE-BSD FRML MDRD: > 90 ML/MIN/1.73M2
GLUCOSE SERPL-MCNC: 97 MG/DL (ref 74–109)
GLUCOSE SERPL-MCNC: 98 MG/DL (ref 74–109)
HCT VFR BLD AUTO: 37.3 % (ref 42–52)
HCT VFR BLD AUTO: 38.7 % (ref 42–52)
HGB BLD-MCNC: 12.4 GM/DL (ref 14–18)
HGB BLD-MCNC: 12.7 GM/DL (ref 14–18)
HYPOCHROMIA BLD QL SMEAR: ABNORMAL
IMM GRANULOCYTES # BLD AUTO: 0.01 THOU/MM3 (ref 0–0.07)
IMM GRANULOCYTES # BLD AUTO: 0.02 THOU/MM3 (ref 0–0.07)
IMM GRANULOCYTES NFR BLD AUTO: 0.2 %
IMM GRANULOCYTES NFR BLD AUTO: 0.4 %
INR PPP: 1.41 (ref 0.85–1.13)
INR PPP: 1.42 (ref 0.85–1.13)
LACTIC ACID, SEPSIS: 3.4 MMOL/L (ref 0.5–1.9)
LACTIC ACID, SEPSIS: 3.4 MMOL/L (ref 0.5–1.9)
LYMPHOCYTES ABSOLUTE: 2.4 THOU/MM3 (ref 1–4.8)
LYMPHOCYTES ABSOLUTE: 2.9 THOU/MM3 (ref 1–4.8)
LYMPHOCYTES NFR BLD AUTO: 50.1 %
LYMPHOCYTES NFR BLD AUTO: 66.4 %
MCH RBC QN AUTO: 29.1 PG (ref 26–33)
MCH RBC QN AUTO: 29.4 PG (ref 26–33)
MCHC RBC AUTO-ENTMCNC: 32.8 GM/DL (ref 32.2–35.5)
MCHC RBC AUTO-ENTMCNC: 33.2 GM/DL (ref 32.2–35.5)
MCV RBC AUTO: 88.4 FL (ref 80–94)
MCV RBC AUTO: 88.6 FL (ref 80–94)
MONOCYTES ABSOLUTE: 0.5 THOU/MM3 (ref 0.4–1.3)
MONOCYTES ABSOLUTE: 0.6 THOU/MM3 (ref 0.4–1.3)
MONOCYTES NFR BLD AUTO: 11.6 %
MONOCYTES NFR BLD AUTO: 12.9 %
NEUTROPHILS ABSOLUTE: 0.9 THOU/MM3 (ref 1.8–7.7)
NEUTROPHILS ABSOLUTE: 1.7 THOU/MM3 (ref 1.8–7.7)
NEUTROPHILS NFR BLD AUTO: 20.2 %
NEUTROPHILS NFR BLD AUTO: 35.4 %
NRBC BLD AUTO-RTO: 0 /100 WBC
NRBC BLD AUTO-RTO: 1 /100 WBC
NT-PROBNP SERPL IA-MCNC: 40 PG/ML (ref 0–124)
NT-PROBNP SERPL IA-MCNC: < 36 PG/ML (ref 0–124)
OSMOLALITY SERPL CALC.SUM OF ELEC: 288.4 MOSMOL/KG (ref 275–300)
OSMOLALITY SERPL CALC.SUM OF ELEC: 288.5 MOSMOL/KG (ref 275–300)
PATHOLOGIST REVIEW: ABNORMAL
PLATELET # BLD AUTO: 235 THOU/MM3 (ref 130–400)
PLATELET # BLD AUTO: 236 THOU/MM3 (ref 130–400)
PLATELET BLD QL SMEAR: ADEQUATE
PMV BLD AUTO: 8.7 FL (ref 9.4–12.4)
PMV BLD AUTO: 8.9 FL (ref 9.4–12.4)
POIKILOCYTES: ABNORMAL
POTASSIUM SERPL-SCNC: 3.6 MEQ/L (ref 3.5–5.2)
POTASSIUM SERPL-SCNC: 3.6 MEQ/L (ref 3.5–5.2)
PROCALCITONIN SERPL IA-MCNC: < 0.02 NG/ML (ref 0.01–0.09)
PROT SERPL-MCNC: 7.2 G/DL (ref 6.4–8.3)
RBC # BLD AUTO: 4.22 MILL/MM3 (ref 4.7–6.1)
RBC # BLD AUTO: 4.37 MILL/MM3 (ref 4.7–6.1)
SCAN OF BLOOD SMEAR: NORMAL
SODIUM SERPL-SCNC: 146 MEQ/L (ref 135–145)
SODIUM SERPL-SCNC: 146 MEQ/L (ref 135–145)
TARGETS BLD QL SMEAR: ABNORMAL
TROPONIN, HIGH SENSITIVITY: 10 NG/L (ref 0–12)
TROPONIN, HIGH SENSITIVITY: < 6 NG/L (ref 0–12)
WBC # BLD AUTO: 4.3 THOU/MM3 (ref 4.8–10.8)
WBC # BLD AUTO: 4.8 THOU/MM3 (ref 4.8–10.8)

## 2025-03-08 PROCEDURE — 85610 PROTHROMBIN TIME: CPT

## 2025-03-08 PROCEDURE — 93005 ELECTROCARDIOGRAM TRACING: CPT | Performed by: FAMILY MEDICINE

## 2025-03-08 PROCEDURE — 85730 THROMBOPLASTIN TIME PARTIAL: CPT

## 2025-03-08 PROCEDURE — 93005 ELECTROCARDIOGRAM TRACING: CPT | Performed by: EMERGENCY MEDICINE

## 2025-03-08 PROCEDURE — 6360000004 HC RX CONTRAST MEDICATION: Performed by: PHYSICIAN ASSISTANT

## 2025-03-08 PROCEDURE — 84145 PROCALCITONIN (PCT): CPT

## 2025-03-08 PROCEDURE — 94761 N-INVAS EAR/PLS OXIMETRY MLT: CPT

## 2025-03-08 PROCEDURE — 84484 ASSAY OF TROPONIN QUANT: CPT

## 2025-03-08 PROCEDURE — 6360000002 HC RX W HCPCS: Performed by: PHYSICIAN ASSISTANT

## 2025-03-08 PROCEDURE — 83880 ASSAY OF NATRIURETIC PEPTIDE: CPT

## 2025-03-08 PROCEDURE — 71045 X-RAY EXAM CHEST 1 VIEW: CPT

## 2025-03-08 PROCEDURE — 80048 BASIC METABOLIC PNL TOTAL CA: CPT

## 2025-03-08 PROCEDURE — 82077 ASSAY SPEC XCP UR&BREATH IA: CPT

## 2025-03-08 PROCEDURE — 71275 CT ANGIOGRAPHY CHEST: CPT

## 2025-03-08 PROCEDURE — 6370000000 HC RX 637 (ALT 250 FOR IP): Performed by: EMERGENCY MEDICINE

## 2025-03-08 PROCEDURE — 85025 COMPLETE CBC W/AUTO DIFF WBC: CPT

## 2025-03-08 PROCEDURE — 93010 ELECTROCARDIOGRAM REPORT: CPT | Performed by: INTERNAL MEDICINE

## 2025-03-08 PROCEDURE — 80076 HEPATIC FUNCTION PANEL: CPT

## 2025-03-08 PROCEDURE — 94640 AIRWAY INHALATION TREATMENT: CPT

## 2025-03-08 PROCEDURE — 96365 THER/PROPH/DIAG IV INF INIT: CPT

## 2025-03-08 PROCEDURE — 36415 COLL VENOUS BLD VENIPUNCTURE: CPT

## 2025-03-08 PROCEDURE — 2580000003 HC RX 258: Performed by: PHYSICIAN ASSISTANT

## 2025-03-08 PROCEDURE — 93005 ELECTROCARDIOGRAM TRACING: CPT | Performed by: PHYSICIAN ASSISTANT

## 2025-03-08 PROCEDURE — 83605 ASSAY OF LACTIC ACID: CPT

## 2025-03-08 PROCEDURE — 99283 EMERGENCY DEPT VISIT LOW MDM: CPT

## 2025-03-08 PROCEDURE — 2500000003 HC RX 250 WO HCPCS: Performed by: PHYSICIAN ASSISTANT

## 2025-03-08 PROCEDURE — 99284 EMERGENCY DEPT VISIT MOD MDM: CPT

## 2025-03-08 PROCEDURE — 96361 HYDRATE IV INFUSION ADD-ON: CPT

## 2025-03-08 PROCEDURE — 99285 EMERGENCY DEPT VISIT HI MDM: CPT

## 2025-03-08 RX ORDER — IOPAMIDOL 755 MG/ML
80 INJECTION, SOLUTION INTRAVASCULAR
Status: COMPLETED | OUTPATIENT
Start: 2025-03-08 | End: 2025-03-08

## 2025-03-08 RX ORDER — 0.9 % SODIUM CHLORIDE 0.9 %
1000 INTRAVENOUS SOLUTION INTRAVENOUS ONCE
Status: COMPLETED | OUTPATIENT
Start: 2025-03-08 | End: 2025-03-08

## 2025-03-08 RX ORDER — IPRATROPIUM BROMIDE AND ALBUTEROL SULFATE 2.5; .5 MG/3ML; MG/3ML
1 SOLUTION RESPIRATORY (INHALATION) EVERY 20 MIN
Status: COMPLETED | OUTPATIENT
Start: 2025-03-08 | End: 2025-03-08

## 2025-03-08 RX ADMIN — THIAMINE HYDROCHLORIDE: 100 INJECTION, SOLUTION INTRAMUSCULAR; INTRAVENOUS at 08:51

## 2025-03-08 RX ADMIN — IPRATROPIUM BROMIDE AND ALBUTEROL SULFATE 1 DOSE: .5; 3 SOLUTION RESPIRATORY (INHALATION) at 17:35

## 2025-03-08 RX ADMIN — IOPAMIDOL 80 ML: 755 INJECTION, SOLUTION INTRAVENOUS at 02:55

## 2025-03-08 RX ADMIN — IPRATROPIUM BROMIDE AND ALBUTEROL SULFATE 1 DOSE: .5; 3 SOLUTION RESPIRATORY (INHALATION) at 17:41

## 2025-03-08 RX ADMIN — IPRATROPIUM BROMIDE AND ALBUTEROL SULFATE 1 DOSE: .5; 3 SOLUTION RESPIRATORY (INHALATION) at 17:45

## 2025-03-08 RX ADMIN — SODIUM CHLORIDE 1000 ML: 9 INJECTION, SOLUTION INTRAVENOUS at 06:09

## 2025-03-08 ASSESSMENT — ENCOUNTER SYMPTOMS
DIARRHEA: 1
COUGH: 0
VOMITING: 0
RHINORRHEA: 0
ABDOMINAL PAIN: 0
SHORTNESS OF BREATH: 1
NAUSEA: 0
SORE THROAT: 0

## 2025-03-08 ASSESSMENT — PAIN - FUNCTIONAL ASSESSMENT
PAIN_FUNCTIONAL_ASSESSMENT: NONE - DENIES PAIN
PAIN_FUNCTIONAL_ASSESSMENT: 0-10

## 2025-03-08 ASSESSMENT — PAIN SCALES - GENERAL: PAINLEVEL_OUTOF10: 4

## 2025-03-08 NOTE — ED NOTES
Pt demanding that his IV be removed because his mother is coming to get him. JAIME Cavazos notified.

## 2025-03-08 NOTE — ED NOTES
Pt is resting in bed with coat over his head. No concerns were voiced at this time. Side rails up x2. Call light within reach. Will monitor.

## 2025-03-08 NOTE — ED PROVIDER NOTES
[facility] EMERGENCY DEPT      Pt Name: Dashawn Rivera  MRN: [id number]  Birthdate 1980  Date of evaluation: 3/8/2025  Provider: Gina Olsen PA-C    CHIEF COMPLAINT       Chief Complaint   Patient presents with    Shortness of Breath    Chest Pain       Nurses Notes reviewed and I agree except as noted in the HPI.      HISTORY OF PRESENT ILLNESS    Dashawn Rivera is a 45 y.o. male who presents through the ED sliders reportedly driven by self for shortness of breath.  Patient reports that he is short of breath.  He tells me that he was admitted for 6 days for the same thing, diagnosed with pneumonia and COPD, and discharged only a few days ago.  Patient reports subjective fever and chills.  Patient reports having diarrhea from the antibiotics that he is taking.  When asked if he has nasal congestion or sore throat patient states that he is \"not 100% sure on that.\"  Patient denies chest pain or cough to me although admitted chest pain to the triage nurse.  Patient tells me he takes Coumadin but his discharge summary notes the Coumadin was stopped and patient was discharged on Lovenox.  Patient tells me his PCP told him to stop taking his antibiotics due to an interaction with Coumadin.  Patient is irritated by questions and wants to lay down and go to sleep.  Patient has been eating and drinking normally.  He reports urinating normally.  Patient admits to being a smoker, drinker, but denies illicit drug use.    REVIEW OF SYSTEMS     Review of Systems   Constitutional:  Positive for chills, fatigue and fever. Negative for appetite change and diaphoresis.   HENT:  Negative for congestion, rhinorrhea and sore throat.    Respiratory:  Positive for shortness of breath. Negative for cough.    Cardiovascular:  Negative for chest pain and leg swelling.   Gastrointestinal:  Positive for diarrhea. Negative for abdominal pain, nausea and vomiting.   Genitourinary:  Negative for decreased urine volume and difficulty  is 97%.    Physical Exam  Constitutional:       Appearance: Normal appearance. He is well-developed. He is not ill-appearing or diaphoretic.   HENT:      Head: Normocephalic and atraumatic.      Right Ear: Hearing normal.      Left Ear: Hearing normal.      Nose: Nose normal. No rhinorrhea.      Mouth/Throat:      Lips: Pink.      Mouth: Mucous membranes are moist.      Pharynx: Oropharynx is clear.   Eyes:      General: Lids are normal. No scleral icterus.     Extraocular Movements: Extraocular movements intact.      Conjunctiva/sclera: Conjunctivae normal.      Pupils: Pupils are equal, round, and reactive to light.   Neck:      Trachea: Trachea normal.   Cardiovascular:      Rate and Rhythm: Normal rate and regular rhythm.      Heart sounds: Normal heart sounds. No murmur heard.  Pulmonary:      Effort: Pulmonary effort is normal.      Breath sounds: Normal breath sounds and air entry. No decreased breath sounds, wheezing or rhonchi.   Abdominal:      General: There is no distension.      Palpations: Abdomen is soft.      Tenderness: There is no abdominal tenderness.   Musculoskeletal:      Cervical back: Normal range of motion and neck supple. No rigidity.      Comments: Well perfused; movement normal as observed; no signs of DVT   Lymphadenopathy:      Cervical: No cervical adenopathy.   Skin:     General: Skin is warm and dry.      Findings: No rash.   Neurological:      General: No focal deficit present.      Mental Status: He is alert.      Sensory: Sensation is intact.      Motor: Motor function is intact.      Gait: Gait is intact.   Psychiatric:         Mood and Affect: Mood normal.         Speech: Speech normal.         Behavior: Behavior normal. Behavior is cooperative.      Comments: Irritated by questions.  Wants to go to sleep         DIFFERENTIAL DIAGNOSIS:   Including but not limited to: Alcohol intoxication, coagulopathy, COPD exacerbation, psychosocial visit    Diagnoses Considered but I have low

## 2025-03-08 NOTE — ED NOTES
Pt sleeping in bed with breaths easy and unlabored, no distress noted at this time.  Pt refuses vital sign check, states no further needs at this time.

## 2025-03-08 NOTE — ED PROVIDER NOTES
This patient was endorsed to me by the previous APC, Gina Olsen.  Patient came in initially complaining of shortness of breath.  However CTA of the chest was negative for any acute pathology.  Patient rechecked at this time, currently resting comfortably, vital signs stable, patient afebrile.  Patient was noted to be intoxicated with alcohol level of 390.  Lactic acid was elevated.  Patient receiving a fluid bolus.  Repeat lactate pending.    Note that the patient repeat lactic acid was 3.4.  Will order more IV hydration with multivitamin, thiamine, folate.  This lactic acid is much lower than the patient's previous, likely related to alcoholic lactic acidosis.      Patient eloped from the emergency department.  I was able to speak with the patient as he was walking out.  Patient states he was feeling much better and just wanted to go.  Patient did not wait for the AMA paperwork.     Dirk Galindo PA  03/08/25 3478

## 2025-03-08 NOTE — ED NOTES
Pt states does not have to urinate at this time, given urinal for specimen collection when able.

## 2025-03-08 NOTE — ED NOTES
Repeat lactic drawn and sent at this time.  Pt sleeping in bed with call light in reach, breaths easy and unlabored.  No distress noted at this time.  Pt states no further needs.

## 2025-03-08 NOTE — ED NOTES
Pt resting in bed with call light in reach, states no further needs.  Breaths easy and unlabored, no distress noted at this time.

## 2025-03-08 NOTE — ED NOTES
Pt 75% on room air while sleeping. Pt placed on 4L NC and Dr. Plascencia and Dr. Choudhary notified.

## 2025-03-08 NOTE — ED NOTES
Lab advises lactic not sent at earlier time, this nurse pulls lactic lab at this time and sends to lab.  Pt refuses vital sign check during hourly rounding.  Pt breaths easy and unlabored, no distress at this time.  Pt states no further needs.

## 2025-03-08 NOTE — ED TRIAGE NOTES
Pt to ER. Reports he was admitted for 5 days. Reports sob for 3 days. States today sob has worsened. Reports pain in chest. When asked where the pain is in chest pt states \"in my chest\". Pt then asked to point to the pain. Pt placed hand over right side of chest. Pt not able to follow commands well. Strong odor of ETOH noted. Pt reports he drove to ED. Pt arrived through ER sliders door and was redirected by staff to from [facility]. Pt slurring words. Heart monitor noted to  mid chest. Pt reports he has been pushing the button on his heart monitor. Reports he \"was not feeling right so I blew down\". Attempting to obtain EKG. Pt moving and will not sit up in the bed. Reports he is \"half drunk and tired\". Reports he had \"shots today\". Will monitor

## 2025-03-08 NOTE — ED TRIAGE NOTES
Pt presents to the ED for SOB that started earlier today. Pt states that he has been using his inhaler with no relief.

## 2025-03-08 NOTE — ED PROVIDER NOTES
[facility] EMERGENCY DEPARTMENT      EMERGENCY MEDICINE     Pt Name: Dashawn Rivera  MRN: [id number]  Birthdate 1980  Date of evaluation: 3/8/2025  Provider: David Choudhary DO  Supervising Physician: Vernon Plascencia MD    CHIEF COMPLAINT       Chief Complaint   Patient presents with    Shortness of Breath     HISTORY OF PRESENT ILLNESS   Dashawn Rivera is a 45 y.o. male with a history of DVT/PE on warfarin, alcohol abuse, COPD who presents to the emergency department from home for evaluation of dyspnea.  He was recently discharged from the hospital for alcohol withdrawal and states his breathing is gradually worsening since then.  Patient was reportedly told that he had COPD.  He told me he was not taking any steroids or breathing treatments.  But later on encounter he did say he was taking albuterol.  He does not have a nebulizer machine at home.  His primary care doctor told him with his warfarin he can never take steroids.  Patient has a chronic cough that is unchanged in sputum production.  He denies any fever, vomiting, chest pain    PASTMEDICAL HISTORY     Past Medical History:   Diagnosis Date    DVT (deep venous thrombosis) (HCC)     with PE - On Coumadin    Hematemesis 12/2024    Liver disease     Seizures (HCC)     Subtherapeutic international normalized ratio (INR)     Labile       Patient Active Problem List   Diagnosis Code    Hypokalemia E87.6    Alcohol withdrawal seizure without complication (HCC) F10.930, R56.9    Alcoholic liver disease K70.9    Alcohol abuse F10.10    Thrombocytopenia concurrent with and due to alcoholism (HCC) D69.59, F10.20    Elevated liver transaminase level R74.01    Moderate malnutrition E44.0    Pulmonary embolus (HCC) I26.99    Deep vein thrombosis (DVT) of both lower extremities (HCC) I82.403    Hypercoagulable state D68.59    Leukocytosis D72.829    Centrilobular emphysema (HCC) J43.2    Seizure disorder (HCC) G40.909    Chronic anemia D64.9    History of alcohol  his mother is alive. He indicated that his father is alive.       SOCIAL HISTORY       Social History     Tobacco Use    Smoking status: Every Day     Current packs/day: 1.00     Types: Cigarettes, Cigars    Smokeless tobacco: Never    Tobacco comments:     daily   Vaping Use    Vaping status: Never Used   Substance Use Topics    Alcohol use: Yes     Alcohol/week: 70.0 standard drinks of alcohol     Types: 70 Shots of liquor per week    Drug use: No       PHYSICAL EXAM       ED Triage Vitals [03/08/25 1655]   BP Systolic BP Percentile Diastolic BP Percentile Temp Temp Source Pulse Respirations SpO2   118/83 -- -- 97.9 °F (36.6 °C) Oral 93 20 99 %      Height Weight - Scale         1.803 m (5' 11\") 59 kg (130 lb)             Physical Exam  Vitals and nursing note reviewed.   Constitutional:       General: He is not in acute distress.     Appearance: He is not ill-appearing or toxic-appearing.   HENT:      Head: Normocephalic and atraumatic.      Right Ear: External ear normal.      Left Ear: External ear normal.      Nose: Nose normal. No congestion.      Mouth/Throat:      Mouth: Mucous membranes are moist.      Pharynx: Oropharynx is clear.   Eyes:      Conjunctiva/sclera: Conjunctivae normal.   Cardiovascular:      Rate and Rhythm: Normal rate and regular rhythm.      Pulses: Normal pulses.      Heart sounds: Normal heart sounds.   Pulmonary:      Effort: Pulmonary effort is normal. No respiratory distress.      Breath sounds: Wheezing (expiratory) present.   Abdominal:      General: There is no distension.      Palpations: Abdomen is soft.      Tenderness: There is no abdominal tenderness. There is no guarding.   Musculoskeletal:         General: Normal range of motion.      Cervical back: Normal range of motion and neck supple. No tenderness.   Lymphadenopathy:      Cervical: No cervical adenopathy.   Skin:     General: Skin is warm and dry.      Capillary Refill: Capillary refill takes less than 2 seconds.

## 2025-03-09 VITALS
DIASTOLIC BLOOD PRESSURE: 68 MMHG | WEIGHT: 130 LBS | HEIGHT: 71 IN | HEART RATE: 102 BPM | RESPIRATION RATE: 18 BRPM | OXYGEN SATURATION: 100 % | BODY MASS INDEX: 18.2 KG/M2 | TEMPERATURE: 98.1 F | SYSTOLIC BLOOD PRESSURE: 106 MMHG

## 2025-03-09 LAB
EKG ATRIAL RATE: 105 BPM
EKG P AXIS: 83 DEGREES
EKG P-R INTERVAL: 132 MS
EKG Q-T INTERVAL: 348 MS
EKG QRS DURATION: 76 MS
EKG QTC CALCULATION (BAZETT): 459 MS
EKG R AXIS: 97 DEGREES
EKG T AXIS: 56 DEGREES
EKG VENTRICULAR RATE: 105 BPM

## 2025-03-09 PROCEDURE — 93010 ELECTROCARDIOGRAM REPORT: CPT | Performed by: INTERNAL MEDICINE

## 2025-03-09 ASSESSMENT — PAIN - FUNCTIONAL ASSESSMENT: PAIN_FUNCTIONAL_ASSESSMENT: NONE - DENIES PAIN

## 2025-03-09 NOTE — ED PROVIDER NOTES
[facility] EMERGENCY DEPT      Pt Name: Dashawn Rivera  MRN: [id number]  Birthdate 1980  Date of evaluation: 3/8/2025  Provider: Gina Olsen PA-C    CHIEF COMPLAINT       Chief Complaint   Patient presents with    Shortness of Breath       Nurses Notes reviewed and I agree except as noted in the HPI.      HISTORY OF PRESENT ILLNESS    Dashawn Rivera is a 45 y.o. male who presents for shortness of breath.    Patient was admitted from 2-28 to 3-3 for shortness of breath, alcohol withdrawal, and pneumonia.  Patient was found to have a subtherapeutic INR and there was questionable colitis.  Patient returned to the department and 3-8 shortly after midnight.  He underwent extensive workup including a CTA of the chest which was negative for pneumonia, PE, or acute process.  Patient was found to be intoxicated with an alcohol 0.39.  He ended up eloping or being discharged from the department at 0900.  Patient then returned at 1700 again for shortness of breath.  Patient was abusive to the nursing staff but then ultimately was discharged at 2030.  Patient returns at 2300 complaining of shortness of breath.  Patient denies any other complaints.  Patient was reassured that he is breathing normally and his pulse ox was normal.  He states we are not helping him and that he is short of breath.  Patient maintains he is taking his Coumadin and not the Lovenox he was discharged with.  Patient is not taking any antibiotics currently.  Patient reports he has follow-up appointments with his PCP and pulmonology this upcoming week.  Patient maintains he has an apartment and lives there alone with his dog.  Patient denies being nervous at home by himself.    REVIEW OF SYSTEMS     Review of Systems   Constitutional:  Negative for appetite change, diaphoresis and fever.   HENT:  Negative for congestion, rhinorrhea and sore throat.    Respiratory:  Positive for shortness of breath.    Cardiovascular:  Negative for chest pain.

## 2025-03-09 NOTE — ED NOTES
This RN in room due to call light. Pt verbal yelling at this nurse due to not seeing a doctor in the room. Pt removed all tele monitoring equipment and IV. Pt states \"this care is fucking ridiculous. I want to see who ever the fuck the doctor is that is taking care of me now.\" Provider notified at this time.

## 2025-03-09 NOTE — ED TRIAGE NOTES
Pt to ED c/o shortness of breaths. Pt states this is a chronic issue. Pt sates he was earlier for the same issue. Pt states he had a few drinks of liquor tonight. Pt A&O.

## 2025-03-09 NOTE — DISCHARGE INSTRUCTIONS
You are seen here today for difficulty breathing.  Follow-up with your primary care doctor and with pulmonology within the next 3 days.  Return here develop worsening shortness of breath or chest pain

## 2025-03-09 NOTE — DISCHARGE INSTR - COC
Continuity of Care Form    Patient Name: Dashawn Rivera   :  1980  MRN:  [id number]    Admit date:  3/8/2025  Discharge date:  ***    Code Status Order: Prior   Advance Directives:     Admitting Physician:  No admitting provider for patient encounter.  PCP: Lyn Damico, APRN - CNP    Discharging Nurse: ***  Discharging Hospital Unit/Room#: 005A  Discharging Unit Phone Number: ***    Emergency Contact:   Extended Emergency Contact Information  Primary Emergency Contact: Guadalupe [facility]ta [facility]  Home Phone: [phone]  Mobile Phone: [phone]  Relation: Domestic Partner  Secondary Emergency Contact: Porsha Rivera   [facility]  Home Phone: [phone]  Mobile Phone: [phone]  Relation: Parent    Past Surgical History:  Past Surgical History:   Procedure Laterality Date    COLONOSCOPY N/A 2019    COLONOSCOPY POLYPECTOMY HOT BIOPSY performed by Jovanni Perry MD at [facility]    IR GUIDED IVC FILTER PLACEMENT  10/2/2024    IR IVC FILTER PLACEMENT W IMAGING 10/2/2024 [facility]    IR GUIDED THROMB MECH VEIN  10/1/2024    IR THROMB MECH VEIN 10/1/2024 [facility]    UPPER GASTROINTESTINAL ENDOSCOPY N/A 2025    EGD performed by Jovanni Perry MD at [facility]       Immunization History:   Immunization History   Administered Date(s) Administered    TDaP, ADACEL (age 10y-64y), BOOSTRIX (age 10y+), IM, 0.5mL 2015, 2021       Active Problems:  Patient Active Problem List   Diagnosis Code    Hypokalemia E87.6    Alcohol withdrawal seizure without complication (HCC) F10.930, R56.9    Alcoholic liver disease K70.9    Alcohol abuse F10.10    Thrombocytopenia concurrent with and due to alcoholism (HCC) D69.59, F10.20    Elevated liver transaminase level R74.01    Moderate malnutrition E44.0    Pulmonary embolus (HCC) I26.99    Deep vein thrombosis (DVT) of both lower extremities (HCC) I82.403    Hypercoagulable state

## 2025-03-10 ENCOUNTER — APPOINTMENT (OUTPATIENT)
Dept: GENERAL RADIOLOGY | Age: 45
End: 2025-03-10
Payer: MEDICAID

## 2025-03-10 ENCOUNTER — HOSPITAL ENCOUNTER (EMERGENCY)
Age: 45
Discharge: HOME | End: 2025-03-10
Attending: EMERGENCY MEDICINE
Payer: MEDICAID

## 2025-03-10 VITALS
RESPIRATION RATE: 17 BRPM | WEIGHT: 130 LBS | BODY MASS INDEX: 18.2 KG/M2 | OXYGEN SATURATION: 100 % | HEART RATE: 97 BPM | DIASTOLIC BLOOD PRESSURE: 75 MMHG | TEMPERATURE: 97.5 F | HEIGHT: 71 IN | SYSTOLIC BLOOD PRESSURE: 121 MMHG

## 2025-03-10 DIAGNOSIS — J44.1 COPD EXACERBATION (HCC): Primary | ICD-10-CM

## 2025-03-10 LAB
ALBUMIN SERPL BCG-MCNC: 4 G/DL (ref 3.4–4.9)
ALP SERPL-CCNC: 118 U/L (ref 40–129)
ALT SERPL W/O P-5'-P-CCNC: 139 U/L (ref 10–50)
ANION GAP SERPL CALC-SCNC: 19 MEQ/L (ref 8–16)
APTT PPP: 32.6 SECONDS (ref 22–38)
AST SERPL-CCNC: 187 U/L (ref 10–50)
BASOPHILS ABSOLUTE: 0 THOU/MM3 (ref 0–0.1)
BASOPHILS NFR BLD AUTO: 0.8 %
BILIRUB CONJ SERPL-MCNC: 0.2 MG/DL (ref 0–0.2)
BILIRUB SERPL-MCNC: 0.6 MG/DL (ref 0.3–1.2)
BUN SERPL-MCNC: 5 MG/DL (ref 8–23)
CALCIUM SERPL-MCNC: 8.5 MG/DL (ref 8.6–10)
CHLORIDE SERPL-SCNC: 101 MEQ/L (ref 98–111)
CO2 SERPL-SCNC: 24 MEQ/L (ref 22–29)
CREAT SERPL-MCNC: 0.6 MG/DL (ref 0.7–1.2)
DEPRECATED RDW RBC AUTO: 62.9 FL (ref 35–45)
EKG ATRIAL RATE: 105 BPM
EKG P AXIS: 88 DEGREES
EKG P-R INTERVAL: 128 MS
EKG Q-T INTERVAL: 352 MS
EKG QRS DURATION: 78 MS
EKG QTC CALCULATION (BAZETT): 465 MS
EKG R AXIS: 86 DEGREES
EKG T AXIS: 81 DEGREES
EKG VENTRICULAR RATE: 105 BPM
EOSINOPHIL NFR BLD AUTO: 0.6 %
EOSINOPHILS ABSOLUTE: 0 THOU/MM3 (ref 0–0.4)
ERYTHROCYTE [DISTWIDTH] IN BLOOD BY AUTOMATED COUNT: 19.3 % (ref 11.5–14.5)
GFR SERPL CREATININE-BSD FRML MDRD: > 90 ML/MIN/1.73M2
GLUCOSE SERPL-MCNC: 56 MG/DL (ref 74–109)
HCT VFR BLD AUTO: 38.4 % (ref 42–52)
HGB BLD-MCNC: 12.5 GM/DL (ref 14–18)
IMM GRANULOCYTES # BLD AUTO: 0.01 THOU/MM3 (ref 0–0.07)
IMM GRANULOCYTES NFR BLD AUTO: 0.2 %
INR PPP: 1.44 (ref 0.85–1.13)
LIPASE SERPL-CCNC: 11 U/L (ref 13–60)
LYMPHOCYTES ABSOLUTE: 2.3 THOU/MM3 (ref 1–4.8)
LYMPHOCYTES NFR BLD AUTO: 45.7 %
MAGNESIUM SERPL-MCNC: 1.8 MG/DL (ref 1.6–2.6)
MCH RBC QN AUTO: 28.9 PG (ref 26–33)
MCHC RBC AUTO-ENTMCNC: 32.6 GM/DL (ref 32.2–35.5)
MCV RBC AUTO: 88.7 FL (ref 80–94)
MONOCYTES ABSOLUTE: 0.7 THOU/MM3 (ref 0.4–1.3)
MONOCYTES NFR BLD AUTO: 14.4 %
NEUTROPHILS ABSOLUTE: 1.9 THOU/MM3 (ref 1.8–7.7)
NEUTROPHILS NFR BLD AUTO: 38.3 %
NRBC BLD AUTO-RTO: 0 /100 WBC
NT-PROBNP SERPL IA-MCNC: 45 PG/ML (ref 0–124)
OSMOLALITY SERPL CALC.SUM OF ELEC: 281.7 MOSMOL/KG (ref 275–300)
PLATELET # BLD AUTO: 246 THOU/MM3 (ref 130–400)
PMV BLD AUTO: 8.8 FL (ref 9.4–12.4)
POTASSIUM SERPL-SCNC: 4 MEQ/L (ref 3.5–5.2)
PROT SERPL-MCNC: 6.9 G/DL (ref 6.4–8.3)
RBC # BLD AUTO: 4.33 MILL/MM3 (ref 4.7–6.1)
SODIUM SERPL-SCNC: 144 MEQ/L (ref 135–145)
TROPONIN, HIGH SENSITIVITY: < 6 NG/L (ref 0–12)
WBC # BLD AUTO: 5 THOU/MM3 (ref 4.8–10.8)

## 2025-03-10 PROCEDURE — 93005 ELECTROCARDIOGRAM TRACING: CPT | Performed by: EMERGENCY MEDICINE

## 2025-03-10 PROCEDURE — 82248 BILIRUBIN DIRECT: CPT

## 2025-03-10 PROCEDURE — 83735 ASSAY OF MAGNESIUM: CPT

## 2025-03-10 PROCEDURE — 6370000000 HC RX 637 (ALT 250 FOR IP): Performed by: EMERGENCY MEDICINE

## 2025-03-10 PROCEDURE — 71045 X-RAY EXAM CHEST 1 VIEW: CPT

## 2025-03-10 PROCEDURE — 85610 PROTHROMBIN TIME: CPT

## 2025-03-10 PROCEDURE — 80053 COMPREHEN METABOLIC PANEL: CPT

## 2025-03-10 PROCEDURE — 99285 EMERGENCY DEPT VISIT HI MDM: CPT

## 2025-03-10 PROCEDURE — 93010 ELECTROCARDIOGRAM REPORT: CPT | Performed by: INTERNAL MEDICINE

## 2025-03-10 PROCEDURE — 94640 AIRWAY INHALATION TREATMENT: CPT

## 2025-03-10 PROCEDURE — 84484 ASSAY OF TROPONIN QUANT: CPT

## 2025-03-10 PROCEDURE — 83690 ASSAY OF LIPASE: CPT

## 2025-03-10 PROCEDURE — 36415 COLL VENOUS BLD VENIPUNCTURE: CPT

## 2025-03-10 PROCEDURE — 85730 THROMBOPLASTIN TIME PARTIAL: CPT

## 2025-03-10 PROCEDURE — 83880 ASSAY OF NATRIURETIC PEPTIDE: CPT

## 2025-03-10 PROCEDURE — 85025 COMPLETE CBC W/AUTO DIFF WBC: CPT

## 2025-03-10 RX ORDER — IPRATROPIUM BROMIDE AND ALBUTEROL SULFATE 2.5; .5 MG/3ML; MG/3ML
2 SOLUTION RESPIRATORY (INHALATION) ONCE
Status: COMPLETED | OUTPATIENT
Start: 2025-03-10 | End: 2025-03-10

## 2025-03-10 RX ORDER — PREDNISONE 20 MG/1
20 TABLET ORAL DAILY
Qty: 10 TABLET | Refills: 0 | Status: SHIPPED | OUTPATIENT
Start: 2025-03-10 | End: 2025-03-20

## 2025-03-10 RX ORDER — ALBUTEROL SULFATE 0.83 MG/ML
2.5 SOLUTION RESPIRATORY (INHALATION) EVERY 6 HOURS PRN
Qty: 120 EACH | Refills: 3 | Status: SHIPPED | OUTPATIENT
Start: 2025-03-10 | End: 2025-03-11

## 2025-03-10 RX ORDER — PREDNISONE 20 MG/1
20 TABLET ORAL ONCE
Status: COMPLETED | OUTPATIENT
Start: 2025-03-10 | End: 2025-03-10

## 2025-03-10 RX ADMIN — IPRATROPIUM BROMIDE AND ALBUTEROL SULFATE 2 DOSE: .5; 3 SOLUTION RESPIRATORY (INHALATION) at 06:01

## 2025-03-10 RX ADMIN — PREDNISONE 20 MG: 20 TABLET ORAL at 04:52

## 2025-03-10 ASSESSMENT — ENCOUNTER SYMPTOMS
EYE REDNESS: 0
NAUSEA: 0
SHORTNESS OF BREATH: 1
PHOTOPHOBIA: 0
TROUBLE SWALLOWING: 0
SORE THROAT: 0
BLOOD IN STOOL: 0
COUGH: 0
DIARRHEA: 0
EYE ITCHING: 0
RHINORRHEA: 0
EYE PAIN: 0
STRIDOR: 0
BACK PAIN: 0
CHOKING: 0
WHEEZING: 1
EYE DISCHARGE: 0
CONSTIPATION: 0
VOMITING: 0
VOICE CHANGE: 0
ABDOMINAL PAIN: 0
SINUS PRESSURE: 0
ABDOMINAL DISTENTION: 0
CHEST TIGHTNESS: 0

## 2025-03-10 ASSESSMENT — PAIN - FUNCTIONAL ASSESSMENT
PAIN_FUNCTIONAL_ASSESSMENT: NONE - DENIES PAIN

## 2025-03-10 NOTE — ED TRIAGE NOTES
Pt to ED from home. Pt c/c SOB. Pt hx COPD. Denies chest pain. Pt has external heart monitor on. Pt states \"I don't know why its on. You guys put this on me\". Pt reports drinking a bottle and a half of vodka today. EKG complete. Vitals assessed. RR easy and unlabored.

## 2025-03-10 NOTE — ED PROVIDER NOTES
[facility]  eMERGENCY dEPARTMENT eNCOUnter          CHIEF COMPLAINT       Chief Complaint   Patient presents with    Shortness of Breath       Nurses Notes reviewed and I agree except as noted in the HPI.      HISTORY OF PRESENT ILLNESS    Dashawn Rivera is a 45 y.o. male who presents for shortness breath.  Patient was here multiple times over the last several days.  Patient states been using an albuterol without any difficulty.  Patient had a recent CTA of his chest which was negative for any PEs.  Patient does have a albuterol inhaler which she is using which she states is not helping very much.  Patient is oxygenating 95 to 100%.  Patient is otherwise resting comfortably on the cot no apparent distress no other physical complaints at this time.  Patient is full code.    REVIEW OF SYSTEMS     Review of Systems   Constitutional:  Negative for activity change, appetite change, diaphoresis, fatigue and unexpected weight change.   HENT:  Negative for congestion, ear discharge, ear pain, hearing loss, rhinorrhea, sinus pressure, sore throat, trouble swallowing and voice change.    Eyes:  Negative for photophobia, pain, discharge, redness and itching.   Respiratory:  Positive for shortness of breath and wheezing. Negative for cough, choking, chest tightness and stridor.    Cardiovascular:  Negative for chest pain, palpitations and leg swelling.   Gastrointestinal:  Negative for abdominal distention, abdominal pain, blood in stool, constipation, diarrhea, nausea and vomiting.   Endocrine: Negative for polydipsia, polyphagia and polyuria.   Genitourinary:  Negative for decreased urine volume, difficulty urinating, dysuria, enuresis, frequency, hematuria, penile discharge, penile pain, penile swelling, scrotal swelling, testicular pain and urgency.   Musculoskeletal:  Negative for arthralgias, back pain, gait problem, myalgias, neck pain and neck stiffness.   Skin:  Negative for pallor and rash.

## 2025-03-10 NOTE — PROGRESS NOTES
Home RT evaluation completed at this time with patient. Patient verbalizes understanding.  
negative...

## 2025-03-10 NOTE — DISCHARGE INSTRUCTIONS
Patient has what appears to be a COPD exacerbation.  Patient is instructed to use the nebulizers and steroids as prescribed.  He is instructed take all other medications as prescribed.  He is instructed to follow-up with a primary care physician and do so within the next 1 to 2 days.  He is instructed to return to the nearest emergency room immediately for any new or worsening complaints

## 2025-03-10 NOTE — ED NOTES
Pt medicated per Mar. Vitals assessed. RR easy and unlabored. Pt denies needs. Call light in reach.

## 2025-03-11 ENCOUNTER — HOSPITAL ENCOUNTER (EMERGENCY)
Age: 45
Discharge: HOME | End: 2025-03-11
Attending: EMERGENCY MEDICINE
Payer: MEDICAID

## 2025-03-11 VITALS
DIASTOLIC BLOOD PRESSURE: 78 MMHG | TEMPERATURE: 97.8 F | RESPIRATION RATE: 18 BRPM | OXYGEN SATURATION: 98 % | SYSTOLIC BLOOD PRESSURE: 118 MMHG | HEART RATE: 93 BPM

## 2025-03-11 DIAGNOSIS — R06.02 SHORTNESS OF BREATH: Primary | ICD-10-CM

## 2025-03-11 PROCEDURE — 6370000000 HC RX 637 (ALT 250 FOR IP): Performed by: EMERGENCY MEDICINE

## 2025-03-11 PROCEDURE — 99283 EMERGENCY DEPT VISIT LOW MDM: CPT

## 2025-03-11 RX ORDER — ALBUTEROL SULFATE 0.83 MG/ML
2.5 SOLUTION RESPIRATORY (INHALATION) EVERY 6 HOURS PRN
Qty: 120 EACH | Refills: 3 | Status: SHIPPED | OUTPATIENT
Start: 2025-03-11

## 2025-03-11 RX ORDER — IPRATROPIUM BROMIDE AND ALBUTEROL SULFATE 2.5; .5 MG/3ML; MG/3ML
2 SOLUTION RESPIRATORY (INHALATION) ONCE
Status: COMPLETED | OUTPATIENT
Start: 2025-03-11 | End: 2025-03-11

## 2025-03-11 RX ADMIN — IPRATROPIUM BROMIDE AND ALBUTEROL SULFATE 2 DOSE: .5; 3 SOLUTION RESPIRATORY (INHALATION) at 05:00

## 2025-03-11 ASSESSMENT — ENCOUNTER SYMPTOMS
EYE PAIN: 0
RHINORRHEA: 0
SHORTNESS OF BREATH: 1
ABDOMINAL PAIN: 0
SORE THROAT: 0
COUGH: 0

## 2025-03-11 ASSESSMENT — PAIN DESCRIPTION - LOCATION: LOCATION: ABDOMEN

## 2025-03-11 ASSESSMENT — PAIN SCALES - GENERAL: PAINLEVEL_OUTOF10: 4

## 2025-03-11 ASSESSMENT — PAIN - FUNCTIONAL ASSESSMENT: PAIN_FUNCTIONAL_ASSESSMENT: 0-10

## 2025-03-11 ASSESSMENT — PAIN DESCRIPTION - DESCRIPTORS: DESCRIPTORS: CRAMPING

## 2025-03-11 NOTE — ED PROVIDER NOTES
[facility] EMERGENCY DEPARTMENT  EMERGENCY DEPARTMENT ENCOUNTER      Pt Name: Dashawn Rivera  MRN: [id number]  Birthdate 1980  Date of evaluation: 3/11/2025  Provider: Ashish Boyer DO  3:57 AM    CHIEF COMPLAINT       Chief Complaint   Patient presents with    Shortness of Breath         HISTORY OF PRESENT ILLNESS    Dashawn Rivera is a 45 y.o. male who presents to the emergency department with a chief complaint of shortness of breath.  Patient has been here several times in the last few days for the same.  Yesterday he was seen by Dr. Salgado and was discharged with a prescription for albuterol nebulizer and a nebulizer machine.  Patient states that he came to the pharmacy to  his prescriptions and they said they did not have it.  Patient thought that they sent it to the pharmacy here at the hospital but it appears that it was sent to [facility].    HPI    Nursing Notes were reviewed.    REVIEW OF SYSTEMS       Review of Systems   Constitutional:  Negative for chills and fever.   HENT:  Negative for rhinorrhea and sore throat.    Eyes:  Negative for pain and visual disturbance.   Respiratory:  Positive for shortness of breath. Negative for cough.    Cardiovascular:  Negative for chest pain.   Gastrointestinal:  Negative for abdominal pain.   Endocrine: Negative for polydipsia and polyuria.   Genitourinary:  Negative for dysuria and flank pain.   Musculoskeletal:  Negative for arthralgias and myalgias.   Skin:  Negative for rash and wound.   Neurological:  Negative for dizziness, weakness and light-headedness.   Psychiatric/Behavioral:  Negative for suicidal ideas.        Except as noted above the remainder of the review of systems was reviewed and negative.       PAST MEDICAL HISTORY     Past Medical History:   Diagnosis Date    DVT (deep venous thrombosis) (HCC)     with PE - On Coumadin    Hematemesis 12/2024    Liver disease     Seizures (HCC)     Subtherapeutic international normalized ratio

## 2025-03-14 ENCOUNTER — HOSPITAL ENCOUNTER (EMERGENCY)
Age: 45
Discharge: HOME | End: 2025-03-15
Attending: STUDENT IN AN ORGANIZED HEALTH CARE EDUCATION/TRAINING PROGRAM
Payer: MEDICAID

## 2025-03-14 ENCOUNTER — APPOINTMENT (OUTPATIENT)
Dept: CT IMAGING | Age: 45
End: 2025-03-14
Payer: MEDICAID

## 2025-03-14 ENCOUNTER — APPOINTMENT (OUTPATIENT)
Dept: ULTRASOUND IMAGING | Age: 45
End: 2025-03-14
Payer: MEDICAID

## 2025-03-14 DIAGNOSIS — R07.9 CHEST PAIN, UNSPECIFIED TYPE: Primary | ICD-10-CM

## 2025-03-14 LAB
ALBUMIN SERPL BCG-MCNC: 3.8 G/DL (ref 3.4–4.9)
ALP SERPL-CCNC: 137 U/L (ref 40–129)
ALT SERPL W/O P-5'-P-CCNC: 126 U/L (ref 10–50)
ANION GAP SERPL CALC-SCNC: 12 MEQ/L (ref 8–16)
AST SERPL-CCNC: 232 U/L (ref 10–50)
BASOPHILS ABSOLUTE: 0 THOU/MM3 (ref 0–0.1)
BASOPHILS NFR BLD AUTO: 0.9 %
BILIRUB SERPL-MCNC: 0.4 MG/DL (ref 0.3–1.2)
BUN SERPL-MCNC: 9 MG/DL (ref 8–23)
CALCIUM SERPL-MCNC: 8.7 MG/DL (ref 8.6–10)
CHLORIDE SERPL-SCNC: 102 MEQ/L (ref 98–111)
CO2 SERPL-SCNC: 25 MEQ/L (ref 22–29)
CREAT SERPL-MCNC: 0.7 MG/DL (ref 0.7–1.2)
DEPRECATED RDW RBC AUTO: 63 FL (ref 35–45)
EKG ATRIAL RATE: 102 BPM
EKG P AXIS: 83 DEGREES
EKG P-R INTERVAL: 128 MS
EKG Q-T INTERVAL: 336 MS
EKG QRS DURATION: 76 MS
EKG QTC CALCULATION (BAZETT): 437 MS
EKG R AXIS: 82 DEGREES
EKG T AXIS: 81 DEGREES
EKG VENTRICULAR RATE: 102 BPM
EOSINOPHIL NFR BLD AUTO: 1.1 %
EOSINOPHILS ABSOLUTE: 0 THOU/MM3 (ref 0–0.4)
ERYTHROCYTE [DISTWIDTH] IN BLOOD BY AUTOMATED COUNT: 19.6 % (ref 11.5–14.5)
GFR SERPL CREATININE-BSD FRML MDRD: > 90 ML/MIN/1.73M2
GLUCOSE SERPL-MCNC: 107 MG/DL (ref 74–109)
HCT VFR BLD AUTO: 31.4 % (ref 42–52)
HGB BLD-MCNC: 10.3 GM/DL (ref 14–18)
IMM GRANULOCYTES # BLD AUTO: 0.01 THOU/MM3 (ref 0–0.07)
IMM GRANULOCYTES NFR BLD AUTO: 0.2 %
LIPASE SERPL-CCNC: 59 U/L (ref 13–60)
LYMPHOCYTES ABSOLUTE: 2.2 THOU/MM3 (ref 1–4.8)
LYMPHOCYTES NFR BLD AUTO: 49.8 %
MCH RBC QN AUTO: 29 PG (ref 26–33)
MCHC RBC AUTO-ENTMCNC: 32.8 GM/DL (ref 32.2–35.5)
MCV RBC AUTO: 88.5 FL (ref 80–94)
MONOCYTES ABSOLUTE: 0.6 THOU/MM3 (ref 0.4–1.3)
MONOCYTES NFR BLD AUTO: 12.6 %
NEUTROPHILS ABSOLUTE: 1.6 THOU/MM3 (ref 1.8–7.7)
NEUTROPHILS NFR BLD AUTO: 35.4 %
NRBC BLD AUTO-RTO: 0 /100 WBC
NT-PROBNP SERPL IA-MCNC: < 36 PG/ML (ref 0–124)
OSMOLALITY SERPL CALC.SUM OF ELEC: 276.7 MOSMOL/KG (ref 275–300)
PLATELET # BLD AUTO: 158 THOU/MM3 (ref 130–400)
PMV BLD AUTO: 9.1 FL (ref 9.4–12.4)
POTASSIUM SERPL-SCNC: 3.8 MEQ/L (ref 3.5–5.2)
PROT SERPL-MCNC: 6.3 G/DL (ref 6.4–8.3)
RBC # BLD AUTO: 3.55 MILL/MM3 (ref 4.7–6.1)
SODIUM SERPL-SCNC: 139 MEQ/L (ref 135–145)
TROPONIN, HIGH SENSITIVITY: < 6 NG/L (ref 0–12)
WBC # BLD AUTO: 4.5 THOU/MM3 (ref 4.8–10.8)

## 2025-03-14 PROCEDURE — 99285 EMERGENCY DEPT VISIT HI MDM: CPT

## 2025-03-14 PROCEDURE — 6370000000 HC RX 637 (ALT 250 FOR IP): Performed by: STUDENT IN AN ORGANIZED HEALTH CARE EDUCATION/TRAINING PROGRAM

## 2025-03-14 PROCEDURE — 83690 ASSAY OF LIPASE: CPT

## 2025-03-14 PROCEDURE — 93005 ELECTROCARDIOGRAM TRACING: CPT | Performed by: STUDENT IN AN ORGANIZED HEALTH CARE EDUCATION/TRAINING PROGRAM

## 2025-03-14 PROCEDURE — 71275 CT ANGIOGRAPHY CHEST: CPT

## 2025-03-14 PROCEDURE — 6360000002 HC RX W HCPCS: Performed by: STUDENT IN AN ORGANIZED HEALTH CARE EDUCATION/TRAINING PROGRAM

## 2025-03-14 PROCEDURE — 76705 ECHO EXAM OF ABDOMEN: CPT

## 2025-03-14 PROCEDURE — 96374 THER/PROPH/DIAG INJ IV PUSH: CPT

## 2025-03-14 PROCEDURE — 83880 ASSAY OF NATRIURETIC PEPTIDE: CPT

## 2025-03-14 PROCEDURE — 85025 COMPLETE CBC W/AUTO DIFF WBC: CPT

## 2025-03-14 PROCEDURE — 84484 ASSAY OF TROPONIN QUANT: CPT

## 2025-03-14 PROCEDURE — 36415 COLL VENOUS BLD VENIPUNCTURE: CPT

## 2025-03-14 PROCEDURE — 80053 COMPREHEN METABOLIC PANEL: CPT

## 2025-03-14 PROCEDURE — 2580000003 HC RX 258: Performed by: STUDENT IN AN ORGANIZED HEALTH CARE EDUCATION/TRAINING PROGRAM

## 2025-03-14 PROCEDURE — 6360000004 HC RX CONTRAST MEDICATION: Performed by: STUDENT IN AN ORGANIZED HEALTH CARE EDUCATION/TRAINING PROGRAM

## 2025-03-14 RX ORDER — 0.9 % SODIUM CHLORIDE 0.9 %
1000 INTRAVENOUS SOLUTION INTRAVENOUS ONCE
Status: COMPLETED | OUTPATIENT
Start: 2025-03-14 | End: 2025-03-14

## 2025-03-14 RX ORDER — IOPAMIDOL 755 MG/ML
80 INJECTION, SOLUTION INTRAVASCULAR
Status: COMPLETED | OUTPATIENT
Start: 2025-03-14 | End: 2025-03-14

## 2025-03-14 RX ORDER — FENTANYL CITRATE 50 UG/ML
50 INJECTION, SOLUTION INTRAMUSCULAR; INTRAVENOUS ONCE
Status: COMPLETED | OUTPATIENT
Start: 2025-03-14 | End: 2025-03-14

## 2025-03-14 RX ORDER — ASPIRIN 81 MG/1
324 TABLET, CHEWABLE ORAL ONCE
Status: COMPLETED | OUTPATIENT
Start: 2025-03-14 | End: 2025-03-14

## 2025-03-14 RX ADMIN — FENTANYL CITRATE 50 MCG: 50 INJECTION, SOLUTION INTRAMUSCULAR; INTRAVENOUS at 22:20

## 2025-03-14 RX ADMIN — IOPAMIDOL 80 ML: 755 INJECTION, SOLUTION INTRAVENOUS at 22:29

## 2025-03-14 RX ADMIN — ASPIRIN 324 MG: 81 TABLET, CHEWABLE ORAL at 22:17

## 2025-03-14 RX ADMIN — SODIUM CHLORIDE 1000 ML: 0.9 INJECTION, SOLUTION INTRAVENOUS at 22:20

## 2025-03-14 ASSESSMENT — PAIN - FUNCTIONAL ASSESSMENT
PAIN_FUNCTIONAL_ASSESSMENT: 0-10
PAIN_FUNCTIONAL_ASSESSMENT: 0-10

## 2025-03-14 ASSESSMENT — PAIN SCALES - GENERAL
PAINLEVEL_OUTOF10: 3
PAINLEVEL_OUTOF10: 4

## 2025-03-15 VITALS
TEMPERATURE: 98.4 F | WEIGHT: 130 LBS | HEART RATE: 93 BPM | BODY MASS INDEX: 18.2 KG/M2 | HEIGHT: 71 IN | OXYGEN SATURATION: 97 % | SYSTOLIC BLOOD PRESSURE: 106 MMHG | RESPIRATION RATE: 10 BRPM | DIASTOLIC BLOOD PRESSURE: 60 MMHG

## 2025-03-15 NOTE — ED NOTES
Pt medicated per MAR. Pt resting on cot at this time. Pt updated on POC, pt verbalizes understanding. Pt denies further needs at this time. Vitals collected. Pt breathing even and unlabored. Call light in reach.

## 2025-03-15 NOTE — ED NOTES
Dr. Martinez at bedside updating pt. Pt laying on cot at this time. Pt vitals collected. Pt breathing even and unlabored. Call light in reach.

## 2025-03-15 NOTE — ED TRIAGE NOTES
Pt to ED via triage through lobby w/ report of chest pain. Pt reports that a heart monitor was placed about 7-8 days ago and was admitted recently. Pt reports that the heart monitor is causing pain and wants to take it off. Pt reports that this pain started 1-2 hours ago. Pt breathing even and unlabored. EKG completed upon arrival. Call light in reach.

## 2025-03-15 NOTE — ED NOTES
Pt resting on cot at this time. Pt updated on POC, pt verbalizes understanding. Pt provided additional blanket upon request. Vitals collected. Pt breathing even and unlabored. Call light in reach.

## 2025-03-15 NOTE — ED PROVIDER NOTES
[facility] EMERGENCY DEPARTMENT    EMERGENCY MEDICINE      Pt Name: Dashawn Rivera  MRN: [id number]  Birthdate 1980  Date of evaluation: 3/14/2025  Treating Physician: Mansoor Martinez DO    CHIEF COMPLAINT       Chief Complaint   Patient presents with    Chest Pain     History obtained from chart review and the patient.    HISTORY OF PRESENT ILLNESS    HPI    Dashawn Rivera is a 45 y.o. male presents to the emergency department for evaluation of chest pain.  Midsternal.  Rated 3 or 4 out of 10.  States that it began approximately 1 to 2 hours prior to arrival.  Minimal intermittent associated shortness of breath.  History of PEs but has been compliant with all of his medications including his Lovenox.  Patient denies any changes to urination, bowel movements.  Is not any fevers or chills.  Denies any new rashes.    The patient has no other acute complaints at this time.      PAST MEDICAL AND SURGICAL HISTORY     Past Medical History:   Diagnosis Date    DVT (deep venous thrombosis) (HCC)     with PE - On Coumadin    Hematemesis 12/2024    Liver disease     Seizures (HCC)     Subtherapeutic international normalized ratio (INR)     Labile       Past Surgical History:   Procedure Laterality Date    COLONOSCOPY N/A 12/17/2019    COLONOSCOPY POLYPECTOMY HOT BIOPSY performed by Jovanni Perry MD at [facility]    IR GUIDED IVC FILTER PLACEMENT  10/2/2024    IR IVC FILTER PLACEMENT W IMAGING 10/2/2024 [facility]    IR GUIDED THROMB MECH VEIN  10/1/2024    IR THROMB MECH VEIN 10/1/2024 [facility] PROC[facility]    UPPER GASTROINTESTINAL ENDOSCOPY N/A 1/31/2025    EGD performed by Jovanni Perry MD at [facility] ENDOSCOPY       CURRENT MEDICATIONS     Discharge Medication List as of 3/15/2025  1:37 AM        CONTINUE these medications which have NOT CHANGED    Details   albuterol (PROVENTIL) (2.5 MG/3ML) 0.083% nebulizer solution Take 3 mLs by nebulization every 6 hours as needed for Wheezing, Disp-120  of transaminitis.  Ultrasound was performed to ensure no dynamic changes.  Patient without a leukocytosis. [EM]   0126 US GALLBLADDER RUQ  No acute findings, patient to follow-up outpatient with GI for his transaminitis.  Attributing to his alcoholic liver disease at this time. [EM]      ED Course User Index  [EM] Mansoor Martinez, DO     Vitals Reviewed:    Vitals:    03/14/25 2221 03/14/25 2318 03/15/25 0014 03/15/25 0130   BP: 120/76 135/89 124/78 106/60   Pulse: 99 94 97 93   Resp: 14 15 15 10   Temp:       TempSrc:       SpO2: 97% 98% 97% 97%   Weight:       Height:           The patient was seen and examined. Appropriate diagnostic testing was performed and results reviewed with the patient and/or caregivers.    The results of pertinent diagnostic studies and exam findings were discussed. The patient’s provisional diagnosis and plan of care were discussed with the patient and present caregivers who expressed understanding. Any medications were reviewed and indications and risks of medications were discussed. Strict verbal and written return precautions, instructions and appropriate follow-up were provided to the patient.     ED Medications administered this visit:  (None if blank)  Medications   aspirin chewable tablet 324 mg (324 mg Oral Given 3/14/25 2217)   sodium chloride 0.9 % bolus 1,000 mL (0 mLs IntraVENous Stopped 3/14/25 2321)   fentaNYL (SUBLIMAZE) injection 50 mcg (50 mcg IntraVENous Given 3/14/25 2220)   iopamidol (ISOVUE-370) 76 % injection 80 mL (80 mLs IntraVENous Given 3/14/25 2229)       PROCEDURES: (None if blank)  Procedures:     CRITICAL CARE: (None if blank)    DISCHARGE PRESCRIPTIONS: (None if blank)  Discharge Medication List as of 3/15/2025  1:37 AM            FINAL IMPRESSION     Final diagnoses:   Chest pain, unspecified type     1. Chest pain, unspecified type        DISPOSITION / PLAN   DISPOSITION Decision To Discharge 03/15/2025 01:30:51 AM   DISPOSITION CONDITION Stable

## 2025-03-15 NOTE — DISCHARGE INSTRUCTIONS
Please read the attached instructions regarding chest pain.  Continue taking your home medications as prescribed.  If you begin to have any recurrence of your pain do not hesitate to return to the ED, otherwise you have an appointment for next week with the cardiology office.

## 2025-03-19 ENCOUNTER — ANTI-COAG VISIT (OUTPATIENT)
Age: 45
End: 2025-03-19
Payer: MEDICAID

## 2025-03-19 DIAGNOSIS — I26.99 ACUTE PULMONARY EMBOLISM WITHOUT ACUTE COR PULMONALE, UNSPECIFIED PULMONARY EMBOLISM TYPE (HCC): Primary | ICD-10-CM

## 2025-03-19 DIAGNOSIS — I82.4Y3 DEEP VEIN THROMBOSIS (DVT) OF PROXIMAL VEIN OF BOTH LOWER EXTREMITIES, UNSPECIFIED CHRONICITY: ICD-10-CM

## 2025-03-19 DIAGNOSIS — Z79.01 ANTICOAGULATED ON COUMADIN: ICD-10-CM

## 2025-03-19 DIAGNOSIS — Z51.81 ENCOUNTER FOR THERAPEUTIC DRUG MONITORING: ICD-10-CM

## 2025-03-19 LAB — POC INR: 1 (ref 0.8–1.2)

## 2025-03-19 PROCEDURE — 99212 OFFICE O/P EST SF 10 MIN: CPT | Performed by: PHARMACIST

## 2025-03-19 PROCEDURE — 85610 PROTHROMBIN TIME: CPT | Performed by: PHARMACIST

## 2025-03-19 RX ORDER — WARFARIN SODIUM 5 MG/1
TABLET ORAL
Qty: 60 TABLET | Refills: 1 | Status: SHIPPED | OUTPATIENT
Start: 2025-03-19

## 2025-03-19 RX ORDER — ENOXAPARIN SODIUM 100 MG/ML
60 INJECTION SUBCUTANEOUS 2 TIMES DAILY
Qty: 10 EACH | Refills: 0 | Status: SHIPPED | OUTPATIENT
Start: 2025-03-19

## 2025-03-19 NOTE — PROGRESS NOTES
Admin Tracking Only    Intervention Detail: Adherence Monitorin, Dose Adjustment: 1, reason: Therapy Optimization, New Rx: 1, reason: Needs Additional Therapy, and Refill(s) Provided  Total # of Interventions Recommended: 4  Total # of Interventions Accepted: 4  Time Spent (min): 30    Melissa Jang, PharmD, BCPS  3/19/2025  9:24 AM

## 2025-03-24 ENCOUNTER — ANTI-COAG VISIT (OUTPATIENT)
Age: 45
End: 2025-03-24
Payer: MEDICAID

## 2025-03-24 DIAGNOSIS — I26.99 ACUTE PULMONARY EMBOLISM WITHOUT ACUTE COR PULMONALE, UNSPECIFIED PULMONARY EMBOLISM TYPE (HCC): Primary | ICD-10-CM

## 2025-03-24 DIAGNOSIS — I82.4Y3 DEEP VEIN THROMBOSIS (DVT) OF PROXIMAL VEIN OF BOTH LOWER EXTREMITIES, UNSPECIFIED CHRONICITY: ICD-10-CM

## 2025-03-24 DIAGNOSIS — Z51.81 ENCOUNTER FOR THERAPEUTIC DRUG MONITORING: ICD-10-CM

## 2025-03-24 DIAGNOSIS — Z79.01 ANTICOAGULATED ON COUMADIN: ICD-10-CM

## 2025-03-24 LAB — POC INR: 2.6 (ref 0.8–1.2)

## 2025-03-24 PROCEDURE — 85610 PROTHROMBIN TIME: CPT | Performed by: PHARMACIST

## 2025-03-24 PROCEDURE — 99211 OFF/OP EST MAY X REQ PHY/QHP: CPT | Performed by: PHARMACIST
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
## 2025-03-25 ENCOUNTER — APPOINTMENT (OUTPATIENT)
Dept: GENERAL RADIOLOGY | Age: 45
End: 2025-03-25
Payer: MEDICAID

## 2025-03-25 ENCOUNTER — HOSPITAL ENCOUNTER (EMERGENCY)
Age: 45
Discharge: HOME | End: 2025-03-25
Payer: MEDICAID

## 2025-03-25 ENCOUNTER — HOSPITAL ENCOUNTER (EMERGENCY)
Age: 45
Discharge: HOME | End: 2025-03-26
Payer: MEDICAID

## 2025-03-25 ENCOUNTER — TELEPHONE (OUTPATIENT)
Age: 45
End: 2025-03-25

## 2025-03-25 VITALS
RESPIRATION RATE: 15 BRPM | DIASTOLIC BLOOD PRESSURE: 82 MMHG | SYSTOLIC BLOOD PRESSURE: 115 MMHG | OXYGEN SATURATION: 97 % | HEART RATE: 70 BPM | TEMPERATURE: 98.2 F

## 2025-03-25 DIAGNOSIS — R06.89 DYSPNEA AND RESPIRATORY ABNORMALITIES: Primary | ICD-10-CM

## 2025-03-25 DIAGNOSIS — T45.515A WARFARIN-INDUCED COAGULOPATHY: ICD-10-CM

## 2025-03-25 DIAGNOSIS — E87.6 HYPOKALEMIA: ICD-10-CM

## 2025-03-25 DIAGNOSIS — R06.00 DYSPNEA AND RESPIRATORY ABNORMALITIES: Primary | ICD-10-CM

## 2025-03-25 DIAGNOSIS — D68.32 WARFARIN-INDUCED COAGULOPATHY: ICD-10-CM

## 2025-03-25 DIAGNOSIS — F10.920 ACUTE ALCOHOLIC INTOXICATION WITHOUT COMPLICATION: ICD-10-CM

## 2025-03-25 LAB
ALBUMIN SERPL BCG-MCNC: 4.3 G/DL (ref 3.4–4.9)
ALP SERPL-CCNC: 108 U/L (ref 40–129)
ALT SERPL W/O P-5'-P-CCNC: 56 U/L (ref 10–50)
ANION GAP SERPL CALC-SCNC: 16 MEQ/L (ref 8–16)
ANISOCYTOSIS BLD QL SMEAR: PRESENT
AST SERPL-CCNC: 80 U/L (ref 10–50)
BASOPHILS ABSOLUTE: 0 THOU/MM3 (ref 0–0.1)
BASOPHILS NFR BLD AUTO: 0.6 %
BILIRUB SERPL-MCNC: 0.4 MG/DL (ref 0.3–1.2)
BUN SERPL-MCNC: 10 MG/DL (ref 8–23)
CALCIUM SERPL-MCNC: 8.4 MG/DL (ref 8.6–10)
CHLORIDE SERPL-SCNC: 103 MEQ/L (ref 98–111)
CO2 SERPL-SCNC: 26 MEQ/L (ref 22–29)
CREAT SERPL-MCNC: 0.7 MG/DL (ref 0.7–1.2)
D DIMER PPP IA.FEU-MCNC: 247 NG/ML FEU (ref 0–500)
DEPRECATED RDW RBC AUTO: 70.7 FL (ref 35–45)
EKG ATRIAL RATE: 78 BPM
EKG P AXIS: 81 DEGREES
EKG P-R INTERVAL: 140 MS
EKG Q-T INTERVAL: 388 MS
EKG QRS DURATION: 82 MS
EKG QTC CALCULATION (BAZETT): 442 MS
EKG R AXIS: 81 DEGREES
EKG T AXIS: 81 DEGREES
EKG VENTRICULAR RATE: 78 BPM
EOSINOPHIL NFR BLD AUTO: 0.3 %
EOSINOPHILS ABSOLUTE: 0 THOU/MM3 (ref 0–0.4)
ERYTHROCYTE [DISTWIDTH] IN BLOOD BY AUTOMATED COUNT: 22.1 % (ref 11.5–14.5)
ETHANOL SERPL-MCNC: 0.26 % (ref 0–0.08)
GFR SERPL CREATININE-BSD FRML MDRD: > 90 ML/MIN/1.73M2
GLUCOSE SERPL-MCNC: 84 MG/DL (ref 74–109)
HCT VFR BLD AUTO: 33.8 % (ref 42–52)
HGB BLD-MCNC: 11.3 GM/DL (ref 14–18)
IMM GRANULOCYTES # BLD AUTO: 0.02 THOU/MM3 (ref 0–0.07)
IMM GRANULOCYTES NFR BLD AUTO: 0.3 %
INR PPP: 2.96 (ref 0.85–1.13)
LYMPHOCYTES ABSOLUTE: 2.6 THOU/MM3 (ref 1–4.8)
LYMPHOCYTES NFR BLD AUTO: 39.2 %
MCH RBC QN AUTO: 30.1 PG (ref 26–33)
MCHC RBC AUTO-ENTMCNC: 33.4 GM/DL (ref 32.2–35.5)
MCV RBC AUTO: 89.9 FL (ref 80–94)
MONOCYTES ABSOLUTE: 0.8 THOU/MM3 (ref 0.4–1.3)
MONOCYTES NFR BLD AUTO: 11.3 %
NEUTROPHILS ABSOLUTE: 3.2 THOU/MM3 (ref 1.8–7.7)
NEUTROPHILS NFR BLD AUTO: 48.3 %
NRBC BLD AUTO-RTO: 0 /100 WBC
OSMOLALITY SERPL CALC.SUM OF ELEC: 286.9 MOSMOL/KG (ref 275–300)
PLATELET # BLD AUTO: 308 THOU/MM3 (ref 130–400)
PLATELET BLD QL SMEAR: ADEQUATE
PMV BLD AUTO: 9.1 FL (ref 9.4–12.4)
POIKILOCYTES: ABNORMAL
POTASSIUM SERPL-SCNC: 3.2 MEQ/L (ref 3.5–5.2)
PROT SERPL-MCNC: 7.4 G/DL (ref 6.4–8.3)
RBC # BLD AUTO: 3.76 MILL/MM3 (ref 4.7–6.1)
SCAN OF BLOOD SMEAR: NORMAL
SODIUM SERPL-SCNC: 145 MEQ/L (ref 135–145)
STOMATOCYTES: ABNORMAL
TARGETS BLD QL SMEAR: ABNORMAL
WBC # BLD AUTO: 6.7 THOU/MM3 (ref 4.8–10.8)

## 2025-03-25 PROCEDURE — 71046 X-RAY EXAM CHEST 2 VIEWS: CPT

## 2025-03-25 PROCEDURE — 2500000003 HC RX 250 WO HCPCS: Performed by: PHYSICIAN ASSISTANT

## 2025-03-25 PROCEDURE — 99285 EMERGENCY DEPT VISIT HI MDM: CPT

## 2025-03-25 PROCEDURE — 85379 FIBRIN DEGRADATION QUANT: CPT

## 2025-03-25 PROCEDURE — 85610 PROTHROMBIN TIME: CPT

## 2025-03-25 PROCEDURE — 6370000000 HC RX 637 (ALT 250 FOR IP): Performed by: PHYSICIAN ASSISTANT

## 2025-03-25 PROCEDURE — 93005 ELECTROCARDIOGRAM TRACING: CPT | Performed by: EMERGENCY MEDICINE

## 2025-03-25 PROCEDURE — 96375 TX/PRO/DX INJ NEW DRUG ADDON: CPT

## 2025-03-25 PROCEDURE — 82077 ASSAY SPEC XCP UR&BREATH IA: CPT

## 2025-03-25 PROCEDURE — 85025 COMPLETE CBC W/AUTO DIFF WBC: CPT

## 2025-03-25 PROCEDURE — 71045 X-RAY EXAM CHEST 1 VIEW: CPT | Performed by: RADIOLOGY

## 2025-03-25 PROCEDURE — 96365 THER/PROPH/DIAG IV INF INIT: CPT

## 2025-03-25 PROCEDURE — 71045 X-RAY EXAM CHEST 1 VIEW: CPT

## 2025-03-25 PROCEDURE — 84484 ASSAY OF TROPONIN QUANT: CPT

## 2025-03-25 PROCEDURE — 36415 COLL VENOUS BLD VENIPUNCTURE: CPT

## 2025-03-25 PROCEDURE — 80053 COMPREHEN METABOLIC PANEL: CPT

## 2025-03-25 PROCEDURE — 2580000003 HC RX 258: Performed by: PHYSICIAN ASSISTANT

## 2025-03-25 PROCEDURE — 6360000002 HC RX W HCPCS: Performed by: PHYSICIAN ASSISTANT

## 2025-03-25 PROCEDURE — 93005 ELECTROCARDIOGRAM TRACING: CPT | Performed by: PHYSICIAN ASSISTANT

## 2025-03-25 RX ORDER — THIAMINE HYDROCHLORIDE 100 MG/ML
100 INJECTION, SOLUTION INTRAMUSCULAR; INTRAVENOUS DAILY
Status: DISCONTINUED | OUTPATIENT
Start: 2025-03-25 | End: 2025-03-25 | Stop reason: HOSPADM

## 2025-03-25 RX ORDER — POTASSIUM CHLORIDE 750 MG/1
40 TABLET, EXTENDED RELEASE ORAL ONCE
Status: COMPLETED | OUTPATIENT
Start: 2025-03-25 | End: 2025-03-25

## 2025-03-25 RX ORDER — 0.9 % SODIUM CHLORIDE 0.9 %
1000 INTRAVENOUS SOLUTION INTRAVENOUS ONCE
Status: COMPLETED | OUTPATIENT
Start: 2025-03-25 | End: 2025-03-25

## 2025-03-25 RX ORDER — MULTIVITAMIN WITH IRON
1 TABLET ORAL ONCE
Status: COMPLETED | OUTPATIENT
Start: 2025-03-25 | End: 2025-03-25

## 2025-03-25 RX ADMIN — SODIUM CHLORIDE 1000 ML: 9 INJECTION, SOLUTION INTRAVENOUS at 04:11

## 2025-03-25 RX ADMIN — THIAMINE HYDROCHLORIDE 100 MG: 100 INJECTION, SOLUTION INTRAMUSCULAR; INTRAVENOUS at 10:31

## 2025-03-25 RX ADMIN — Medication 1 TABLET: at 08:10

## 2025-03-25 RX ADMIN — FOLIC ACID 1 MG: 5 INJECTION, SOLUTION INTRAMUSCULAR; INTRAVENOUS; SUBCUTANEOUS at 04:43

## 2025-03-25 RX ADMIN — POTASSIUM CHLORIDE 40 MEQ: 750 TABLET, EXTENDED RELEASE ORAL at 08:10

## 2025-03-25 ASSESSMENT — ENCOUNTER SYMPTOMS
VOMITING: 0
ABDOMINAL PAIN: 0
NAUSEA: 0
DIARRHEA: 0
BACK PAIN: 0
COUGH: 1
RHINORRHEA: 0
SHORTNESS OF BREATH: 1
SORE THROAT: 0

## 2025-03-25 ASSESSMENT — PAIN - FUNCTIONAL ASSESSMENT
PAIN_FUNCTIONAL_ASSESSMENT: NONE - DENIES PAIN

## 2025-03-25 NOTE — ED PROVIDER NOTES
[facility] EMERGENCY DEPT      Pt Name: Dashawn Rivera  MRN: [id number]  Birthdate 1980  Date of evaluation: 3/25/2025  Provider: Gina Olsen PA-C    CHIEF COMPLAINT       Chief Complaint   Patient presents with    Shortness of Breath       Nurses Notes reviewed and I agree except as noted in the HPI.      HISTORY OF PRESENT ILLNESS    Dashawn Rivera is a 45 y.o. male who presents through the lobby  for shortness of breath.  Patient reports he developed shortness of breath prior to arrival.  He has chronic cough due to smoking but denies other complaints.  He does admits to doing \"quite a few shots\" this evening.  There has been no fever, chills, change in appetite, nausea, vomiting, chest pain, weakness, dizziness, leg pain, leg swelling, or lightheadedness.  Patient admits to a history of COPD and being a smoker.  He was wearing a heart monitor but it had fallen off.    In review of old records patient had been admitted to the hospital on 2-28 and discharged on 3-3 for shortness of breath, SVT, alcohol withdrawal, CAP, supratherapeutic INR, and possible colitis.  He was found to have a lactic acidosis due to the drinking.  After discharge patient presented to the department 3 times on 3-8, 3-10, 3-11, and 3-14.  He received a CTA of the chest on 3-8 and 3-14 both being negative.  Yesterday he went to the Coumadin clinic and had a therapeutic INR at 2.6.    REVIEW OF SYSTEMS     Review of Systems   Constitutional:  Negative for activity change, appetite change, chills, diaphoresis and fever.   HENT:  Negative for congestion, rhinorrhea and sore throat.    Respiratory:  Positive for cough and shortness of breath.    Cardiovascular:  Negative for chest pain and leg swelling.   Gastrointestinal:  Negative for abdominal pain, diarrhea, nausea and vomiting.   Genitourinary:  Negative for decreased urine volume and difficulty urinating.   Musculoskeletal:  Negative for back pain and gait problem.   Skin:

## 2025-03-25 NOTE — ED NOTES
Medicated per MAR. Patient resting in bed. Respirations easy and unlabored. No distress noted. Call light within reach.

## 2025-03-25 NOTE — ED PROVIDER NOTES
Transfer of care from Gina Olsen PA-C to Catina Almanzar PA-C at 0600 on 3/25/25.      Pt c/c of SOB. Gina found no abnormal respiratory physical exam, SpO2 98% on room air. Pt has been seen in the ED for similar complaints for the past few ED visits. EtOH level was at .26. Folic acid 5mg/ml and normal saline administered.           MDM  /  ED Course as of 03/25/25 0940   Tue Mar 25, 2025   0637 Awaiting administration of Kcl, multivitamin, and thiamine injection.  [LK]   0714 Pt is currently awake. Lungs clear to auscultation bilaterally. However pt still complains of SOB. SpO2 97% room air. Ordered CXR  [LK]   0715 Pt reports of wearing a holter monitor for the past 2 weeks. It fell off on the way to the ED. He states a nurse currently has possession of it.  [LK]   0750 XR CHEST (2 VW)  The heart size is normal.  The mediastinum is not widened.  There are no  pulmonary infiltrates or effusions.  The pulmonary vascularity is normal. No  suspicious osseous lesions are present.   [LK]   0750 Ordered D dimer as an add-on for PE rule out  [LK]   0802 D-Dimer, Quantitative:    D-Dimer, Quant 247.00  D-dimer not elevated [LK]   0855 Multivitamin and potassium chloride given to the patient. [LK]   0932 Pulmonology follow up 3/26/25 at 11am.  [LK]   0934 Spoke to Dr. Pascual about pt. Pt appropriate for pulmonology and family practice follow up.  [LK]   0936 Patient is able to ambulate to the bathroom and back without difficulty.  Patient had no nystagmus and no slurred speech.  Patient is clinically sober [LK]   0940 Pt cleared to discharge after thiamine administration  [LK]      ED Course User Index  [[place]] Catina Almanzar PA-C       Reassessment: See ed course.     FINAL IMPRESSION      1. Dyspnea and respiratory abnormalities    2. Acute alcoholic intoxication without complication    3. Hypokalemia    4. Warfarin-induced coagulopathy                 Catina Almanzar PA-C  03/25/25 0940

## 2025-03-25 NOTE — ED TRIAGE NOTES
Pt to ED from home with c/o SOB. Pt states this started \"just now\". Pt states he had a heart monitor and it fell off. Pt denies pain. Pt has even and unlabored respiration and does not appear in distress. Call light in reach. EKG completed.

## 2025-03-25 NOTE — ED NOTES
Pt resting in bed with blankets over his head. Denies any needs or concerns at this time. Call light in reach.

## 2025-03-25 NOTE — DISCHARGE INSTRUCTIONS
Follow up with pulmonology on 3/26/25 at 11am as indicated in your discharge paperwork. Make sure you also follow up with your primary care doctor.     Take your medication as indicated and prescribed.  If you were given a prescription for prednisone or any other steroid then, take Pepcid (famotidine - over the counter) every day while you are taking the steroids.  If you are a diabetic, you should check your blood sugar more frequently while taking prednisone.  Use you use an inhaler or nebulizer or were given one to use, then use as prescribed, or at minimum every 4 hours while you are having shortness of breath.        For pain use acetaminophen (Tylenol) or ibuprofen (Motrin / Advil), unless prescribed medications that have acetaminophen or ibuprofen (or similar medications) in it.  You can take over the counter acetaminophen tablets (1 - 2 tablets of the 500-mg strength every 6 hours) or ibuprofen tablets (2 tablets every 4 hours).    PLEASE RETURN TO THE EMERGENCY DEPARTMENT IMMEDIATELY for worsening symptoms of shortness of breath, wheezing, change in the amount of sputum that you cough up or a change in the color of your sputum, using your inhaler more frequently or if your inhaler only lasts up to 2 hours, or if you develop any concerning symptoms such as: high fever not relieved by acetaminophen (Tylenol) and/or ibuprofen (Motrin / Advil), chills, shortness of breath, chest pain, feeling of your heart fluttering or racing, persistent nausea and/or vomiting, vomiting up blood, blood in your stool, loss of consciousness, numbness, weakness or tingling in the arms or legs or change in color of the extremities, changes in mental status, persistent headache, blurry vision, loss of bladder / bowel control, unable to follow up with your physician, or other any other care or concern.

## 2025-03-25 NOTE — TELEPHONE ENCOUNTER
Noted patient was in ER this morning for dyspnea/respiratory abnormalities, hypokalemia, acute alcoholic intoxication without complication. ETOH level 0.26. Was given MVI/thiamine/K+.  INR there 2.96 (2.6 yesterday in clinic).  Goal INR 2-3 for hypercoagulable state.  Currently on clinic schedule 3/31.  Message forwarded to clinic pharmacist.

## 2025-03-26 ENCOUNTER — PATIENT MESSAGE (OUTPATIENT)
Dept: FAMILY MEDICINE CLINIC | Age: 45
End: 2025-03-26

## 2025-03-26 VITALS
OXYGEN SATURATION: 99 % | RESPIRATION RATE: 13 BRPM | BODY MASS INDEX: 18.2 KG/M2 | HEART RATE: 83 BPM | DIASTOLIC BLOOD PRESSURE: 69 MMHG | SYSTOLIC BLOOD PRESSURE: 111 MMHG | WEIGHT: 130 LBS | HEIGHT: 71 IN | TEMPERATURE: 97.6 F

## 2025-03-26 LAB
ALBUMIN SERPL BCG-MCNC: 3.8 G/DL (ref 3.4–4.9)
ALP SERPL-CCNC: 94 U/L (ref 40–129)
ALT SERPL W/O P-5'-P-CCNC: 40 U/L (ref 10–50)
ANION GAP SERPL CALC-SCNC: 14 MEQ/L (ref 8–16)
ANISOCYTOSIS BLD QL SMEAR: PRESENT
AST SERPL-CCNC: 61 U/L (ref 10–50)
BASOPHILS ABSOLUTE: 0 THOU/MM3 (ref 0–0.1)
BASOPHILS NFR BLD AUTO: 0.8 %
BILIRUB SERPL-MCNC: 0.3 MG/DL (ref 0.3–1.2)
BUN SERPL-MCNC: 8 MG/DL (ref 8–23)
CALCIUM SERPL-MCNC: 8 MG/DL (ref 8.6–10)
CHLORIDE SERPL-SCNC: 106 MEQ/L (ref 98–111)
CO2 SERPL-SCNC: 24 MEQ/L (ref 22–29)
CREAT SERPL-MCNC: 0.7 MG/DL (ref 0.7–1.2)
DEPRECATED RDW RBC AUTO: 73.7 FL (ref 35–45)
EKG ATRIAL RATE: 77 BPM
EKG P AXIS: 84 DEGREES
EKG P-R INTERVAL: 142 MS
EKG Q-T INTERVAL: 394 MS
EKG QRS DURATION: 84 MS
EKG QTC CALCULATION (BAZETT): 445 MS
EKG R AXIS: 82 DEGREES
EKG T AXIS: 81 DEGREES
EKG VENTRICULAR RATE: 77 BPM
EOSINOPHIL NFR BLD AUTO: 0.4 %
EOSINOPHILS ABSOLUTE: 0 THOU/MM3 (ref 0–0.4)
ERYTHROCYTE [DISTWIDTH] IN BLOOD BY AUTOMATED COUNT: 22 % (ref 11.5–14.5)
GFR SERPL CREATININE-BSD FRML MDRD: > 90 ML/MIN/1.73M2
GLUCOSE SERPL-MCNC: 82 MG/DL (ref 74–109)
HCT VFR BLD AUTO: 32 % (ref 42–52)
HGB BLD-MCNC: 10.3 GM/DL (ref 14–18)
IMM GRANULOCYTES # BLD AUTO: 0.02 THOU/MM3 (ref 0–0.07)
IMM GRANULOCYTES NFR BLD AUTO: 0.4 %
LYMPHOCYTES ABSOLUTE: 2.3 THOU/MM3 (ref 1–4.8)
LYMPHOCYTES NFR BLD AUTO: 46.3 %
MCH RBC QN AUTO: 29.5 PG (ref 26–33)
MCHC RBC AUTO-ENTMCNC: 32.2 GM/DL (ref 32.2–35.5)
MCV RBC AUTO: 91.7 FL (ref 80–94)
MONOCYTES ABSOLUTE: 0.4 THOU/MM3 (ref 0.4–1.3)
MONOCYTES NFR BLD AUTO: 8.3 %
NEUTROPHILS ABSOLUTE: 2.1 THOU/MM3 (ref 1.8–7.7)
NEUTROPHILS NFR BLD AUTO: 43.8 %
NRBC BLD AUTO-RTO: 0 /100 WBC
OSMOLALITY SERPL CALC.SUM OF ELEC: 284.3 MOSMOL/KG (ref 275–300)
PLATELET # BLD AUTO: 285 THOU/MM3 (ref 130–400)
PMV BLD AUTO: 8.5 FL (ref 9.4–12.4)
POTASSIUM SERPL-SCNC: 3.5 MEQ/L (ref 3.5–5.2)
PROT SERPL-MCNC: 6.5 G/DL (ref 6.4–8.3)
RBC # BLD AUTO: 3.49 MILL/MM3 (ref 4.7–6.1)
REASON FOR REJECTION: NORMAL
REJECTED TEST: NORMAL
SODIUM SERPL-SCNC: 144 MEQ/L (ref 135–145)
TROPONIN, HIGH SENSITIVITY: 6 NG/L (ref 0–12)
TROPONIN, HIGH SENSITIVITY: 8 NG/L (ref 0–12)
WBC # BLD AUTO: 4.9 THOU/MM3 (ref 4.8–10.8)

## 2025-03-26 PROCEDURE — 84484 ASSAY OF TROPONIN QUANT: CPT

## 2025-03-26 ASSESSMENT — PAIN - FUNCTIONAL ASSESSMENT
PAIN_FUNCTIONAL_ASSESSMENT: NONE - DENIES PAIN

## 2025-03-26 NOTE — ED TRIAGE NOTES
Pt presents to ED from home with complaints of SOB. Pt states he was seen in ED last night, and was sent home with a holter monitor that fell off. Pt is A&Ox4, vital signs assessed.

## 2025-03-26 NOTE — ED PROVIDER NOTES
[facility] EMERGENCY DEPT      Pt Name: Dashawn Rivera  MRN: [id number]  Birthdate 1980  Date of evaluation: 3/25/2025  Provider: Gina Olsen PA-C    CHIEF COMPLAINT       Chief Complaint   Patient presents with    Shortness of Breath       Nurses Notes reviewed and I agree except as noted in the HPI.      HISTORY OF PRESENT ILLNESS    Dashawn Rivera is a 45 y.o. male who presents through the lobby for shortness of breath.  Patient reports he became short of breath a little bit before coming in.  Patient reports being tired and wants to be left alone to sleep.  Patient denies chest pain, cough different than his chronic cough, fever, chills, dizziness, nausea, vomiting, hemoptysis, falls or trauma, or other complaints.  Patient was just seen in the department yesterday as well as multiple times this month for the same complaint.    PAST MEDICAL HISTORY    has a past medical history of DVT (deep venous thrombosis) (HCC), Hematemesis, Liver disease, Seizures (HCC), and Subtherapeutic international normalized ratio (INR).    SURGICAL HISTORY      has a past surgical history that includes Colonoscopy (N/A, 12/17/2019); IR GUIDED THROMB MECH VEIN (10/1/2024); IR GUIDED IVC FILTER PLACEMENT (10/2/2024); and Upper gastrointestinal endoscopy (N/A, 1/31/2025).    CURRENT MEDICATIONS       Previous Medications    ALBUTEROL (PROVENTIL) (2.5 MG/3ML) 0.083% NEBULIZER SOLUTION    Take 3 mLs by nebulization every 6 hours as needed for Wheezing    ALBUTEROL SULFATE HFA (PROVENTIL;VENTOLIN;PROAIR) 108 (90 BASE) MCG/ACT INHALER    Inhale 2 puffs into the lungs every 6 hours as needed for Wheezing    FOLIC ACID (FOLVITE) 1 MG TABLET    Take 1 tablet by mouth daily    LEVETIRACETAM (KEPPRA) 500 MG TABLET    Take 1 tablet by mouth 2 times daily    MULTIPLE VITAMIN (MULTIVITAMIN) TABS TABLET    Take 1 tablet by mouth daily    NALTREXONE (DEPADE) 50 MG TABLET    Take 1 tablet by mouth daily    PANTOPRAZOLE (PROTONIX) 40 MG TABLET

## 2025-03-26 NOTE — TELEPHONE ENCOUNTER
INR within target range in ED on 3/25. Scheduled for next check 3/31. Will touch base with pt at that apptmt.

## 2025-04-07 ENCOUNTER — TELEPHONE (OUTPATIENT)
Age: 45
End: 2025-04-07

## 2025-04-07 DIAGNOSIS — I82.4Y3 DEEP VEIN THROMBOSIS (DVT) OF PROXIMAL VEIN OF BOTH LOWER EXTREMITIES, UNSPECIFIED CHRONICITY: ICD-10-CM

## 2025-04-07 DIAGNOSIS — Z79.01 ANTICOAGULATED ON COUMADIN: ICD-10-CM

## 2025-04-07 DIAGNOSIS — I26.99 ACUTE PULMONARY EMBOLISM WITHOUT ACUTE COR PULMONALE, UNSPECIFIED PULMONARY EMBOLISM TYPE (HCC): Primary | ICD-10-CM

## 2025-04-07 RX ORDER — WARFARIN SODIUM 5 MG/1
TABLET ORAL
Qty: 30 TABLET | Refills: 0 | Status: SHIPPED | OUTPATIENT
Start: 2025-04-07

## 2025-04-07 RX ORDER — ENOXAPARIN SODIUM 100 MG/ML
60 INJECTION SUBCUTANEOUS 2 TIMES DAILY
Qty: 10 EACH | Refills: 0 | Status: SHIPPED | OUTPATIENT
Start: 2025-04-07

## 2025-04-07 NOTE — TELEPHONE ENCOUNTER
Patient called clinic (from [phone]) to report that he had a family emergency in [place] and he would not be back in the area until 4/17.  He reports that he did not bring any of his medication with him and he last took coumadin on 3/30.  Patient no showed to his clinic appt on 3/31.      Patient is on warfarin for hypercoaguable state with PE and DVT 10/2024.  Patient has hx of noncompliance and labile INRs. Hx daily alcohol use.    Sent Rx for Lovenox 60 mg subq BID x 5 days and warfarin 5 mg tablets to [facility] in [place], [place].  Instructed patient to take warfarin 20 mg x 1, 15 mg x 2, then resume warfarin 10 mg daily.  Scheduled patient as soon as possible to be seen in clinic on 4/17.  Patient repeated back instructions.     For Pharmacy Admin Tracking Only    Intervention Detail: New Rx: 1, reason: Needs Additional Therapy and Refill(s) Provided  Total # of Interventions Recommended: 2  Total # of Interventions Accepted: 2  Time Spent (min): 30      Melissa Jang, NickD, BCPS  4/7/2025  11:13 AM

## 2025-04-08 NOTE — TELEPHONE ENCOUNTER
Prior auth message received, but states that \"[employer] has predicted that this prior auth will not be required\".  Prior auth closed.

## 2025-04-09 ENCOUNTER — TELEPHONE (OUTPATIENT)
Age: 45
End: 2025-04-09

## 2025-04-09 NOTE — TELEPHONE ENCOUNTER
Patient is out-of-town in [place] (see TC 4/7/25) and clinic had previously prescribed Lovenox and Coumadin for patient to a local pharmacy there.    Patient called today stating that he was able to get the Lovenox, but not the Coumadin as the pharmacy was stating refill too soon. Asked patient to follow-up with pharmacy regarding Coumadin prescription to see if he can pay cash price to get the script. He states he would and will call clinic back.    Followed-up with patient. He was able to get the Coumadin tablets today. He will now follow instructions from 4/7/25 TC until appointment 4/17/25 @ 2:40 PM.    Miguel Merritt, Charo, BCPS  4/9/2025  4:25 PM

## 2025-04-16 ENCOUNTER — TELEPHONE (OUTPATIENT)
Age: 45
End: 2025-04-16

## 2025-04-16 NOTE — TELEPHONE ENCOUNTER
Patient called and rescheduled his appointment.  Patient is still out of state.  Rescheduled him for Tuesday and he states he still has meds and is taking they way he was told to do so.   Told patient he needs to keep this appt.

## 2025-04-17 ENCOUNTER — APPOINTMENT (OUTPATIENT)
Age: 45
End: 2025-04-17
Payer: MEDICAID

## 2025-04-22 ENCOUNTER — ANTI-COAG VISIT (OUTPATIENT)
Age: 45
End: 2025-04-22
Payer: MEDICAID

## 2025-04-22 DIAGNOSIS — Z51.81 ENCOUNTER FOR THERAPEUTIC DRUG MONITORING: ICD-10-CM

## 2025-04-22 DIAGNOSIS — I82.4Y3 DEEP VEIN THROMBOSIS (DVT) OF PROXIMAL VEIN OF BOTH LOWER EXTREMITIES, UNSPECIFIED CHRONICITY (HCC): ICD-10-CM

## 2025-04-22 DIAGNOSIS — I26.99 ACUTE PULMONARY EMBOLISM WITHOUT ACUTE COR PULMONALE, UNSPECIFIED PULMONARY EMBOLISM TYPE (HCC): Primary | ICD-10-CM

## 2025-04-22 DIAGNOSIS — Z79.01 ANTICOAGULATED ON COUMADIN: ICD-10-CM

## 2025-04-22 LAB — POC INR: 1.2 (ref 0.8–1.2)

## 2025-04-22 PROCEDURE — 99212 OFFICE O/P EST SF 10 MIN: CPT | Performed by: PHARMACIST

## 2025-04-22 PROCEDURE — 85610 PROTHROMBIN TIME: CPT | Performed by: PHARMACIST

## 2025-04-22 RX ORDER — WARFARIN SODIUM 5 MG/1
TABLET ORAL
Qty: 60 TABLET | Refills: 0 | Status: SHIPPED | OUTPATIENT
Start: 2025-04-22

## 2025-04-22 NOTE — PATIENT INSTRUCTIONS
FIND A COUMADIN CLINIC IN [place], WORK ON THIS NOW    WE SENT A 30 DAY REFILL OF WARFARIN TO YOUR PHARMACY, YOU WILL NEED A NEW PROVIDER WITHIN 30 DAYS    WHEN YOU FIND A COUMADIN CLINIC, YOU CAN SEE IF G[facility]ETH [facility]ILLAL[facility] [phone] WILL SEND A REFERRAL.  YOU WILL EVENTUALLY NEED A NEW PROVIDER IN [place].    Go to lab on WEDNESDAY 4/30/25 for INR draw.  We will call you on 5/1/25 to complete a phone visit.  Get the name and the phone number of the lab.

## 2025-04-22 NOTE — PROGRESS NOTES
Medication Management Clinic  [facility]  Anticoagulation Clinic  [phone] (phone)  [phone] (fax)    Mr. Dashawn Rivera is a 45 y.o.  male with history of DVT, PE who presents today for anticoagulation monitoring and adjustment.    Patient verifies current dosing regimen and tablet strength.  Thinks he missed his dose on Sunday. Based on 30 tab RX that was sent on 4/7, pt likely missed several doses as he would have ran out of tablets last week.   Patient denies s/s bleeding/bruising/swelling/SOB  No blood in urine or stool.  No dietary changes.  No changes in medication/OTC agents/Herbals. - Taking no medications.   No change in tobacco use. Drinking less alcohol, 1 beer and a double shot per day.  No change in activity level.  Patient denies falls.  No vomiting/diarrhea or acute illness.   No Procedures scheduled in the future at this time.  Has 3 syringes of Lovenox left. States when he picked up his warfarin, the outpt pharmacy told him to stop Lovenox.       States he is moving tomorrow to [place], [place]. Recommended recheck INR in clinic within 1 week, patient is unable to do this. Advised pt that he needs to find Coumadin clinic in his new location.  He is out of warfarin.  Sent 30 day refill to pharmacy.  Advised pt that we will be unable to send refills after that.  He needs to find a new provider ASAP, pt voiced understanding. Will give patient a lab order for INR.  Explained to patient that we will be unable to do this long term, he voiced understanding.       Assessment:   Lab Results   Component Value Date    INR 1.20 04/22/2025    INR 2.96 (H) 03/25/2025    INR 2.60 (H) 03/24/2025     INR subtherapeutic   Recent Labs     04/22/25  1542   INR 1.20         INR goal: 2.0-3.0    Plan:  Continue Lovenox 60mg SQ Q12h, using his remaining syringes. Coumadin 20mg today and tomorrow, then continue Coumadin 10mg daily.  Recheck INR in 1 week(s) via outpt  lab order. Pt is aware to go

## 2025-04-29 ENCOUNTER — TELEPHONE (OUTPATIENT)
Age: 45
End: 2025-04-29

## 2025-04-29 NOTE — TELEPHONE ENCOUNTER
INR received from [facility]. INR was 1.6 on 4/28.  Pt had been instructed to go to lab on 4/30.  Called pt to discuss result.  Both calls stopped ringing after a few rings, and did not go to  and were not answered.

## 2025-05-01 NOTE — TELEPHONE ENCOUNTER
Attempted to call patient at [phone], call would not go through. Number was verified with patient at last appt.      Tried other number on chart, [phone], number rings busy.      Unable to reach patient.  Will wait for patient to contact clinic. Patient informed us at last visit on 4/22/25 that he was moving to [place] the next day.  Patient was instructed at 4/22/25 visit that he needed to establish with a new provider in [place] for anticoagulation management ASAP.

## 2025-05-05 ENCOUNTER — TELEPHONE (OUTPATIENT)
Age: 45
End: 2025-05-05

## 2025-05-05 NOTE — TELEPHONE ENCOUNTER
Received request from [facility] in [place] for medical records as they are taking over INR management for patient. Faxed records. Will discharge from clinic.    Miguel Merritt, NickD, BCPS  5/5/2025  11:25 AM

## 2025-07-03 ENCOUNTER — HOSPITAL ENCOUNTER (EMERGENCY)
Age: 45
Discharge: LEFT BEFORE DISCHARGE | End: 2025-07-03
Payer: MEDICAID

## 2025-07-03 ENCOUNTER — APPOINTMENT (OUTPATIENT)
Dept: CT IMAGING | Age: 45
End: 2025-07-03
Payer: MEDICAID

## 2025-07-03 ENCOUNTER — APPOINTMENT (OUTPATIENT)
Dept: GENERAL RADIOLOGY | Age: 45
End: 2025-07-03
Payer: MEDICAID

## 2025-07-03 VITALS
RESPIRATION RATE: 20 BRPM | HEIGHT: 71 IN | WEIGHT: 140 LBS | SYSTOLIC BLOOD PRESSURE: 121 MMHG | DIASTOLIC BLOOD PRESSURE: 82 MMHG | HEART RATE: 112 BPM | BODY MASS INDEX: 19.6 KG/M2 | OXYGEN SATURATION: 95 % | TEMPERATURE: 99.3 F

## 2025-07-03 DIAGNOSIS — Z53.21 ELOPED FROM EMERGENCY DEPARTMENT: Primary | ICD-10-CM

## 2025-07-03 LAB
ALBUMIN SERPL BCG-MCNC: 4.6 G/DL (ref 3.4–4.9)
ALP SERPL-CCNC: 98 U/L (ref 40–129)
ALT SERPL W/O P-5'-P-CCNC: 25 U/L (ref 10–50)
ANION GAP SERPL CALC-SCNC: 25 MEQ/L (ref 8–16)
AST SERPL-CCNC: 49 U/L (ref 10–50)
BASOPHILS ABSOLUTE: 0.1 THOU/MM3 (ref 0–0.1)
BASOPHILS NFR BLD AUTO: 1.2 %
BILIRUB CONJ SERPL-MCNC: 0.2 MG/DL (ref 0–0.2)
BILIRUB SERPL-MCNC: 0.5 MG/DL (ref 0.3–1.2)
BUN SERPL-MCNC: 10 MG/DL (ref 8–23)
CALCIUM SERPL-MCNC: 9.4 MG/DL (ref 8.6–10)
CHLORIDE SERPL-SCNC: 94 MEQ/L (ref 98–111)
CO2 SERPL-SCNC: 22 MEQ/L (ref 22–29)
CREAT SERPL-MCNC: 0.9 MG/DL (ref 0.7–1.2)
DEPRECATED RDW RBC AUTO: 58.8 FL (ref 35–45)
EKG ATRIAL RATE: 110 BPM
EKG P AXIS: 78 DEGREES
EKG P-R INTERVAL: 130 MS
EKG Q-T INTERVAL: 318 MS
EKG QRS DURATION: 74 MS
EKG QTC CALCULATION (BAZETT): 430 MS
EKG R AXIS: 94 DEGREES
EKG T AXIS: 48 DEGREES
EKG VENTRICULAR RATE: 110 BPM
EOSINOPHIL NFR BLD AUTO: 0.7 %
EOSINOPHILS ABSOLUTE: 0 THOU/MM3 (ref 0–0.4)
ERYTHROCYTE [DISTWIDTH] IN BLOOD BY AUTOMATED COUNT: 20.1 % (ref 11.5–14.5)
GFR SERPL CREATININE-BSD FRML MDRD: > 90 ML/MIN/1.73M2
GLUCOSE SERPL-MCNC: 112 MG/DL (ref 74–109)
HCT VFR BLD AUTO: 44.4 % (ref 42–52)
HGB BLD-MCNC: 15.3 GM/DL (ref 14–18)
IMM GRANULOCYTES # BLD AUTO: 0 THOU/MM3 (ref 0–0.07)
IMM GRANULOCYTES NFR BLD AUTO: 0 %
INR PPP: 1.1 (ref 0.85–1.13)
LIPASE SERPL-CCNC: 14 U/L (ref 13–60)
LYMPHOCYTES ABSOLUTE: 2.2 THOU/MM3 (ref 1–4.8)
LYMPHOCYTES NFR BLD AUTO: 39.3 %
MAGNESIUM SERPL-MCNC: 1.8 MG/DL (ref 1.6–2.6)
MCH RBC QN AUTO: 29.3 PG (ref 26–33)
MCHC RBC AUTO-ENTMCNC: 34.5 GM/DL (ref 32.2–35.5)
MCV RBC AUTO: 84.9 FL (ref 80–94)
MONOCYTES ABSOLUTE: 0.9 THOU/MM3 (ref 0.4–1.3)
MONOCYTES NFR BLD AUTO: 15.5 %
NEUTROPHILS ABSOLUTE: 2.5 THOU/MM3 (ref 1.8–7.7)
NEUTROPHILS NFR BLD AUTO: 43.3 %
NRBC BLD AUTO-RTO: 0 /100 WBC
NT-PROBNP SERPL IA-MCNC: < 36 PG/ML (ref 0–124)
OSMOLALITY SERPL CALC.SUM OF ELEC: 281.1 MOSMOL/KG (ref 275–300)
PLATELET # BLD AUTO: 349 THOU/MM3 (ref 130–400)
PMV BLD AUTO: 8.9 FL (ref 9.4–12.4)
POTASSIUM SERPL-SCNC: 4.4 MEQ/L (ref 3.5–5.2)
PROT SERPL-MCNC: 7.9 G/DL (ref 6.4–8.3)
PROTHROMBIN TIME: 12.5 SECONDS (ref 10–13.5)
RBC # BLD AUTO: 5.23 MILL/MM3 (ref 4.7–6.1)
SODIUM SERPL-SCNC: 141 MEQ/L (ref 135–145)
TROPONIN, HIGH SENSITIVITY: < 6 NG/L (ref 0–12)
TSH SERPL DL<=0.05 MIU/L-ACNC: 1.65 UIU/ML (ref 0.27–4.2)
WBC # BLD AUTO: 5.7 THOU/MM3 (ref 4.8–10.8)

## 2025-07-03 PROCEDURE — 84443 ASSAY THYROID STIM HORMONE: CPT

## 2025-07-03 PROCEDURE — 82248 BILIRUBIN DIRECT: CPT

## 2025-07-03 PROCEDURE — 84484 ASSAY OF TROPONIN QUANT: CPT

## 2025-07-03 PROCEDURE — 83880 ASSAY OF NATRIURETIC PEPTIDE: CPT

## 2025-07-03 PROCEDURE — 85610 PROTHROMBIN TIME: CPT

## 2025-07-03 PROCEDURE — 83735 ASSAY OF MAGNESIUM: CPT

## 2025-07-03 PROCEDURE — 80053 COMPREHEN METABOLIC PANEL: CPT

## 2025-07-03 PROCEDURE — 83690 ASSAY OF LIPASE: CPT

## 2025-07-03 PROCEDURE — 93010 ELECTROCARDIOGRAM REPORT: CPT | Performed by: INTERNAL MEDICINE

## 2025-07-03 PROCEDURE — 99284 EMERGENCY DEPT VISIT MOD MDM: CPT

## 2025-07-03 PROCEDURE — 93005 ELECTROCARDIOGRAM TRACING: CPT | Performed by: NURSE PRACTITIONER

## 2025-07-03 PROCEDURE — 85025 COMPLETE CBC W/AUTO DIFF WBC: CPT

## 2025-07-03 PROCEDURE — 36415 COLL VENOUS BLD VENIPUNCTURE: CPT

## 2025-07-03 RX ORDER — IOPAMIDOL 755 MG/ML
80 INJECTION, SOLUTION INTRAVASCULAR
Status: DISCONTINUED | OUTPATIENT
Start: 2025-07-03 | End: 2025-07-03 | Stop reason: HOSPADM

## 2025-07-03 ASSESSMENT — PAIN - FUNCTIONAL ASSESSMENT: PAIN_FUNCTIONAL_ASSESSMENT: NONE - DENIES PAIN

## 2025-07-03 NOTE — ED TRIAGE NOTES
Pt to the ED with c/o loss of consciousness and medication reaction. Pt reports this started 20 minutes PTA. Pt reports it started while he was outside walking. Pt reports they recently changed his blood thinner from Coumadin to Eliquis and patient reports he stared the Eliquis yesterday. EKG obtained.

## 2025-07-03 NOTE — ED PROVIDER NOTES
understanding and is in agreement.     ED Medications administered this visit:  (None if blank)  Medications - No data to display      CONSULTS:  None    PROCEDURES: (None if blank)  Procedures:     CRITICAL CARE: (None if blank)      DISCHARGE PRESCRIPTIONS: (None if blank)  Discharge Medication List as of 7/3/2025  7:06 PM          FINAL IMPRESSION      1. Eloped from emergency department          DISPOSITION/PLAN   DISPOSITION Eloped - Left Before Treatment Complete 07/03/2025 07:05:07 PM               OUTPATIENT FOLLOW UP THE PATIENT:  No follow-up provider specified.    Grazyna Tam, MARIA ALEJANDRA - Grazyna Mitchell APRN - YEN  07/04/25 0219

## 2025-07-26 LAB — ECHO BSA: 1.78 M2

## (undated) DEVICE — CATHETER ETER IV 22GA L1IN POLYUR STR RADPQ INTROCAN SFTY

## (undated) DEVICE — TRAP POLYP ETRAP

## (undated) DEVICE — 4-PORT MANIFOLD: Brand: NEPTUNE 2

## (undated) DEVICE — SNARE POLYP SM W13MMXL240CM SHTH DIA2.4MM OVL FLX DISP

## (undated) DEVICE — KIT INF CTRL 2OZ LUB TBNG L12FT DBL END BRSH SYR OP4

## (undated) DEVICE — IV START KIT: Brand: MEDLINE INDUSTRIES, INC.

## (undated) DEVICE — TUBING IV STOPCOCK 48 CM 3 W

## (undated) DEVICE — SET ADMIN 25ML L117IN PMP MOD CK VLV RLER CLMP 2 SMRTSITE

## (undated) DEVICE — PATIENT RETURN ELECTRODE, SINGLE-USE, CONTACT QUALITY MONITORING, ADULT, WITH 9FT CORD, FOR PATIENTS WEIGING OVER 33LBS. (15KG): Brand: MEGADYNE

## (undated) DEVICE — SOLUTION IV 1000ML 0.45% SOD CHL PH 5 INJ USP VIAFLX PLAS

## (undated) DEVICE — SOLUTION IV IRRIG WATER 500ML POUR BRL ST 2F7113

## (undated) DEVICE — SET LNR RED GRN W/ BASE CLEANASCOPE